# Patient Record
Sex: MALE | Race: WHITE | Employment: OTHER | URBAN - METROPOLITAN AREA
[De-identification: names, ages, dates, MRNs, and addresses within clinical notes are randomized per-mention and may not be internally consistent; named-entity substitution may affect disease eponyms.]

---

## 2017-01-18 ENCOUNTER — OFFICE VISIT (OUTPATIENT)
Dept: ORTHOPEDIC SURGERY | Age: 57
End: 2017-01-18

## 2017-01-18 VITALS
HEIGHT: 68 IN | WEIGHT: 199.08 LBS | BODY MASS INDEX: 30.17 KG/M2 | HEART RATE: 69 BPM | SYSTOLIC BLOOD PRESSURE: 106 MMHG | DIASTOLIC BLOOD PRESSURE: 67 MMHG

## 2017-01-18 DIAGNOSIS — M47.22 CERVICAL SPONDYLOSIS WITH RADICULOPATHY: Primary | ICD-10-CM

## 2017-01-18 PROCEDURE — 1036F TOBACCO NON-USER: CPT | Performed by: PHYSICAL MEDICINE & REHABILITATION

## 2017-01-18 PROCEDURE — 99213 OFFICE O/P EST LOW 20 MIN: CPT | Performed by: PHYSICAL MEDICINE & REHABILITATION

## 2017-01-18 PROCEDURE — 3017F COLORECTAL CA SCREEN DOC REV: CPT | Performed by: PHYSICAL MEDICINE & REHABILITATION

## 2017-01-18 PROCEDURE — G8427 DOCREV CUR MEDS BY ELIG CLIN: HCPCS | Performed by: PHYSICAL MEDICINE & REHABILITATION

## 2017-01-18 PROCEDURE — G8419 CALC BMI OUT NRM PARAM NOF/U: HCPCS | Performed by: PHYSICAL MEDICINE & REHABILITATION

## 2017-01-18 PROCEDURE — G8599 NO ASA/ANTIPLAT THER USE RNG: HCPCS | Performed by: PHYSICAL MEDICINE & REHABILITATION

## 2017-01-18 PROCEDURE — G8484 FLU IMMUNIZE NO ADMIN: HCPCS | Performed by: PHYSICAL MEDICINE & REHABILITATION

## 2017-01-26 ENCOUNTER — OFFICE VISIT (OUTPATIENT)
Dept: ORTHOPEDIC SURGERY | Age: 57
End: 2017-01-26

## 2017-01-26 VITALS
HEIGHT: 68 IN | DIASTOLIC BLOOD PRESSURE: 87 MMHG | BODY MASS INDEX: 30.17 KG/M2 | WEIGHT: 199.08 LBS | SYSTOLIC BLOOD PRESSURE: 134 MMHG | HEART RATE: 61 BPM

## 2017-01-26 DIAGNOSIS — M47.22 CERVICAL SPONDYLOSIS WITH RADICULOPATHY: Primary | ICD-10-CM

## 2017-01-26 PROCEDURE — 3017F COLORECTAL CA SCREEN DOC REV: CPT | Performed by: ORTHOPAEDIC SURGERY

## 2017-01-26 PROCEDURE — 1036F TOBACCO NON-USER: CPT | Performed by: ORTHOPAEDIC SURGERY

## 2017-01-26 PROCEDURE — G8599 NO ASA/ANTIPLAT THER USE RNG: HCPCS | Performed by: ORTHOPAEDIC SURGERY

## 2017-01-26 PROCEDURE — G8419 CALC BMI OUT NRM PARAM NOF/U: HCPCS | Performed by: ORTHOPAEDIC SURGERY

## 2017-01-26 PROCEDURE — G8427 DOCREV CUR MEDS BY ELIG CLIN: HCPCS | Performed by: ORTHOPAEDIC SURGERY

## 2017-01-26 PROCEDURE — 99213 OFFICE O/P EST LOW 20 MIN: CPT | Performed by: ORTHOPAEDIC SURGERY

## 2017-01-26 PROCEDURE — G8484 FLU IMMUNIZE NO ADMIN: HCPCS | Performed by: ORTHOPAEDIC SURGERY

## 2017-01-27 ENCOUNTER — TELEPHONE (OUTPATIENT)
Dept: CARDIOLOGY CLINIC | Age: 57
End: 2017-01-27

## 2017-01-30 ENCOUNTER — TELEPHONE (OUTPATIENT)
Dept: ORTHOPEDIC SURGERY | Age: 57
End: 2017-01-30

## 2017-01-30 ENCOUNTER — TELEPHONE (OUTPATIENT)
Dept: CARDIOLOGY CLINIC | Age: 57
End: 2017-01-30

## 2017-02-06 ENCOUNTER — HOSPITAL ENCOUNTER (OUTPATIENT)
Dept: OTHER | Age: 57
Discharge: HOME | End: 2017-02-06
Attending: FAMILY MEDICINE | Admitting: FAMILY MEDICINE

## 2017-02-06 DIAGNOSIS — M25.511 ARTHRALGIA OF RIGHT ACROMIOCLAVICULAR JOINT: ICD-10-CM

## 2017-02-09 ENCOUNTER — TELEPHONE (OUTPATIENT)
Dept: ORTHOPEDIC SURGERY | Age: 57
End: 2017-02-09

## 2017-02-22 ENCOUNTER — OFFICE VISIT (OUTPATIENT)
Dept: ORTHOPEDIC SURGERY | Age: 57
End: 2017-02-22

## 2017-02-22 VITALS
HEART RATE: 66 BPM | SYSTOLIC BLOOD PRESSURE: 115 MMHG | DIASTOLIC BLOOD PRESSURE: 76 MMHG | HEIGHT: 69 IN | BODY MASS INDEX: 29.49 KG/M2 | WEIGHT: 199.08 LBS

## 2017-02-22 DIAGNOSIS — Z98.1 S/P CERVICAL SPINAL FUSION: Primary | ICD-10-CM

## 2017-02-22 PROCEDURE — 72040 X-RAY EXAM NECK SPINE 2-3 VW: CPT | Performed by: ORTHOPAEDIC SURGERY

## 2017-02-22 PROCEDURE — 99024 POSTOP FOLLOW-UP VISIT: CPT | Performed by: ORTHOPAEDIC SURGERY

## 2017-02-22 RX ORDER — OXYCODONE HYDROCHLORIDE AND ACETAMINOPHEN 5; 325 MG/1; MG/1
1 TABLET ORAL EVERY 6 HOURS PRN
Qty: 50 TABLET | Refills: 0 | Status: SHIPPED | OUTPATIENT
Start: 2017-02-22 | End: 2017-04-05 | Stop reason: SDUPTHER

## 2017-03-31 ENCOUNTER — TELEPHONE (OUTPATIENT)
Dept: ORTHOPEDIC SURGERY | Age: 57
End: 2017-03-31

## 2017-04-05 ENCOUNTER — OFFICE VISIT (OUTPATIENT)
Dept: ORTHOPEDIC SURGERY | Age: 57
End: 2017-04-05

## 2017-04-05 VITALS — WEIGHT: 199 LBS | HEIGHT: 68 IN | HEART RATE: 71 BPM | BODY MASS INDEX: 30.16 KG/M2

## 2017-04-05 DIAGNOSIS — Z98.1 S/P CERVICAL SPINAL FUSION: Primary | ICD-10-CM

## 2017-04-05 DIAGNOSIS — M25.512 ACUTE PAIN OF LEFT SHOULDER: ICD-10-CM

## 2017-04-05 PROCEDURE — 99024 POSTOP FOLLOW-UP VISIT: CPT | Performed by: PHYSICIAN ASSISTANT

## 2017-04-05 PROCEDURE — 72040 X-RAY EXAM NECK SPINE 2-3 VW: CPT | Performed by: PHYSICIAN ASSISTANT

## 2017-04-05 PROCEDURE — 73030 X-RAY EXAM OF SHOULDER: CPT | Performed by: PHYSICIAN ASSISTANT

## 2017-04-05 RX ORDER — DEXAMETHASONE 6 MG/1
6 TABLET ORAL 2 TIMES DAILY
Qty: 14 TABLET | Refills: 0 | Status: SHIPPED | OUTPATIENT
Start: 2017-04-05 | End: 2017-04-12

## 2017-04-05 RX ORDER — OXYCODONE HYDROCHLORIDE AND ACETAMINOPHEN 5; 325 MG/1; MG/1
TABLET ORAL
Qty: 50 TABLET | Refills: 0 | Status: SHIPPED | OUTPATIENT
Start: 2017-04-05 | End: 2018-01-04

## 2017-05-16 ENCOUNTER — OFFICE VISIT (OUTPATIENT)
Dept: ORTHOPEDIC SURGERY | Age: 57
End: 2017-05-16

## 2017-05-16 VITALS
DIASTOLIC BLOOD PRESSURE: 68 MMHG | SYSTOLIC BLOOD PRESSURE: 98 MMHG | HEIGHT: 68 IN | HEART RATE: 75 BPM | WEIGHT: 199.08 LBS | BODY MASS INDEX: 30.17 KG/M2

## 2017-05-16 DIAGNOSIS — Z98.1 S/P CERVICAL SPINAL FUSION: Primary | ICD-10-CM

## 2017-05-16 PROCEDURE — 99024 POSTOP FOLLOW-UP VISIT: CPT | Performed by: ORTHOPAEDIC SURGERY

## 2017-05-16 PROCEDURE — 72040 X-RAY EXAM NECK SPINE 2-3 VW: CPT | Performed by: ORTHOPAEDIC SURGERY

## 2017-05-17 ENCOUNTER — OFFICE VISIT (OUTPATIENT)
Dept: CARDIOLOGY CLINIC | Age: 57
End: 2017-05-17

## 2017-05-17 VITALS
DIASTOLIC BLOOD PRESSURE: 72 MMHG | WEIGHT: 198.8 LBS | SYSTOLIC BLOOD PRESSURE: 108 MMHG | HEART RATE: 66 BPM | HEIGHT: 68 IN | BODY MASS INDEX: 30.13 KG/M2

## 2017-05-17 DIAGNOSIS — E78.2 MIXED HYPERLIPIDEMIA: ICD-10-CM

## 2017-05-17 DIAGNOSIS — I25.118 CORONARY ARTERY DISEASE OF NATIVE ARTERY OF NATIVE HEART WITH STABLE ANGINA PECTORIS (HCC): Primary | ICD-10-CM

## 2017-05-17 PROCEDURE — G8417 CALC BMI ABV UP PARAM F/U: HCPCS | Performed by: INTERNAL MEDICINE

## 2017-05-17 PROCEDURE — 1036F TOBACCO NON-USER: CPT | Performed by: INTERNAL MEDICINE

## 2017-05-17 PROCEDURE — 99214 OFFICE O/P EST MOD 30 MIN: CPT | Performed by: INTERNAL MEDICINE

## 2017-05-17 PROCEDURE — 3017F COLORECTAL CA SCREEN DOC REV: CPT | Performed by: INTERNAL MEDICINE

## 2017-05-17 PROCEDURE — G8599 NO ASA/ANTIPLAT THER USE RNG: HCPCS | Performed by: INTERNAL MEDICINE

## 2017-05-17 PROCEDURE — G8427 DOCREV CUR MEDS BY ELIG CLIN: HCPCS | Performed by: INTERNAL MEDICINE

## 2017-05-18 ENCOUNTER — HOSPITAL ENCOUNTER (OUTPATIENT)
Dept: OTHER | Age: 57
Discharge: HOME | End: 2017-05-18
Attending: INTERNAL MEDICINE | Admitting: INTERNAL MEDICINE

## 2017-05-18 LAB
ALBUMIN SERPL-MCNC: 4.1 G/DL (ref 3.4–5)
ALP BLD-CCNC: 108 U/L (ref 40–129)
ALT SERPL-CCNC: 21 U/L (ref 10–40)
AST SERPL-CCNC: 15 U/L (ref 15–37)
BILIRUB SERPL-MCNC: 0.4 MG/DL (ref 0–1)
BILIRUBIN DIRECT: <0.2 MG/DL (ref 0–0.3)
BILIRUBIN, INDIRECT: NORMAL MG/DL (ref 0–1)
CHOLESTEROL, FASTING: 200 MG/DL (ref 0–199)
HDLC SERPL-MCNC: 25 MG/DL (ref 40–60)
LDL CHOLESTEROL CALCULATED: 138 MG/DL
TOTAL PROTEIN: 6.7 G/DL (ref 6.4–8.2)
TRIGLYCERIDE, FASTING: 187 MG/DL (ref 0–150)
VLDLC SERPL CALC-MCNC: 37 MG/DL

## 2017-05-19 ENCOUNTER — TELEPHONE (OUTPATIENT)
Dept: CARDIOLOGY CLINIC | Age: 57
End: 2017-05-19

## 2017-05-19 RX ORDER — ATORVASTATIN CALCIUM 80 MG/1
80 TABLET, FILM COATED ORAL DAILY
Qty: 90 TABLET | Refills: 3 | Status: SHIPPED | OUTPATIENT
Start: 2017-05-19 | End: 2018-06-06 | Stop reason: SDUPTHER

## 2017-05-22 ENCOUNTER — OFFICE VISIT (OUTPATIENT)
Dept: ORTHOPEDIC SURGERY | Age: 57
End: 2017-05-22

## 2017-05-22 VITALS
HEART RATE: 66 BPM | WEIGHT: 198.85 LBS | SYSTOLIC BLOOD PRESSURE: 137 MMHG | BODY MASS INDEX: 30.14 KG/M2 | HEIGHT: 68 IN | DIASTOLIC BLOOD PRESSURE: 89 MMHG

## 2017-05-22 DIAGNOSIS — G89.29 CHRONIC LEFT SHOULDER PAIN: Primary | ICD-10-CM

## 2017-05-22 DIAGNOSIS — M25.512 CHRONIC LEFT SHOULDER PAIN: Primary | ICD-10-CM

## 2017-05-22 PROCEDURE — G8427 DOCREV CUR MEDS BY ELIG CLIN: HCPCS | Performed by: ORTHOPAEDIC SURGERY

## 2017-05-22 PROCEDURE — 99213 OFFICE O/P EST LOW 20 MIN: CPT | Performed by: ORTHOPAEDIC SURGERY

## 2017-05-22 PROCEDURE — 3017F COLORECTAL CA SCREEN DOC REV: CPT | Performed by: ORTHOPAEDIC SURGERY

## 2017-05-22 PROCEDURE — G8599 NO ASA/ANTIPLAT THER USE RNG: HCPCS | Performed by: ORTHOPAEDIC SURGERY

## 2017-05-22 PROCEDURE — G8417 CALC BMI ABV UP PARAM F/U: HCPCS | Performed by: ORTHOPAEDIC SURGERY

## 2017-05-22 PROCEDURE — 1036F TOBACCO NON-USER: CPT | Performed by: ORTHOPAEDIC SURGERY

## 2017-05-23 ENCOUNTER — HOSPITAL ENCOUNTER (OUTPATIENT)
Dept: MRI IMAGING | Age: 57
Discharge: HOME | End: 2017-05-23
Attending: ORTHOPAEDIC SURGERY | Admitting: ORTHOPAEDIC SURGERY

## 2017-05-23 DIAGNOSIS — M25.512 CHRONIC LEFT SHOULDER PAIN: ICD-10-CM

## 2017-05-23 DIAGNOSIS — G89.29 CHRONIC LEFT SHOULDER PAIN: ICD-10-CM

## 2017-05-23 DIAGNOSIS — M25.512 PAIN IN LEFT SHOULDER: ICD-10-CM

## 2017-05-25 ENCOUNTER — TELEPHONE (OUTPATIENT)
Dept: CARDIOLOGY CLINIC | Age: 57
End: 2017-05-25

## 2017-05-25 ENCOUNTER — TELEPHONE (OUTPATIENT)
Dept: ORTHOPEDIC SURGERY | Age: 57
End: 2017-05-25

## 2017-05-25 ENCOUNTER — OFFICE VISIT (OUTPATIENT)
Dept: ORTHOPEDIC SURGERY | Age: 57
End: 2017-05-25

## 2017-05-25 VITALS
WEIGHT: 198.85 LBS | HEIGHT: 68 IN | DIASTOLIC BLOOD PRESSURE: 53 MMHG | BODY MASS INDEX: 30.14 KG/M2 | HEART RATE: 67 BPM | SYSTOLIC BLOOD PRESSURE: 74 MMHG

## 2017-05-25 DIAGNOSIS — M75.122 COMPLETE TEAR OF LEFT ROTATOR CUFF: Primary | ICD-10-CM

## 2017-05-25 PROCEDURE — G8427 DOCREV CUR MEDS BY ELIG CLIN: HCPCS | Performed by: ORTHOPAEDIC SURGERY

## 2017-05-25 PROCEDURE — G8417 CALC BMI ABV UP PARAM F/U: HCPCS | Performed by: ORTHOPAEDIC SURGERY

## 2017-05-25 PROCEDURE — 99213 OFFICE O/P EST LOW 20 MIN: CPT | Performed by: ORTHOPAEDIC SURGERY

## 2017-05-25 PROCEDURE — 1036F TOBACCO NON-USER: CPT | Performed by: ORTHOPAEDIC SURGERY

## 2017-05-25 PROCEDURE — 3017F COLORECTAL CA SCREEN DOC REV: CPT | Performed by: ORTHOPAEDIC SURGERY

## 2017-05-25 PROCEDURE — G8599 NO ASA/ANTIPLAT THER USE RNG: HCPCS | Performed by: ORTHOPAEDIC SURGERY

## 2017-05-26 ASSESSMENT — ENCOUNTER SYMPTOMS: SHORTNESS OF BREATH: 1

## 2017-05-30 ENCOUNTER — HOSPITAL ENCOUNTER (OUTPATIENT)
Dept: SURGERY | Age: 57
Discharge: HOME | End: 2017-05-30
Attending: ORTHOPAEDIC SURGERY | Admitting: ORTHOPAEDIC SURGERY

## 2017-05-30 VITALS
BODY MASS INDEX: 30.01 KG/M2 | OXYGEN SATURATION: 97 % | TEMPERATURE: 96.8 F | HEIGHT: 68 IN | SYSTOLIC BLOOD PRESSURE: 119 MMHG | DIASTOLIC BLOOD PRESSURE: 80 MMHG | WEIGHT: 198 LBS | HEART RATE: 61 BPM | RESPIRATION RATE: 18 BRPM

## 2017-05-30 RX ORDER — HYDROMORPHONE HCL 110MG/55ML
0.5 PATIENT CONTROLLED ANALGESIA SYRINGE INTRAVENOUS EVERY 5 MIN PRN
Status: DISCONTINUED | OUTPATIENT
Start: 2017-05-30 | End: 2017-05-31 | Stop reason: HOSPADM

## 2017-05-30 RX ORDER — LIDOCAINE HYDROCHLORIDE 10 MG/ML
INJECTION, SOLUTION EPIDURAL; INFILTRATION; INTRACAUDAL; PERINEURAL
Status: COMPLETED
Start: 2017-05-30 | End: 2017-05-30

## 2017-05-30 RX ORDER — OXYCODONE HYDROCHLORIDE 5 MG/1
10 TABLET ORAL PRN
Status: ACTIVE | OUTPATIENT
Start: 2017-05-30 | End: 2017-05-30

## 2017-05-30 RX ORDER — OXYCODONE HYDROCHLORIDE 5 MG/1
5 TABLET ORAL PRN
Status: ACTIVE | OUTPATIENT
Start: 2017-05-30 | End: 2017-05-30

## 2017-05-30 RX ORDER — MEPERIDINE HYDROCHLORIDE 25 MG/ML
12.5 INJECTION INTRAMUSCULAR; INTRAVENOUS; SUBCUTANEOUS EVERY 5 MIN PRN
Status: DISCONTINUED | OUTPATIENT
Start: 2017-05-30 | End: 2017-05-31 | Stop reason: HOSPADM

## 2017-05-30 RX ORDER — PROMETHAZINE HYDROCHLORIDE 25 MG/ML
6.25 INJECTION, SOLUTION INTRAMUSCULAR; INTRAVENOUS
Status: ACTIVE | OUTPATIENT
Start: 2017-05-30 | End: 2017-05-30

## 2017-05-30 RX ORDER — SODIUM CHLORIDE, SODIUM LACTATE, POTASSIUM CHLORIDE, CALCIUM CHLORIDE 600; 310; 30; 20 MG/100ML; MG/100ML; MG/100ML; MG/100ML
INJECTION, SOLUTION INTRAVENOUS CONTINUOUS
Status: DISCONTINUED | OUTPATIENT
Start: 2017-05-30 | End: 2017-05-31 | Stop reason: HOSPADM

## 2017-05-30 RX ORDER — OXYCODONE AND ACETAMINOPHEN 7.5; 325 MG/1; MG/1
1 TABLET ORAL EVERY 6 HOURS PRN
Qty: 50 TABLET | Refills: 0 | Status: SHIPPED | OUTPATIENT
Start: 2017-05-30 | End: 2017-06-06

## 2017-05-30 RX ORDER — SODIUM CHLORIDE, SODIUM LACTATE, POTASSIUM CHLORIDE, CALCIUM CHLORIDE 600; 310; 30; 20 MG/100ML; MG/100ML; MG/100ML; MG/100ML
INJECTION, SOLUTION INTRAVENOUS
Status: COMPLETED
Start: 2017-05-30 | End: 2017-05-30

## 2017-05-30 RX ORDER — HYDRALAZINE HYDROCHLORIDE 20 MG/ML
5 INJECTION INTRAMUSCULAR; INTRAVENOUS EVERY 10 MIN PRN
Status: DISCONTINUED | OUTPATIENT
Start: 2017-05-30 | End: 2017-05-31 | Stop reason: HOSPADM

## 2017-05-30 RX ORDER — LIDOCAINE HYDROCHLORIDE 10 MG/ML
1 INJECTION, SOLUTION EPIDURAL; INFILTRATION; INTRACAUDAL; PERINEURAL
Status: COMPLETED | OUTPATIENT
Start: 2017-05-30 | End: 2017-05-30

## 2017-05-30 RX ORDER — ACETAMINOPHEN 10 MG/ML
INJECTION, SOLUTION INTRAVENOUS
Status: COMPLETED
Start: 2017-05-30 | End: 2017-05-30

## 2017-05-30 RX ORDER — MIDAZOLAM HYDROCHLORIDE 1 MG/ML
INJECTION INTRAMUSCULAR; INTRAVENOUS
Status: DISPENSED
Start: 2017-05-30 | End: 2017-05-30

## 2017-05-30 RX ORDER — FENTANYL CITRATE 50 UG/ML
INJECTION, SOLUTION INTRAMUSCULAR; INTRAVENOUS
Status: DISPENSED
Start: 2017-05-30 | End: 2017-05-30

## 2017-05-30 RX ORDER — DIPHENHYDRAMINE HYDROCHLORIDE 50 MG/ML
12.5 INJECTION INTRAMUSCULAR; INTRAVENOUS
Status: ACTIVE | OUTPATIENT
Start: 2017-05-30 | End: 2017-05-30

## 2017-05-30 RX ORDER — METOCLOPRAMIDE HYDROCHLORIDE 5 MG/ML
10 INJECTION INTRAMUSCULAR; INTRAVENOUS
Status: ACTIVE | OUTPATIENT
Start: 2017-05-30 | End: 2017-05-30

## 2017-05-30 RX ORDER — LABETALOL HYDROCHLORIDE 5 MG/ML
5 INJECTION, SOLUTION INTRAVENOUS EVERY 10 MIN PRN
Status: DISCONTINUED | OUTPATIENT
Start: 2017-05-30 | End: 2017-05-31 | Stop reason: HOSPADM

## 2017-05-30 RX ORDER — ACETAMINOPHEN 10 MG/ML
1000 INJECTION, SOLUTION INTRAVENOUS ONCE
Status: COMPLETED | OUTPATIENT
Start: 2017-05-30 | End: 2017-05-30

## 2017-05-30 RX ORDER — BUPIVACAINE HYDROCHLORIDE 5 MG/ML
INJECTION, SOLUTION EPIDURAL; INTRACAUDAL
Status: DISPENSED
Start: 2017-05-30 | End: 2017-05-30

## 2017-05-30 RX ORDER — HYDROMORPHONE HCL 110MG/55ML
0.25 PATIENT CONTROLLED ANALGESIA SYRINGE INTRAVENOUS EVERY 5 MIN PRN
Status: DISCONTINUED | OUTPATIENT
Start: 2017-05-30 | End: 2017-05-31 | Stop reason: HOSPADM

## 2017-05-30 RX ORDER — MORPHINE SULFATE 10 MG/ML
1 INJECTION, SOLUTION INTRAMUSCULAR; INTRAVENOUS EVERY 5 MIN PRN
Status: DISCONTINUED | OUTPATIENT
Start: 2017-05-30 | End: 2017-05-31 | Stop reason: HOSPADM

## 2017-05-30 RX ADMIN — ACETAMINOPHEN 1000 MG: 10 INJECTION, SOLUTION INTRAVENOUS at 07:21

## 2017-05-30 RX ADMIN — LIDOCAINE HYDROCHLORIDE 0.1 ML: 10 INJECTION, SOLUTION EPIDURAL; INFILTRATION; INTRACAUDAL; PERINEURAL at 07:19

## 2017-05-30 RX ADMIN — SODIUM CHLORIDE, SODIUM LACTATE, POTASSIUM CHLORIDE, CALCIUM CHLORIDE: 600; 310; 30; 20 INJECTION, SOLUTION INTRAVENOUS at 07:20

## 2017-05-30 ASSESSMENT — PAIN - FUNCTIONAL ASSESSMENT: PAIN_FUNCTIONAL_ASSESSMENT: 0-10

## 2017-05-30 ASSESSMENT — PAIN DESCRIPTION - PROGRESSION: CLINICAL_PROGRESSION: NOT CHANGED

## 2017-05-30 ASSESSMENT — ENCOUNTER SYMPTOMS: SHORTNESS OF BREATH: 1

## 2017-05-30 ASSESSMENT — PAIN DESCRIPTION - DESCRIPTORS: DESCRIPTORS: ACHING;BURNING

## 2017-06-07 ENCOUNTER — HOSPITAL ENCOUNTER (OUTPATIENT)
Dept: CT IMAGING | Age: 57
Discharge: HOME | End: 2017-06-07
Attending: NURSE PRACTITIONER | Admitting: NURSE PRACTITIONER

## 2017-06-07 DIAGNOSIS — Z85.72 PERSONAL HISTORY OF LYMPHOMA: ICD-10-CM

## 2017-06-07 DIAGNOSIS — Z85.72 HISTORY OF NON-HODGKIN'S LYMPHOMA: ICD-10-CM

## 2017-06-07 LAB
A/G RATIO: 1.4 (ref 1.1–2.2)
ALBUMIN SERPL-MCNC: 4 G/DL (ref 3.4–5)
ALP BLD-CCNC: 141 U/L (ref 40–129)
ALT SERPL-CCNC: 15 U/L (ref 10–40)
ANION GAP SERPL CALCULATED.3IONS-SCNC: 9 MMOL/L (ref 3–16)
AST SERPL-CCNC: 15 U/L (ref 15–37)
BASOPHILS ABSOLUTE: 0.1 K/UL (ref 0–0.2)
BASOPHILS RELATIVE PERCENT: 1.2 %
BILIRUB SERPL-MCNC: 0.5 MG/DL (ref 0–1)
BUN BLDV-MCNC: 7 MG/DL (ref 7–20)
CALCIUM SERPL-MCNC: 9.3 MG/DL (ref 8.3–10.6)
CHLORIDE BLD-SCNC: 99 MMOL/L (ref 99–110)
CO2: 29 MMOL/L (ref 21–32)
CREAT SERPL-MCNC: 0.8 MG/DL (ref 0.9–1.3)
EOSINOPHILS ABSOLUTE: 0.3 K/UL (ref 0–0.6)
EOSINOPHILS RELATIVE PERCENT: 4.1 %
GFR AFRICAN AMERICAN: >60
GFR NON-AFRICAN AMERICAN: >60
GLOBULIN: 2.8 G/DL
GLUCOSE BLD-MCNC: 112 MG/DL (ref 70–99)
HCT VFR BLD CALC: 39.3 % (ref 40.5–52.5)
HEMOGLOBIN: 12.8 G/DL (ref 13.5–17.5)
LYMPHOCYTES ABSOLUTE: 2.7 K/UL (ref 1–5.1)
LYMPHOCYTES RELATIVE PERCENT: 39.6 %
MCH RBC QN AUTO: 29.3 PG (ref 26–34)
MCHC RBC AUTO-ENTMCNC: 32.6 G/DL (ref 31–36)
MCV RBC AUTO: 89.9 FL (ref 80–100)
MONOCYTES ABSOLUTE: 0.5 K/UL (ref 0–1.3)
MONOCYTES RELATIVE PERCENT: 7.7 %
NEUTROPHILS ABSOLUTE: 3.2 K/UL (ref 1.7–7.7)
NEUTROPHILS RELATIVE PERCENT: 47.4 %
PDW BLD-RTO: 14 % (ref 12.4–15.4)
PLATELET # BLD: 247 K/UL (ref 135–450)
PMV BLD AUTO: 10 FL (ref 5–10.5)
POTASSIUM SERPL-SCNC: 3.7 MMOL/L (ref 3.5–5.1)
RBC # BLD: 4.37 M/UL (ref 4.2–5.9)
SODIUM BLD-SCNC: 137 MMOL/L (ref 136–145)
TOTAL PROTEIN: 6.8 G/DL (ref 6.4–8.2)
WBC # BLD: 6.8 K/UL (ref 4–11)

## 2017-06-08 ENCOUNTER — OFFICE VISIT (OUTPATIENT)
Dept: ORTHOPEDIC SURGERY | Age: 57
End: 2017-06-08

## 2017-06-08 VITALS
SYSTOLIC BLOOD PRESSURE: 125 MMHG | HEART RATE: 68 BPM | WEIGHT: 197.97 LBS | HEIGHT: 68 IN | BODY MASS INDEX: 30 KG/M2 | DIASTOLIC BLOOD PRESSURE: 74 MMHG

## 2017-06-08 DIAGNOSIS — M75.122 COMPLETE TEAR OF LEFT ROTATOR CUFF: Primary | ICD-10-CM

## 2017-06-08 PROCEDURE — 99024 POSTOP FOLLOW-UP VISIT: CPT | Performed by: ORTHOPAEDIC SURGERY

## 2017-06-08 RX ORDER — OXYCODONE HYDROCHLORIDE AND ACETAMINOPHEN 5; 325 MG/1; MG/1
TABLET ORAL
Qty: 50 TABLET | Refills: 0 | Status: CANCELLED | OUTPATIENT
Start: 2017-06-08

## 2017-07-20 ENCOUNTER — OFFICE VISIT (OUTPATIENT)
Dept: ORTHOPEDIC SURGERY | Age: 57
End: 2017-07-20

## 2017-07-20 VITALS — WEIGHT: 201.06 LBS | BODY MASS INDEX: 30.47 KG/M2 | HEIGHT: 68 IN

## 2017-07-20 DIAGNOSIS — M75.122 COMPLETE TEAR OF LEFT ROTATOR CUFF: Primary | ICD-10-CM

## 2017-07-20 PROCEDURE — 99024 POSTOP FOLLOW-UP VISIT: CPT | Performed by: ORTHOPAEDIC SURGERY

## 2017-08-29 ENCOUNTER — HOSPITAL ENCOUNTER (OUTPATIENT)
Dept: PULMONOLOGY | Age: 57
Discharge: HOME | End: 2017-08-29
Attending: INTERNAL MEDICINE | Admitting: INTERNAL MEDICINE

## 2017-08-29 VITALS — OXYGEN SATURATION: 96 %

## 2017-08-29 DIAGNOSIS — R06.00 DYSPNEA: ICD-10-CM

## 2017-08-29 RX ORDER — ALBUTEROL SULFATE 2.5 MG/3ML
2.5 SOLUTION RESPIRATORY (INHALATION) ONCE
Status: DISCONTINUED | OUTPATIENT
Start: 2017-08-29 | End: 2017-08-30 | Stop reason: HOSPADM

## 2017-09-07 ENCOUNTER — OFFICE VISIT (OUTPATIENT)
Dept: PULMONOLOGY | Age: 57
End: 2017-09-07

## 2017-09-07 VITALS
WEIGHT: 202.6 LBS | SYSTOLIC BLOOD PRESSURE: 126 MMHG | BODY MASS INDEX: 30.01 KG/M2 | OXYGEN SATURATION: 96 % | HEART RATE: 68 BPM | TEMPERATURE: 98.2 F | HEIGHT: 69 IN | RESPIRATION RATE: 18 BRPM | DIASTOLIC BLOOD PRESSURE: 82 MMHG

## 2017-09-07 DIAGNOSIS — R94.2 DIFFUSION CAPACITY OF LUNG (DL), DECREASED: ICD-10-CM

## 2017-09-07 DIAGNOSIS — R06.02 SHORTNESS OF BREATH: ICD-10-CM

## 2017-09-07 DIAGNOSIS — Z72.0 TOBACCO ABUSE: ICD-10-CM

## 2017-09-07 DIAGNOSIS — J43.9 PULMONARY EMPHYSEMA, UNSPECIFIED EMPHYSEMA TYPE (HCC): Primary | ICD-10-CM

## 2017-09-07 PROCEDURE — 3023F SPIROM DOC REV: CPT | Performed by: INTERNAL MEDICINE

## 2017-09-07 PROCEDURE — 99214 OFFICE O/P EST MOD 30 MIN: CPT | Performed by: INTERNAL MEDICINE

## 2017-09-07 PROCEDURE — 3017F COLORECTAL CA SCREEN DOC REV: CPT | Performed by: INTERNAL MEDICINE

## 2017-09-07 PROCEDURE — 1036F TOBACCO NON-USER: CPT | Performed by: INTERNAL MEDICINE

## 2017-09-07 PROCEDURE — G8926 SPIRO NO PERF OR DOC: HCPCS | Performed by: INTERNAL MEDICINE

## 2017-09-07 PROCEDURE — G8417 CALC BMI ABV UP PARAM F/U: HCPCS | Performed by: INTERNAL MEDICINE

## 2017-09-07 PROCEDURE — G8427 DOCREV CUR MEDS BY ELIG CLIN: HCPCS | Performed by: INTERNAL MEDICINE

## 2017-09-07 PROCEDURE — G8599 NO ASA/ANTIPLAT THER USE RNG: HCPCS | Performed by: INTERNAL MEDICINE

## 2017-09-07 RX ORDER — ALBUTEROL SULFATE 90 UG/1
2 AEROSOL, METERED RESPIRATORY (INHALATION) EVERY 6 HOURS PRN
Qty: 1 INHALER | Refills: 5 | Status: SHIPPED | OUTPATIENT
Start: 2017-09-07 | End: 2018-03-06 | Stop reason: CLARIF

## 2018-01-04 PROBLEM — R65.20 SEVERE SEPSIS (HCC): Status: ACTIVE | Noted: 2018-01-04

## 2018-01-04 PROBLEM — A41.9 SEVERE SEPSIS (HCC): Status: ACTIVE | Noted: 2018-01-04

## 2018-01-12 ENCOUNTER — TELEPHONE (OUTPATIENT)
Dept: PULMONOLOGY | Age: 58
End: 2018-01-12

## 2018-01-12 DIAGNOSIS — J18.9 COMMUNITY ACQUIRED PNEUMONIA, UNSPECIFIED LATERALITY: Primary | ICD-10-CM

## 2018-01-24 ENCOUNTER — OFFICE VISIT (OUTPATIENT)
Dept: SURGERY | Age: 58
End: 2018-01-24

## 2018-01-24 ENCOUNTER — HOSPITAL ENCOUNTER (OUTPATIENT)
Dept: OTHER | Age: 58
Discharge: HOME | End: 2018-01-24
Attending: INTERNAL MEDICINE | Admitting: INTERNAL MEDICINE

## 2018-01-24 VITALS
HEIGHT: 68 IN | BODY MASS INDEX: 29.86 KG/M2 | SYSTOLIC BLOOD PRESSURE: 136 MMHG | DIASTOLIC BLOOD PRESSURE: 82 MMHG | WEIGHT: 197 LBS

## 2018-01-24 DIAGNOSIS — D48.5 NEOPLASM OF UNCERTAIN BEHAVIOR OF SKIN: Primary | ICD-10-CM

## 2018-01-24 DIAGNOSIS — J18.9 COMMUNITY ACQUIRED PNEUMONIA, UNSPECIFIED LATERALITY: ICD-10-CM

## 2018-01-24 PROCEDURE — 1036F TOBACCO NON-USER: CPT | Performed by: SURGERY

## 2018-01-24 PROCEDURE — 1111F DSCHRG MED/CURRENT MED MERGE: CPT | Performed by: SURGERY

## 2018-01-24 PROCEDURE — G8427 DOCREV CUR MEDS BY ELIG CLIN: HCPCS | Performed by: SURGERY

## 2018-01-24 PROCEDURE — G8484 FLU IMMUNIZE NO ADMIN: HCPCS | Performed by: SURGERY

## 2018-01-24 PROCEDURE — G8417 CALC BMI ABV UP PARAM F/U: HCPCS | Performed by: SURGERY

## 2018-01-24 PROCEDURE — 3017F COLORECTAL CA SCREEN DOC REV: CPT | Performed by: SURGERY

## 2018-01-24 PROCEDURE — G8598 ASA/ANTIPLAT THER USED: HCPCS | Performed by: SURGERY

## 2018-01-24 PROCEDURE — 99201 PR OFFICE OUTPATIENT NEW 10 MINUTES: CPT | Performed by: SURGERY

## 2018-01-25 ENCOUNTER — OFFICE VISIT (OUTPATIENT)
Dept: PULMONOLOGY | Age: 58
End: 2018-01-25

## 2018-01-25 VITALS
BODY MASS INDEX: 29.4 KG/M2 | OXYGEN SATURATION: 97 % | RESPIRATION RATE: 16 BRPM | DIASTOLIC BLOOD PRESSURE: 72 MMHG | HEART RATE: 74 BPM | HEIGHT: 68 IN | TEMPERATURE: 97.9 F | SYSTOLIC BLOOD PRESSURE: 118 MMHG | WEIGHT: 194 LBS

## 2018-01-25 DIAGNOSIS — Z72.0 TOBACCO ABUSE: ICD-10-CM

## 2018-01-25 DIAGNOSIS — J18.9 COMMUNITY ACQUIRED PNEUMONIA, UNSPECIFIED LATERALITY: Primary | ICD-10-CM

## 2018-01-25 DIAGNOSIS — J43.9 PULMONARY EMPHYSEMA, UNSPECIFIED EMPHYSEMA TYPE (HCC): ICD-10-CM

## 2018-01-25 PROCEDURE — G8427 DOCREV CUR MEDS BY ELIG CLIN: HCPCS | Performed by: INTERNAL MEDICINE

## 2018-01-25 PROCEDURE — 99214 OFFICE O/P EST MOD 30 MIN: CPT | Performed by: INTERNAL MEDICINE

## 2018-01-25 PROCEDURE — 3023F SPIROM DOC REV: CPT | Performed by: INTERNAL MEDICINE

## 2018-01-25 PROCEDURE — G8484 FLU IMMUNIZE NO ADMIN: HCPCS | Performed by: INTERNAL MEDICINE

## 2018-01-25 PROCEDURE — G8598 ASA/ANTIPLAT THER USED: HCPCS | Performed by: INTERNAL MEDICINE

## 2018-01-25 PROCEDURE — G8417 CALC BMI ABV UP PARAM F/U: HCPCS | Performed by: INTERNAL MEDICINE

## 2018-01-25 PROCEDURE — 1036F TOBACCO NON-USER: CPT | Performed by: INTERNAL MEDICINE

## 2018-01-25 PROCEDURE — 3017F COLORECTAL CA SCREEN DOC REV: CPT | Performed by: INTERNAL MEDICINE

## 2018-01-25 PROCEDURE — G8926 SPIRO NO PERF OR DOC: HCPCS | Performed by: INTERNAL MEDICINE

## 2018-01-25 PROCEDURE — 1111F DSCHRG MED/CURRENT MED MERGE: CPT | Performed by: INTERNAL MEDICINE

## 2018-01-25 NOTE — PROGRESS NOTES
consistent with resolved multifocal pneumonia and   superimposed pulmonary edema.  No focus of acute airspace consolidation is   evident at this time. Assessment:       · Recent Severe pneumonia associated with emphysema exacerbation; was intubated; now close to abseline  · Dyspnea - no obstruction on PFT's; it is likely that deconditioning and Smoking-related lung disease and emphysema are contributing   · Pulmonary emphysema without obstruction   · Tobacco abuse- quitting now  · Mild reduction in DLCO- probably due to emphysema, no other ILD on chest CT, no history to suggest pulmonary vascular disease  · CAD s/p stent  · gastric MALT - in remission  · Preop pulm exam for cystoscopy      Plan:       · Quit during thisrecent  hospitalization  · Albuterol MDI prn   · spiriva daily -reminded to use daily  · Follow up in  3 months   Patient may proceed to the operating room for cystoscopy from pulmonary perspective without further testing. Because of underlying disease, the patient is at increased risk of jennifer-operative complications, including atelectasis, pneumonia and, when general anesthesia is required, failure to liberate from mechanical ventilation. However, this should not preclude the patient from having the recommended operation. It is my recommendation that the patient's inhaled bronchodilators be continued in the perioperative period (spiriva daily) and that short acting bronchodilators (albuterol neb) be used on a prn basis as well. All of this was discussed with the patient today in the office. No further pulmonary testing needed before procedure which can be scheduled at his convenience.

## 2018-01-31 ENCOUNTER — HOSPITAL ENCOUNTER (OUTPATIENT)
Dept: OTHER | Age: 58
Discharge: HOME | End: 2018-01-31
Attending: SURGERY | Admitting: SURGERY

## 2018-01-31 VITALS
TEMPERATURE: 97 F | SYSTOLIC BLOOD PRESSURE: 117 MMHG | RESPIRATION RATE: 14 BRPM | OXYGEN SATURATION: 97 % | DIASTOLIC BLOOD PRESSURE: 76 MMHG | HEART RATE: 65 BPM

## 2018-01-31 PROCEDURE — 11402 EXC TR-EXT B9+MARG 1.1-2 CM: CPT | Performed by: SURGERY

## 2018-01-31 PROCEDURE — 12032 INTMD RPR S/A/T/EXT 2.6-7.5: CPT | Performed by: SURGERY

## 2018-01-31 ASSESSMENT — PAIN DESCRIPTION - DESCRIPTORS: DESCRIPTORS: ACHING

## 2018-01-31 ASSESSMENT — PAIN - FUNCTIONAL ASSESSMENT: PAIN_FUNCTIONAL_ASSESSMENT: 0-10

## 2018-01-31 NOTE — PROGRESS NOTES
inhaler Inhale 2 puffs into the lungs every 6 hours as needed for Wheezing or Shortness of Breath 1 Inhaler 5    atorvastatin (LIPITOR) 80 MG tablet Take 1 tablet by mouth daily 90 tablet 3    omeprazole (PRILOSEC) 20 MG capsule Take 20 mg by mouth daily      nitroGLYCERIN (NITROSTAT) 0.4 MG SL tablet Place 1 tablet under the tongue every 5 minutes as needed for Chest pain 25 tablet 3    Sucralfate (CARAFATE PO) Take by mouth      FLUoxetine (PROZAC) 20 MG capsule Take 20 mg by mouth daily.  aspirin EC 81 MG EC tablet Take 1 tablet by mouth daily. 30 tablet 11    metoprolol (LOPRESSOR) 25 MG tablet Take 25 mg by mouth 2 times daily. Indications: take DOS      zolpidem (AMBIEN) 10 MG tablet Take 10 mg by mouth nightly as needed.  pregabalin (LYRICA) 50 MG capsule   Take 100 mg by mouth 3 times daily        No current facility-administered medications on file prior to visit. Allergies   Allergen Reactions    Bactrim [Sulfamethoxazole-Trimethoprim] Other (See Comments)     States makes yeast infection on his penis    Codeine Nausea Only       Social History     Social History    Marital status:      Spouse name: N/A    Number of children: N/A    Years of education: N/A     Occupational History    Not on file. Social History Main Topics    Smoking status: Former Smoker     Packs/day: 1.50     Years: 35.00     Types: Cigarettes     Quit date: 1/1/2016    Smokeless tobacco: Never Used    Alcohol use No    Drug use: Yes     Types: Marijuana    Sexual activity: Yes     Partners: Female     Other Topics Concern    Not on file     Social History Narrative    No narrative on file       Family History   Problem Relation Age of Onset    Heart Disease Father        ROS:  He reports no complaints related to the eyes, ears , nose throat or mouth. He denies weight loss. No chest pain. No SOB. No urinary complaints. No musculoskeletal complaints.   Skin complaints include lesion L

## 2018-02-07 ENCOUNTER — TELEPHONE (OUTPATIENT)
Dept: SURGERY | Age: 58
End: 2018-02-07

## 2018-02-07 NOTE — PRE-PROCEDURE INSTRUCTIONS

## 2018-02-09 ENCOUNTER — HOSPITAL ENCOUNTER (OUTPATIENT)
Dept: SURGERY | Age: 58
Discharge: HOME | End: 2018-02-09
Attending: UROLOGY | Admitting: UROLOGY

## 2018-02-09 VITALS
RESPIRATION RATE: 12 BRPM | BODY MASS INDEX: 29.4 KG/M2 | HEART RATE: 64 BPM | WEIGHT: 194 LBS | DIASTOLIC BLOOD PRESSURE: 77 MMHG | OXYGEN SATURATION: 97 % | SYSTOLIC BLOOD PRESSURE: 124 MMHG | TEMPERATURE: 97.7 F | HEIGHT: 68 IN

## 2018-02-09 DIAGNOSIS — R06.00 DYSPNEA: ICD-10-CM

## 2018-02-09 RX ORDER — HYDROMORPHONE HCL 110MG/55ML
0.5 PATIENT CONTROLLED ANALGESIA SYRINGE INTRAVENOUS EVERY 5 MIN PRN
Status: DISCONTINUED | OUTPATIENT
Start: 2018-02-09 | End: 2018-02-10 | Stop reason: HOSPADM

## 2018-02-09 RX ORDER — PROMETHAZINE HYDROCHLORIDE 25 MG/ML
6.25 INJECTION, SOLUTION INTRAMUSCULAR; INTRAVENOUS
Status: ACTIVE | OUTPATIENT
Start: 2018-02-09 | End: 2018-02-09

## 2018-02-09 RX ORDER — LABETALOL HYDROCHLORIDE 5 MG/ML
5 INJECTION, SOLUTION INTRAVENOUS EVERY 10 MIN PRN
Status: DISCONTINUED | OUTPATIENT
Start: 2018-02-09 | End: 2018-02-10 | Stop reason: HOSPADM

## 2018-02-09 RX ORDER — SODIUM CHLORIDE 0.9 % (FLUSH) 0.9 %
10 SYRINGE (ML) INJECTION PRN
Status: DISCONTINUED | OUTPATIENT
Start: 2018-02-09 | End: 2018-02-10 | Stop reason: HOSPADM

## 2018-02-09 RX ORDER — HYDROMORPHONE HCL 110MG/55ML
0.25 PATIENT CONTROLLED ANALGESIA SYRINGE INTRAVENOUS EVERY 5 MIN PRN
Status: DISCONTINUED | OUTPATIENT
Start: 2018-02-09 | End: 2018-02-10 | Stop reason: HOSPADM

## 2018-02-09 RX ORDER — OXYCODONE HYDROCHLORIDE 5 MG/1
5 TABLET ORAL PRN
Status: ACTIVE | OUTPATIENT
Start: 2018-02-09 | End: 2018-02-09

## 2018-02-09 RX ORDER — LIDOCAINE HYDROCHLORIDE 10 MG/ML
0.3 INJECTION, SOLUTION EPIDURAL; INFILTRATION; INTRACAUDAL; PERINEURAL
Status: COMPLETED | OUTPATIENT
Start: 2018-02-09 | End: 2018-02-09

## 2018-02-09 RX ORDER — SODIUM CHLORIDE, SODIUM LACTATE, POTASSIUM CHLORIDE, CALCIUM CHLORIDE 600; 310; 30; 20 MG/100ML; MG/100ML; MG/100ML; MG/100ML
INJECTION, SOLUTION INTRAVENOUS CONTINUOUS
Status: DISCONTINUED | OUTPATIENT
Start: 2018-02-09 | End: 2018-02-10 | Stop reason: HOSPADM

## 2018-02-09 RX ORDER — DIPHENHYDRAMINE HYDROCHLORIDE 50 MG/ML
12.5 INJECTION INTRAMUSCULAR; INTRAVENOUS
Status: ACTIVE | OUTPATIENT
Start: 2018-02-09 | End: 2018-02-09

## 2018-02-09 RX ORDER — OXYCODONE HYDROCHLORIDE 5 MG/1
10 TABLET ORAL PRN
Status: ACTIVE | OUTPATIENT
Start: 2018-02-09 | End: 2018-02-09

## 2018-02-09 RX ORDER — HYDRALAZINE HYDROCHLORIDE 20 MG/ML
5 INJECTION INTRAMUSCULAR; INTRAVENOUS EVERY 10 MIN PRN
Status: DISCONTINUED | OUTPATIENT
Start: 2018-02-09 | End: 2018-02-10 | Stop reason: HOSPADM

## 2018-02-09 RX ORDER — SODIUM CHLORIDE 0.9 % (FLUSH) 0.9 %
10 SYRINGE (ML) INJECTION EVERY 12 HOURS SCHEDULED
Status: DISCONTINUED | OUTPATIENT
Start: 2018-02-09 | End: 2018-02-10 | Stop reason: HOSPADM

## 2018-02-09 RX ORDER — METOCLOPRAMIDE HYDROCHLORIDE 5 MG/ML
10 INJECTION INTRAMUSCULAR; INTRAVENOUS
Status: ACTIVE | OUTPATIENT
Start: 2018-02-09 | End: 2018-02-09

## 2018-02-09 RX ORDER — MEPERIDINE HYDROCHLORIDE 25 MG/ML
12.5 INJECTION INTRAMUSCULAR; INTRAVENOUS; SUBCUTANEOUS EVERY 5 MIN PRN
Status: DISCONTINUED | OUTPATIENT
Start: 2018-02-09 | End: 2018-02-10 | Stop reason: HOSPADM

## 2018-02-09 RX ORDER — MORPHINE SULFATE 4 MG/ML
1 INJECTION, SOLUTION INTRAMUSCULAR; INTRAVENOUS EVERY 5 MIN PRN
Status: DISCONTINUED | OUTPATIENT
Start: 2018-02-09 | End: 2018-02-10 | Stop reason: HOSPADM

## 2018-02-09 RX ADMIN — SODIUM CHLORIDE, SODIUM LACTATE, POTASSIUM CHLORIDE, CALCIUM CHLORIDE: 600; 310; 30; 20 INJECTION, SOLUTION INTRAVENOUS at 12:19

## 2018-02-09 RX ADMIN — LIDOCAINE HYDROCHLORIDE 0.3 ML: 10 INJECTION, SOLUTION EPIDURAL; INFILTRATION; INTRACAUDAL; PERINEURAL at 12:20

## 2018-02-09 ASSESSMENT — PAIN - FUNCTIONAL ASSESSMENT: PAIN_FUNCTIONAL_ASSESSMENT: 0-10

## 2018-02-09 ASSESSMENT — ENCOUNTER SYMPTOMS: SHORTNESS OF BREATH: 1

## 2018-02-09 NOTE — BRIEF OP NOTE
Brief Postoperative Note    Nicholas Portillo  YOB: 1960  [id number]    Pre-operative Diagnosis: Gross hematuria. Post-operative Diagnosis: Same bladder stones, chronic prostatitis.     Procedure: Cysto, removal of bladder stones  Anesthesia: MAC    Surgeons/Assistants: JAMES Amaya    Estimated Blood Loss: 0    Complications: None    Specimens: Was Obtained: bladder stones    Findings: Same    Electronically signed by Wily Martino MD on 2/9/2018 at 1:57 PM

## 2018-02-09 NOTE — PROGRESS NOTES
NO CHANGE IN PHYSICAL ASSESSMENT; PT AND FAMILY VERBALIZE UNDERSTANDING OF INSTRUCTIONS; PT DISCHARGED VIA W/C BY NURSE WITH FAMILY IN STABLE CONDITION; NO C/O

## 2018-02-09 NOTE — ANESTHESIA PRE-OP
RESPIMAT) 2.5 MCG/ACT AERS inhaler 2 inhalations daily 1 Inhaler 5    atorvastatin (LIPITOR) 80 MG tablet Take 1 tablet by mouth daily 90 tablet 3    omeprazole (PRILOSEC) 20 MG capsule Take 20 mg by mouth daily      nitroGLYCERIN (NITROSTAT) 0.4 MG SL tablet Place 1 tablet under the tongue every 5 minutes as needed for Chest pain 25 tablet 3    Sucralfate (CARAFATE PO) Take by mouth      FLUoxetine (PROZAC) 20 MG capsule Take 20 mg by mouth daily.  aspirin EC 81 MG EC tablet Take 1 tablet by mouth daily. 30 tablet 11    metoprolol (LOPRESSOR) 25 MG tablet Take 25 mg by mouth 2 times daily. Indications: take DOS      zolpidem (AMBIEN) 10 MG tablet Take 10 mg by mouth nightly as needed.         pregabalin (LYRICA) 50 MG capsule   Take 100 mg by mouth 3 times daily       albuterol sulfate HFA (PROAIR HFA) 108 (90 Base) MCG/ACT inhaler Inhale 2 puffs into the lungs every 6 hours as needed for Wheezing or Shortness of Breath 1 Inhaler 5     Current Facility-Administered Medications   Medication Dose Route Frequency Provider Last Rate Last Dose    ceFAZolin (ANCEF) 2 g in sterile water 20 mL IV syringe  2 g Intravenous Once Robina Chamorro MD        lactated ringers infusion   Intravenous Continuous Ada Bardales MD        sodium chloride flush 0.9 % injection 10 mL  10 mL Intravenous 2 times per day Ada Bardales MD        sodium chloride flush 0.9 % injection 10 mL  10 mL Intravenous PRN Ada Bardales MD        lidocaine PF 1 % injection 0.3 mL  0.3 mL Intradermal Once PRN Ada Bardales MD        HYDROmorphone (DILAUDID) injection 0.25 mg  0.25 mg Intravenous Q5 Min PRN Anahy Campos MD        HYDROmorphone (DILAUDID) injection 0.5 mg  0.5 mg Intravenous Q5 Min JESSICAN Anahy Campos MD        morphine (PF) injection 1 mg  1 mg Intravenous Q5 Min PRN Anahy Campos MD        HYDROmorphone (DILAUDID) injection 0.5 mg  0.5 mg Intravenous Q5 Min PRN Anahy Campos MD        oxyCODONE (ROXICODONE) immediate release tablet 5 mg  5 mg Oral PRN Emili Mahmood MD        Or    oxyCODONE (ROXICODONE) immediate release tablet 10 mg  10 mg Oral PRN Emili Mahmood MD        diphenhydrAMINE (BENADRYL) injection 12.5 mg  12.5 mg Intravenous Once PRN Emili Mahmood MD        metoclopramide (REGLAN) injection 10 mg  10 mg Intravenous Once PRN Emili Mahmood MD        promethazine [facility]) injection 6.25 mg  6.25 mg Intravenous Once PRN Emili Mahmood MD        labetalol (NORMODYNE;TRANDATE) injection 5 mg  5 mg Intravenous Q10 Min PRN Emili Mahmood MD        hydrALAZINE (APRESOLINE) injection 5 mg  5 mg Intravenous Q10 Min PRN Emili Mahmood MD        meperidine (DEMEROL) injection 12.5 mg  12.5 mg Intravenous Q5 Min PRN Emili Mahmood MD           Allergies:     Allergies   Allergen Reactions    Bactrim [Sulfamethoxazole-Trimethoprim] Other (See Comments)     States makes yeast infection on his penis    Codeine Nausea Only       Problem List:    Patient Active Problem List   Diagnosis Code    Pulmonary nodules R91.8    Dyspnea R06.00    Tobacco abuse Z72.0    Abnormal stress test R94.39    Hyperlipidemia E78.5    Chest pain R07.9    Displacement of lumbar intervertebral disc without myelopathy M51.26    Degeneration of lumbar or lumbosacral intervertebral disc M51.37    CAD (coronary artery disease) I25.10    Peripheral edema R60.9    Lumbar stenosis M48.061    Lumbar facet arthropathy M12.88    MALT lymphoma ([facility]) C88.4    Impingement syndrome, shoulder M75.40    Complete tear of left rotator cuff M75.122    Severe sepsis ([facility]) A41.9, R65.20    Acute respiratory failure with hypoxia ([address] J96.01    Multifocal pneumonia J18.9    COPD exacerbation ([address] J44.1    Community acquired pneumonia J18.9    Pulmonary emphysema ([address] J43.9    Neoplasm of uncertain behavior of skin D48.5       Past Medical History:        Diagnosis Date    Arthritis 1/2011    SHOULDERS AND KNEES    CAD (coronary artery disease)     Chronic back pain     COPD (chronic obstructive pulmonary disease) (HCC)     GERD (gastroesophageal reflux disease)     ONCE WEEKLY TAKE TUMS    Hyperlipidemia     Hypertension     Kidney stone     Lumbar herniated disc     Chronic Pain    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     CERVICAL DISCECTOMY  02/08/2017    Anterior discectomy, anterior foraminotomy C5/C6 and C6-7    CYSTOSCOPY  6/5/15    DIAGNOSTIC CARDIAC CATH LAB PROCEDURE      ENDOSCOPY, COLON, DIAGNOSTIC      HERNIA REPAIR      KNEE ARTHROSCOPY      LITHOTRIPSY      LITHOTRIPSY Left 11/5/15    OTHER SURGICAL HISTORY  1/20/11    video arthroscopy of right shoulder with subcromial d ecompression and arthroscopic sarbjit    OTHER SURGICAL HISTORY  5/1/13    Excision Lesion Right Angle of Mouth    SHOULDER SURGERY      Left Shoulder X 3; right Shoulder X 5    SHOULDER SURGERY Left 05/30/2017    UPPER GASTROINTESTINAL ENDOSCOPY  07/26/2012       Social History:    Social History   Substance Use Topics    Smoking status: Former Smoker     Packs/day: 1.50     Years: 35.00     Types: Cigarettes     Quit date: 1/1/2016    Smokeless tobacco: Never Used    Alcohol use No                                Counseling given: Not Answered      Vital Signs (Current):   Vitals:    02/07/18 1429 02/09/18 1159   BP:  105/71   Pulse:  71   Resp:  16   SpO2:  97%   Weight: 194 lb (88 kg)    Height: 5' 8\" (1.727 m)                                               BP Readings from Last 3 Encounters:   02/09/18 105/71   01/31/18 117/76   01/25/18 118/72       NPO Status:                                                                                 BMI:   Wt Readings

## 2018-02-12 ENCOUNTER — HOSPITAL ENCOUNTER (OUTPATIENT)
Dept: OTHER | Age: 58
Discharge: HOME | End: 2018-02-12

## 2018-02-12 DIAGNOSIS — M54.6 PAIN IN THORACIC SPINE: ICD-10-CM

## 2018-03-06 ENCOUNTER — TELEPHONE (OUTPATIENT)
Dept: PULMONOLOGY | Age: 58
End: 2018-03-06

## 2018-03-06 DIAGNOSIS — J44.9 CHRONIC OBSTRUCTIVE PULMONARY DISEASE, UNSPECIFIED COPD TYPE (HCC): Primary | ICD-10-CM

## 2018-03-06 RX ORDER — ALBUTEROL SULFATE 90 UG/1
2 AEROSOL, METERED RESPIRATORY (INHALATION) EVERY 6 HOURS PRN
Qty: 1 INHALER | Refills: 5 | Status: SHIPPED | OUTPATIENT
Start: 2018-03-06 | End: 2018-11-09 | Stop reason: SDUPTHER

## 2018-05-17 ENCOUNTER — OFFICE VISIT (OUTPATIENT)
Dept: CARDIOLOGY CLINIC | Age: 58
End: 2018-05-17

## 2018-05-17 VITALS
HEIGHT: 69 IN | BODY MASS INDEX: 29.15 KG/M2 | OXYGEN SATURATION: 95 % | HEART RATE: 67 BPM | WEIGHT: 196.8 LBS | SYSTOLIC BLOOD PRESSURE: 112 MMHG | DIASTOLIC BLOOD PRESSURE: 68 MMHG

## 2018-05-17 DIAGNOSIS — I25.10 CORONARY ARTERY DISEASE INVOLVING NATIVE HEART WITHOUT ANGINA PECTORIS, UNSPECIFIED VESSEL OR LESION TYPE: Primary | ICD-10-CM

## 2018-05-17 DIAGNOSIS — R06.02 SHORTNESS OF BREATH: ICD-10-CM

## 2018-05-17 DIAGNOSIS — E78.2 MIXED HYPERLIPIDEMIA: ICD-10-CM

## 2018-05-17 DIAGNOSIS — I10 ESSENTIAL HYPERTENSION: ICD-10-CM

## 2018-05-17 PROCEDURE — 1036F TOBACCO NON-USER: CPT | Performed by: INTERNAL MEDICINE

## 2018-05-17 PROCEDURE — 99214 OFFICE O/P EST MOD 30 MIN: CPT | Performed by: INTERNAL MEDICINE

## 2018-05-17 PROCEDURE — 3017F COLORECTAL CA SCREEN DOC REV: CPT | Performed by: INTERNAL MEDICINE

## 2018-05-17 PROCEDURE — G8427 DOCREV CUR MEDS BY ELIG CLIN: HCPCS | Performed by: INTERNAL MEDICINE

## 2018-05-17 PROCEDURE — G8598 ASA/ANTIPLAT THER USED: HCPCS | Performed by: INTERNAL MEDICINE

## 2018-05-17 PROCEDURE — G8417 CALC BMI ABV UP PARAM F/U: HCPCS | Performed by: INTERNAL MEDICINE

## 2018-05-30 ENCOUNTER — TELEPHONE (OUTPATIENT)
Dept: PULMONOLOGY | Age: 58
End: 2018-05-30

## 2018-06-07 RX ORDER — ATORVASTATIN CALCIUM 80 MG/1
TABLET, FILM COATED ORAL
Qty: 90 TABLET | Refills: 3 | Status: SHIPPED | OUTPATIENT
Start: 2018-06-07 | End: 2019-04-05

## 2018-06-12 ENCOUNTER — HOSPITAL ENCOUNTER (OUTPATIENT)
Dept: CT IMAGING | Facility: MEDICAL CENTER | Age: 58
Discharge: HOME | End: 2018-06-12
Attending: NURSE PRACTITIONER | Admitting: NURSE PRACTITIONER

## 2018-06-12 ENCOUNTER — HOSPITAL ENCOUNTER (OUTPATIENT)
Dept: OTHER | Age: 58
Discharge: HOME | End: 2018-06-12
Attending: INTERNAL MEDICINE | Admitting: INTERNAL MEDICINE

## 2018-06-12 ENCOUNTER — HOSPITAL ENCOUNTER (OUTPATIENT)
Dept: OTHER | Age: 58
Discharge: HOME | End: 2018-06-12
Attending: FAMILY MEDICINE | Admitting: FAMILY MEDICINE

## 2018-06-12 DIAGNOSIS — Z85.79 HISTORY OF LYMPHOMA: ICD-10-CM

## 2018-06-12 DIAGNOSIS — Z85.72 HISTORY OF NON-HODGKIN'S LYMPHOMA: ICD-10-CM

## 2018-06-12 DIAGNOSIS — Z85.72 HX OF LYMPHOMA: ICD-10-CM

## 2018-06-13 LAB
CHOLESTEROL, FASTING: 101 MG/DL (ref 0–199)
HDLC SERPL-MCNC: 25 MG/DL (ref 40–60)
LDL CHOLESTEROL CALCULATED: 61 MG/DL
T4 FREE: 1.4 NG/DL (ref 0.9–1.8)
TRIGLYCERIDE, FASTING: 74 MG/DL (ref 0–150)
TSH SERPL DL<=0.05 MIU/L-ACNC: 6.43 UIU/ML (ref 0.27–4.2)
VLDLC SERPL CALC-MCNC: 15 MG/DL

## 2018-06-22 ENCOUNTER — TELEPHONE (OUTPATIENT)
Dept: CARDIOLOGY CLINIC | Age: 58
End: 2018-06-22

## 2018-06-26 ENCOUNTER — OFFICE VISIT (OUTPATIENT)
Dept: PULMONOLOGY | Age: 58
End: 2018-06-26

## 2018-06-26 VITALS
RESPIRATION RATE: 16 BRPM | HEIGHT: 69 IN | WEIGHT: 199 LBS | SYSTOLIC BLOOD PRESSURE: 125 MMHG | BODY MASS INDEX: 29.47 KG/M2 | HEART RATE: 72 BPM | OXYGEN SATURATION: 98 % | DIASTOLIC BLOOD PRESSURE: 74 MMHG

## 2018-06-26 DIAGNOSIS — J43.9 PULMONARY EMPHYSEMA, UNSPECIFIED EMPHYSEMA TYPE (HCC): ICD-10-CM

## 2018-06-26 DIAGNOSIS — R93.89 ABNORMAL CHEST CT: Primary | ICD-10-CM

## 2018-06-26 PROCEDURE — 3017F COLORECTAL CA SCREEN DOC REV: CPT | Performed by: INTERNAL MEDICINE

## 2018-06-26 PROCEDURE — G8428 CUR MEDS NOT DOCUMENT: HCPCS | Performed by: INTERNAL MEDICINE

## 2018-06-26 PROCEDURE — G8926 SPIRO NO PERF OR DOC: HCPCS | Performed by: INTERNAL MEDICINE

## 2018-06-26 PROCEDURE — 1036F TOBACCO NON-USER: CPT | Performed by: INTERNAL MEDICINE

## 2018-06-26 PROCEDURE — 3023F SPIROM DOC REV: CPT | Performed by: INTERNAL MEDICINE

## 2018-06-26 PROCEDURE — G8417 CALC BMI ABV UP PARAM F/U: HCPCS | Performed by: INTERNAL MEDICINE

## 2018-06-26 PROCEDURE — 99214 OFFICE O/P EST MOD 30 MIN: CPT | Performed by: INTERNAL MEDICINE

## 2018-06-26 PROCEDURE — G8598 ASA/ANTIPLAT THER USED: HCPCS | Performed by: INTERNAL MEDICINE

## 2018-06-26 RX ORDER — POTASSIUM CHLORIDE 750 MG/1
TABLET, FILM COATED, EXTENDED RELEASE ORAL
COMMUNITY
Start: 2018-06-16 | End: 2018-12-26

## 2018-06-26 RX ORDER — FUROSEMIDE 20 MG/1
TABLET ORAL
COMMUNITY
Start: 2018-05-17 | End: 2018-12-26

## 2018-06-26 RX ORDER — LEVOTHYROXINE SODIUM 0.07 MG/1
TABLET ORAL
COMMUNITY
Start: 2018-06-13

## 2018-06-26 RX ORDER — TIZANIDINE 4 MG/1
4 TABLET ORAL EVERY 8 HOURS PRN
COMMUNITY
Start: 2018-06-16

## 2018-06-26 RX ORDER — TAMSULOSIN HYDROCHLORIDE 0.4 MG/1
CAPSULE ORAL
COMMUNITY
Start: 2018-06-21

## 2018-10-02 ENCOUNTER — TELEPHONE (OUTPATIENT)
Dept: PULMONOLOGY | Age: 58
End: 2018-10-02

## 2018-11-05 ENCOUNTER — HOSPITAL ENCOUNTER (OUTPATIENT)
Dept: CT IMAGING | Age: 58
Discharge: HOME | End: 2018-11-05
Payer: MEDICARE

## 2018-11-05 PROCEDURE — 71250 CT THORAX DX C-: CPT

## 2018-11-09 ENCOUNTER — OFFICE VISIT (OUTPATIENT)
Dept: PULMONOLOGY | Age: 58
End: 2018-11-09
Payer: MEDICARE

## 2018-11-09 VITALS
RESPIRATION RATE: 16 BRPM | HEIGHT: 69 IN | SYSTOLIC BLOOD PRESSURE: 120 MMHG | DIASTOLIC BLOOD PRESSURE: 76 MMHG | WEIGHT: 197 LBS | BODY MASS INDEX: 29.18 KG/M2 | HEART RATE: 76 BPM | OXYGEN SATURATION: 98 %

## 2018-11-09 DIAGNOSIS — R93.89 ABNORMAL CHEST CT: ICD-10-CM

## 2018-11-09 DIAGNOSIS — R06.02 SHORTNESS OF BREATH: ICD-10-CM

## 2018-11-09 DIAGNOSIS — J43.9 PULMONARY EMPHYSEMA, UNSPECIFIED EMPHYSEMA TYPE (HCC): ICD-10-CM

## 2018-11-09 DIAGNOSIS — Z87.891 PERSONAL HISTORY OF TOBACCO USE: Primary | ICD-10-CM

## 2018-11-09 PROCEDURE — G8427 DOCREV CUR MEDS BY ELIG CLIN: HCPCS | Performed by: INTERNAL MEDICINE

## 2018-11-09 PROCEDURE — G8484 FLU IMMUNIZE NO ADMIN: HCPCS | Performed by: INTERNAL MEDICINE

## 2018-11-09 PROCEDURE — 3023F SPIROM DOC REV: CPT | Performed by: INTERNAL MEDICINE

## 2018-11-09 PROCEDURE — 99214 OFFICE O/P EST MOD 30 MIN: CPT | Performed by: INTERNAL MEDICINE

## 2018-11-09 PROCEDURE — 1036F TOBACCO NON-USER: CPT | Performed by: INTERNAL MEDICINE

## 2018-11-09 PROCEDURE — G0296 VISIT TO DETERM LDCT ELIG: HCPCS | Performed by: INTERNAL MEDICINE

## 2018-11-09 PROCEDURE — G8598 ASA/ANTIPLAT THER USED: HCPCS | Performed by: INTERNAL MEDICINE

## 2018-11-09 PROCEDURE — G8417 CALC BMI ABV UP PARAM F/U: HCPCS | Performed by: INTERNAL MEDICINE

## 2018-11-09 PROCEDURE — 3017F COLORECTAL CA SCREEN DOC REV: CPT | Performed by: INTERNAL MEDICINE

## 2018-11-09 PROCEDURE — G8926 SPIRO NO PERF OR DOC: HCPCS | Performed by: INTERNAL MEDICINE

## 2018-11-09 RX ORDER — ALBUTEROL SULFATE 90 UG/1
2 AEROSOL, METERED RESPIRATORY (INHALATION) EVERY 6 HOURS PRN
Qty: 1 INHALER | Refills: 5 | Status: ON HOLD | OUTPATIENT
Start: 2018-11-09 | End: 2020-10-01

## 2018-11-09 NOTE — PROGRESS NOTES
[facility]    Outpatient Follow Up Note    CC: SOB  Consulting provider: Racquel Moss MD    Interval History: 66 y.o. male He continues to smoke but has cut back to < 1 PPD. No exacerbations. Inhalers compliant and have helped    1/2018 he was admitted for severe CAP and exacerbation of his emphysema. He required intubation. He has daily passive smoke exposure from his sister who smokes heavily indoors. Initial HPI for Dr Cayla Valles 2014: \"in for evaluation of pulmonary nodules, emphysema. Had symptoms of pneumonia at the time of chest CT. Had dyspnea, chills/sweats, + cough, mainly dry. Completed a course of antibiotics with some improvement in symptoms. Started on prednisone last Thurs. For persistent cough, dyspnea. Currently on prednisone 30 mg. Having a productive cough since starting prednisone. Sputum is yellow in color, no hemoptysis. Dyspnea worse with exertion. More wheezing since getting the pneumonia, occasional chest tightness. Intermittently has an albuterol inhaler, currently without. Does not feel it helps.   + tobacco abuse- 2 ppd x 40 years. Quit in 2016. Has tried to stop smoking with wellbutrin- did not help. Retired- used to work in construction, building and tearing houses down.  Exposed to asbestos, dust, etc.   Current Medications:    Current Outpatient Prescriptions:     levothyroxine (SYNTHROID) 75 MCG tablet, , Disp: , Rfl:     furosemide (LASIX) 20 MG tablet, , Disp: , Rfl:     potassium chloride (KLOR-CON) 10 MEQ extended release tablet, , Disp: , Rfl:     mupirocin (BACTROBAN) 2 % ointment, , Disp: , Rfl:     tamsulosin (FLOMAX) 0.4 MG capsule, , Disp: , Rfl:     tiZANidine (ZANAFLEX) 4 MG tablet, , Disp: , Rfl:     tiotropium (SPIRIVA RESPIMAT) 2.5 MCG/ACT AERS inhaler, 2 inhalations daily, Disp: 1 Inhaler, Rfl: 5    atorvastatin (LIPITOR) 80 MG tablet, TAKE ONE TABLET BY MOUTH DAILY, Disp: 90 tablet, Rfl: 3    albuterol sulfate HFA (VENTOLIN HFA) 108 (90 Base) MCG/ACT inhaler, Inhale 2 puffs into the lungs every 6 hours as needed for Wheezing, Disp: 1 Inhaler, Rfl: 5    lactobacillus (CULTURELLE) capsule, Take 1 capsule by mouth daily, Disp: 10 capsule, Rfl: 0    omeprazole (PRILOSEC) 20 MG capsule, Take 20 mg by mouth daily, Disp: , Rfl:     nitroGLYCERIN (NITROSTAT) 0.4 MG SL tablet, Place 1 tablet under the tongue every 5 minutes as needed for Chest pain, Disp: 25 tablet, Rfl: 3    Sucralfate (CARAFATE PO), Take by mouth, Disp: , Rfl:     FLUoxetine (PROZAC) 20 MG capsule, Take 20 mg by mouth daily. , Disp: , Rfl:     aspirin EC 81 MG EC tablet, Take 1 tablet by mouth daily. , Disp: 30 tablet, Rfl: 11    metoprolol (LOPRESSOR) 25 MG tablet, Take 25 mg by mouth 2 times daily. Indications: take DOS, Disp: , Rfl:     zolpidem (AMBIEN) 10 MG tablet, Take 10 mg by mouth nightly as needed. , Disp: , Rfl:     pregabalin (LYRICA) 50 MG capsule,  Take 100 mg by mouth 3 times daily , Disp: , Rfl:     Objective:   PHYSICAL EXAM:    /76 (Site: Left Upper Arm, Position: Sitting, Cuff Size: Medium Adult)   Pulse 76   Resp 16   Ht 5' 8.5\" (1.74 m)   Wt 197 lb (89.4 kg)   SpO2 98%   BMI 29.52 kg/m²   Constitutional: In no acute distress. Appears stated age. Well developed and nourished  Eyes: PERRL. No sclera icterus. No conjunctival injection. ENT: Oropharynx clear. Neck: Trachea midline. No thyroid tenderness. Lymph: No cervical LAD. No supraclavicular LAD. Resp: No accessory muscle use. No crackles. No wheezes. No rhonchi. CV: Regular rate. Regular rhythm. No murmur or rub. No lower extremity edema. Skin: Warm and dry. No nodules on exposed extremities. No rash on exposed extremities. Musc: No clubbing. No cyanosis. No synovitis or joint deformity in digits. Psych: Oriented x 3. Mood and affect normal. Intact judgment and insight. LABS:  Reviewed any pertinent new labs that are available.     PFT         7/1/14

## 2018-12-25 ENCOUNTER — HOSPITAL ENCOUNTER (EMERGENCY)
Age: 58
Discharge: HOME | DRG: 194 | End: 2018-12-25
Attending: EMERGENCY MEDICINE
Payer: MEDICARE

## 2018-12-25 ENCOUNTER — APPOINTMENT (OUTPATIENT)
Dept: GENERAL RADIOLOGY | Age: 58
DRG: 194 | End: 2018-12-25
Payer: MEDICARE

## 2018-12-25 VITALS
TEMPERATURE: 97.8 F | OXYGEN SATURATION: 98 % | RESPIRATION RATE: 24 BRPM | SYSTOLIC BLOOD PRESSURE: 132 MMHG | BODY MASS INDEX: 29.18 KG/M2 | DIASTOLIC BLOOD PRESSURE: 70 MMHG | HEIGHT: 69 IN | HEART RATE: 76 BPM | WEIGHT: 197 LBS

## 2018-12-25 DIAGNOSIS — R05.9 COUGH: ICD-10-CM

## 2018-12-25 DIAGNOSIS — R06.02 SHORTNESS OF BREATH: ICD-10-CM

## 2018-12-25 DIAGNOSIS — J44.1 COPD EXACERBATION (HCC): Primary | ICD-10-CM

## 2018-12-25 LAB
A/G RATIO: 1.4 (ref 1.1–2.2)
ALBUMIN SERPL-MCNC: 3.9 G/DL (ref 3.4–5)
ALP BLD-CCNC: 100 U/L (ref 40–129)
ALT SERPL-CCNC: 15 U/L (ref 10–40)
ANION GAP SERPL CALCULATED.3IONS-SCNC: 13 MMOL/L (ref 3–16)
AST SERPL-CCNC: 13 U/L (ref 15–37)
BASOPHILS ABSOLUTE: 0.1 K/UL (ref 0–0.2)
BASOPHILS RELATIVE PERCENT: 0.8 %
BILIRUB SERPL-MCNC: 0.5 MG/DL (ref 0–1)
BUN BLDV-MCNC: 10 MG/DL (ref 7–20)
CALCIUM SERPL-MCNC: 9.5 MG/DL (ref 8.3–10.6)
CHLORIDE BLD-SCNC: 101 MMOL/L (ref 99–110)
CO2: 28 MMOL/L (ref 21–32)
CREAT SERPL-MCNC: 0.8 MG/DL (ref 0.9–1.3)
EKG ATRIAL RATE: 75 BPM
EKG DIAGNOSIS: NORMAL
EKG P AXIS: 74 DEGREES
EKG P-R INTERVAL: 156 MS
EKG Q-T INTERVAL: 378 MS
EKG QRS DURATION: 78 MS
EKG QTC CALCULATION (BAZETT): 422 MS
EKG R AXIS: 63 DEGREES
EKG T AXIS: 61 DEGREES
EKG VENTRICULAR RATE: 75 BPM
EOSINOPHILS ABSOLUTE: 0.1 K/UL (ref 0–0.6)
EOSINOPHILS RELATIVE PERCENT: 1 %
GFR AFRICAN AMERICAN: >60
GFR NON-AFRICAN AMERICAN: >60
GLOBULIN: 2.8 G/DL
GLUCOSE BLD-MCNC: 112 MG/DL (ref 70–99)
HCT VFR BLD CALC: 41 % (ref 40.5–52.5)
HEMOGLOBIN: 13.6 G/DL (ref 13.5–17.5)
INR BLD: 1.06 (ref 0.86–1.14)
LYMPHOCYTES ABSOLUTE: 3.1 K/UL (ref 1–5.1)
LYMPHOCYTES RELATIVE PERCENT: 31.7 %
MCH RBC QN AUTO: 29.7 PG (ref 26–34)
MCHC RBC AUTO-ENTMCNC: 33.1 G/DL (ref 31–36)
MCV RBC AUTO: 89.8 FL (ref 80–100)
MONOCYTES ABSOLUTE: 0.7 K/UL (ref 0–1.3)
MONOCYTES RELATIVE PERCENT: 6.6 %
NEUTROPHILS ABSOLUTE: 5.9 K/UL (ref 1.7–7.7)
NEUTROPHILS RELATIVE PERCENT: 59.9 %
PDW BLD-RTO: 14.2 % (ref 12.4–15.4)
PLATELET # BLD: 236 K/UL (ref 135–450)
PMV BLD AUTO: 8.4 FL (ref 5–10.5)
POTASSIUM SERPL-SCNC: 3.6 MMOL/L (ref 3.5–5.1)
PRO-BNP: 50 PG/ML (ref 0–124)
PROTHROMBIN TIME: 12.1 SEC (ref 9.8–13)
RBC # BLD: 4.57 M/UL (ref 4.2–5.9)
SODIUM BLD-SCNC: 142 MMOL/L (ref 136–145)
TOTAL PROTEIN: 6.7 G/DL (ref 6.4–8.2)
TROPONIN: <0.01 NG/ML
WBC # BLD: 9.9 K/UL (ref 4–11)

## 2018-12-25 PROCEDURE — 96365 THER/PROPH/DIAG IV INF INIT: CPT

## 2018-12-25 PROCEDURE — 99285 EMERGENCY DEPT VISIT HI MDM: CPT

## 2018-12-25 PROCEDURE — 6360000002 HC RX W HCPCS: Performed by: EMERGENCY MEDICINE

## 2018-12-25 PROCEDURE — 93010 ELECTROCARDIOGRAM REPORT: CPT | Performed by: INTERNAL MEDICINE

## 2018-12-25 PROCEDURE — 80053 COMPREHEN METABOLIC PANEL: CPT

## 2018-12-25 PROCEDURE — 87205 SMEAR GRAM STAIN: CPT

## 2018-12-25 PROCEDURE — 36415 COLL VENOUS BLD VENIPUNCTURE: CPT

## 2018-12-25 PROCEDURE — 93005 ELECTROCARDIOGRAM TRACING: CPT | Performed by: EMERGENCY MEDICINE

## 2018-12-25 PROCEDURE — 84484 ASSAY OF TROPONIN QUANT: CPT

## 2018-12-25 PROCEDURE — 85025 COMPLETE CBC W/AUTO DIFF WBC: CPT

## 2018-12-25 PROCEDURE — 6370000000 HC RX 637 (ALT 250 FOR IP): Performed by: EMERGENCY MEDICINE

## 2018-12-25 PROCEDURE — 87070 CULTURE OTHR SPECIMN AEROBIC: CPT

## 2018-12-25 PROCEDURE — 71046 X-RAY EXAM CHEST 2 VIEWS: CPT

## 2018-12-25 PROCEDURE — 83880 ASSAY OF NATRIURETIC PEPTIDE: CPT

## 2018-12-25 PROCEDURE — 2580000003 HC RX 258: Performed by: EMERGENCY MEDICINE

## 2018-12-25 PROCEDURE — 85610 PROTHROMBIN TIME: CPT

## 2018-12-25 RX ORDER — PREDNISONE 20 MG/1
60 TABLET ORAL ONCE
Status: COMPLETED | OUTPATIENT
Start: 2018-12-25 | End: 2018-12-25

## 2018-12-25 RX ORDER — BENZONATATE 200 MG/1
200 CAPSULE ORAL 3 TIMES DAILY PRN
Qty: 20 CAPSULE | Refills: 0 | Status: ON HOLD | OUTPATIENT
Start: 2018-12-25 | End: 2018-12-28

## 2018-12-25 RX ORDER — BENZONATATE 100 MG/1
100 CAPSULE ORAL 3 TIMES DAILY PRN
Status: DISCONTINUED | OUTPATIENT
Start: 2018-12-25 | End: 2018-12-25 | Stop reason: HOSPADM

## 2018-12-25 RX ORDER — IPRATROPIUM BROMIDE AND ALBUTEROL SULFATE 2.5; .5 MG/3ML; MG/3ML
1 SOLUTION RESPIRATORY (INHALATION) ONCE
Status: COMPLETED | OUTPATIENT
Start: 2018-12-25 | End: 2018-12-25

## 2018-12-25 RX ORDER — PREDNISONE 50 MG/1
50 TABLET ORAL DAILY
Qty: 5 TABLET | Refills: 0 | Status: ON HOLD | OUTPATIENT
Start: 2018-12-25 | End: 2018-12-28

## 2018-12-25 RX ORDER — GUAIFENESIN 600 MG/1
600 TABLET, EXTENDED RELEASE ORAL ONCE
Status: COMPLETED | OUTPATIENT
Start: 2018-12-25 | End: 2018-12-25

## 2018-12-25 RX ORDER — GUAIFENESIN 600 MG/1
600 TABLET, EXTENDED RELEASE ORAL 2 TIMES DAILY
Qty: 30 TABLET | Refills: 0 | Status: SHIPPED | OUTPATIENT
Start: 2018-12-25 | End: 2019-01-09

## 2018-12-25 RX ADMIN — IPRATROPIUM BROMIDE AND ALBUTEROL SULFATE 1 AMPULE: .5; 3 SOLUTION RESPIRATORY (INHALATION) at 10:32

## 2018-12-25 RX ADMIN — MAGNESIUM SULFATE 2 G: 500 INJECTION, SOLUTION INTRAMUSCULAR; INTRAVENOUS at 10:32

## 2018-12-25 RX ADMIN — PREDNISONE 60 MG: 20 TABLET ORAL at 10:32

## 2018-12-25 RX ADMIN — BENZONATATE 100 MG: 100 CAPSULE ORAL at 11:12

## 2018-12-25 RX ADMIN — GUAIFENESIN 600 MG: 600 TABLET, EXTENDED RELEASE ORAL at 11:12

## 2018-12-25 ASSESSMENT — PAIN DESCRIPTION - DESCRIPTORS: DESCRIPTORS: TIGHTNESS

## 2018-12-25 ASSESSMENT — PAIN DESCRIPTION - ONSET: ONSET: SUDDEN

## 2018-12-25 ASSESSMENT — PAIN DESCRIPTION - PROGRESSION: CLINICAL_PROGRESSION: NOT CHANGED

## 2018-12-25 ASSESSMENT — PAIN DESCRIPTION - PAIN TYPE: TYPE: ACUTE PAIN

## 2018-12-25 ASSESSMENT — PAIN DESCRIPTION - FREQUENCY: FREQUENCY: CONTINUOUS

## 2018-12-25 ASSESSMENT — PAIN DESCRIPTION - ORIENTATION: ORIENTATION: MID

## 2018-12-25 ASSESSMENT — PAIN SCALES - GENERAL: PAINLEVEL_OUTOF10: 8

## 2018-12-25 ASSESSMENT — PAIN DESCRIPTION - LOCATION: LOCATION: CHEST

## 2018-12-26 ENCOUNTER — APPOINTMENT (OUTPATIENT)
Dept: CT IMAGING | Age: 58
DRG: 194 | End: 2018-12-26
Payer: MEDICARE

## 2018-12-26 ENCOUNTER — APPOINTMENT (OUTPATIENT)
Dept: GENERAL RADIOLOGY | Age: 58
DRG: 194 | End: 2018-12-26
Payer: MEDICARE

## 2018-12-26 ENCOUNTER — HOSPITAL ENCOUNTER (INPATIENT)
Age: 58
LOS: 2 days | Discharge: HOME | DRG: 194 | End: 2018-12-28
Attending: EMERGENCY MEDICINE | Admitting: INTERNAL MEDICINE
Payer: MEDICARE

## 2018-12-26 DIAGNOSIS — R55 SYNCOPE AND COLLAPSE: Primary | ICD-10-CM

## 2018-12-26 DIAGNOSIS — S09.90XA CLOSED HEAD INJURY, INITIAL ENCOUNTER: ICD-10-CM

## 2018-12-26 LAB
ANION GAP SERPL CALCULATED.3IONS-SCNC: 13 MMOL/L (ref 3–16)
BASOPHILS ABSOLUTE: 0.2 K/UL (ref 0–0.2)
BASOPHILS RELATIVE PERCENT: 1.8 %
BILIRUBIN URINE: NEGATIVE
BLOOD, URINE: NEGATIVE
BUN BLDV-MCNC: 12 MG/DL (ref 7–20)
CALCIUM SERPL-MCNC: 8.9 MG/DL (ref 8.3–10.6)
CHLORIDE BLD-SCNC: 101 MMOL/L (ref 99–110)
CLARITY: CLEAR
CO2: 27 MMOL/L (ref 21–32)
COLOR: YELLOW
CREAT SERPL-MCNC: 0.9 MG/DL (ref 0.9–1.3)
EKG ATRIAL RATE: 73 BPM
EKG DIAGNOSIS: NORMAL
EKG P AXIS: 55 DEGREES
EKG P-R INTERVAL: 154 MS
EKG Q-T INTERVAL: 390 MS
EKG QRS DURATION: 78 MS
EKG QTC CALCULATION (BAZETT): 429 MS
EKG R AXIS: 32 DEGREES
EKG T AXIS: 33 DEGREES
EKG VENTRICULAR RATE: 73 BPM
EOSINOPHILS ABSOLUTE: 0.1 K/UL (ref 0–0.6)
EOSINOPHILS RELATIVE PERCENT: 0.7 %
GFR AFRICAN AMERICAN: >60
GFR NON-AFRICAN AMERICAN: >60
GLUCOSE BLD-MCNC: 119 MG/DL (ref 70–99)
GLUCOSE URINE: NEGATIVE MG/DL
HCT VFR BLD CALC: 39.2 % (ref 40.5–52.5)
HEMOGLOBIN: 13 G/DL (ref 13.5–17.5)
KETONES, URINE: NEGATIVE MG/DL
LEUKOCYTE ESTERASE, URINE: NEGATIVE
LYMPHOCYTES ABSOLUTE: 3 K/UL (ref 1–5.1)
LYMPHOCYTES RELATIVE PERCENT: 26.9 %
MCH RBC QN AUTO: 29.8 PG (ref 26–34)
MCHC RBC AUTO-ENTMCNC: 33.2 G/DL (ref 31–36)
MCV RBC AUTO: 89.6 FL (ref 80–100)
MICROSCOPIC EXAMINATION: NORMAL
MONOCYTES ABSOLUTE: 0.7 K/UL (ref 0–1.3)
MONOCYTES RELATIVE PERCENT: 6.4 %
NEUTROPHILS ABSOLUTE: 7.2 K/UL (ref 1.7–7.7)
NEUTROPHILS RELATIVE PERCENT: 64.2 %
NITRITE, URINE: NEGATIVE
PDW BLD-RTO: 14.6 % (ref 12.4–15.4)
PH UA: 7.5
PLATELET # BLD: 243 K/UL (ref 135–450)
PMV BLD AUTO: 8.7 FL (ref 5–10.5)
POTASSIUM SERPL-SCNC: 3.5 MMOL/L (ref 3.5–5.1)
PROTEIN UA: NEGATIVE MG/DL
RBC # BLD: 4.38 M/UL (ref 4.2–5.9)
SODIUM BLD-SCNC: 141 MMOL/L (ref 136–145)
SPECIFIC GRAVITY UA: <=1.005
TROPONIN: <0.01 NG/ML
URINE REFLEX TO CULTURE: NORMAL
URINE TYPE: NORMAL
UROBILINOGEN, URINE: 0.2 E.U./DL
WBC # BLD: 11.2 K/UL (ref 4–11)

## 2018-12-26 PROCEDURE — 6360000002 HC RX W HCPCS: Performed by: PHYSICIAN ASSISTANT

## 2018-12-26 PROCEDURE — 80048 BASIC METABOLIC PNL TOTAL CA: CPT

## 2018-12-26 PROCEDURE — 6370000000 HC RX 637 (ALT 250 FOR IP): Performed by: PHYSICIAN ASSISTANT

## 2018-12-26 PROCEDURE — 99222 1ST HOSP IP/OBS MODERATE 55: CPT | Performed by: PHYSICIAN ASSISTANT

## 2018-12-26 PROCEDURE — 70450 CT HEAD/BRAIN W/O DYE: CPT

## 2018-12-26 PROCEDURE — 72128 CT CHEST SPINE W/O DYE: CPT

## 2018-12-26 PROCEDURE — 71045 X-RAY EXAM CHEST 1 VIEW: CPT

## 2018-12-26 PROCEDURE — 85025 COMPLETE CBC W/AUTO DIFF WBC: CPT

## 2018-12-26 PROCEDURE — 99285 EMERGENCY DEPT VISIT HI MDM: CPT

## 2018-12-26 PROCEDURE — 94640 AIRWAY INHALATION TREATMENT: CPT

## 2018-12-26 PROCEDURE — 36415 COLL VENOUS BLD VENIPUNCTURE: CPT

## 2018-12-26 PROCEDURE — 2580000003 HC RX 258: Performed by: EMERGENCY MEDICINE

## 2018-12-26 PROCEDURE — 93010 ELECTROCARDIOGRAM REPORT: CPT | Performed by: INTERNAL MEDICINE

## 2018-12-26 PROCEDURE — 72125 CT NECK SPINE W/O DYE: CPT

## 2018-12-26 PROCEDURE — 94664 DEMO&/EVAL PT USE INHALER: CPT

## 2018-12-26 PROCEDURE — 84484 ASSAY OF TROPONIN QUANT: CPT

## 2018-12-26 PROCEDURE — 81003 URINALYSIS AUTO W/O SCOPE: CPT

## 2018-12-26 PROCEDURE — 6370000000 HC RX 637 (ALT 250 FOR IP): Performed by: EMERGENCY MEDICINE

## 2018-12-26 PROCEDURE — 94150 VITAL CAPACITY TEST: CPT

## 2018-12-26 PROCEDURE — 2060000000 HC ICU INTERMEDIATE R&B

## 2018-12-26 PROCEDURE — 2580000003 HC RX 258: Performed by: PHYSICIAN ASSISTANT

## 2018-12-26 PROCEDURE — 93005 ELECTROCARDIOGRAM TRACING: CPT | Performed by: EMERGENCY MEDICINE

## 2018-12-26 RX ORDER — PREGABALIN 100 MG/1
100 CAPSULE ORAL 3 TIMES DAILY
Status: DISCONTINUED | OUTPATIENT
Start: 2018-12-26 | End: 2018-12-28 | Stop reason: HOSPADM

## 2018-12-26 RX ORDER — PANTOPRAZOLE SODIUM 40 MG/1
40 TABLET, DELAYED RELEASE ORAL
Status: DISCONTINUED | OUTPATIENT
Start: 2018-12-27 | End: 2018-12-28 | Stop reason: HOSPADM

## 2018-12-26 RX ORDER — AZITHROMYCIN 250 MG/1
250 TABLET, FILM COATED ORAL DAILY
Status: DISCONTINUED | OUTPATIENT
Start: 2018-12-27 | End: 2018-12-26

## 2018-12-26 RX ORDER — 0.9 % SODIUM CHLORIDE 0.9 %
1000 INTRAVENOUS SOLUTION INTRAVENOUS ONCE
Status: COMPLETED | OUTPATIENT
Start: 2018-12-26 | End: 2018-12-26

## 2018-12-26 RX ORDER — SODIUM CHLORIDE 0.9 % (FLUSH) 0.9 %
10 SYRINGE (ML) INJECTION EVERY 12 HOURS SCHEDULED
Status: DISCONTINUED | OUTPATIENT
Start: 2018-12-26 | End: 2018-12-28 | Stop reason: HOSPADM

## 2018-12-26 RX ORDER — DOXYCYCLINE HYCLATE 100 MG
100 TABLET ORAL EVERY 12 HOURS SCHEDULED
Status: DISCONTINUED | OUTPATIENT
Start: 2018-12-27 | End: 2018-12-27

## 2018-12-26 RX ORDER — FLUOXETINE HYDROCHLORIDE 20 MG/1
20 CAPSULE ORAL DAILY
Status: DISCONTINUED | OUTPATIENT
Start: 2018-12-26 | End: 2018-12-28 | Stop reason: HOSPADM

## 2018-12-26 RX ORDER — LACTOBACILLUS RHAMNOSUS GG 10B CELL
1 CAPSULE ORAL DAILY
Status: DISCONTINUED | OUTPATIENT
Start: 2018-12-26 | End: 2018-12-28 | Stop reason: HOSPADM

## 2018-12-26 RX ORDER — ACETAMINOPHEN 325 MG/1
650 TABLET ORAL EVERY 4 HOURS PRN
Status: DISCONTINUED | OUTPATIENT
Start: 2018-12-26 | End: 2018-12-28 | Stop reason: HOSPADM

## 2018-12-26 RX ORDER — MAGNESIUM SULFATE 1 G/100ML
1 INJECTION INTRAVENOUS PRN
Status: DISCONTINUED | OUTPATIENT
Start: 2018-12-26 | End: 2018-12-28 | Stop reason: HOSPADM

## 2018-12-26 RX ORDER — METHYLPREDNISOLONE SODIUM SUCCINATE 40 MG/ML
40 INJECTION, POWDER, LYOPHILIZED, FOR SOLUTION INTRAMUSCULAR; INTRAVENOUS EVERY 12 HOURS
Status: DISCONTINUED | OUTPATIENT
Start: 2018-12-26 | End: 2018-12-28

## 2018-12-26 RX ORDER — OXYCODONE HYDROCHLORIDE 5 MG/1
5 TABLET ORAL ONCE
Status: COMPLETED | OUTPATIENT
Start: 2018-12-26 | End: 2018-12-26

## 2018-12-26 RX ORDER — ACETAMINOPHEN 500 MG
1000 TABLET ORAL ONCE
Status: COMPLETED | OUTPATIENT
Start: 2018-12-26 | End: 2018-12-26

## 2018-12-26 RX ORDER — POTASSIUM CHLORIDE 7.45 MG/ML
10 INJECTION INTRAVENOUS PRN
Status: DISCONTINUED | OUTPATIENT
Start: 2018-12-26 | End: 2018-12-28 | Stop reason: HOSPADM

## 2018-12-26 RX ORDER — ONDANSETRON 2 MG/ML
4 INJECTION INTRAMUSCULAR; INTRAVENOUS EVERY 6 HOURS PRN
Status: DISCONTINUED | OUTPATIENT
Start: 2018-12-26 | End: 2018-12-28 | Stop reason: HOSPADM

## 2018-12-26 RX ORDER — IPRATROPIUM BROMIDE AND ALBUTEROL SULFATE 2.5; .5 MG/3ML; MG/3ML
1 SOLUTION RESPIRATORY (INHALATION)
Status: DISCONTINUED | OUTPATIENT
Start: 2018-12-26 | End: 2018-12-27

## 2018-12-26 RX ORDER — POTASSIUM CHLORIDE 20 MEQ/1
40 TABLET, EXTENDED RELEASE ORAL PRN
Status: DISCONTINUED | OUTPATIENT
Start: 2018-12-26 | End: 2018-12-28 | Stop reason: HOSPADM

## 2018-12-26 RX ORDER — ASPIRIN 81 MG/1
81 TABLET ORAL DAILY
Status: DISCONTINUED | OUTPATIENT
Start: 2018-12-26 | End: 2018-12-28 | Stop reason: HOSPADM

## 2018-12-26 RX ORDER — ATORVASTATIN CALCIUM 80 MG/1
80 TABLET, FILM COATED ORAL DAILY
Status: DISCONTINUED | OUTPATIENT
Start: 2018-12-26 | End: 2018-12-28

## 2018-12-26 RX ORDER — AZITHROMYCIN 250 MG/1
500 TABLET, FILM COATED ORAL ONCE
Status: COMPLETED | OUTPATIENT
Start: 2018-12-26 | End: 2018-12-26

## 2018-12-26 RX ORDER — IPRATROPIUM BROMIDE AND ALBUTEROL SULFATE 2.5; .5 MG/3ML; MG/3ML
1 SOLUTION RESPIRATORY (INHALATION) EVERY 4 HOURS PRN
Status: DISCONTINUED | OUTPATIENT
Start: 2018-12-26 | End: 2018-12-26

## 2018-12-26 RX ORDER — BENZONATATE 100 MG/1
200 CAPSULE ORAL 3 TIMES DAILY PRN
Status: DISCONTINUED | OUTPATIENT
Start: 2018-12-26 | End: 2018-12-28 | Stop reason: HOSPADM

## 2018-12-26 RX ORDER — IPRATROPIUM BROMIDE AND ALBUTEROL SULFATE 2.5; .5 MG/3ML; MG/3ML
1 SOLUTION RESPIRATORY (INHALATION)
Status: DISCONTINUED | OUTPATIENT
Start: 2018-12-26 | End: 2018-12-26

## 2018-12-26 RX ORDER — TIZANIDINE 4 MG/1
4 TABLET ORAL EVERY 8 HOURS PRN
Status: DISCONTINUED | OUTPATIENT
Start: 2018-12-26 | End: 2018-12-28 | Stop reason: HOSPADM

## 2018-12-26 RX ORDER — SODIUM CHLORIDE 0.9 % (FLUSH) 0.9 %
10 SYRINGE (ML) INJECTION PRN
Status: DISCONTINUED | OUTPATIENT
Start: 2018-12-26 | End: 2018-12-28 | Stop reason: HOSPADM

## 2018-12-26 RX ORDER — GUAIFENESIN 600 MG/1
600 TABLET, EXTENDED RELEASE ORAL 2 TIMES DAILY
Status: DISCONTINUED | OUTPATIENT
Start: 2018-12-26 | End: 2018-12-28 | Stop reason: HOSPADM

## 2018-12-26 RX ORDER — KETOROLAC TROMETHAMINE 30 MG/ML
15 INJECTION, SOLUTION INTRAMUSCULAR; INTRAVENOUS EVERY 6 HOURS PRN
Status: DISCONTINUED | OUTPATIENT
Start: 2018-12-26 | End: 2018-12-28 | Stop reason: HOSPADM

## 2018-12-26 RX ORDER — FLUOXETINE HYDROCHLORIDE 20 MG/1
20 CAPSULE ORAL DAILY
Status: DISCONTINUED | OUTPATIENT
Start: 2018-12-26 | End: 2018-12-26

## 2018-12-26 RX ADMIN — AZITHROMYCIN 500 MG: 250 TABLET, FILM COATED ORAL at 13:07

## 2018-12-26 RX ADMIN — ENOXAPARIN SODIUM 40 MG: 40 INJECTION SUBCUTANEOUS at 13:07

## 2018-12-26 RX ADMIN — METHYLPREDNISOLONE SODIUM SUCCINATE 40 MG: 40 INJECTION, POWDER, FOR SOLUTION INTRAMUSCULAR; INTRAVENOUS at 16:26

## 2018-12-26 RX ADMIN — Medication 10 ML: at 21:09

## 2018-12-26 RX ADMIN — TIZANIDINE 4 MG: 4 TABLET ORAL at 18:25

## 2018-12-26 RX ADMIN — Medication 1 CAPSULE: at 13:08

## 2018-12-26 RX ADMIN — IPRATROPIUM BROMIDE AND ALBUTEROL SULFATE 1 AMPULE: .5; 3 SOLUTION RESPIRATORY (INHALATION) at 19:04

## 2018-12-26 RX ADMIN — GUAIFENESIN 600 MG: 600 TABLET, EXTENDED RELEASE ORAL at 21:09

## 2018-12-26 RX ADMIN — ACETAMINOPHEN 1000 MG: 500 TABLET ORAL at 09:21

## 2018-12-26 RX ADMIN — PREGABALIN 100 MG: 100 CAPSULE ORAL at 21:09

## 2018-12-26 RX ADMIN — GUAIFENESIN 600 MG: 600 TABLET, EXTENDED RELEASE ORAL at 13:08

## 2018-12-26 RX ADMIN — FLUOXETINE 20 MG: 20 CAPSULE ORAL at 21:09

## 2018-12-26 RX ADMIN — Medication 10 ML: at 16:27

## 2018-12-26 RX ADMIN — ATORVASTATIN CALCIUM 80 MG: 80 TABLET, FILM COATED ORAL at 21:09

## 2018-12-26 RX ADMIN — OXYCODONE HYDROCHLORIDE 5 MG: 5 TABLET ORAL at 16:26

## 2018-12-26 RX ADMIN — SODIUM CHLORIDE 1000 ML: 9 INJECTION, SOLUTION INTRAVENOUS at 09:21

## 2018-12-26 RX ADMIN — ACETAMINOPHEN 650 MG: 325 TABLET ORAL at 13:08

## 2018-12-26 RX ADMIN — METOPROLOL TARTRATE 25 MG: 25 TABLET ORAL at 21:09

## 2018-12-26 RX ADMIN — PREGABALIN 100 MG: 100 CAPSULE ORAL at 14:50

## 2018-12-26 RX ADMIN — BENZONATATE 200 MG: 100 CAPSULE ORAL at 13:08

## 2018-12-26 RX ADMIN — PREDNISONE 50 MG: 20 TABLET ORAL at 13:08

## 2018-12-26 ASSESSMENT — PAIN DESCRIPTION - PAIN TYPE
TYPE: ACUTE PAIN

## 2018-12-26 ASSESSMENT — PAIN SCALES - GENERAL
PAINLEVEL_OUTOF10: 6
PAINLEVEL_OUTOF10: 5
PAINLEVEL_OUTOF10: 9
PAINLEVEL_OUTOF10: 8
PAINLEVEL_OUTOF10: 9
PAINLEVEL_OUTOF10: 9
PAINLEVEL_OUTOF10: 10

## 2018-12-26 ASSESSMENT — PAIN DESCRIPTION - LOCATION
LOCATION: BACK;NECK
LOCATION: BACK;SHOULDER
LOCATION: BACK;NECK

## 2018-12-26 ASSESSMENT — PAIN DESCRIPTION - FREQUENCY
FREQUENCY: CONTINUOUS

## 2018-12-26 ASSESSMENT — PAIN DESCRIPTION - ORIENTATION
ORIENTATION: RIGHT;UPPER
ORIENTATION: RIGHT;UPPER
ORIENTATION: UPPER
ORIENTATION: UPPER

## 2018-12-26 ASSESSMENT — PAIN DESCRIPTION - PROGRESSION
CLINICAL_PROGRESSION: NOT CHANGED
CLINICAL_PROGRESSION: GRADUALLY WORSENING

## 2018-12-26 ASSESSMENT — PAIN DESCRIPTION - ONSET
ONSET: SUDDEN
ONSET: ON-GOING
ONSET: ON-GOING
ONSET: SUDDEN

## 2018-12-26 ASSESSMENT — PAIN DESCRIPTION - DIRECTION
RADIATING_TOWARDS: NON RADIATING
RADIATING_TOWARDS: NON RADIATING
RADIATING_TOWARDS: NONRADIATING

## 2018-12-26 ASSESSMENT — PAIN DESCRIPTION - DESCRIPTORS
DESCRIPTORS: SHARP;STABBING
DESCRIPTORS: SHARP;STABBING
DESCRIPTORS: ACHING;CONSTANT
DESCRIPTORS: ACHING;CONSTANT
DESCRIPTORS: SHARP

## 2018-12-27 LAB
ANION GAP SERPL CALCULATED.3IONS-SCNC: 12 MMOL/L (ref 3–16)
BASOPHILS ABSOLUTE: 0.2 K/UL (ref 0–0.2)
BASOPHILS RELATIVE PERCENT: 2.7 %
BUN BLDV-MCNC: 11 MG/DL (ref 7–20)
CALCIUM SERPL-MCNC: 9.1 MG/DL (ref 8.3–10.6)
CHLORIDE BLD-SCNC: 102 MMOL/L (ref 99–110)
CO2: 26 MMOL/L (ref 21–32)
CREAT SERPL-MCNC: 0.6 MG/DL (ref 0.9–1.3)
EOSINOPHILS ABSOLUTE: 0 K/UL (ref 0–0.6)
EOSINOPHILS RELATIVE PERCENT: 0.5 %
GFR AFRICAN AMERICAN: >60
GFR NON-AFRICAN AMERICAN: >60
GLUCOSE BLD-MCNC: 143 MG/DL (ref 70–99)
HCT VFR BLD CALC: 39 % (ref 40.5–52.5)
HEMOGLOBIN: 13.2 G/DL (ref 13.5–17.5)
LV EF: 58 %
LVEF MODALITY: NORMAL
LYMPHOCYTES ABSOLUTE: 0.7 K/UL (ref 1–5.1)
LYMPHOCYTES RELATIVE PERCENT: 10.3 %
MCH RBC QN AUTO: 30 PG (ref 26–34)
MCHC RBC AUTO-ENTMCNC: 33.8 G/DL (ref 31–36)
MCV RBC AUTO: 88.8 FL (ref 80–100)
MONOCYTES ABSOLUTE: 0.1 K/UL (ref 0–1.3)
MONOCYTES RELATIVE PERCENT: 2.3 %
NEUTROPHILS ABSOLUTE: 5.4 K/UL (ref 1.7–7.7)
NEUTROPHILS RELATIVE PERCENT: 84.2 %
PDW BLD-RTO: 14.8 % (ref 12.4–15.4)
PLATELET # BLD: 210 K/UL (ref 135–450)
PMV BLD AUTO: 9.1 FL (ref 5–10.5)
POTASSIUM REFLEX MAGNESIUM: 3.9 MMOL/L (ref 3.5–5.1)
RBC # BLD: 4.39 M/UL (ref 4.2–5.9)
SODIUM BLD-SCNC: 140 MMOL/L (ref 136–145)
WBC # BLD: 6.4 K/UL (ref 4–11)

## 2018-12-27 PROCEDURE — 94640 AIRWAY INHALATION TREATMENT: CPT

## 2018-12-27 PROCEDURE — 36415 COLL VENOUS BLD VENIPUNCTURE: CPT

## 2018-12-27 PROCEDURE — 94761 N-INVAS EAR/PLS OXIMETRY MLT: CPT

## 2018-12-27 PROCEDURE — 80048 BASIC METABOLIC PNL TOTAL CA: CPT

## 2018-12-27 PROCEDURE — 1200000000 HC SEMI PRIVATE

## 2018-12-27 PROCEDURE — 6370000000 HC RX 637 (ALT 250 FOR IP): Performed by: PHYSICIAN ASSISTANT

## 2018-12-27 PROCEDURE — 6370000000 HC RX 637 (ALT 250 FOR IP): Performed by: INTERNAL MEDICINE

## 2018-12-27 PROCEDURE — 6360000002 HC RX W HCPCS: Performed by: INTERNAL MEDICINE

## 2018-12-27 PROCEDURE — 2580000003 HC RX 258: Performed by: PHYSICIAN ASSISTANT

## 2018-12-27 PROCEDURE — 99222 1ST HOSP IP/OBS MODERATE 55: CPT | Performed by: INTERNAL MEDICINE

## 2018-12-27 PROCEDURE — 85025 COMPLETE CBC W/AUTO DIFF WBC: CPT

## 2018-12-27 PROCEDURE — 93880 EXTRACRANIAL BILAT STUDY: CPT

## 2018-12-27 PROCEDURE — 93306 TTE W/DOPPLER COMPLETE: CPT

## 2018-12-27 PROCEDURE — 99232 SBSQ HOSP IP/OBS MODERATE 35: CPT | Performed by: INTERNAL MEDICINE

## 2018-12-27 PROCEDURE — 6360000002 HC RX W HCPCS: Performed by: PHYSICIAN ASSISTANT

## 2018-12-27 PROCEDURE — 2580000003 HC RX 258: Performed by: INTERNAL MEDICINE

## 2018-12-27 RX ORDER — IPRATROPIUM BROMIDE AND ALBUTEROL SULFATE 2.5; .5 MG/3ML; MG/3ML
1 SOLUTION RESPIRATORY (INHALATION) EVERY 4 HOURS PRN
Status: DISCONTINUED | OUTPATIENT
Start: 2018-12-27 | End: 2018-12-28 | Stop reason: HOSPADM

## 2018-12-27 RX ORDER — IPRATROPIUM BROMIDE AND ALBUTEROL SULFATE 2.5; .5 MG/3ML; MG/3ML
1 SOLUTION RESPIRATORY (INHALATION) 2 TIMES DAILY
Status: DISCONTINUED | OUTPATIENT
Start: 2018-12-27 | End: 2018-12-28 | Stop reason: HOSPADM

## 2018-12-27 RX ORDER — AZITHROMYCIN 250 MG/1
250 TABLET, FILM COATED ORAL DAILY
Status: DISCONTINUED | OUTPATIENT
Start: 2018-12-27 | End: 2018-12-28 | Stop reason: HOSPADM

## 2018-12-27 RX ADMIN — IPRATROPIUM BROMIDE AND ALBUTEROL SULFATE 1 AMPULE: .5; 3 SOLUTION RESPIRATORY (INHALATION) at 06:55

## 2018-12-27 RX ADMIN — DOXYCYCLINE HYCLATE 100 MG: 100 TABLET, COATED ORAL at 08:19

## 2018-12-27 RX ADMIN — PREGABALIN 100 MG: 100 CAPSULE ORAL at 20:16

## 2018-12-27 RX ADMIN — KETOROLAC TROMETHAMINE 15 MG: 30 INJECTION, SOLUTION INTRAMUSCULAR at 17:22

## 2018-12-27 RX ADMIN — ENOXAPARIN SODIUM 40 MG: 40 INJECTION SUBCUTANEOUS at 08:18

## 2018-12-27 RX ADMIN — ASPIRIN 81 MG: 81 TABLET, COATED ORAL at 08:18

## 2018-12-27 RX ADMIN — KETOROLAC TROMETHAMINE 15 MG: 30 INJECTION, SOLUTION INTRAMUSCULAR at 08:20

## 2018-12-27 RX ADMIN — METOPROLOL TARTRATE 25 MG: 25 TABLET ORAL at 08:18

## 2018-12-27 RX ADMIN — PREGABALIN 100 MG: 100 CAPSULE ORAL at 08:19

## 2018-12-27 RX ADMIN — AZITHROMYCIN 250 MG: 250 TABLET, FILM COATED ORAL at 14:15

## 2018-12-27 RX ADMIN — METHYLPREDNISOLONE SODIUM SUCCINATE 40 MG: 40 INJECTION, POWDER, FOR SOLUTION INTRAMUSCULAR; INTRAVENOUS at 05:59

## 2018-12-27 RX ADMIN — GUAIFENESIN 600 MG: 600 TABLET, EXTENDED RELEASE ORAL at 20:16

## 2018-12-27 RX ADMIN — IPRATROPIUM BROMIDE AND ALBUTEROL SULFATE 1 AMPULE: .5; 3 SOLUTION RESPIRATORY (INHALATION) at 03:29

## 2018-12-27 RX ADMIN — TIZANIDINE 4 MG: 4 TABLET ORAL at 08:19

## 2018-12-27 RX ADMIN — FLUOXETINE 20 MG: 20 CAPSULE ORAL at 20:16

## 2018-12-27 RX ADMIN — PANTOPRAZOLE SODIUM 40 MG: 40 TABLET, DELAYED RELEASE ORAL at 05:59

## 2018-12-27 RX ADMIN — LEVOTHYROXINE SODIUM 75 MCG: 0.05 TABLET ORAL at 05:58

## 2018-12-27 RX ADMIN — METHYLPREDNISOLONE SODIUM SUCCINATE 40 MG: 40 INJECTION, POWDER, FOR SOLUTION INTRAMUSCULAR; INTRAVENOUS at 17:22

## 2018-12-27 RX ADMIN — Medication 10 ML: at 20:16

## 2018-12-27 RX ADMIN — METOPROLOL TARTRATE 25 MG: 25 TABLET ORAL at 20:16

## 2018-12-27 RX ADMIN — Medication 1 CAPSULE: at 08:18

## 2018-12-27 RX ADMIN — IPRATROPIUM BROMIDE AND ALBUTEROL SULFATE 1 AMPULE: .5; 3 SOLUTION RESPIRATORY (INHALATION) at 11:03

## 2018-12-27 RX ADMIN — CEFTRIAXONE SODIUM 1 G: 1 INJECTION, POWDER, FOR SOLUTION INTRAMUSCULAR; INTRAVENOUS at 13:38

## 2018-12-27 RX ADMIN — PREGABALIN 100 MG: 100 CAPSULE ORAL at 13:38

## 2018-12-27 RX ADMIN — IPRATROPIUM BROMIDE AND ALBUTEROL SULFATE 1 AMPULE: .5; 3 SOLUTION RESPIRATORY (INHALATION) at 20:35

## 2018-12-27 RX ADMIN — Medication 10 ML: at 17:23

## 2018-12-27 RX ADMIN — Medication 10 ML: at 08:20

## 2018-12-27 RX ADMIN — GUAIFENESIN 600 MG: 600 TABLET, EXTENDED RELEASE ORAL at 08:19

## 2018-12-27 ASSESSMENT — PAIN DESCRIPTION - ORIENTATION
ORIENTATION: INNER
ORIENTATION: INNER

## 2018-12-27 ASSESSMENT — PAIN SCALES - GENERAL
PAINLEVEL_OUTOF10: 6
PAINLEVEL_OUTOF10: 5

## 2018-12-27 ASSESSMENT — PAIN DESCRIPTION - PAIN TYPE
TYPE: ACUTE PAIN
TYPE: ACUTE PAIN

## 2018-12-27 ASSESSMENT — PAIN DESCRIPTION - LOCATION
LOCATION: HEAD
LOCATION: HEAD;NECK

## 2018-12-27 ASSESSMENT — PAIN DESCRIPTION - FREQUENCY
FREQUENCY: CONTINUOUS
FREQUENCY: CONTINUOUS

## 2018-12-27 ASSESSMENT — PAIN DESCRIPTION - ONSET
ONSET: ON-GOING
ONSET: ON-GOING

## 2018-12-27 ASSESSMENT — PAIN DESCRIPTION - DIRECTION
RADIATING_TOWARDS: NON RADIATING
RADIATING_TOWARDS: NON RADIATING

## 2018-12-27 ASSESSMENT — ACTIVITIES OF DAILY LIVING (ADL): EFFECT OF PAIN ON DAILY ACTIVITIES: BOTHERSOME

## 2018-12-27 ASSESSMENT — PAIN DESCRIPTION - PROGRESSION
CLINICAL_PROGRESSION: GRADUALLY WORSENING
CLINICAL_PROGRESSION: GRADUALLY WORSENING

## 2018-12-27 ASSESSMENT — PAIN DESCRIPTION - DESCRIPTORS
DESCRIPTORS: ACHING;CONSTANT
DESCRIPTORS: ACHING;CONSTANT

## 2018-12-28 ENCOUNTER — TELEPHONE (OUTPATIENT)
Dept: PULMONOLOGY | Age: 58
End: 2018-12-28

## 2018-12-28 ENCOUNTER — APPOINTMENT (OUTPATIENT)
Dept: ULTRASOUND IMAGING | Age: 58
DRG: 194 | End: 2018-12-28
Payer: MEDICARE

## 2018-12-28 VITALS
WEIGHT: 191.7 LBS | SYSTOLIC BLOOD PRESSURE: 149 MMHG | HEART RATE: 82 BPM | OXYGEN SATURATION: 98 % | BODY MASS INDEX: 29.05 KG/M2 | DIASTOLIC BLOOD PRESSURE: 85 MMHG | HEIGHT: 68 IN | RESPIRATION RATE: 16 BRPM | TEMPERATURE: 97 F

## 2018-12-28 LAB — TSH REFLEX: 1.03 UIU/ML (ref 0.27–4.2)

## 2018-12-28 PROCEDURE — 36415 COLL VENOUS BLD VENIPUNCTURE: CPT

## 2018-12-28 PROCEDURE — 84443 ASSAY THYROID STIM HORMONE: CPT

## 2018-12-28 PROCEDURE — 6370000000 HC RX 637 (ALT 250 FOR IP): Performed by: INTERNAL MEDICINE

## 2018-12-28 PROCEDURE — 6360000002 HC RX W HCPCS: Performed by: PHYSICIAN ASSISTANT

## 2018-12-28 PROCEDURE — 76536 US EXAM OF HEAD AND NECK: CPT

## 2018-12-28 PROCEDURE — 6370000000 HC RX 637 (ALT 250 FOR IP): Performed by: PHYSICIAN ASSISTANT

## 2018-12-28 PROCEDURE — 99239 HOSP IP/OBS DSCHRG MGMT >30: CPT | Performed by: INTERNAL MEDICINE

## 2018-12-28 PROCEDURE — 94640 AIRWAY INHALATION TREATMENT: CPT

## 2018-12-28 PROCEDURE — 2580000003 HC RX 258: Performed by: PHYSICIAN ASSISTANT

## 2018-12-28 RX ORDER — BENZONATATE 200 MG/1
200 CAPSULE ORAL 3 TIMES DAILY PRN
Qty: 30 CAPSULE | Refills: 0 | Status: SHIPPED | OUTPATIENT
Start: 2018-12-28 | End: 2018-12-31

## 2018-12-28 RX ORDER — PREDNISONE 20 MG/1
20 TABLET ORAL DAILY
Qty: 5 TABLET | Refills: 0 | Status: SHIPPED | OUTPATIENT
Start: 2018-12-28 | End: 2018-12-31

## 2018-12-28 RX ORDER — ATORVASTATIN CALCIUM 40 MG/1
80 TABLET, FILM COATED ORAL DAILY
Status: DISCONTINUED | OUTPATIENT
Start: 2018-12-28 | End: 2018-12-28 | Stop reason: HOSPADM

## 2018-12-28 RX ORDER — LEVOFLOXACIN 500 MG/1
500 TABLET, FILM COATED ORAL DAILY
Qty: 3 TABLET | Refills: 0 | Status: SHIPPED | OUTPATIENT
Start: 2018-12-28 | End: 2018-12-31

## 2018-12-28 RX ADMIN — METOPROLOL TARTRATE 25 MG: 25 TABLET ORAL at 08:57

## 2018-12-28 RX ADMIN — Medication 1 CAPSULE: at 08:57

## 2018-12-28 RX ADMIN — PANTOPRAZOLE SODIUM 40 MG: 40 TABLET, DELAYED RELEASE ORAL at 05:15

## 2018-12-28 RX ADMIN — ATORVASTATIN CALCIUM 80 MG: 40 TABLET, FILM COATED ORAL at 08:57

## 2018-12-28 RX ADMIN — Medication 10 ML: at 05:15

## 2018-12-28 RX ADMIN — ASPIRIN 81 MG: 81 TABLET, COATED ORAL at 08:57

## 2018-12-28 RX ADMIN — IPRATROPIUM BROMIDE AND ALBUTEROL SULFATE 1 AMPULE: .5; 3 SOLUTION RESPIRATORY (INHALATION) at 07:59

## 2018-12-28 RX ADMIN — GUAIFENESIN 600 MG: 600 TABLET, EXTENDED RELEASE ORAL at 08:56

## 2018-12-28 RX ADMIN — PREGABALIN 100 MG: 100 CAPSULE ORAL at 08:57

## 2018-12-28 RX ADMIN — LEVOTHYROXINE SODIUM 75 MCG: 0.05 TABLET ORAL at 05:15

## 2018-12-28 RX ADMIN — AZITHROMYCIN 250 MG: 250 TABLET, FILM COATED ORAL at 08:57

## 2018-12-28 RX ADMIN — METHYLPREDNISOLONE SODIUM SUCCINATE 40 MG: 40 INJECTION, POWDER, FOR SOLUTION INTRAMUSCULAR; INTRAVENOUS at 05:15

## 2018-12-29 ENCOUNTER — CARE COORDINATION (OUTPATIENT)
Dept: CASE MANAGEMENT | Age: 58
End: 2018-12-29

## 2018-12-29 LAB
CULTURE, RESPIRATORY: NORMAL
GRAM STAIN RESULT: NORMAL

## 2018-12-31 ENCOUNTER — HOSPITAL ENCOUNTER (EMERGENCY)
Age: 58
Discharge: HOME | End: 2018-12-31
Attending: EMERGENCY MEDICINE
Payer: MEDICARE

## 2018-12-31 ENCOUNTER — TELEPHONE (OUTPATIENT)
Dept: OTHER | Facility: CLINIC | Age: 58
End: 2018-12-31

## 2018-12-31 ENCOUNTER — APPOINTMENT (OUTPATIENT)
Dept: GENERAL RADIOLOGY | Age: 58
End: 2018-12-31
Payer: MEDICARE

## 2018-12-31 VITALS
BODY MASS INDEX: 29.4 KG/M2 | DIASTOLIC BLOOD PRESSURE: 75 MMHG | HEART RATE: 87 BPM | RESPIRATION RATE: 18 BRPM | SYSTOLIC BLOOD PRESSURE: 117 MMHG | OXYGEN SATURATION: 94 % | WEIGHT: 194 LBS | TEMPERATURE: 97.5 F | HEIGHT: 68 IN

## 2018-12-31 DIAGNOSIS — J40 BRONCHITIS: Primary | ICD-10-CM

## 2018-12-31 LAB
ANION GAP SERPL CALCULATED.3IONS-SCNC: 9 MMOL/L (ref 3–16)
BASOPHILS ABSOLUTE: 0.1 K/UL (ref 0–0.2)
BASOPHILS RELATIVE PERCENT: 0.4 %
BUN BLDV-MCNC: 10 MG/DL (ref 7–20)
CALCIUM SERPL-MCNC: 9.4 MG/DL (ref 8.3–10.6)
CHLORIDE BLD-SCNC: 96 MMOL/L (ref 99–110)
CO2: 30 MMOL/L (ref 21–32)
CREAT SERPL-MCNC: 0.8 MG/DL (ref 0.9–1.3)
EKG ATRIAL RATE: 84 BPM
EKG DIAGNOSIS: NORMAL
EKG P AXIS: 62 DEGREES
EKG P-R INTERVAL: 140 MS
EKG Q-T INTERVAL: 366 MS
EKG QRS DURATION: 76 MS
EKG QTC CALCULATION (BAZETT): 432 MS
EKG R AXIS: 44 DEGREES
EKG T AXIS: 58 DEGREES
EKG VENTRICULAR RATE: 84 BPM
EOSINOPHILS ABSOLUTE: 0.1 K/UL (ref 0–0.6)
EOSINOPHILS RELATIVE PERCENT: 0.6 %
GFR AFRICAN AMERICAN: >60
GFR NON-AFRICAN AMERICAN: >60
GLUCOSE BLD-MCNC: 94 MG/DL (ref 70–99)
HCT VFR BLD CALC: 43.5 % (ref 40.5–52.5)
HEMOGLOBIN: 14.3 G/DL (ref 13.5–17.5)
LYMPHOCYTES ABSOLUTE: 1.1 K/UL (ref 1–5.1)
LYMPHOCYTES RELATIVE PERCENT: 9.1 %
MCH RBC QN AUTO: 29.8 PG (ref 26–34)
MCHC RBC AUTO-ENTMCNC: 32.9 G/DL (ref 31–36)
MCV RBC AUTO: 90.5 FL (ref 80–100)
MONOCYTES ABSOLUTE: 0.6 K/UL (ref 0–1.3)
MONOCYTES RELATIVE PERCENT: 4.4 %
NEUTROPHILS ABSOLUTE: 10.6 K/UL (ref 1.7–7.7)
NEUTROPHILS RELATIVE PERCENT: 85.5 %
PDW BLD-RTO: 15 % (ref 12.4–15.4)
PLATELET # BLD: 210 K/UL (ref 135–450)
PMV BLD AUTO: 8.5 FL (ref 5–10.5)
POTASSIUM SERPL-SCNC: 3.9 MMOL/L (ref 3.5–5.1)
RBC # BLD: 4.81 M/UL (ref 4.2–5.9)
SODIUM BLD-SCNC: 135 MMOL/L (ref 136–145)
TROPONIN: <0.01 NG/ML
WBC # BLD: 12.4 K/UL (ref 4–11)

## 2018-12-31 PROCEDURE — 93005 ELECTROCARDIOGRAM TRACING: CPT | Performed by: EMERGENCY MEDICINE

## 2018-12-31 PROCEDURE — 85025 COMPLETE CBC W/AUTO DIFF WBC: CPT

## 2018-12-31 PROCEDURE — 93010 ELECTROCARDIOGRAM REPORT: CPT | Performed by: INTERNAL MEDICINE

## 2018-12-31 PROCEDURE — 99285 EMERGENCY DEPT VISIT HI MDM: CPT

## 2018-12-31 PROCEDURE — 84484 ASSAY OF TROPONIN QUANT: CPT

## 2018-12-31 PROCEDURE — 80048 BASIC METABOLIC PNL TOTAL CA: CPT

## 2018-12-31 PROCEDURE — 71046 X-RAY EXAM CHEST 2 VIEWS: CPT

## 2018-12-31 PROCEDURE — 6360000002 HC RX W HCPCS: Performed by: EMERGENCY MEDICINE

## 2018-12-31 PROCEDURE — 6370000000 HC RX 637 (ALT 250 FOR IP): Performed by: EMERGENCY MEDICINE

## 2018-12-31 RX ORDER — LEVOFLOXACIN 500 MG/1
500 TABLET, FILM COATED ORAL DAILY
Qty: 3 TABLET | Refills: 0 | Status: SHIPPED | OUTPATIENT
Start: 2018-12-31 | End: 2019-01-03

## 2018-12-31 RX ORDER — BENZONATATE 100 MG/1
100 CAPSULE ORAL ONCE
Status: COMPLETED | OUTPATIENT
Start: 2018-12-31 | End: 2018-12-31

## 2018-12-31 RX ORDER — LEVOFLOXACIN 500 MG/1
500 TABLET, FILM COATED ORAL DAILY
Qty: 7 TABLET | Refills: 0 | Status: SHIPPED | OUTPATIENT
Start: 2018-12-31 | End: 2019-01-07

## 2018-12-31 RX ORDER — LEVOFLOXACIN 500 MG/1
500 TABLET, FILM COATED ORAL ONCE
Status: COMPLETED | OUTPATIENT
Start: 2018-12-31 | End: 2018-12-31

## 2018-12-31 RX ORDER — PREDNISONE 10 MG/1
20 TABLET ORAL DAILY
Qty: 10 TABLET | Refills: 0 | Status: SHIPPED | OUTPATIENT
Start: 2018-12-31 | End: 2019-01-05

## 2018-12-31 RX ORDER — BENZONATATE 100 MG/1
100 CAPSULE ORAL 3 TIMES DAILY PRN
Qty: 10 CAPSULE | Refills: 0 | Status: SHIPPED | OUTPATIENT
Start: 2018-12-31 | End: 2019-01-07

## 2018-12-31 RX ORDER — ALBUTEROL SULFATE 2.5 MG/3ML
2.5 SOLUTION RESPIRATORY (INHALATION) ONCE
Status: COMPLETED | OUTPATIENT
Start: 2018-12-31 | End: 2018-12-31

## 2018-12-31 RX ORDER — PREDNISONE 10 MG/1
20 TABLET ORAL DAILY
Qty: 10 TABLET | Refills: 0 | Status: SHIPPED | OUTPATIENT
Start: 2018-12-31 | End: 2018-12-31

## 2018-12-31 RX ORDER — LEVOFLOXACIN 500 MG/1
500 TABLET, FILM COATED ORAL DAILY
Qty: 7 TABLET | Refills: 0 | Status: SHIPPED | OUTPATIENT
Start: 2018-12-31 | End: 2018-12-31

## 2018-12-31 RX ORDER — PREDNISONE 20 MG/1
40 TABLET ORAL ONCE
Status: COMPLETED | OUTPATIENT
Start: 2018-12-31 | End: 2018-12-31

## 2018-12-31 RX ORDER — BENZONATATE 100 MG/1
100 CAPSULE ORAL 3 TIMES DAILY PRN
Qty: 10 CAPSULE | Refills: 0 | Status: SHIPPED | OUTPATIENT
Start: 2018-12-31 | End: 2018-12-31

## 2018-12-31 RX ADMIN — PREDNISONE 40 MG: 20 TABLET ORAL at 09:48

## 2018-12-31 RX ADMIN — LEVOFLOXACIN 500 MG: 500 TABLET, FILM COATED ORAL at 10:31

## 2018-12-31 RX ADMIN — BENZONATATE 100 MG: 100 CAPSULE ORAL at 09:48

## 2018-12-31 RX ADMIN — ALBUTEROL SULFATE 2.5 MG: 2.5 SOLUTION RESPIRATORY (INHALATION) at 09:48

## 2018-12-31 ASSESSMENT — PAIN DESCRIPTION - PAIN TYPE: TYPE: ACUTE PAIN

## 2018-12-31 ASSESSMENT — PAIN DESCRIPTION - LOCATION: LOCATION: CHEST;HEAD

## 2018-12-31 ASSESSMENT — PAIN SCALES - GENERAL: PAINLEVEL_OUTOF10: 8

## 2019-01-16 ENCOUNTER — CARE COORDINATION (OUTPATIENT)
Dept: CASE MANAGEMENT | Age: 59
End: 2019-01-16

## 2019-01-17 ENCOUNTER — CARE COORDINATION (OUTPATIENT)
Dept: CASE MANAGEMENT | Age: 59
End: 2019-01-17

## 2019-01-17 ENCOUNTER — TELEPHONE (OUTPATIENT)
Dept: PULMONOLOGY | Age: 59
End: 2019-01-17

## 2019-01-21 ENCOUNTER — OFFICE VISIT (OUTPATIENT)
Dept: PULMONOLOGY | Age: 59
End: 2019-01-21
Payer: MEDICARE

## 2019-01-21 VITALS
SYSTOLIC BLOOD PRESSURE: 128 MMHG | HEART RATE: 78 BPM | RESPIRATION RATE: 20 BRPM | DIASTOLIC BLOOD PRESSURE: 64 MMHG | TEMPERATURE: 97.7 F | HEIGHT: 68 IN | OXYGEN SATURATION: 98 % | WEIGHT: 198 LBS | BODY MASS INDEX: 30.01 KG/M2

## 2019-01-21 DIAGNOSIS — Z72.0 TOBACCO ABUSE: ICD-10-CM

## 2019-01-21 DIAGNOSIS — J43.9 PULMONARY EMPHYSEMA, UNSPECIFIED EMPHYSEMA TYPE (HCC): ICD-10-CM

## 2019-01-21 DIAGNOSIS — J18.9 COMMUNITY ACQUIRED PNEUMONIA, UNSPECIFIED LATERALITY: Primary | ICD-10-CM

## 2019-01-21 PROCEDURE — G8417 CALC BMI ABV UP PARAM F/U: HCPCS | Performed by: INTERNAL MEDICINE

## 2019-01-21 PROCEDURE — G8599 NO ASA/ANTIPLAT THER USE RNG: HCPCS | Performed by: INTERNAL MEDICINE

## 2019-01-21 PROCEDURE — G8484 FLU IMMUNIZE NO ADMIN: HCPCS | Performed by: INTERNAL MEDICINE

## 2019-01-21 PROCEDURE — 4004F PT TOBACCO SCREEN RCVD TLK: CPT | Performed by: INTERNAL MEDICINE

## 2019-01-21 PROCEDURE — G8926 SPIRO NO PERF OR DOC: HCPCS | Performed by: INTERNAL MEDICINE

## 2019-01-21 PROCEDURE — 1111F DSCHRG MED/CURRENT MED MERGE: CPT | Performed by: INTERNAL MEDICINE

## 2019-01-21 PROCEDURE — 3023F SPIROM DOC REV: CPT | Performed by: INTERNAL MEDICINE

## 2019-01-21 PROCEDURE — 3017F COLORECTAL CA SCREEN DOC REV: CPT | Performed by: INTERNAL MEDICINE

## 2019-01-21 PROCEDURE — 99214 OFFICE O/P EST MOD 30 MIN: CPT | Performed by: INTERNAL MEDICINE

## 2019-01-21 PROCEDURE — G8427 DOCREV CUR MEDS BY ELIG CLIN: HCPCS | Performed by: INTERNAL MEDICINE

## 2019-01-21 RX ORDER — BENZONATATE 200 MG/1
200 CAPSULE ORAL 3 TIMES DAILY PRN
Qty: 90 CAPSULE | Refills: 1 | Status: SHIPPED | OUTPATIENT
Start: 2019-01-21 | End: 2019-01-28

## 2019-01-21 RX ORDER — BUDESONIDE AND FORMOTEROL FUMARATE DIHYDRATE 160; 4.5 UG/1; UG/1
AEROSOL RESPIRATORY (INHALATION)
COMMUNITY
Start: 2019-01-07 | End: 2019-04-05

## 2019-02-05 ENCOUNTER — CARE COORDINATION (OUTPATIENT)
Dept: CASE MANAGEMENT | Age: 59
End: 2019-02-05

## 2019-02-17 ENCOUNTER — CARE COORDINATION (OUTPATIENT)
Dept: CASE MANAGEMENT | Age: 59
End: 2019-02-17

## 2019-03-06 ENCOUNTER — CARE COORDINATION (OUTPATIENT)
Dept: CASE MANAGEMENT | Age: 59
End: 2019-03-06

## 2019-03-25 ENCOUNTER — CARE COORDINATION (OUTPATIENT)
Dept: CASE MANAGEMENT | Age: 59
End: 2019-03-25

## 2019-04-05 ENCOUNTER — HOSPITAL ENCOUNTER (EMERGENCY)
Age: 59
Discharge: HOME | End: 2019-04-06
Attending: EMERGENCY MEDICINE
Payer: MEDICARE

## 2019-04-05 ENCOUNTER — APPOINTMENT (OUTPATIENT)
Dept: GENERAL RADIOLOGY | Age: 59
End: 2019-04-05
Payer: MEDICARE

## 2019-04-05 DIAGNOSIS — C88.4 MALT LYMPHOMA (HCC): ICD-10-CM

## 2019-04-05 DIAGNOSIS — E86.0 MILD DEHYDRATION: ICD-10-CM

## 2019-04-05 DIAGNOSIS — Z86.59 MENTAL STATUS CHANGE RESOLVED: Primary | ICD-10-CM

## 2019-04-05 DIAGNOSIS — J44.9 CHRONIC OBSTRUCTIVE PULMONARY DISEASE, UNSPECIFIED COPD TYPE (HCC): ICD-10-CM

## 2019-04-05 DIAGNOSIS — R55 SYNCOPE AND COLLAPSE: ICD-10-CM

## 2019-04-05 DIAGNOSIS — F12.90 MARIJUANA USE: ICD-10-CM

## 2019-04-05 DIAGNOSIS — Z72.0 TOBACCO ABUSE: ICD-10-CM

## 2019-04-05 LAB
BASOPHILS ABSOLUTE: 0 K/UL (ref 0–0.2)
BASOPHILS RELATIVE PERCENT: 0.8 %
EOSINOPHILS ABSOLUTE: 0.2 K/UL (ref 0–0.6)
EOSINOPHILS RELATIVE PERCENT: 2.8 %
HCT VFR BLD CALC: 37.8 % (ref 40.5–52.5)
HEMOGLOBIN: 12.6 G/DL (ref 13.5–17.5)
LYMPHOCYTES ABSOLUTE: 2.2 K/UL (ref 1–5.1)
LYMPHOCYTES RELATIVE PERCENT: 37.2 %
MCH RBC QN AUTO: 29.6 PG (ref 26–34)
MCHC RBC AUTO-ENTMCNC: 33.4 G/DL (ref 31–36)
MCV RBC AUTO: 88.4 FL (ref 80–100)
MONOCYTES ABSOLUTE: 0.4 K/UL (ref 0–1.3)
MONOCYTES RELATIVE PERCENT: 7.5 %
NEUTROPHILS ABSOLUTE: 3.1 K/UL (ref 1.7–7.7)
NEUTROPHILS RELATIVE PERCENT: 51.7 %
PDW BLD-RTO: 15 % (ref 12.4–15.4)
PLATELET # BLD: 196 K/UL (ref 135–450)
PMV BLD AUTO: 8.6 FL (ref 5–10.5)
RBC # BLD: 4.28 M/UL (ref 4.2–5.9)
WBC # BLD: 6 K/UL (ref 4–11)

## 2019-04-05 PROCEDURE — 84484 ASSAY OF TROPONIN QUANT: CPT

## 2019-04-05 PROCEDURE — 36415 COLL VENOUS BLD VENIPUNCTURE: CPT

## 2019-04-05 PROCEDURE — 84439 ASSAY OF FREE THYROXINE: CPT

## 2019-04-05 PROCEDURE — 99285 EMERGENCY DEPT VISIT HI MDM: CPT

## 2019-04-05 PROCEDURE — 71045 X-RAY EXAM CHEST 1 VIEW: CPT

## 2019-04-05 PROCEDURE — 80053 COMPREHEN METABOLIC PANEL: CPT

## 2019-04-05 PROCEDURE — 84443 ASSAY THYROID STIM HORMONE: CPT

## 2019-04-05 PROCEDURE — 85025 COMPLETE CBC W/AUTO DIFF WBC: CPT

## 2019-04-05 PROCEDURE — 82550 ASSAY OF CK (CPK): CPT

## 2019-04-05 PROCEDURE — 83735 ASSAY OF MAGNESIUM: CPT

## 2019-04-05 PROCEDURE — G0480 DRUG TEST DEF 1-7 CLASSES: HCPCS

## 2019-04-06 ENCOUNTER — APPOINTMENT (OUTPATIENT)
Dept: CT IMAGING | Age: 59
End: 2019-04-06
Payer: MEDICARE

## 2019-04-06 VITALS
OXYGEN SATURATION: 96 % | RESPIRATION RATE: 16 BRPM | TEMPERATURE: 98.3 F | SYSTOLIC BLOOD PRESSURE: 126 MMHG | BODY MASS INDEX: 29.1 KG/M2 | HEART RATE: 64 BPM | DIASTOLIC BLOOD PRESSURE: 63 MMHG | HEIGHT: 68 IN | WEIGHT: 192 LBS

## 2019-04-06 LAB
A/G RATIO: 1.3 (ref 1.1–2.2)
ALBUMIN SERPL-MCNC: 3.9 G/DL (ref 3.4–5)
ALP BLD-CCNC: 112 U/L (ref 40–129)
ALT SERPL-CCNC: 13 U/L (ref 10–40)
AMMONIA: 50 UMOL/L (ref 16–60)
AMPHETAMINE SCREEN, URINE: ABNORMAL
ANION GAP SERPL CALCULATED.3IONS-SCNC: 11 MMOL/L (ref 3–16)
AST SERPL-CCNC: 17 U/L (ref 15–37)
BARBITURATE SCREEN URINE: ABNORMAL
BASE EXCESS VENOUS: -0.7 MMOL/L (ref -3–3)
BENZODIAZEPINE SCREEN, URINE: ABNORMAL
BILIRUB SERPL-MCNC: 0.4 MG/DL (ref 0–1)
BILIRUBIN URINE: NEGATIVE
BLOOD, URINE: NEGATIVE
BUN BLDV-MCNC: 7 MG/DL (ref 7–20)
CALCIUM SERPL-MCNC: 9.4 MG/DL (ref 8.3–10.6)
CANNABINOID SCREEN URINE: POSITIVE
CARBOXYHEMOGLOBIN: 8.7 % (ref 0–1.5)
CHLORIDE BLD-SCNC: 100 MMOL/L (ref 99–110)
CLARITY: CLEAR
CO2: 26 MMOL/L (ref 21–32)
COCAINE METABOLITE SCREEN URINE: ABNORMAL
COLOR: YELLOW
CREAT SERPL-MCNC: 0.8 MG/DL (ref 0.9–1.3)
EKG ATRIAL RATE: 64 BPM
EKG DIAGNOSIS: NORMAL
EKG P AXIS: 53 DEGREES
EKG P-R INTERVAL: 162 MS
EKG Q-T INTERVAL: 418 MS
EKG QRS DURATION: 76 MS
EKG QTC CALCULATION (BAZETT): 431 MS
EKG R AXIS: 39 DEGREES
EKG T AXIS: 51 DEGREES
EKG VENTRICULAR RATE: 64 BPM
EPITHELIAL CELLS, UA: NORMAL /HPF
ETHANOL: NORMAL MG/DL (ref 0–0.08)
GFR AFRICAN AMERICAN: >60
GFR NON-AFRICAN AMERICAN: >60
GLOBULIN: 2.9 G/DL
GLUCOSE BLD-MCNC: 109 MG/DL (ref 70–99)
GLUCOSE URINE: NEGATIVE MG/DL
HCO3 VENOUS: 21.3 MMOL/L (ref 23–29)
KETONES, URINE: NEGATIVE MG/DL
LEUKOCYTE ESTERASE, URINE: ABNORMAL
Lab: ABNORMAL
MAGNESIUM: 1.8 MG/DL (ref 1.8–2.4)
METHADONE SCREEN, URINE: ABNORMAL
METHEMOGLOBIN VENOUS: 0.3 %
MICROSCOPIC EXAMINATION: YES
NITRITE, URINE: NEGATIVE
O2 CONTENT, VEN: 14 VOL %
O2 SAT, VEN: 81 %
O2 THERAPY: ABNORMAL
OPIATE SCREEN URINE: ABNORMAL
OXYCODONE URINE: ABNORMAL
PCO2, VEN: 28.1 MMHG (ref 40–50)
PH UA: 6
PH UA: 6 (ref 5–8)
PH VENOUS: 7.5 (ref 7.35–7.45)
PHENCYCLIDINE SCREEN URINE: ABNORMAL
PO2, VEN: 40.6 MMHG (ref 25–40)
POTASSIUM SERPL-SCNC: 3.6 MMOL/L (ref 3.5–5.1)
PROPOXYPHENE SCREEN: ABNORMAL
PROTEIN UA: NEGATIVE MG/DL
RAPID INFLUENZA  B AGN: NEGATIVE
RAPID INFLUENZA A AGN: NEGATIVE
RBC UA: NORMAL /HPF (ref 0–2)
SODIUM BLD-SCNC: 137 MMOL/L (ref 136–145)
SPECIFIC GRAVITY UA: <=1.005 (ref 1–1.03)
T4 FREE: 1.4 NG/DL (ref 0.9–1.8)
TCO2 CALC VENOUS: 22 MMOL/L
TOTAL CK: 315 U/L (ref 39–308)
TOTAL PROTEIN: 6.8 G/DL (ref 6.4–8.2)
TROPONIN: <0.01 NG/ML
TSH REFLEX: 4.4 UIU/ML (ref 0.27–4.2)
URINE TYPE: ABNORMAL
UROBILINOGEN, URINE: 0.2 E.U./DL
WBC UA: NORMAL /HPF (ref 0–5)

## 2019-04-06 PROCEDURE — 2580000003 HC RX 258: Performed by: EMERGENCY MEDICINE

## 2019-04-06 PROCEDURE — 87804 INFLUENZA ASSAY W/OPTIC: CPT

## 2019-04-06 PROCEDURE — 96360 HYDRATION IV INFUSION INIT: CPT

## 2019-04-06 PROCEDURE — 81001 URINALYSIS AUTO W/SCOPE: CPT

## 2019-04-06 PROCEDURE — 82803 BLOOD GASES ANY COMBINATION: CPT

## 2019-04-06 PROCEDURE — 93010 ELECTROCARDIOGRAM REPORT: CPT | Performed by: INTERNAL MEDICINE

## 2019-04-06 PROCEDURE — 80307 DRUG TEST PRSMV CHEM ANLYZR: CPT

## 2019-04-06 PROCEDURE — 93005 ELECTROCARDIOGRAM TRACING: CPT | Performed by: EMERGENCY MEDICINE

## 2019-04-06 PROCEDURE — 36415 COLL VENOUS BLD VENIPUNCTURE: CPT

## 2019-04-06 PROCEDURE — 82140 ASSAY OF AMMONIA: CPT

## 2019-04-06 PROCEDURE — 70450 CT HEAD/BRAIN W/O DYE: CPT

## 2019-04-06 RX ORDER — NALOXONE HYDROCHLORIDE 0.4 MG/ML
0.4 INJECTION, SOLUTION INTRAMUSCULAR; INTRAVENOUS; SUBCUTANEOUS ONCE
Status: DISCONTINUED | OUTPATIENT
Start: 2019-04-06 | End: 2019-04-06

## 2019-04-06 RX ORDER — 0.9 % SODIUM CHLORIDE 0.9 %
1000 INTRAVENOUS SOLUTION INTRAVENOUS ONCE
Status: COMPLETED | OUTPATIENT
Start: 2019-04-06 | End: 2019-04-06

## 2019-04-06 RX ORDER — ALBUTEROL SULFATE 2.5 MG/3ML
2.5 SOLUTION RESPIRATORY (INHALATION) EVERY 4 HOURS PRN
Qty: 100 EACH | Refills: 0 | Status: ON HOLD | OUTPATIENT
Start: 2019-04-06 | End: 2019-06-21 | Stop reason: ALTCHOICE

## 2019-04-06 RX ADMIN — SODIUM CHLORIDE 1000 ML: 9 INJECTION, SOLUTION INTRAVENOUS at 01:05

## 2019-04-06 NOTE — ED NOTES
MD made aware that Mag results will take about an hour to result due to QC being completed on machinery.       Vijaya Barnes RN  04/06/19 [id number]

## 2019-04-06 NOTE — ED NOTES
Flu swab obtained. Other labs obtained. Patient voided per urinal. Patient insisted on standing up at bedside. Patient needed SBA. Patient to CT prior to writer being able to medicate patient. Patient more awake after flu swab and urine obtained.       Celina Disla RN  04/06/19 [id number]

## 2019-04-06 NOTE — ED NOTES
Patient is ready to go home. Discharge instructions reviewed states understanding. Denies questions. Instructed to f/u states understanding.       Renan Bass RN  04/06/19 [id number]

## 2019-04-06 NOTE — ED NOTES
Discussed giving Narcan with MD. MD gave OK to hold Narcan until results of UDS resulted     Asuncion Harrison RN  04/06/19 [id number]

## 2019-04-06 NOTE — ED NOTES
Per patient family member patient was complaining of leg pain and then sat down in the chair and slumped forward. SO states that patient has been drowsy for about an hour. SO states patient took normal night time medications and medications normally do not effect patient that way. Patient is drowsy but responds to voice.       Kulwant Luo RN  04/05/19 [id number]

## 2019-04-06 NOTE — ED PROVIDER NOTES
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  CERVICAL DISCECTOMY  02/08/2017    Anterior discectomy, anterior foraminotomy C5/C6 and C6-7    CYSTOSCOPY  6/5/15    CYSTOSCOPY N/A 02/09/2018    DIAGNOSTIC CARDIAC CATH LAB PROCEDURE      ENDOSCOPY, COLON, DIAGNOSTIC      HERNIA REPAIR      KNEE ARTHROSCOPY      LITHOTRIPSY      LITHOTRIPSY Left 11/5/15    OTHER SURGICAL HISTORY  1/20/11    video arthroscopy of right shoulder with subcromial d ecompression and arthroscopic sarbjit    OTHER SURGICAL HISTORY  5/1/13    Excision Lesion Right Angle of Mouth    SHOULDER SURGERY      Left Shoulder X 3; right Shoulder X 5    SHOULDER SURGERY Left 05/30/2017    UPPER GASTROINTESTINAL ENDOSCOPY  07/26/2012     Family History   Problem Relation Age of Onset    Heart Disease Father      Social History     Socioeconomic History    Marital status:       Spouse name: Not on file    Number of children: Not on file    Years of education: Not on file    Highest education level: Not on file   Occupational History    Not on file   Social Needs    Financial resource strain: Not on file    Food insecurity:     Worry: Not on file     Inability: Not on file    Transportation needs:     Medical: Not on file     Non-medical: Not on file   Tobacco Use    Smoking status: Current Every Day Smoker     Packs/day: 0.50     Years: 35.00     Pack years: 17.50     Types: Cigarettes    Smokeless tobacco: Never Used   Substance and Sexual Activity    Alcohol use: No    Drug use: Yes     Types: Marijuana    Sexual activity: Yes     Partners: Female   Lifestyle    Physical activity:     Days per week: Not on file     Minutes per session: Not on file    Stress: Not on file   Relationships    Social connections:     Talks on phone: Not on file     Gets together: Not on file     Attends Restoration service: Not on file     Active member of club or organization: Not on file     Attends meetings of clubs or organizations: Not on file     Relationship status: Not on file    Intimate partner violence:     Fear of current or ex partner: Not on file     Emotionally abused: Not on file     Physically abused: Not on file     Forced sexual activity: Not on file   Other Topics Concern    Not on file   Social History Narrative    Not on file     No current facility-administered medications for this encounter. Current Outpatient Medications   Medication Sig Dispense Refill    albuterol (PROVENTIL) (2.5 MG/3ML) 0.083% nebulizer solution Take 3 mLs by nebulization every 4 hours as needed for Wheezing or Shortness of Breath 100 each 0    albuterol sulfate HFA (VENTOLIN HFA) 108 (90 Base) MCG/ACT inhaler Inhale 2 puffs into the lungs every 6 hours as needed for Wheezing 1 Inhaler 5    levothyroxine (SYNTHROID) 75 MCG tablet       tamsulosin (FLOMAX) 0.4 MG capsule       tiZANidine (ZANAFLEX) 4 MG tablet       omeprazole (PRILOSEC) 20 MG capsule Take 20 mg by mouth daily      nitroGLYCERIN (NITROSTAT) 0.4 MG SL tablet Place 1 tablet under the tongue every 5 minutes as needed for Chest pain 25 tablet 3    Sucralfate (CARAFATE PO) Take by mouth      FLUoxetine (PROZAC) 20 MG capsule Take 20 mg by mouth daily.  aspirin EC 81 MG EC tablet Take 1 tablet by mouth daily. 30 tablet 11    metoprolol (LOPRESSOR) 25 MG tablet Take 25 mg by mouth 2 times daily.  Indications: take DOS      pregabalin (LYRICA) 50 MG capsule   Take 100 mg by mouth 3 times daily       tiotropium (SPIRIVA RESPIMAT) 2.5 MCG/ACT AERS inhaler 2 inhalations daily 1 Inhaler 5     Allergies   Allergen Reactions  Bactrim [Sulfamethoxazole-Trimethoprim] Other (See Comments)     States makes yeast infection on his penis    Codeine Nausea Only       REVIEW OF SYSTEMS  10 systems reviewed, pertinent positives per HPI otherwise noted to be negative. PHYSICAL EXAM  /76   Pulse 67   Temp 98.3 °F (36.8 °C)   Resp 16   Ht 5' 8\" (1.727 m)   Wt 192 lb (87.1 kg)   SpO2 94%   BMI 29.19 kg/m²   GENERAL APPEARANCE: Awake and alert. Cooperative. No acute distress, sleeping when I entered the room, appears older than stated age  HEAD: Normocephalic. Atraumatic. EYES: PERRL. EOM's grossly intact. Normal/dull conjunctiva  ENT: Mucous membranes are moist.  Airway patent, no tonsillar/pharyngeal erythema/edema, no hemotympanum. NECK: Supple. Normal range of motion, no rigidity, no JVD  HEART: RRR. No murmurs  LUNGS: Respirations nonlabored. Lungs are with coarse breath sounds, few scattered wheezes, no crackles or rhonchi. ABDOMEN: Soft. Non-distended. Non-tender. No guarding or rebound. Normal bowel sounds. Rotund  EXTREMITIES: No peripheral edema. No calf tenderness Moves all extremities equally. All extremities neurovascularly intact. SKIN: Warm and dry. No acute rashes. NEUROLOGICAL: He is somnolent, but awakens easily to voice and answers questions appropriately, oriented ×4, falls asleep easily. No gross facial drooping. Strength 5/5, sensation intact. No truncal ataxia. Finger to nose testing normal bilaterally, but slowed on cranial nerves II through XII grossly intact, no visual field deficits  PSYCHIATRIC: Normal mood and affect. LABS  I have reviewed all labs for this visit.    Results for orders placed or performed during the hospital encounter of 04/05/19   Rapid influenza A/B antigens   Result Value Ref Range    Rapid Influenza A Ag Negative Negative    Rapid Influenza B Ag Negative Negative   CBC auto differential   Result Value Ref Range    WBC 6.0 4.0 - 11.0 K/uL    RBC 4.28 4.20 - 5.90 M/uL Yellow Straw/Yellow    Clarity, UA Clear Clear    Glucose, Ur Negative Negative mg/dL    Bilirubin Urine Negative Negative    Ketones, Urine Negative Negative mg/dL    Specific Gravity, UA <=1.005 1.005 - 1.030    Blood, Urine Negative Negative    pH, UA 6.0 5.0 - 8.0    Protein, UA Negative Negative mg/dL    Urobilinogen, Urine 0.2 <2.0 E.U./dL    Nitrite, Urine Negative Negative    Leukocyte Esterase, Urine SMALL (A) Negative    Microscopic Examination YES     Urine Type Not Specified    Urine Drug Screen   Result Value Ref Range    Amphetamine Screen, Urine Neg Negative <1000ng/mL    Barbiturate Screen, Ur Neg Negative <200 ng/mL    Benzodiazepine Screen, Urine Neg Negative <200 ng/mL    Cannabinoid Scrn, Ur POSITIVE (A) Negative <50 ng/mL    Cocaine Metabolite Screen, Urine Neg Negative <300 ng/mL    Opiate Scrn, Ur Neg Negative <300 ng/mL    PCP Screen, Urine Neg Negative <25 ng/mL    Methadone Screen, Urine Neg Negative <300 ng/mL    Propoxyphene Scrn, Ur Neg Negative <300 ng/mL    pH, UA 6.0     Drug Screen Comment: see below     Oxycodone Urine Neg Negative <100 ng/ml   Microscopic Urinalysis   Result Value Ref Range    WBC, UA 3-5 0 - 5 /HPF    RBC, UA 0-2 0 - 2 /HPF    Epi Cells 0-2 /HPF     RADIOLOGY  Ct Head Wo Contrast    Result Date: 4/6/2019  EXAMINATION: CT OF THE HEAD WITHOUT CONTRAST  4/6/2019 12:27 am TECHNIQUE: CT of the head was performed without the administration of intravenous contrast. Dose modulation, iterative reconstruction, and/or weight based adjustment of the mA/kV was utilized to reduce the radiation dose to as low as reasonably achievable. COMPARISON: None. HISTORY: ORDERING SYSTEM PROVIDED HISTORY: AMS, hx stomach CA TECHNOLOGIST PROVIDED HISTORY: Has a \"code stroke\" or \"stroke alert\" been called? ->No Ordering Physician Provided Reason for Exam: loc Acuity: Acute Type of Exam: Initial FINDINGS: BRAIN/VENTRICLES: There is no acute intracranial hemorrhage, mass effect or midline shift.  No abnormal extra-axial fluid collection. The gray-white differentiation is maintained without evidence of an acute infarct. There is no evidence of hydrocephalus. ORBITS: The visualized portion of the orbits demonstrate no acute abnormality. SINUSES: Moderate ethmoid sinus mucosal thickening. Small mucous retention cysts identified involving both maxillary sinuses. Mild mucosal thickening maxillary sinuses. Mastoids are grossly clear with minimal fluid right mastoid. SOFT TISSUES/SKULL:  No acute abnormality of the visualized skull or soft tissues. Small right forehead lipoma versus dermoid. No acute intracranial abnormality. Mild-to-moderate sinus disease. Xr Chest Portable    Result Date: 4/6/2019  EXAMINATION: SINGLE XRAY VIEW OF THE CHEST 4/5/2019 11:51 pm COMPARISON: Chest radiographs 12/31/2018 HISTORY: ORDERING SYSTEM PROVIDED HISTORY: loc TECHNOLOGIST PROVIDED HISTORY: Reason for exam:->loc Ordering Physician Provided Reason for Exam: loc Acuity: Acute Type of Exam: Initial FINDINGS: No pneumothorax, pleural effusion or airspace consolidation. Normal heart size and mediastinal contours. No acute osseous abnormality. Cervical fusion hardware noted. No acute findings. ED COURSE/MDM  Patient seen and evaluated. Old records reviewed. Labs and imaging reviewed and results discussed with patient. Plan of care discussed with patient and family. Patient and family in agreement with plan.    61-year-old male with mental status change, he did improve here after fluids, is more awake and answering questions more promptly, labs and imaging unremarkable, abdominal exam benign and no new abdominal symptoms, he did request a nebulizer machine prescription which was provided on paper, and given prescription for albuterol solution for home use, discussed that he should follow up with primary care and Dr. Genoveva Price, he may need home oxygen as his O2 sats would drop into the low 90s, sometimes Breath       CLINICAL IMPRESSION  1. Mental status change resolved    2. Syncope and collapse    3. Chronic obstructive pulmonary disease, unspecified COPD type ([facility] [address]    4. Mild dehydration    5. 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 Tobacco abuse        Blood pressure 134/76, pulse 67, temperature 98.3 °F (36.8 °C), resp. rate 16, height 5' 8\" (1.727 m), weight 192 lb (87.1 kg), SpO2 94 %. Marcus Bradley was discharged to home in stable condition.                    May Serrato DO  04/06/19 0215

## 2019-04-25 ENCOUNTER — HOSPITAL ENCOUNTER (OUTPATIENT)
Dept: GENERAL RADIOLOGY | Age: 59
Discharge: HOME | End: 2019-04-25
Payer: MEDICARE

## 2019-04-25 ENCOUNTER — HOSPITAL ENCOUNTER (OUTPATIENT)
Age: 59
Discharge: HOME | End: 2019-04-25
Payer: MEDICARE

## 2019-04-25 DIAGNOSIS — J44.1 COPD EXACERBATION (HCC): ICD-10-CM

## 2019-04-25 PROCEDURE — 71046 X-RAY EXAM CHEST 2 VIEWS: CPT

## 2019-05-15 NOTE — PROGRESS NOTES
Sucralfate (CARAFATE PO) Take by mouth    Historical Provider, MD   FLUoxetine (PROZAC) 20 MG capsule Take 20 mg by mouth daily. Historical Provider, MD   aspirin EC 81 MG EC tablet Take 1 tablet by mouth daily. 2/16/15   Evy Lr MD   metoprolol (LOPRESSOR) 25 MG tablet Take 25 mg by mouth 2 times daily. Indications: take DOS    Historical Provider, MD   pregabalin (LYRICA) 50 MG capsule   Take 100 mg by mouth 3 times daily     Historical Provider, MD          Allergies:  Bactrim [sulfamethoxazole-trimethoprim] and Codeine     Review of Systems:   A 14 point review of symptoms completed. Pertinent positives identified in the HPI, all other review of symptoms negative as below. Objective   PHYSICAL EXAM:    There were no vitals filed for this visit.            General Appearance:  Alert, cooperative, no distress, appears stated age   Head:  Normocephalic, without obvious abnormality, atraumatic   Eyes:  PERRL, conjunctiva/corneas clear   Nose: Nares normal, no drainage or sinus tenderness   Throat: Lips, mucosa, and tongue normal   Neck: Supple, symmetrical, trachea midline, no adenopathy, thyroid: not enlarged, symmetric, no tenderness/mass/nodules, no carotid bruit or JVD   Lungs:   Clear to auscultation bilaterally, respirations unlabored   Chest Wall:  No deformity or tenderness   Heart:  Regular rate and rhythm, S1, S2 normal, 2/6 sm   Abdomen:   Soft, non-tender, bowel sounds active all four quadrants,  no masses, no organomegaly   Extremities: Extremities normal, atraumatic, no cyanosis or edema   Pulses: 2+ and symmetric   Skin: Skin color, texture, turgor normal, no rashes or lesions   Pysch: Normal mood and affect   Neurologic: Normal gross motor and sensory exam.         Labs   CBC:   Lab Results   Component Value Date    WBC 6.0 04/05/2019    RBC 4.28 04/05/2019    RBC 3.92 06/09/2017    HGB 12.6 04/05/2019    HCT 37.8 04/05/2019    MCV 88.4 04/05/2019    RDW 15.0 04/05/2019    PLT

## 2019-05-17 ENCOUNTER — OFFICE VISIT (OUTPATIENT)
Dept: CARDIOLOGY CLINIC | Age: 59
End: 2019-05-17
Payer: MEDICARE

## 2019-05-17 VITALS
OXYGEN SATURATION: 98 % | HEIGHT: 68 IN | SYSTOLIC BLOOD PRESSURE: 90 MMHG | BODY MASS INDEX: 28.49 KG/M2 | WEIGHT: 188 LBS | HEART RATE: 68 BPM | DIASTOLIC BLOOD PRESSURE: 62 MMHG

## 2019-05-17 DIAGNOSIS — E78.2 MIXED HYPERLIPIDEMIA: ICD-10-CM

## 2019-05-17 DIAGNOSIS — R07.2 PRECORDIAL PAIN: ICD-10-CM

## 2019-05-17 DIAGNOSIS — R55 SYNCOPE, UNSPECIFIED SYNCOPE TYPE: ICD-10-CM

## 2019-05-17 DIAGNOSIS — I25.10 CORONARY ARTERY DISEASE INVOLVING NATIVE HEART WITHOUT ANGINA PECTORIS, UNSPECIFIED VESSEL OR LESION TYPE: Primary | ICD-10-CM

## 2019-05-17 DIAGNOSIS — I10 ESSENTIAL HYPERTENSION: ICD-10-CM

## 2019-05-17 PROCEDURE — 99214 OFFICE O/P EST MOD 30 MIN: CPT | Performed by: INTERNAL MEDICINE

## 2019-05-17 PROCEDURE — 3017F COLORECTAL CA SCREEN DOC REV: CPT | Performed by: INTERNAL MEDICINE

## 2019-05-17 PROCEDURE — G8427 DOCREV CUR MEDS BY ELIG CLIN: HCPCS | Performed by: INTERNAL MEDICINE

## 2019-05-17 PROCEDURE — G8599 NO ASA/ANTIPLAT THER USE RNG: HCPCS | Performed by: INTERNAL MEDICINE

## 2019-05-17 PROCEDURE — 4004F PT TOBACCO SCREEN RCVD TLK: CPT | Performed by: INTERNAL MEDICINE

## 2019-05-17 PROCEDURE — G8417 CALC BMI ABV UP PARAM F/U: HCPCS | Performed by: INTERNAL MEDICINE

## 2019-05-17 RX ORDER — ZOLPIDEM TARTRATE 10 MG/1
TABLET ORAL NIGHTLY PRN
COMMUNITY

## 2019-05-17 RX ORDER — ONDANSETRON 4 MG/1
4 TABLET, FILM COATED ORAL EVERY 8 HOURS PRN
Status: ON HOLD | COMMUNITY
End: 2020-10-03 | Stop reason: HOSPADM

## 2019-05-17 RX ORDER — POTASSIUM CHLORIDE 750 MG/1
20 TABLET, FILM COATED, EXTENDED RELEASE ORAL DAILY
COMMUNITY
End: 2020-02-18 | Stop reason: SDUPTHER

## 2019-05-17 NOTE — PATIENT INSTRUCTIONS
Plan   1. Liver and lipids  2. Lexiscan myoview to evaluate chest pain and shortness of breath  3. Echo to evaluate chest pain and shortness of breath  4. 2 week event monitor to evaluate syncope  5.  Follow up with NP in 1-2 months

## 2019-05-17 NOTE — LETTER
[address] [place] [address] Cardiology - [address] [place] [address] [place] Ch[place]vez. [address]  Phone: [phone]  Fax: [phone]    Hillary Guerra MD        May 22, 2019     Cory Meza MD  [address] [place] [address] Dr. Philippe Portillo [id number]    Patient: Fletcher Tee  MR Number:   YOB: 1960  Date of Visit: 5/17/2019    Dear Dr. Cory Meza:    Today I saw our mutual patient named above. Below are the relevant portions of my assessment and plan of care. If you have questions, please do not hesitate to call me. I look forward to following Sullivan Justin along with you. [facility]   CARDIAC EVALUATION NOTE  [phone]      PCP:  Cory Meza MD    Reason for Consultation/Chief Complaint: ER follow up    Subjective   History of Present Illness:  Fletcher Tee is a 64 y.o. patient with a history of CAD, HTN, HLD, COPD, and thyroid disease who presents for follow up. She initially saw Dr. Sonali Belle on 02/16/15 for an abnormal stress test. Stress test from 02/05/15 showed small sized reversible perfusion defect in the lateral wall of the left ventricle which likely represents a focus of stress induced myocardial ischemia. He reported his main complaint was SOB. Reports to having active chest tightness across his chest. Chest pain ongoing for 2 months. States this chest pain has not been worsening. Had been on antihypertensives and have not helped to improve his chest pain. States he had active pain that radiates into his L shoulder, however this is not new for him. His cardiac cath on 02/23/15 showed 40% mid LAD. He underwent ACDF C5/C6 and C6-7 with allograft and plate and screws on 5/0/91. He had a syncopal episode after coughing and was admitted 12/26-12/28/18. He presented ED in April 2018 due to a syncopal episode. He was treated for dehydration. Today he reports he has not had a syncopal episode since 04/2019.  He 2 puffs into the lungs every 6 hours as needed for Wheezing 11/9/18   Yolanda Vergara MD   levothyroxine (SYNTHROID) 75 MCG tablet  6/13/18   Historical Provider, MD   tamsulosin (FLOMAX) 0.4 MG capsule  6/21/18   Historical Provider, MD   tiZANidine (ZANAFLEX) 4 MG tablet  6/16/18   Historical Provider, MD   tiotropium (SPIRIVA RESPIMAT) 2.5 MCG/ACT AERS inhaler 2 inhalations daily 6/26/18   Yolanda Vergara MD   omeprazole (PRILOSEC) 20 MG capsule Take 20 mg by mouth daily    Historical Provider, MD   nitroGLYCERIN (NITROSTAT) 0.4 MG SL tablet Place 1 tablet under the tongue every 5 minutes as needed for Chest pain 5/17/16   Mikey Cardoza MD   Sucralfate (CARAFATE PO) Take by mouth    Historical Provider, MD   FLUoxetine (PROZAC) 20 MG capsule Take 20 mg by mouth daily. Historical Provider, MD   aspirin EC 81 MG EC tablet Take 1 tablet by mouth daily. 2/16/15   Mikey Cardoza MD   metoprolol (LOPRESSOR) 25 MG tablet Take 25 mg by mouth 2 times daily. Indications: take DOS    Historical Provider, MD   pregabalin (LYRICA) 50 MG capsule   Take 100 mg by mouth 3 times daily     Historical Provider, MD          Allergies:  Bactrim [sulfamethoxazole-trimethoprim] and Codeine     Review of Systems:   A 14 point review of symptoms completed. Pertinent positives identified in the HPI, all other review of symptoms negative as below. Objective   PHYSICAL EXAM:    There were no vitals filed for this visit.            General Appearance:  Alert, cooperative, no distress, appears stated age   Head:  Normocephalic, without obvious abnormality, atraumatic   Eyes:  PERRL, conjunctiva/corneas clear   Nose: Nares normal, no drainage or sinus tenderness   Throat: Lips, mucosa, and tongue normal   Neck: Supple, symmetrical, trachea midline, no adenopathy, thyroid: not enlarged, symmetric, no tenderness/mass/nodules, no carotid bruit or JVD   Lungs:   Clear to auscultation bilaterally, respirations unlabored myocardial ischemia. Please correlate with ECG findings. 2. LVEF 65%       Cath: 02/20/15  CORONARY ANGIOGRAPHY:  1. Left main was a large-caliber vessel that bifurcated. It was normal.     2.  Left circumflex was a large-caliber vessel. It was dominant. It  continued in the posterior AV groove as a medium-caliber vessel and then in  the posterior intraventricular groove as a medium-caliber left posterior  descending artery. It gave off a larger 1st obtuse marginal branch that had  luminal irregularities. In the posterior AV groove it gave off 2 small left  posterolateral branches and a small- to medium-caliber left posterior  descending artery. 3.  The LAD was a medium-caliber vessel that reached the apex and wrapped  around it. Proximal to mid LAD had diffuse disease of 40%. It gave off 2  diagonal branches, first was small and second was small to medium and had  luminal irregularities. 4.  Right coronary artery was small and nondominant and normal.        IMPRESSION:    1. A 40% proximal to mid left anterior descending stenosis. 2.  Moderately elevated left ventricular end-diastolic pressure, 20 mmHg. 3.  Hyperdynamic left ventricular systolic function with ejection fraction of  65%. Studies:       I have reviewed labs and imaging/xray/diagnostic testing in this note.     Assessment        Patient Active Problem List   Diagnosis    Pulmonary nodules    Dyspnea    Tobacco abuse    Abnormal stress test    Hyperlipidemia    Chest pain    Displacement of lumbar intervertebral disc without myelopathy    Degeneration of lumbar or lumbosacral intervertebral disc    CAD (coronary artery disease)    Peripheral edema    Lumbar stenosis    Lumbar facet arthropathy    MALT lymphoma ([facility] [address]    Impingement syndrome, shoulder    Complete tear of left rotator cuff    Severe sepsis (HCC)    Acute respiratory failure with hypoxia (HCC)    Multifocal pneumonia  Chronic obstructive pulmonary disease with acute exacerbation ([facility] [address]    Community acquired pneumonia    Pulmonary emphysema ([facility] [address]    Neoplasm of uncertain behavior of skin    Essential hypertension    Syncope    Syncope and collapse    Closed head injury    Acute bronchitis    Pneumonia due to infectious organism                Plan   1. Liver and lipids  2. Lexiscan myoview to evaluate chest pain and shortness of breath  3. Echo to evaluate chest pain and shortness of breath  4. 2 week event monitor to evaluate syncope  5. Follow up with NP in 1-2 months      Scribe's attestation: This note was scribed in the presence of Dr. Kristi Arzola by Antoinette Fugn RN      Thank you for allowing us to participate in the care of Swetha Gillis. Please call me with any questions [phone]. Kristi Arzola MD, [facility] [place]   Interventional Cardiologist  [place] [address]  [phone] [facility]  [phone] [address] [place] [address]  5/15/2019 3:45 PM    I will address the patient's cardiac risk factors and adjusted pharmacologic treatment as needed. In addition, I have reinforced the need for patient directed risk factor modification. Tobacco use was discussed with the patient and educated on the negative effects and was asked not to use. All questions and concerns were addressed to the patient/family. Alternatives to my treatment were discussed. I, Dr Kristi Arzola, personally performed the services described in this documentation, as scribed by the above signed scribe in my presence. It is both accurate and complete to my knowledge. I agree with the details independently gathered by the clinical support staff and the scribed note accurately describes my personal service to the patient.                   Sincerely,        Rosa Amaya MD

## 2019-05-21 ENCOUNTER — HOSPITAL ENCOUNTER (OUTPATIENT)
Age: 59
Discharge: HOME | End: 2019-05-21
Payer: MEDICARE

## 2019-05-21 ENCOUNTER — TELEPHONE (OUTPATIENT)
Dept: PULMONOLOGY | Age: 59
End: 2019-05-21

## 2019-05-21 DIAGNOSIS — E78.2 MIXED HYPERLIPIDEMIA: ICD-10-CM

## 2019-05-21 LAB
ALBUMIN SERPL-MCNC: 4.1 G/DL (ref 3.4–5)
ALP BLD-CCNC: 130 U/L (ref 40–129)
ALT SERPL-CCNC: 13 U/L (ref 10–40)
AST SERPL-CCNC: 12 U/L (ref 15–37)
BILIRUB SERPL-MCNC: 0.3 MG/DL (ref 0–1)
BILIRUBIN DIRECT: <0.2 MG/DL (ref 0–0.3)
BILIRUBIN, INDIRECT: ABNORMAL MG/DL (ref 0–1)
CHOLESTEROL, TOTAL: 119 MG/DL (ref 0–199)
HDLC SERPL-MCNC: 29 MG/DL (ref 40–60)
LDL CHOLESTEROL CALCULATED: 76 MG/DL
T4 FREE: 1.3 NG/DL (ref 0.9–1.8)
TOTAL PROTEIN: 7.2 G/DL (ref 6.4–8.2)
TRIGL SERPL-MCNC: 68 MG/DL (ref 0–150)
TSH SERPL DL<=0.05 MIU/L-ACNC: 4.41 UIU/ML (ref 0.27–4.2)
VLDLC SERPL CALC-MCNC: 14 MG/DL

## 2019-05-21 PROCEDURE — 80076 HEPATIC FUNCTION PANEL: CPT

## 2019-05-21 PROCEDURE — 84443 ASSAY THYROID STIM HORMONE: CPT

## 2019-05-21 PROCEDURE — 36415 COLL VENOUS BLD VENIPUNCTURE: CPT

## 2019-05-21 PROCEDURE — 84439 ASSAY OF FREE THYROXINE: CPT

## 2019-05-21 PROCEDURE — 80061 LIPID PANEL: CPT

## 2019-05-21 NOTE — TELEPHONE ENCOUNTER
Do you have the following symptoms? Shortness of Breath  yes  Wheezing  yes  Cough  yes                  Cough Characteristics:                           Productive    yes                           Sputum Color    Goes from clear to green                           Hemoptysis   no                           Consistency of sputum   thick   Fever:    no  Temp:n/a  Chills/Sweats:  no  What other symptoms are you having?:  no    How long have you had these symptoms? About a month in a half     Pharmacy: [facility] [place]          Review medications and allergies: Allergies? Allergies   Allergen Reactions    Bactrim [Sulfamethoxazole-Trimethoprim] Other (See Comments)     States makes yeast infection on his penis    Codeine Nausea Only                      Currently on Antibiotics? No                       Systemic Steroids? Prednisone  (Drug/Dose/Frequency and how long on?) 10mg taper        Pull in last office note. Assessment: 1/21/19      · Abnormal Chest CT with GGO -probably post-infectious - these have resolved  · Dyspnea -  it is likely that deconditioning and Smoking-related lung disease and emphysema are contributing   · Pulmonary emphysema without obstruction   · Prior tobacco abuse  · CAD s/p stent  · gastric MALT - in remission     Plan:     He seems to have a respiratory illness in the winter that lingers but then does well the rest of the year. Would stop Symbicort once these symptoms return to baseline. · Completing a prednisone taper  · Albuterol MDI prn   · Complete tobacco cessation  · spiriva daily   · Tessalon perals PRN  · Call if not continuing to improve  · lung cancer screening  · Screening CT scan was considered in a lung cancer screening counseling and shared decision making visit today that included the following elements:   · Eligibility: Age: 66. There are no signs or symptoms of lung cancer.   Tobacco History 80 pack-years, quit 2 years ago  · Verbal counseling has been performed by me to include benefits and harms of screening, follow-up diagnostic testing, over-diagnosis, false positive rate, and total radiation exposure;   · I have counseled on the importance of adherence to annual lung cancer LDCT screening, the impact of comorbidities and patient is willing to undergo diagnosis and treatment;   · I have provided counseling on the importance of maintaining cigarette smoking abstinence if former smoker; or the importance of smoking cessation if current smoker and, if appropriate, furnishing of information about tobacco cessation interventions; and   · I have furnished a written order for lung cancer screening with LDCT.    · Order for Screening chest CT scan should be placed with documentation as below:  · Beneficiary date of birth;   · Actual pack - year smoking history (number) from above;   · Current smoking status, and for former smokers, the number of years since quitting smoking from above  · Beneficiary is asymptomatic   · National Provider Identifier (NPI) for Dr. Antonio Gallegos

## 2019-05-22 ENCOUNTER — OFFICE VISIT (OUTPATIENT)
Dept: PULMONOLOGY | Age: 59
End: 2019-05-22
Payer: MEDICARE

## 2019-05-22 VITALS
HEIGHT: 68 IN | RESPIRATION RATE: 16 BRPM | WEIGHT: 186 LBS | DIASTOLIC BLOOD PRESSURE: 76 MMHG | HEART RATE: 66 BPM | BODY MASS INDEX: 28.19 KG/M2 | TEMPERATURE: 98.1 F | OXYGEN SATURATION: 98 % | SYSTOLIC BLOOD PRESSURE: 110 MMHG

## 2019-05-22 DIAGNOSIS — Z72.0 TOBACCO ABUSE: ICD-10-CM

## 2019-05-22 DIAGNOSIS — R05.9 COUGH: Primary | ICD-10-CM

## 2019-05-22 DIAGNOSIS — J43.9 PULMONARY EMPHYSEMA, UNSPECIFIED EMPHYSEMA TYPE (HCC): ICD-10-CM

## 2019-05-22 PROCEDURE — 3023F SPIROM DOC REV: CPT | Performed by: INTERNAL MEDICINE

## 2019-05-22 PROCEDURE — G8427 DOCREV CUR MEDS BY ELIG CLIN: HCPCS | Performed by: INTERNAL MEDICINE

## 2019-05-22 PROCEDURE — 4004F PT TOBACCO SCREEN RCVD TLK: CPT | Performed by: INTERNAL MEDICINE

## 2019-05-22 PROCEDURE — 99214 OFFICE O/P EST MOD 30 MIN: CPT | Performed by: INTERNAL MEDICINE

## 2019-05-22 PROCEDURE — G8926 SPIRO NO PERF OR DOC: HCPCS | Performed by: INTERNAL MEDICINE

## 2019-05-22 PROCEDURE — G8417 CALC BMI ABV UP PARAM F/U: HCPCS | Performed by: INTERNAL MEDICINE

## 2019-05-22 PROCEDURE — G8599 NO ASA/ANTIPLAT THER USE RNG: HCPCS | Performed by: INTERNAL MEDICINE

## 2019-05-22 PROCEDURE — 3017F COLORECTAL CA SCREEN DOC REV: CPT | Performed by: INTERNAL MEDICINE

## 2019-05-22 RX ORDER — FLUTICASONE FUROATE AND VILANTEROL 100; 25 UG/1; UG/1
1 POWDER RESPIRATORY (INHALATION) DAILY
Qty: 1 EACH | Refills: 5 | Status: ON HOLD | OUTPATIENT
Start: 2019-05-22 | End: 2020-10-01

## 2019-05-22 RX ORDER — FLUTICASONE FUROATE AND VILANTEROL 100; 25 UG/1; UG/1
1 POWDER RESPIRATORY (INHALATION) DAILY
Qty: 1 EACH | Refills: 0 | Status: ON HOLD | COMMUNITY
Start: 2019-05-22 | End: 2020-10-01

## 2019-05-22 RX ORDER — GUAIFENESIN/DEXTROMETHORPHAN 100-10MG/5
5 SYRUP ORAL 3 TIMES DAILY PRN
Qty: 120 ML | Refills: 0 | Status: SHIPPED | OUTPATIENT
Start: 2019-05-22 | End: 2019-06-01

## 2019-05-22 RX ORDER — BENZONATATE 200 MG/1
200 CAPSULE ORAL 3 TIMES DAILY PRN
Qty: 30 CAPSULE | Refills: 0 | Status: SHIPPED | OUTPATIENT
Start: 2019-05-22 | End: 2019-05-29

## 2019-05-22 NOTE — PROGRESS NOTES
[facility]    Outpatient Follow Up Note    CC: SOB, hospital follow up  Consulting provider: Estefanía Kelley MD    Interval History: 64 y.o. male  he developed a URI about 6 or 8 weeks ago. He has had increased cough and wheezing and postnasal drainage since that time. Coughing up yellow sputum. No fever. He's been on 3 rounds of antibiotics and is starting steroids again today. He feels like none of this has made any significant impact on his symptoms. He continues to smoke. He had a clear x-ray twice. 1/18 he was intubated for Pneumonia briefly. Initial HPI for Dr Vaibhav Villanueva 2014:  + tobacco abuse- 2 ppd x 40 years. Quit in 2016. Has tried to stop smoking with wellbutrin- did not help. Retired- used to work in construction, building and tearing houses down. Exposed to asbestos, dust, etc.   Current Medications:    Current Outpatient Medications:     zolpidem (AMBIEN) 10 MG tablet, Take by mouth nightly as needed for Sleep., Disp: , Rfl:     ondansetron (ZOFRAN) 4 MG tablet, Take 4 mg by mouth every 8 hours as needed for Nausea or Vomiting, Disp: , Rfl:     potassium chloride (K-TAB) 10 MEQ extended release tablet, Take 10 mEq by mouth daily, Disp: , Rfl:     albuterol sulfate HFA (VENTOLIN HFA) 108 (90 Base) MCG/ACT inhaler, Inhale 2 puffs into the lungs every 6 hours as needed for Wheezing, Disp: 1 Inhaler, Rfl: 5    levothyroxine (SYNTHROID) 75 MCG tablet, , Disp: , Rfl:     tamsulosin (FLOMAX) 0.4 MG capsule, , Disp: , Rfl:     tiZANidine (ZANAFLEX) 4 MG tablet, , Disp: , Rfl:     omeprazole (PRILOSEC) 20 MG capsule, Take 20 mg by mouth daily, Disp: , Rfl:     nitroGLYCERIN (NITROSTAT) 0.4 MG SL tablet, Place 1 tablet under the tongue every 5 minutes as needed for Chest pain, Disp: 25 tablet, Rfl: 3    Sucralfate (CARAFATE PO), Take by mouth, Disp: , Rfl:     FLUoxetine (PROZAC) 20 MG capsule, Take 20 mg by mouth daily. , Disp: , Rfl:     aspirin EC 81 MG EC stent  · gastric MALT - in remission    Plan:      · Do not think further biopsy be beneficial this point. He is on steroid taper ready. · Add tessalon pearls and robitussin DM  · Albuterol MDI prn   · Complete tobacco cessation emphasized  · Change to Breo  · F/u in 2 mo  lung cancer screening due in Nov  Screening CT scan was considered in a lung cancer screening counseling and shared decision making visit today that included the following elements:   Eligibility: Age: 66. There are no signs or symptoms of lung cancer. Tobacco History 80 pack-years, quit 2 years ago  Verbal counseling has been performed by me to include benefits and harms of screening, follow-up diagnostic testing, over-diagnosis, false positive rate, and total radiation exposure;   I have counseled on the importance of adherence to annual lung cancer LDCT screening, the impact of comorbidities and patient is willing to undergo diagnosis and treatment;   I have provided counseling on the importance of maintaining cigarette smoking abstinence if former smoker; or the importance of smoking cessation if current smoker and, if appropriate, furnishing of information about tobacco cessation interventions; and   I have furnished a written order for lung cancer screening with LDCT. Order for Screening chest CT scan should be placed with documentation as below:  Beneficiary date of birth;    Actual pack - year smoking history (number) from above;   Current smoking status, and for former smokers, the number of years since quitting smoking from above  Beneficiary is asymptomatic   National Provider Identifier (NPI) for Dr. Sung Anis

## 2019-05-24 ENCOUNTER — TELEPHONE (OUTPATIENT)
Dept: CARDIOLOGY CLINIC | Age: 59
End: 2019-05-24

## 2019-05-24 NOTE — TELEPHONE ENCOUNTER
Conner Lorenzo from [facility] called to let us know that pt had an urgent alert on monitor. Pt is in SR but passed out. Conner Lorenzo said she called pt and pt did not want any medical attention and said he was okay. Katina from [facility] sending over monitor report now.

## 2019-05-29 DIAGNOSIS — R55 SYNCOPE, UNSPECIFIED SYNCOPE TYPE: ICD-10-CM

## 2019-05-29 PROCEDURE — 93268 ECG RECORD/REVIEW: CPT | Performed by: INTERNAL MEDICINE

## 2019-05-31 ENCOUNTER — HOSPITAL ENCOUNTER (OUTPATIENT)
Dept: NON INVASIVE DIAGNOSTICS | Age: 59
Discharge: HOME | End: 2019-05-31
Payer: MEDICARE

## 2019-05-31 ENCOUNTER — HOSPITAL ENCOUNTER (OUTPATIENT)
Dept: NUCLEAR MEDICINE | Age: 59
Discharge: HOME | End: 2019-05-31
Payer: MEDICARE

## 2019-05-31 DIAGNOSIS — R55 SYNCOPE, UNSPECIFIED SYNCOPE TYPE: ICD-10-CM

## 2019-05-31 DIAGNOSIS — R07.2 PRECORDIAL PAIN: ICD-10-CM

## 2019-05-31 LAB
LV EF: 58 %
LV EF: 60 %
LVEF MODALITY: NORMAL
LVEF MODALITY: NORMAL

## 2019-05-31 PROCEDURE — 93306 TTE W/DOPPLER COMPLETE: CPT

## 2019-05-31 PROCEDURE — 3430000000 HC RX DIAGNOSTIC RADIOPHARMACEUTICAL: Performed by: INTERNAL MEDICINE

## 2019-05-31 PROCEDURE — A9502 TC99M TETROFOSMIN: HCPCS | Performed by: INTERNAL MEDICINE

## 2019-05-31 PROCEDURE — 78452 HT MUSCLE IMAGE SPECT MULT: CPT

## 2019-05-31 PROCEDURE — 93017 CV STRESS TEST TRACING ONLY: CPT

## 2019-05-31 PROCEDURE — 6360000002 HC RX W HCPCS: Performed by: INTERNAL MEDICINE

## 2019-05-31 RX ADMIN — REGADENOSON 0.4 MG: 0.08 INJECTION, SOLUTION INTRAVENOUS at 08:30

## 2019-05-31 RX ADMIN — TETROFOSMIN 34 MILLICURIE: 1.38 INJECTION, POWDER, LYOPHILIZED, FOR SOLUTION INTRAVENOUS at 08:42

## 2019-05-31 RX ADMIN — TETROFOSMIN 11.3 MILLICURIE: 1.38 INJECTION, POWDER, LYOPHILIZED, FOR SOLUTION INTRAVENOUS at 07:28

## 2019-05-31 ASSESSMENT — PAIN - FUNCTIONAL ASSESSMENT: PAIN_FUNCTIONAL_ASSESSMENT: 0-10

## 2019-06-03 ENCOUNTER — HOSPITAL ENCOUNTER (OUTPATIENT)
Age: 59
Discharge: HOME | End: 2019-06-03
Payer: MEDICARE

## 2019-06-03 ENCOUNTER — HOSPITAL ENCOUNTER (OUTPATIENT)
Dept: CT IMAGING | Age: 59
Discharge: HOME | End: 2019-06-03
Payer: MEDICARE

## 2019-06-03 DIAGNOSIS — Z85.72 PERSONAL HISTORY OF MALIGNANT LYMPHOMA: ICD-10-CM

## 2019-06-03 LAB
BUN BLDV-MCNC: 7 MG/DL (ref 7–20)
CREAT SERPL-MCNC: 0.9 MG/DL (ref 0.9–1.3)
GFR AFRICAN AMERICAN: >60
GFR NON-AFRICAN AMERICAN: >60

## 2019-06-03 PROCEDURE — 82565 ASSAY OF CREATININE: CPT

## 2019-06-03 PROCEDURE — 6360000004 HC RX CONTRAST MEDICATION: Performed by: NURSE PRACTITIONER

## 2019-06-03 PROCEDURE — 74177 CT ABD & PELVIS W/CONTRAST: CPT

## 2019-06-03 PROCEDURE — 36415 COLL VENOUS BLD VENIPUNCTURE: CPT

## 2019-06-03 PROCEDURE — 84520 ASSAY OF UREA NITROGEN: CPT

## 2019-06-03 RX ADMIN — IOHEXOL 50 ML: 240 INJECTION, SOLUTION INTRATHECAL; INTRAVASCULAR; INTRAVENOUS; ORAL at 08:09

## 2019-06-03 RX ADMIN — IOPAMIDOL 75 ML: 755 INJECTION, SOLUTION INTRAVENOUS at 08:09

## 2019-06-06 ENCOUNTER — TELEPHONE (OUTPATIENT)
Dept: CARDIOLOGY CLINIC | Age: 59
End: 2019-06-06

## 2019-06-06 PROCEDURE — 93228 REMOTE 30 DAY ECG REV/REPORT: CPT | Performed by: INTERNAL MEDICINE

## 2019-06-06 NOTE — TELEPHONE ENCOUNTER
Spoke with Celso Dempsey with [employer] does not require a PA. Reference number [id number]. [employer] notified.

## 2019-06-07 DIAGNOSIS — R55 SYNCOPE, UNSPECIFIED SYNCOPE TYPE: ICD-10-CM

## 2019-06-10 ENCOUNTER — HOSPITAL ENCOUNTER (EMERGENCY)
Age: 59
Discharge: HOME | End: 2019-06-10
Attending: EMERGENCY MEDICINE
Payer: MEDICARE

## 2019-06-10 VITALS
HEART RATE: 77 BPM | BODY MASS INDEX: 28.19 KG/M2 | RESPIRATION RATE: 18 BRPM | SYSTOLIC BLOOD PRESSURE: 129 MMHG | WEIGHT: 186 LBS | TEMPERATURE: 98 F | OXYGEN SATURATION: 100 % | HEIGHT: 68 IN | DIASTOLIC BLOOD PRESSURE: 85 MMHG

## 2019-06-10 DIAGNOSIS — M54.31 SCIATICA OF RIGHT SIDE: Primary | ICD-10-CM

## 2019-06-10 PROCEDURE — 6360000002 HC RX W HCPCS: Performed by: EMERGENCY MEDICINE

## 2019-06-10 PROCEDURE — 96372 THER/PROPH/DIAG INJ SC/IM: CPT

## 2019-06-10 PROCEDURE — 99283 EMERGENCY DEPT VISIT LOW MDM: CPT

## 2019-06-10 RX ORDER — PROMETHAZINE HYDROCHLORIDE 25 MG/ML
25 INJECTION, SOLUTION INTRAMUSCULAR; INTRAVENOUS ONCE
Status: COMPLETED | OUTPATIENT
Start: 2019-06-10 | End: 2019-06-10

## 2019-06-10 RX ORDER — OXYCODONE HYDROCHLORIDE AND ACETAMINOPHEN 5; 325 MG/1; MG/1
1 TABLET ORAL EVERY 6 HOURS PRN
Qty: 12 TABLET | Refills: 0 | Status: SHIPPED | OUTPATIENT
Start: 2019-06-10 | End: 2019-06-13

## 2019-06-10 RX ADMIN — PROMETHAZINE HYDROCHLORIDE 25 MG: 25 INJECTION INTRAMUSCULAR; INTRAVENOUS at 02:37

## 2019-06-10 RX ADMIN — HYDROMORPHONE HYDROCHLORIDE 1 MG: 1 INJECTION, SOLUTION INTRAMUSCULAR; INTRAVENOUS; SUBCUTANEOUS at 02:37

## 2019-06-10 ASSESSMENT — PAIN SCALES - GENERAL: PAINLEVEL_OUTOF10: 10

## 2019-06-10 NOTE — ED NOTES
No sign of reaction at injection sites. States that pain is getting some better.      Evelynn Gosselin, RN  06/10/19 7287

## 2019-06-10 NOTE — ED PROVIDER NOTES
Triage Chief Complaint:   Back Pain (low back pain with radiation to right leg)      [place]:  Samuel Morris is a 64 y.o. male that presents with lumbar back pain particularly on the right side that radiates down his leg on the right. Patient's had long-standing back problems throughout the years and has been in pain management in the past but is no longer being followed. He has had steroid injections that seemed to have helped. He has had no change in bowel or bladder habit and no focal weakness. He does describe sciatica. He did not have a headache chest pain shortness of breath abdominal pain or fever. He is not known to be immunocompromised. 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     CERVICAL DISCECTOMY  02/08/2017    Anterior discectomy, anterior foraminotomy C5/C6 and C6-7    CYSTOSCOPY  6/5/15    CYSTOSCOPY N/A 02/09/2018    DIAGNOSTIC CARDIAC CATH LAB PROCEDURE      ENDOSCOPY, COLON, DIAGNOSTIC      HERNIA REPAIR      KNEE ARTHROSCOPY      LITHOTRIPSY      LITHOTRIPSY Left 11/5/15    OTHER SURGICAL HISTORY  1/20/11    video arthroscopy of right shoulder with subcromial d ecompression and arthroscopic sarbjit    OTHER SURGICAL HISTORY  5/1/13    Excision Lesion Right Angle of Mouth    SHOULDER SURGERY      Left Shoulder X 3; right Shoulder X 5    SHOULDER SURGERY Left 05/30/2017    UPPER GASTROINTESTINAL ENDOSCOPY  07/26/2012     Family History   Problem Relation Age of Onset    Heart Disease Father      Social History     Socioeconomic History    Marital status:       Spouse name: Not on file    Number of children: Not on file    Years of education: Not on file    Highest education level: Not on file   Occupational History    Not on file   Social Needs    Financial resource strain: Not on file    Food insecurity:     Worry: Not on file     Inability: Not on file    Transportation needs:     Medical: Not on file     Non-medical: Not on file   Tobacco Use    Smoking status: Current Every Day Smoker     Packs/day: 0.50     Years: 45.00     Pack years: 22.50     Types: Cigarettes    Smokeless tobacco: Never Used   Substance and Sexual Activity    Alcohol use: No    Drug use: Yes     Types: Marijuana    Sexual activity: Yes     Partners: Female   Lifestyle    Physical activity:     Days per week: Not on file     Minutes per session: Not on file    Stress: Not on file   Relationships    Social connections:     Talks on phone: Not on file     Gets together: Not on file     Attends Advent service: Not on file     Active member of club or organization: Not on file     Attends meetings of clubs or organizations: Not on file     Relationship status: Not on file    Intimate partner violence:     Fear of current or ex partner: Not on file     Emotionally abused: Not on file     Physically abused: Not on file     Forced sexual activity: Not on file   Other Topics Concern    Not on file   Social History Narrative    Not on file     Current Facility-Administered Medications   Medication Dose Route Frequency Provider Last Rate Last Dose    HYDROmorphone (DILAUDID) injection 1 mg  1 mg Intramuscular Once Agatha Dempsey MD        [facility] injection 25 mg  25 mg Intramuscular Once Kiana Jade MD         Current Outpatient Medications   Medication Sig Dispense Refill    oxyCODONE-acetaminophen (PERCOCET) 5-325 MG per tablet Take 1 tablet by mouth every 6 hours as needed for Pain for up to 3 days. Intended supply: 3 days. Take lowest dose possible to manage pain 12 tablet 0    fluticasone-vilanterol (BREO ELLIPTA) 100-25 MCG/INH AEPB inhaler Inhale 1 puff into the lungs daily 1 each 5    fluticasone-vilanterol (BREO ELLIPTA) 100-25 MCG/INH AEPB inhaler Inhale 1 puff into the lungs daily 1 each 0    zolpidem (AMBIEN) 10 MG tablet Take by mouth nightly as needed for Sleep.  ondansetron (ZOFRAN) 4 MG tablet Take 4 mg by mouth every 8 hours as needed for Nausea or Vomiting      potassium chloride (K-TAB) 10 MEQ extended release tablet Take 10 mEq by mouth daily      albuterol (PROVENTIL) (2.5 MG/3ML) 0.083% nebulizer solution Take 3 mLs by nebulization every 4 hours as needed for Wheezing or Shortness of Breath 100 each 0    albuterol sulfate HFA (VENTOLIN HFA) 108 (90 Base) MCG/ACT inhaler Inhale 2 puffs into the lungs every 6 hours as needed for Wheezing 1 Inhaler 5    levothyroxine (SYNTHROID) 75 MCG tablet       tamsulosin (FLOMAX) 0.4 MG capsule       tiZANidine (ZANAFLEX) 4 MG tablet       omeprazole (PRILOSEC) 20 MG capsule Take 20 mg by mouth daily      nitroGLYCERIN (NITROSTAT) 0.4 MG SL tablet Place 1 tablet under the tongue every 5 minutes as needed for Chest pain 25 tablet 3    Sucralfate (CARAFATE PO) Take by mouth      FLUoxetine (PROZAC) 20 MG capsule Take 20 mg by mouth daily.  aspirin EC 81 MG EC tablet Take 1 tablet by mouth daily. 30 tablet 11    metoprolol (LOPRESSOR) 25 MG tablet Take 25 mg by mouth 2 times daily.  Indications: take DOS      pregabalin (LYRICA) 50 MG capsule   Take 100 mg by mouth 3 times daily        Allergies   Allergen Reactions    Bactrim [Sulfamethoxazole-Trimethoprim] Other (See Comments) States makes yeast infection on his penis    Codeine Nausea Only     Nursing Notes Reviewed    Physical Exam:  ED Triage Vitals [06/10/19 0223]   Enc Vitals Group      BP       Pulse       Resp       Temp       Temp Source Oral      SpO2       Weight 186 lb (84.4 kg)      Height 5' 8\" (1.727 m)      Head Circumference       Peak Flow       Pain Score       Pain Loc       Pain Edu? Excl. in [address]? GENERAL APPEARANCE: A well-developed well-nourished pleasant uncomfortable 43-year-old male in moderate distress  HEAD: Normocephalic, atraumatic  EYES: Sclera anicteric.no conjunctival injection,   HEART: RRR without rubs murmurs or gallops  LUNGS:  Clear good air movement no wheezing no retraction or accessory muscle use,  BACK: No pain to palpation of the midline,  pain is predominantly the right lumbosacral lateral musculature with no objective radiculopathy, 5 over 5 motor strength normal sensation 2+ knee and ankle jerk reflexes, he can dorsiflex and plantar flex his feet flex and extend his knees and did not have saddle anesthesia. Straight leg raising reproduces pain on the right side but not radicular symptoms. EXTREMITIES: No acute deformities, no peripheral edema  SKIN: Warm and dry. Normal color, no rash,  capillary refill less than 2 seconds  MENTAL STATUS: Alert, oriented, interactive,     Nursing note and vital signs reviewed     I have reviewed and interpreted all of the currently available lab results from this visit (if applicable):  No results found for this visit on 06/10/19. Radiographs (if obtained):  ? Radiologist's Report Reviewed:  No orders to display       ? Discussed with Radiologist:     ? The following radiograph was interpreted by myself in the absence of a radiologist:     EKG (if obtained): (All EKG's are interpreted by myself in the absence of a cardiologist)      MDM:   Patient with lumbar pain presents to the ER for evaluation.   No evidence of an acute disc cauda equina syndrome or epidural disease. He'll be treated as an outpatient I'll give him Percocet for pain along with ibuprofen and is to follow-up with his family physician but I have encouraged him to return to the emergency department if any interim if problems develop. Patient is wife are reliable and understand these instructions    Final Impression:  1. Sciatica of right side        Critical Care:       Disposition referral (if applicable):  No follow-up provider specified. Disposition medications (if applicable):  New Prescriptions    OXYCODONE-ACETAMINOPHEN (PERCOCET) 5-325 MG PER TABLET    Take 1 tablet by mouth every 6 hours as needed for Pain for up to 3 days. Intended supply: 3 days. Take lowest dose possible to manage pain       Comment: Please note this report has been produced using speech recognition software and may contain errors related to that system including errors in grammar, punctuation, and spelling, as well as words and phrases that may be inappropriate. If there are any questions or concerns please feel free to contact the dictating provider for clarification.       (Please note that portions of this note may have been completed with a voice recognition program. Efforts were made to edit the dictations but occasionally words are mis-transcribed.)    MD Akin Cedillo MD  06/10/19 6362

## 2019-06-21 ENCOUNTER — HOSPITAL ENCOUNTER (OUTPATIENT)
Age: 59
Setting detail: OUTPATIENT SURGERY
Discharge: HOME | End: 2019-06-21
Attending: INTERNAL MEDICINE | Admitting: INTERNAL MEDICINE
Payer: MEDICARE

## 2019-06-21 VITALS
SYSTOLIC BLOOD PRESSURE: 112 MMHG | DIASTOLIC BLOOD PRESSURE: 73 MMHG | RESPIRATION RATE: 16 BRPM | OXYGEN SATURATION: 96 % | WEIGHT: 182 LBS | TEMPERATURE: 97.4 F | HEIGHT: 68 IN | HEART RATE: 73 BPM | BODY MASS INDEX: 27.58 KG/M2

## 2019-06-21 PROCEDURE — 88305 TISSUE EXAM BY PATHOLOGIST: CPT

## 2019-06-21 PROCEDURE — 2709999900 HC NON-CHARGEABLE SUPPLY: Performed by: INTERNAL MEDICINE

## 2019-06-21 PROCEDURE — 6360000002 HC RX W HCPCS: Performed by: INTERNAL MEDICINE

## 2019-06-21 PROCEDURE — 7100000010 HC PHASE II RECOVERY - FIRST 15 MIN: Performed by: INTERNAL MEDICINE

## 2019-06-21 PROCEDURE — 3609012400 HC EGD TRANSORAL BIOPSY SINGLE/MULTIPLE: Performed by: INTERNAL MEDICINE

## 2019-06-21 PROCEDURE — 7100000011 HC PHASE II RECOVERY - ADDTL 15 MIN: Performed by: INTERNAL MEDICINE

## 2019-06-21 PROCEDURE — 2580000003 HC RX 258: Performed by: INTERNAL MEDICINE

## 2019-06-21 PROCEDURE — 99152 MOD SED SAME PHYS/QHP 5/>YRS: CPT | Performed by: INTERNAL MEDICINE

## 2019-06-21 RX ORDER — SODIUM CHLORIDE, SODIUM LACTATE, POTASSIUM CHLORIDE, CALCIUM CHLORIDE 600; 310; 30; 20 MG/100ML; MG/100ML; MG/100ML; MG/100ML
INJECTION, SOLUTION INTRAVENOUS CONTINUOUS
Status: DISCONTINUED | OUTPATIENT
Start: 2019-06-21 | End: 2019-06-21 | Stop reason: HOSPADM

## 2019-06-21 RX ORDER — SODIUM CHLORIDE, SODIUM LACTATE, POTASSIUM CHLORIDE, CALCIUM CHLORIDE 600; 310; 30; 20 MG/100ML; MG/100ML; MG/100ML; MG/100ML
INJECTION, SOLUTION INTRAVENOUS CONTINUOUS PRN
Status: COMPLETED | OUTPATIENT
Start: 2019-06-21 | End: 2019-06-21

## 2019-06-21 RX ORDER — FENTANYL CITRATE 50 UG/ML
INJECTION, SOLUTION INTRAMUSCULAR; INTRAVENOUS PRN
Status: DISCONTINUED | OUTPATIENT
Start: 2019-06-21 | End: 2019-06-21 | Stop reason: ALTCHOICE

## 2019-06-21 RX ORDER — MIDAZOLAM HYDROCHLORIDE 5 MG/ML
INJECTION INTRAMUSCULAR; INTRAVENOUS PRN
Status: DISCONTINUED | OUTPATIENT
Start: 2019-06-21 | End: 2019-06-21 | Stop reason: ALTCHOICE

## 2019-06-21 NOTE — H&P
Gastroenterology Preop Assessment    Patient:   Zulema Blocker   :    1960   Facility:   [facility]  Referring/PCP: Gem Jenkins MD  Date:     2019    Subjective:   Procedure: egd    HPI/Reason for procedure:  History of MALT lymphoma and abdominal pain    Past Medical History:   Diagnosis Date    Arthritis 2011    SHOULDERS AND KNEES    CAD (coronary artery disease)     Chronic back pain     COPD (chronic obstructive pulmonary disease) ([address]     GERD (gastroesophageal reflux disease)     ONCE WEEKLY TAKE TUMS    Hyperlipidemia     Hypertension     Kidney stone     Lumbar herniated disc     Chronic Pain    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     CERVICAL DISCECTOMY  2017    Anterior discectomy, anterior foraminotomy C5/C6 and C6-7    CYSTOSCOPY  6/5/15    CYSTOSCOPY N/A 2018    DIAGNOSTIC CARDIAC CATH LAB PROCEDURE      ENDOSCOPY, COLON, DIAGNOSTIC      HERNIA REPAIR      KNEE ARTHROSCOPY      LITHOTRIPSY      LITHOTRIPSY Left 11/5/15    OTHER SURGICAL HISTORY  11    video arthroscopy of right shoulder with subcromial d ecompression and arthroscopic sarbjit    OTHER SURGICAL HISTORY  13    Excision Lesion Right Angle of Mouth    SHOULDER SURGERY      Left Shoulder X 3; right Shoulder X 5    SHOULDER SURGERY Left 2017    UPPER GASTROINTESTINAL ENDOSCOPY  2012       Social:   Social History     Tobacco Use    Smoking status: Current Every Day Smoker     Packs/day: 0.50     Years: 45.00     Pack years: 22.50     Types: Cigarettes    Smokeless tobacco: Never Used   Substance Use Topics    Alcohol use: No     Family:   Family History   Problem Relation Age of Onset    Heart Disease Father Diaz, DO      Infusions:    lactated ringers       PRN Medications: Allergies: Allergies   Allergen Reactions    Bactrim [Sulfamethoxazole-Trimethoprim] Other (See Comments)     States makes yeast infection on his penis    Codeine Nausea Only         Objective:     Physical Exam:   /87   Pulse 71   Temp 96.9 °F (36.1 °C) (Temporal)   Resp 18   Ht 5' 8\" (1.727 m)   Wt 182 lb (82.6 kg)   SpO2 100%   BMI 27.67 kg/m²     HEENT: NCAT  Lungs: CTAB  CV: RRR  Abd: soft, ntd  Ext: dpi    Lab and Imaging Review   Labs:  CBC: No results for input(s): WBC, HGB, HCT, MCV, PLT in the last 72 hours. BMP: No results for input(s): NA, K, CL, CO2, PHOS, BUN, CREATININE in the last 72 hours. Invalid input(s): CA  LIVER PROFILE: No results for input(s): AST, ALT, LIPASE, PROT, BILIDIR, BILITOT, ALKPHOS in the last 72 hours. Invalid input(s): AMYLASE,  ALB  PT/INR: No results for input(s): INR in the last 72 hours. Invalid input(s): PT    Pre-Procedure Assessment / Plan:  ASA: Class 2 - A normal healthy patient with mild systemic disease  Airway: Mallampati: II (soft palate, uvula, fauces visible)  Level of Sedation Plan: Moderate sedation  Post Procedure plan: Return to same level of care      Plan:   1. egd    I assessed the patient and find that the patient is in satisfactory condition to proceed with the planned procedure and sedation plan. I have explained the risk, benefits, and alternatives to the procedure; the patient understands and agrees to proceed.        Ari Turk  6/21/2019

## 2019-06-21 NOTE — PROGRESS NOTES
Recovery completed,discharge instructions given to pt and friend,verbalize understanding.   Pt discharged home stable condition

## 2019-06-21 NOTE — OP NOTE
Esophagogastroduodenoscopy Note    Patient:   Edd Cantu    YOB: 1960    Facility:   [facility] [Outpatient]   Referring/PCP: Diya Monet MD    Procedure:   Esophagogastroduodenoscopy with biopsy  Date:     6/21/2019   Endoscopist:  Parviz Langford     Preoperative Diagnosis:   History of MALT lymphoma, abdominal pain    Postoperative Diagnosis:  Erosive gastritis    Anesthesia:  Versed 9 mg IV, fentanyl 100 mcg IV; Procedural Sedation Time: 10 minutes. Estimated blood loss: None    Complications: None    Description of Procedure:  Informed consent was obtained from the patient after explanation of the procedure including indications, description of the procedure,  benefits and possible risks and complications of the procedure, and alternatives. Questions were answered. The patient's history was reviewed and a directed physical examination was performed prior to the procedure. Patient was monitored throughout the procedure with pulse oximetry and periodic assessment of vital signs. Patient was sedated as noted above. The Nursing staff and I performed a time out. With the patient in the left lateral decubitus position, the Olympus videoendoscope was placed in the patient's mouth and under direct visualization passed into the esophagus. The scope was ultimately passed to the second portion of the duodenum. Visualization was performed during both introduction and withdrawal of the endoscope and retroflexed view of the proximal stomach was obtained. Findings[de-identified]   Esophagus: normal. The findings do support a diagnosis of [place]'s Esophagus. Stomach: Small erosions were seen in the gastric antrum. Biopsies were obtained with cold biopsy forceps.   Duodenum: normal.  Biopsies were obtained    Recommendations:   - Await pathology results    Parviz Langford DO

## 2019-07-05 ENCOUNTER — TELEPHONE (OUTPATIENT)
Dept: CARDIOLOGY CLINIC | Age: 59
End: 2019-07-05

## 2019-07-05 NOTE — TELEPHONE ENCOUNTER
Pt called is requesting a cardiac risk assessment for a procedure he is having on 7-10-19. Pt is having a biopsy of the prostate, bladder and kidney. Dr. Lisa Aleman is doing the procedure. Pt last ov was 5-17-19 w/ [facility]. Pt did not have a fax # just the office # [phone].  Please advise, thank you

## 2019-11-06 ENCOUNTER — TELEPHONE (OUTPATIENT)
Dept: PULMONOLOGY | Age: 59
End: 2019-11-06

## 2019-11-27 ENCOUNTER — TELEPHONE (OUTPATIENT)
Dept: PULMONOLOGY | Age: 59
End: 2019-11-27

## 2020-02-18 RX ORDER — POTASSIUM CHLORIDE 750 MG/1
20 TABLET, FILM COATED, EXTENDED RELEASE ORAL DAILY
Qty: 90 TABLET | Refills: 3 | Status: ON HOLD | OUTPATIENT
Start: 2020-02-18 | End: 2020-10-01

## 2020-02-18 RX ORDER — ATORVASTATIN CALCIUM 80 MG/1
TABLET, FILM COATED ORAL
Qty: 90 TABLET | Refills: 3 | Status: SHIPPED | OUTPATIENT
Start: 2020-02-18 | End: 2021-02-19 | Stop reason: SDUPTHER

## 2020-02-18 NOTE — TELEPHONE ENCOUNTER
Pt called and I spoke to Tereza Kathleen and she was able to help me relay the message to the pt and he verbalized understanding on getting his lab work done and also sts that he has a GI doctor as well.  Thank you

## 2020-07-02 ENCOUNTER — HOSPITAL ENCOUNTER (OUTPATIENT)
Dept: CT IMAGING | Age: 60
Discharge: HOME | End: 2020-07-02
Payer: MEDICARE

## 2020-07-02 PROCEDURE — 6360000004 HC RX CONTRAST MEDICATION: Performed by: FAMILY MEDICINE

## 2020-07-02 PROCEDURE — 74177 CT ABD & PELVIS W/CONTRAST: CPT

## 2020-07-02 RX ADMIN — IOPAMIDOL 75 ML: 755 INJECTION, SOLUTION INTRAVENOUS at 13:45

## 2020-07-02 RX ADMIN — IOHEXOL 50 ML: 240 INJECTION, SOLUTION INTRATHECAL; INTRAVASCULAR; INTRAVENOUS; ORAL at 13:45

## 2020-10-01 ENCOUNTER — HOSPITAL ENCOUNTER (INPATIENT)
Age: 60
LOS: 2 days | Discharge: HOME | DRG: 603 | End: 2020-10-03
Attending: EMERGENCY MEDICINE | Admitting: INTERNAL MEDICINE
Payer: MEDICARE

## 2020-10-01 ENCOUNTER — APPOINTMENT (OUTPATIENT)
Dept: GENERAL RADIOLOGY | Age: 60
DRG: 603 | End: 2020-10-01
Payer: MEDICARE

## 2020-10-01 PROBLEM — L03.90 CELLULITIS: Status: ACTIVE | Noted: 2020-10-01

## 2020-10-01 LAB
A/G RATIO: 1.6 (ref 1.1–2.2)
ALBUMIN SERPL-MCNC: 4.1 G/DL (ref 3.4–5)
ALP BLD-CCNC: 121 U/L (ref 40–129)
ALT SERPL-CCNC: 9 U/L (ref 10–40)
ANION GAP SERPL CALCULATED.3IONS-SCNC: 11 MMOL/L (ref 3–16)
AST SERPL-CCNC: 10 U/L (ref 15–37)
BASOPHILS ABSOLUTE: 0.1 K/UL (ref 0–0.2)
BASOPHILS RELATIVE PERCENT: 0.7 %
BILIRUB SERPL-MCNC: 1 MG/DL (ref 0–1)
BUN BLDV-MCNC: 8 MG/DL (ref 7–20)
CALCIUM SERPL-MCNC: 9.4 MG/DL (ref 8.3–10.6)
CHLORIDE BLD-SCNC: 101 MMOL/L (ref 99–110)
CO2: 23 MMOL/L (ref 21–32)
CREAT SERPL-MCNC: 0.7 MG/DL (ref 0.9–1.3)
EOSINOPHILS ABSOLUTE: 0 K/UL (ref 0–0.6)
EOSINOPHILS RELATIVE PERCENT: 0 %
GFR AFRICAN AMERICAN: >60
GFR NON-AFRICAN AMERICAN: >60
GLOBULIN: 2.5 G/DL
GLUCOSE BLD-MCNC: 103 MG/DL (ref 70–99)
HCT VFR BLD CALC: 35.5 % (ref 40.5–52.5)
HEMOGLOBIN: 11.8 G/DL (ref 13.5–17.5)
LACTIC ACID, SEPSIS: 0.8 MMOL/L (ref 0.4–1.9)
LYMPHOCYTES ABSOLUTE: 1.1 K/UL (ref 1–5.1)
LYMPHOCYTES RELATIVE PERCENT: 9 %
MCH RBC QN AUTO: 29.9 PG (ref 26–34)
MCHC RBC AUTO-ENTMCNC: 33.3 G/DL (ref 31–36)
MCV RBC AUTO: 89.7 FL (ref 80–100)
MONOCYTES ABSOLUTE: 0.5 K/UL (ref 0–1.3)
MONOCYTES RELATIVE PERCENT: 4.1 %
NEUTROPHILS ABSOLUTE: 10.6 K/UL (ref 1.7–7.7)
NEUTROPHILS RELATIVE PERCENT: 86.2 %
PDW BLD-RTO: 15.2 % (ref 12.4–15.4)
PLATELET # BLD: 194 K/UL (ref 135–450)
PMV BLD AUTO: 8.4 FL (ref 5–10.5)
POTASSIUM REFLEX MAGNESIUM: 3.7 MMOL/L (ref 3.5–5.1)
PROCALCITONIN: 0.05 NG/ML (ref 0–0.15)
RBC # BLD: 3.95 M/UL (ref 4.2–5.9)
SODIUM BLD-SCNC: 135 MMOL/L (ref 136–145)
TOTAL PROTEIN: 6.6 G/DL (ref 6.4–8.2)
WBC # BLD: 12.3 K/UL (ref 4–11)

## 2020-10-01 PROCEDURE — 84145 PROCALCITONIN (PCT): CPT

## 2020-10-01 PROCEDURE — 73130 X-RAY EXAM OF HAND: CPT

## 2020-10-01 PROCEDURE — 73090 X-RAY EXAM OF FOREARM: CPT

## 2020-10-01 PROCEDURE — 6360000002 HC RX W HCPCS: Performed by: INTERNAL MEDICINE

## 2020-10-01 PROCEDURE — 99285 EMERGENCY DEPT VISIT HI MDM: CPT

## 2020-10-01 PROCEDURE — 6370000000 HC RX 637 (ALT 250 FOR IP): Performed by: EMERGENCY MEDICINE

## 2020-10-01 PROCEDURE — 85025 COMPLETE CBC W/AUTO DIFF WBC: CPT

## 2020-10-01 PROCEDURE — 96365 THER/PROPH/DIAG IV INF INIT: CPT

## 2020-10-01 PROCEDURE — 96367 TX/PROPH/DG ADDL SEQ IV INF: CPT

## 2020-10-01 PROCEDURE — 2580000003 HC RX 258: Performed by: INTERNAL MEDICINE

## 2020-10-01 PROCEDURE — 6370000000 HC RX 637 (ALT 250 FOR IP): Performed by: INTERNAL MEDICINE

## 2020-10-01 PROCEDURE — 2580000003 HC RX 258: Performed by: PHYSICIAN ASSISTANT

## 2020-10-01 PROCEDURE — 1200000000 HC SEMI PRIVATE

## 2020-10-01 PROCEDURE — 87150 DNA/RNA AMPLIFIED PROBE: CPT

## 2020-10-01 PROCEDURE — 2580000003 HC RX 258

## 2020-10-01 PROCEDURE — 87040 BLOOD CULTURE FOR BACTERIA: CPT

## 2020-10-01 PROCEDURE — 87077 CULTURE AEROBIC IDENTIFY: CPT

## 2020-10-01 PROCEDURE — 80053 COMPREHEN METABOLIC PANEL: CPT

## 2020-10-01 PROCEDURE — 6360000002 HC RX W HCPCS: Performed by: PHYSICIAN ASSISTANT

## 2020-10-01 PROCEDURE — 96366 THER/PROPH/DIAG IV INF ADDON: CPT

## 2020-10-01 PROCEDURE — 83605 ASSAY OF LACTIC ACID: CPT

## 2020-10-01 RX ORDER — TAMSULOSIN HYDROCHLORIDE 0.4 MG/1
0.4 CAPSULE ORAL DAILY
Status: DISCONTINUED | OUTPATIENT
Start: 2020-10-01 | End: 2020-10-03 | Stop reason: HOSPADM

## 2020-10-01 RX ORDER — BUDESONIDE AND FORMOTEROL FUMARATE DIHYDRATE 160; 4.5 UG/1; UG/1
2 AEROSOL RESPIRATORY (INHALATION) 2 TIMES DAILY
Status: DISCONTINUED | OUTPATIENT
Start: 2020-10-01 | End: 2020-10-02

## 2020-10-01 RX ORDER — 0.9 % SODIUM CHLORIDE 0.9 %
1000 INTRAVENOUS SOLUTION INTRAVENOUS ONCE
Status: COMPLETED | OUTPATIENT
Start: 2020-10-01 | End: 2020-10-01

## 2020-10-01 RX ORDER — SODIUM CHLORIDE 0.9 % (FLUSH) 0.9 %
10 SYRINGE (ML) INJECTION PRN
Status: DISCONTINUED | OUTPATIENT
Start: 2020-10-01 | End: 2020-10-03 | Stop reason: HOSPADM

## 2020-10-01 RX ORDER — ASPIRIN 81 MG/1
81 TABLET ORAL DAILY
Status: DISCONTINUED | OUTPATIENT
Start: 2020-10-01 | End: 2020-10-03 | Stop reason: HOSPADM

## 2020-10-01 RX ORDER — ATORVASTATIN CALCIUM 40 MG/1
80 TABLET, FILM COATED ORAL DAILY
Status: DISCONTINUED | OUTPATIENT
Start: 2020-10-01 | End: 2020-10-03 | Stop reason: HOSPADM

## 2020-10-01 RX ORDER — ACETAMINOPHEN 500 MG
1000 TABLET ORAL ONCE
Status: COMPLETED | OUTPATIENT
Start: 2020-10-01 | End: 2020-10-01

## 2020-10-01 RX ORDER — POLYETHYLENE GLYCOL 3350 17 G/17G
17 POWDER, FOR SOLUTION ORAL DAILY PRN
Status: DISCONTINUED | OUTPATIENT
Start: 2020-10-01 | End: 2020-10-03 | Stop reason: HOSPADM

## 2020-10-01 RX ORDER — ONDANSETRON 2 MG/ML
4 INJECTION INTRAMUSCULAR; INTRAVENOUS EVERY 6 HOURS PRN
Status: DISCONTINUED | OUTPATIENT
Start: 2020-10-01 | End: 2020-10-03 | Stop reason: HOSPADM

## 2020-10-01 RX ORDER — PROMETHAZINE HYDROCHLORIDE 25 MG/1
12.5 TABLET ORAL EVERY 6 HOURS PRN
Status: DISCONTINUED | OUTPATIENT
Start: 2020-10-01 | End: 2020-10-03 | Stop reason: HOSPADM

## 2020-10-01 RX ORDER — HYDROCODONE BITARTRATE AND ACETAMINOPHEN 5; 325 MG/1; MG/1
1 TABLET ORAL EVERY 6 HOURS PRN
Status: DISCONTINUED | OUTPATIENT
Start: 2020-10-01 | End: 2020-10-02

## 2020-10-01 RX ORDER — PREGABALIN 100 MG/1
100 CAPSULE ORAL 3 TIMES DAILY
Status: DISCONTINUED | OUTPATIENT
Start: 2020-10-01 | End: 2020-10-03 | Stop reason: HOSPADM

## 2020-10-01 RX ORDER — ALBUTEROL SULFATE 2.5 MG/3ML
2.5 SOLUTION RESPIRATORY (INHALATION) EVERY 6 HOURS PRN
Status: DISCONTINUED | OUTPATIENT
Start: 2020-10-01 | End: 2020-10-02

## 2020-10-01 RX ORDER — ACETAMINOPHEN 325 MG/1
650 TABLET ORAL EVERY 6 HOURS PRN
Status: DISCONTINUED | OUTPATIENT
Start: 2020-10-01 | End: 2020-10-03 | Stop reason: HOSPADM

## 2020-10-01 RX ORDER — ACETAMINOPHEN 650 MG/1
650 SUPPOSITORY RECTAL EVERY 6 HOURS PRN
Status: DISCONTINUED | OUTPATIENT
Start: 2020-10-01 | End: 2020-10-03 | Stop reason: HOSPADM

## 2020-10-01 RX ORDER — SODIUM CHLORIDE 0.9 % (FLUSH) 0.9 %
10 SYRINGE (ML) INJECTION EVERY 12 HOURS SCHEDULED
Status: DISCONTINUED | OUTPATIENT
Start: 2020-10-01 | End: 2020-10-03 | Stop reason: HOSPADM

## 2020-10-01 RX ORDER — PANTOPRAZOLE SODIUM 40 MG/1
40 TABLET, DELAYED RELEASE ORAL
Status: DISCONTINUED | OUTPATIENT
Start: 2020-10-02 | End: 2020-10-03 | Stop reason: HOSPADM

## 2020-10-01 RX ORDER — RISPERIDONE 0.5 MG/1
0.5 TABLET, FILM COATED ORAL NIGHTLY
COMMUNITY

## 2020-10-01 RX ORDER — ZOLPIDEM TARTRATE 5 MG/1
5 TABLET ORAL NIGHTLY PRN
Status: DISCONTINUED | OUTPATIENT
Start: 2020-10-01 | End: 2020-10-03 | Stop reason: HOSPADM

## 2020-10-01 RX ORDER — LEVOTHYROXINE SODIUM 0.03 MG/1
75 TABLET ORAL DAILY
Status: DISCONTINUED | OUTPATIENT
Start: 2020-10-02 | End: 2020-10-03 | Stop reason: HOSPADM

## 2020-10-01 RX ORDER — SODIUM CHLORIDE 9 MG/ML
INJECTION, SOLUTION INTRAVENOUS
Status: COMPLETED
Start: 2020-10-01 | End: 2020-10-01

## 2020-10-01 RX ORDER — ALBUTEROL SULFATE 90 UG/1
2 AEROSOL, METERED RESPIRATORY (INHALATION) EVERY 6 HOURS PRN
COMMUNITY

## 2020-10-01 RX ORDER — FLUOXETINE HYDROCHLORIDE 20 MG/1
20 CAPSULE ORAL DAILY
Status: DISCONTINUED | OUTPATIENT
Start: 2020-10-01 | End: 2020-10-03 | Stop reason: HOSPADM

## 2020-10-01 RX ORDER — TIZANIDINE 4 MG/1
2 TABLET ORAL EVERY 8 HOURS PRN
Status: DISCONTINUED | OUTPATIENT
Start: 2020-10-01 | End: 2020-10-03 | Stop reason: HOSPADM

## 2020-10-01 RX ADMIN — CEFEPIME 2 G: 2 INJECTION, POWDER, FOR SOLUTION INTRAVENOUS at 14:58

## 2020-10-01 RX ADMIN — CEFTRIAXONE SODIUM 1 G: 1 INJECTION, POWDER, FOR SOLUTION INTRAMUSCULAR; INTRAVENOUS at 22:26

## 2020-10-01 RX ADMIN — SODIUM CHLORIDE 250 ML: 9 INJECTION, SOLUTION INTRAVENOUS at 22:27

## 2020-10-01 RX ADMIN — ACETAMINOPHEN 1000 MG: 500 TABLET ORAL at 17:34

## 2020-10-01 RX ADMIN — VANCOMYCIN HYDROCHLORIDE 1750 MG: 10 INJECTION, POWDER, LYOPHILIZED, FOR SOLUTION INTRAVENOUS at 15:34

## 2020-10-01 RX ADMIN — PREGABALIN 100 MG: 100 CAPSULE ORAL at 22:30

## 2020-10-01 RX ADMIN — PROMETHAZINE HYDROCHLORIDE 12.5 MG: 25 TABLET ORAL at 22:30

## 2020-10-01 RX ADMIN — ATORVASTATIN CALCIUM 80 MG: 40 TABLET, FILM COATED ORAL at 22:30

## 2020-10-01 RX ADMIN — ENOXAPARIN SODIUM 40 MG: 40 INJECTION SUBCUTANEOUS at 22:38

## 2020-10-01 RX ADMIN — Medication 10 ML: at 22:31

## 2020-10-01 RX ADMIN — TAMSULOSIN HYDROCHLORIDE 0.4 MG: 0.4 CAPSULE ORAL at 22:30

## 2020-10-01 RX ADMIN — FLUOXETINE 20 MG: 20 CAPSULE ORAL at 22:30

## 2020-10-01 RX ADMIN — ZOLPIDEM TARTRATE 5 MG: 5 TABLET ORAL at 22:30

## 2020-10-01 RX ADMIN — TIZANIDINE 2 MG: 4 TABLET ORAL at 22:30

## 2020-10-01 RX ADMIN — SODIUM CHLORIDE 1000 ML: 9 INJECTION, SOLUTION INTRAVENOUS at 14:58

## 2020-10-01 ASSESSMENT — PAIN DESCRIPTION - ONSET: ONSET: GRADUAL

## 2020-10-01 ASSESSMENT — PAIN DESCRIPTION - PROGRESSION: CLINICAL_PROGRESSION: GRADUALLY WORSENING

## 2020-10-01 ASSESSMENT — PAIN DESCRIPTION - ORIENTATION: ORIENTATION: RIGHT

## 2020-10-01 ASSESSMENT — PAIN SCALES - GENERAL
PAINLEVEL_OUTOF10: 10
PAINLEVEL_OUTOF10: 0

## 2020-10-01 ASSESSMENT — PAIN DESCRIPTION - FREQUENCY: FREQUENCY: CONTINUOUS

## 2020-10-01 ASSESSMENT — PAIN DESCRIPTION - DESCRIPTORS: DESCRIPTORS: BURNING;ACHING;TIGHTNESS

## 2020-10-01 ASSESSMENT — PAIN DESCRIPTION - LOCATION: LOCATION: HAND

## 2020-10-01 ASSESSMENT — PAIN DESCRIPTION - PAIN TYPE: TYPE: ACUTE PAIN

## 2020-10-01 NOTE — ED PROVIDER NOTES
ED Attending Note    Rosalba Alonso was initially seen and evaluated by the advanced practice provider. Briefly, this is a 64 y.o. male who presented with with pain, swelling, and redness of the right hand for 2 days. Associated with chills. Struck his hand into a sharp object several days prior. Focused exam:   General: appears stated age, nontoxic, NAD  MSK: There is mild edema of the dorsum of the right hand. Small scab on the dorsum of the hand. Overlying erythema with lymphangitic streaking. 2+ radial pulse. Sensation intact R/M/U. Wiggles fingers. Right cardinal hand functions are intact. Given complaint of chills and given lymphangitic streaking will start on antibiotics and admit to hospital medicine. For all further details of the patient's emergency department visit, please see the advanced practice provider's documentation. Sarah Winters MD     This report has been produced using speech recognition software and may contain errors related to that system including errors in grammar, punctuation, and spelling, as well as words and phrases that may be inappropriate. If there are any questions or concerns please feel free to contact the dictating provider for clarification.        Sarah Winters MD  10/01/20 2650

## 2020-10-01 NOTE — PROGRESS NOTES
Admitting Physician:  Vancomycin administered by ED provider. Please consult Pharmacy if you wish to continue vancomycin after admission. Thanks.   Pharmacy

## 2020-10-01 NOTE — ED NOTES
1718 - perfect serve sent to [address] [place] [address]  10/01/20 1720    1925 - Dr. Davide Rosado called back.       Rima Daiz  10/01/20 0227

## 2020-10-01 NOTE — ED PROVIDER NOTES
[address] ED  EMERGENCY DEPARTMENT ENCOUNTER        Pt Name: Jemal Blevins  MRN: [id number]  [place] 1960  Date of evaluation: 10/1/2020  Provider: JAISON Laird  PCP: Claudine Justice MD    This patient was seen and evaluated by the attending physician Julianna Lund       Chief Complaint   Patient presents with    Abscess     RIGHT HAND       HISTORY OF PRESENT ILLNESS   (Location/Symptom, Timing/Onset, Context/Setting, Quality, Duration, Modifying Factors, Severity)  Note limiting factors. Jemal Blevins is a 64 y.o. male with significant past medical history of primary stomach cancer not currently in treatment, coronary artery disease, tobacco abuse who presents via private vehicle from his home with his daughter for evaluation of swelling to the right hand. Patient notes about 5 days ago he had a scratch to the right hand. Over the past 2 days the hand is gotten more red more swollen more tender, it hurts when his sleeve touches his wrist.  He also endorses feeling ill, body aches chills rigors, nausea since last night. No documented fevers at home. He has not been taking any medicine for it. Nothing makes it better, moving it makes it worse. He endorses pain with moving the wrist and fingers. He denies any chest pain or shortness of breath. He has been working outside in his pond that is in his backyard. He denies history of diabetes or other forms of immunocompromise, he denies history of IV drug abuse, he is not currently on any chemo or radiation for his stomach cancer, he is followed by Dr. Vonda Cedeno for this, he has never received chemoradiation for this. Nursing Notes were all reviewed and agreed with or any disagreements were addressed  in the HPI. REVIEW OF SYSTEMS    (2-9 systems for level 4, 10 or more for level 5)     Review of Systems    Positives and Pertinent negatives as per HPI.   Except as noted abovein the ROS, all other systems were reviewed and negative. 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  CERVICAL DISCECTOMY  02/08/2017    Anterior discectomy, anterior foraminotomy C5/C6 and C6-7    CYSTOSCOPY  6/5/15    CYSTOSCOPY N/A 02/09/2018    DIAGNOSTIC CARDIAC CATH LAB PROCEDURE      ENDOSCOPY, COLON, DIAGNOSTIC      HERNIA REPAIR      KNEE ARTHROSCOPY      LITHOTRIPSY      LITHOTRIPSY Left 11/5/15    OTHER SURGICAL HISTORY  1/20/11    video arthroscopy of right shoulder with subcromial d ecompression and arthroscopic sarbjit    OTHER SURGICAL HISTORY  5/1/13    Excision Lesion Right Angle of Mouth    SHOULDER SURGERY      Left Shoulder X 3; right Shoulder X 5    SHOULDER SURGERY Left 05/30/2017    UPPER GASTROINTESTINAL ENDOSCOPY  07/26/2012    UPPER GASTROINTESTINAL ENDOSCOPY N/A 6/21/2019    EGD BIOPSY performed by Angie Torres DO at [address]       Previous Medications    ALBUTEROL SULFATE HFA (VENTOLIN HFA) 108 (90 BASE) MCG/ACT INHALER    Inhale 2 puffs into the lungs every 6 hours as needed for Wheezing    ASPIRIN EC 81 MG EC TABLET    Take 1 tablet by mouth daily. ATORVASTATIN (LIPITOR) 80 MG TABLET    TAKE ONE TABLET BY MOUTH DAILY    FLUOXETINE (PROZAC) 20 MG CAPSULE    Take 20 mg by mouth daily. FLUTICASONE-VILANTEROL (BREO ELLIPTA) 100-25 MCG/INH AEPB INHALER    Inhale 1 puff into the lungs daily    FLUTICASONE-VILANTEROL (BREO ELLIPTA) 100-25 MCG/INH AEPB INHALER    Inhale 1 puff into the lungs daily    LEVOTHYROXINE (SYNTHROID) 75 MCG TABLET        NITROGLYCERIN (NITROSTAT) 0.4 MG SL TABLET    Place 1 tablet under the tongue every 5 minutes as needed for Chest pain    OMEPRAZOLE (PRILOSEC) 20 MG CAPSULE    Take 20 mg by mouth daily    ONDANSETRON (ZOFRAN) 4 MG TABLET    Take 4 mg by mouth every 8 hours as needed for Nausea or Vomiting    POTASSIUM CHLORIDE (K-TAB) 10 MEQ EXTENDED RELEASE TABLET    Take 2 tablets by mouth daily    PREGABALIN (LYRICA) 50 MG CAPSULE      Take 100 mg by mouth 3 times daily     SUCRALFATE (CARAFATE PO)    Take by mouth    TAMSULOSIN (FLOMAX) 0.4 MG CAPSULE        TIZANIDINE (ZANAFLEX) 4 MG TABLET        ZOLPIDEM (AMBIEN) 10 MG TABLET    Take by mouth nightly as needed for Sleep. ALLERGIES     Bactrim [sulfamethoxazole-trimethoprim] and Codeine    FAMILYHISTORY       Family History   Problem Relation Age of Onset    Heart Disease Father     Cancer Mother     Cancer Sister           SOCIAL HISTORY       Social History     Socioeconomic History    Marital status:       Spouse name: None    Number of children: None    Years of education: None    Highest education level: None   Occupational History    None   Social Needs    Financial resource strain: None    Food insecurity     Worry: None     Inability: None    Transportation needs     Medical: None     Non-medical: None   Tobacco Use    Smoking status: Current Every Day Smoker     Packs/day: 0.50     Years: 45.00     Pack years: 22.50     Types: Cigarettes    Smokeless tobacco: Never Used   Substance and Sexual Activity    Alcohol use: No    Drug use: Yes     Types: Marijuana    Sexual activity: Yes     Partners: Female   Lifestyle    Physical activity     Days per week: None     Minutes per session: None    Stress: None   Relationships    Social connections     Talks on phone: None     Gets together: None     Attends Yazidism service: None     Active member of club or organization: None     Attends meetings of clubs or organizations: None     Relationship status: None    Intimate partner violence     Fear of current or ex partner: None     Emotionally abused: None     Physically abused: None     Forced sexual activity: None   Other Topics Concern    None   Social History Narrative    None       SCREENINGS             PHYSICAL EXAM    (up to 7 for level 4, 8 or more for level 5)     ED Triage Vitals [10/01/20 1221]   BP Temp Temp Source Pulse Resp SpO2 Height Weight   135/71 98.5 °F (36.9 °C) Oral 89 16 98 % 5' 8.5\" (1.74 m) 155 lb (70.3 kg)       Physical Exam  Vitals signs and nursing note reviewed. Constitutional:       General: He is awake. He is in acute distress. Appearance: Normal appearance. He is well-developed. He is ill-appearing. He is not toxic-appearing or diaphoretic. HENT:      Head: Normocephalic and atraumatic. Right Ear: External ear normal.      Left Ear: External ear normal.      Nose: Nose normal.      Mouth/Throat:      Mouth: Mucous membranes are moist.      Pharynx: Oropharynx is clear. Eyes:      General:         Right eye: No discharge. Left eye: No discharge. Extraocular Movements: Extraocular movements intact. Conjunctiva/sclera: Conjunctivae normal.      Pupils: Pupils are equal, round, and reactive to light. Neck:      Musculoskeletal: Normal range of motion and neck supple. No neck rigidity. Cardiovascular:      Rate and Rhythm: Normal rate and regular rhythm. Pulses:           Radial pulses are 2+ on the right side and 2+ on the left side. Posterior tibial pulses are 2+ on the right side and 2+ on the left side. Heart sounds: Normal heart sounds. No murmur. No friction rub. No gallop.     Pulmonary:      Effort: Pulmonary effort is normal. No respiratory distress. Breath sounds: Normal breath sounds. No wheezing or rales. Abdominal:      Palpations: Abdomen is soft. Tenderness: There is abdominal tenderness (generalized). There is no guarding or rebound. Musculoskeletal:      Right elbow: Normal.     Right wrist: He exhibits decreased range of motion, tenderness and swelling. He exhibits no bony tenderness, no effusion, no crepitus, no deformity and no laceration. Right hand: He exhibits laceration and swelling. He exhibits normal range of motion, no tenderness, no bony tenderness, normal capillary refill and no deformity. Normal sensation noted. Decreased strength noted. He exhibits wrist extension trouble. He exhibits no finger abduction and no thumb/finger opposition. Hands:       Right lower leg: No edema. Left lower leg: No edema. Comments: Able to fully extend all fingers and wrist but with pain. No flexor deformity. Skin:     General: Skin is warm and dry. Capillary Refill: Capillary refill takes less than 2 seconds. Findings: Erythema, signs of injury and lesion present. Neurological:      General: No focal deficit present. Mental Status: He is alert, oriented to person, place, and time and easily aroused. Sensory: No sensory deficit. Motor: No weakness. Psychiatric:         Mood and Affect: Mood normal.         Behavior: Behavior normal. Behavior is cooperative. DIAGNOSTIC RESULTS   LABS:    Labs Reviewed - No data to display    All other labs were within normal range or not returned as of this dictation. EKG: All EKG's are interpreted by the Emergency Department Physician who either signs orCo-signs this chart in the absence of a cardiologist.  Please see their note for interpretation of EKG.       RADIOLOGY:   Non-plain film images such as CT, Ultrasound and MRI are read by the radiologist. Plain radiographic images are visualized andpreliminarily interpreted by the  ED Provider with the below findings:        Interpretation perthe Radiologist below, if available at the time of this note:    No orders to display     No results found. PROCEDURES   Unless otherwise noted below, none     Procedures    CRITICAL CARE TIME   N/A    CONSULTS:  None      EMERGENCY DEPARTMENT COURSE and DIFFERENTIALDIAGNOSIS/MDM:   Vitals:    Vitals:    10/01/20 1221   BP: 135/71   Pulse: 89   Resp: 16   Temp: 98.5 °F (36.9 °C)   TempSrc: Oral   SpO2: 98%   Weight: 155 lb (70.3 kg)   Height: 5' 8.5\" (1.74 m)       Patient was given thefollowing medications:  Medications - No data to display    PDMP Monitoring:    Last PDMP [employer] as Reviewed [facility]):  Review User Review Instant Review Result            Urine Drug Screenings (1 yr)     Urine Drug Screen  Collected: 4/6/2019 12:40 AM (Final result)    Narrative:  Performed at:  [facility]. [facility]  [address] [place] [address]. [facility], [address] [place] [address]   Phone [phone]    Complete Results              Medication Contract and Consent for Opioid Use Documents Filed      No documents found                MDM:     SIRS CRITERIA:    Temp >38 C (100.4 F) or <36 C (96.8 F)   NO. Heart Rate >90:   NO.     Resp. Rate >20 or PaCO2 < 32 mmHg:   NO.     WBC <4K or >12K  or >10% Bands:   YES  +1. Total:   1     ** Two or more above criteria met? No**     Patient seen and evaluated. Old records reviewed. Diagnostic testing reviewed and results discussed. Pt is a 65 yo male who presents for infection to the right hand. On exam he is alert, oriented, afebrile and well perfused. He is not meeting SIRS sepsis criteria however he was promptly started on IV abx as he has infetion over the extensor surface of the hand with lymphangitic streaking, concerning for more extensive infection.  At this time I have overall low concern for extensor tendon compartment infection as he has full active rom of joints in the area of infection at this time however I advocate for continued observation and prompt ortho consult should his clinical picture change. All information including ED workup, results, treatment, diagnosis has been reviewed and discussed with ED attending physician and directly discussed with Hospitalist who is the admitting physician. Pt will be admitted in stable condition. Pt advised of admission and is in full agreement. FINAL IMPRESSION      1. Cellulitis of right hand          DISPOSITION/PLAN   DISPOSITION        PATIENT REFERREDTO:  No follow-up provider specified.     DISCHARGE MEDICATIONS:  New Prescriptions    No medications on file       DISCONTINUED MEDICATIONS:  Discontinued Medications    No medications on file              (Please note that portions ofthis note were completed with a voice recognition program.  Efforts were made to edit the dictations but occasionally words are mis-transcribed.)    Sunil Lama (electronically signed)       Sunil Lama  10/02/20 1114

## 2020-10-02 ENCOUNTER — APPOINTMENT (OUTPATIENT)
Dept: MRI IMAGING | Age: 60
DRG: 603 | End: 2020-10-02
Payer: MEDICARE

## 2020-10-02 PROCEDURE — 1200000000 HC SEMI PRIVATE

## 2020-10-02 PROCEDURE — 99222 1ST HOSP IP/OBS MODERATE 55: CPT | Performed by: INTERNAL MEDICINE

## 2020-10-02 PROCEDURE — 2580000003 HC RX 258: Performed by: INTERNAL MEDICINE

## 2020-10-02 PROCEDURE — 94761 N-INVAS EAR/PLS OXIMETRY MLT: CPT

## 2020-10-02 PROCEDURE — 94640 AIRWAY INHALATION TREATMENT: CPT

## 2020-10-02 PROCEDURE — A9579 GAD-BASE MR CONTRAST NOS,1ML: HCPCS | Performed by: PHYSICIAN ASSISTANT

## 2020-10-02 PROCEDURE — 6360000004 HC RX CONTRAST MEDICATION: Performed by: PHYSICIAN ASSISTANT

## 2020-10-02 PROCEDURE — 73220 MRI UPPR EXTREMITY W/O&W/DYE: CPT

## 2020-10-02 PROCEDURE — 6370000000 HC RX 637 (ALT 250 FOR IP): Performed by: NURSE PRACTITIONER

## 2020-10-02 PROCEDURE — 6370000000 HC RX 637 (ALT 250 FOR IP): Performed by: INTERNAL MEDICINE

## 2020-10-02 PROCEDURE — 6360000002 HC RX W HCPCS: Performed by: INTERNAL MEDICINE

## 2020-10-02 RX ORDER — ALBUTEROL SULFATE 2.5 MG/3ML
2.5 SOLUTION RESPIRATORY (INHALATION) EVERY 6 HOURS PRN
Status: DISCONTINUED | OUTPATIENT
Start: 2020-10-02 | End: 2020-10-03 | Stop reason: HOSPADM

## 2020-10-02 RX ORDER — BUDESONIDE AND FORMOTEROL FUMARATE DIHYDRATE 160; 4.5 UG/1; UG/1
2 AEROSOL RESPIRATORY (INHALATION) 2 TIMES DAILY
Status: DISCONTINUED | OUTPATIENT
Start: 2020-10-02 | End: 2020-10-03 | Stop reason: HOSPADM

## 2020-10-02 RX ORDER — NICOTINE 21 MG/24HR
1 PATCH, TRANSDERMAL 24 HOURS TRANSDERMAL DAILY
Status: DISCONTINUED | OUTPATIENT
Start: 2020-10-02 | End: 2020-10-03 | Stop reason: HOSPADM

## 2020-10-02 RX ORDER — OXYCODONE HYDROCHLORIDE 5 MG/1
5 TABLET ORAL EVERY 4 HOURS PRN
Status: DISCONTINUED | OUTPATIENT
Start: 2020-10-02 | End: 2020-10-03 | Stop reason: HOSPADM

## 2020-10-02 RX ORDER — OXYCODONE HYDROCHLORIDE 5 MG/1
10 TABLET ORAL EVERY 4 HOURS PRN
Status: DISCONTINUED | OUTPATIENT
Start: 2020-10-02 | End: 2020-10-03 | Stop reason: HOSPADM

## 2020-10-02 RX ADMIN — ATORVASTATIN CALCIUM 80 MG: 40 TABLET, FILM COATED ORAL at 08:22

## 2020-10-02 RX ADMIN — TIZANIDINE 2 MG: 4 TABLET ORAL at 21:06

## 2020-10-02 RX ADMIN — PREGABALIN 100 MG: 100 CAPSULE ORAL at 14:53

## 2020-10-02 RX ADMIN — ZOLPIDEM TARTRATE 5 MG: 5 TABLET ORAL at 21:06

## 2020-10-02 RX ADMIN — GADOTERIDOL 15 ML: 279.3 INJECTION, SOLUTION INTRAVENOUS at 12:29

## 2020-10-02 RX ADMIN — Medication 10 ML: at 08:23

## 2020-10-02 RX ADMIN — VANCOMYCIN HYDROCHLORIDE 1250 MG: 10 INJECTION, POWDER, LYOPHILIZED, FOR SOLUTION INTRAVENOUS at 19:09

## 2020-10-02 RX ADMIN — PANTOPRAZOLE SODIUM 40 MG: 40 TABLET, DELAYED RELEASE ORAL at 06:01

## 2020-10-02 RX ADMIN — LEVOTHYROXINE SODIUM 75 MCG: 25 TABLET ORAL at 06:01

## 2020-10-02 RX ADMIN — Medication 2 PUFF: at 07:01

## 2020-10-02 RX ADMIN — OXYCODONE 10 MG: 5 TABLET ORAL at 11:03

## 2020-10-02 RX ADMIN — Medication 10 ML: at 21:06

## 2020-10-02 RX ADMIN — ENOXAPARIN SODIUM 40 MG: 40 INJECTION SUBCUTANEOUS at 08:21

## 2020-10-02 RX ADMIN — PROMETHAZINE HYDROCHLORIDE 12.5 MG: 25 TABLET ORAL at 21:06

## 2020-10-02 RX ADMIN — Medication 2 PUFF: at 19:48

## 2020-10-02 RX ADMIN — CEFTRIAXONE SODIUM 1 G: 1 INJECTION, POWDER, FOR SOLUTION INTRAMUSCULAR; INTRAVENOUS at 21:03

## 2020-10-02 RX ADMIN — PREGABALIN 100 MG: 100 CAPSULE ORAL at 21:06

## 2020-10-02 RX ADMIN — PREGABALIN 100 MG: 100 CAPSULE ORAL at 08:23

## 2020-10-02 RX ADMIN — TAMSULOSIN HYDROCHLORIDE 0.4 MG: 0.4 CAPSULE ORAL at 08:22

## 2020-10-02 RX ADMIN — FLUOXETINE 20 MG: 20 CAPSULE ORAL at 08:22

## 2020-10-02 RX ADMIN — VANCOMYCIN HYDROCHLORIDE 1250 MG: 10 INJECTION, POWDER, LYOPHILIZED, FOR SOLUTION INTRAVENOUS at 05:59

## 2020-10-02 ASSESSMENT — PAIN SCALES - GENERAL
PAINLEVEL_OUTOF10: 10
PAINLEVEL_OUTOF10: 7

## 2020-10-02 ASSESSMENT — PAIN DESCRIPTION - PAIN TYPE: TYPE: ACUTE PAIN

## 2020-10-02 ASSESSMENT — PAIN DESCRIPTION - FREQUENCY: FREQUENCY: CONTINUOUS

## 2020-10-02 ASSESSMENT — PAIN DESCRIPTION - DESCRIPTORS: DESCRIPTORS: ACHING;BURNING

## 2020-10-02 ASSESSMENT — PAIN DESCRIPTION - LOCATION: LOCATION: HAND

## 2020-10-02 ASSESSMENT — PAIN DESCRIPTION - ORIENTATION: ORIENTATION: RIGHT

## 2020-10-02 NOTE — CARE COORDINATION
Case Management Assessment  Initial Evaluation      Patient Name: Mica Dumont  YOB: 1960  Diagnosis: Cellulitis [L03.90]  Date / Time: 10/1/2020 12:17 PM    Admission status/Date:  10/02/2020  Chart Reviewed: Yes      Patient Interviewed: Yes   Family Interviewed:  No      Hospitalization in the last 30 days:  No      Health Care Decision Maker:    Met with: patient  Ramses Whitmore conducted  (bedside/phone):phone  Current PCP: APRIL [facility]ajas Medicare  Precert required for SNF : YES        3 night stay required - NA    ADLS  Support Systems/Care Needs: Family Members, Spouse/Significant Other  Transportation: self    Meal Preparation: self    Housing  Living Arrangements: lives w/SO and 2 grandchildren  Steps:  Intent for return to present living arrangements: Yes  Identified Issues: NA    Home Care Information  Active with [address] : No Agency:  Type of Home Care Services: None  Passport/Waiver : No  :                      Phone Number:    Passport/Waiver Services: NA          Durable Medical Equiptment   DME Provider:   Equipment:   Walker___Cane___RTS___ BSC___Shower Chair___Hospital Bed___W/C____Other________  02 at ____Liter(s)---wears(frequency)_______ HHN ___ CPAP___ BiPap___   N/A____      Home O2 Use :  No   97% RA      Community Service Affiliation  Dialysis:  No    · Agency:  · Location:  · Dialysis Schedule:  · Phone:   · Fax: Other Community Services:   DISCHARGE PLAN: Explained Case Management role/services. Chart reviewed. Met with pt at bedside and explained the role of the CM. Plans to return home. IPTA. CM following for possible home IVABTX.

## 2020-10-02 NOTE — ED NOTES
Patient transported to floor with nurse, via wheelchair. Patient has his belongings with him, including, clothing, shoes, and cell phone and . Report given to [facility], WINNIE.      Mario Mix RN  10/01/20 2036

## 2020-10-02 NOTE — PROGRESS NOTES
Vancomycin Day: 2    Patient's labs, cultures, vitals, and vancomycin regimen reviewed. No changes today.   Trough due on 10/3 at [address] [place] [address] D 10/2/63421:54 PM  .

## 2020-10-02 NOTE — PROGRESS NOTES
AM assessment completed, see flow sheet. Pt is alert and oriented. Vital signs are WNL. Respirations are even & easy on RA. No complaints voiced of pain at this time. Right hand red swollen up passed wrist. Pt refused pillows to elevate or ice pack. Pt denies needs at this time. SR up x 2, and bed in low position. Call light is within reach.

## 2020-10-02 NOTE — PROGRESS NOTES
Report given @ bedside to [facility], for transferral of care. Pt resting in bed. Call light in reach.

## 2020-10-02 NOTE — PLAN OF CARE
Problem: Falls - Risk of:  Goal: Will remain free from falls  Description: Will remain free from falls  10/2/2020 1054 by Jessica Higuera RN  Outcome: Ongoing  10/2/2020 0126 by Ellin Galeazzi, RN  Outcome: Ongoing  Goal: Absence of physical injury  Description: Absence of physical injury  10/2/2020 1054 by Jessica Higuera RN  Outcome: Ongoing  10/2/2020 0126 by Ellin Galeazzi, RN  Outcome: Ongoing     Problem: SAFETY  Goal: Free from accidental physical injury  10/2/2020 1054 by Jessica Higuera RN  Outcome: Ongoing  10/2/2020 0126 by Ellin Galeazzi, RN  Outcome: Ongoing     Problem: DAILY CARE  Goal: Daily care needs are met  10/2/2020 1054 by Jessica Higuera RN  Outcome: Ongoing  10/2/2020 0126 by Ellin Galeazzi, RN  Outcome: Ongoing     Problem: PAIN  Goal: Patient's pain/discomfort is manageable  10/2/2020 1054 by Jessica Higuera RN  Outcome: Ongoing  10/2/2020 0126 by Ellin Galeazzi, RN  Outcome: Ongoing     Problem: SKIN INTEGRITY  Goal: Skin integrity is maintained or improved  10/2/2020 1054 by Jessica Higuera RN  Outcome: Ongoing  10/2/2020 0126 by Ellin Galeazzi, RN  Outcome: Ongoing     Problem: KNOWLEDGE DEFICIT  Goal: Patient/S.O. demonstrates understanding of disease process, treatment plan, medications, and discharge instructions.   10/2/2020 1054 by Jessica Higuera RN  Outcome: Ongoing  10/2/2020 0126 by Ellin Galeazzi, RN  Outcome: Ongoing     Problem: DISCHARGE BARRIERS  Goal: Patient's continuum of care needs are met  10/2/2020 1054 by Jessica Higuera RN  Outcome: Ongoing  10/2/2020 0126 by Ellin Galeazzi, RN  Outcome: Ongoing

## 2020-10-02 NOTE — PROGRESS NOTES
RESPIRATORY THERAPY ASSESSMENT    Name:  Veda Cr Record Number:  [id number]  Age: 64 y.o. Gender: male  : 1960  Today's Date:  10/2/2020  Room:  0203/0203-01    Assessment     Is the patient being admitted for a COPD or Asthma exacerbation? No   (If yes the patient will be seen every 4 hours for the first 24 hours and then reassessed)    Patient Admission Diagnosis      Allergies  Allergies   Allergen Reactions    Bactrim [Sulfamethoxazole-Trimethoprim] Other (See Comments)     States makes yeast infection on his penis    Codeine Nausea Only       Minimum Predicted Vital Capacity:               Actual Vital Capacity:                    Pulmonary History:COPD  Home Oxygen Therapy:  room air  Home Respiratory Therapy:Albuterol, Breo  Current Respiratory Therapy:  Symbicort 160 2 puff BID, Albuterol Q6 PRN          Respiratory Severity Index(RSI)   Patients with orders for inhalation medications, oxygen, or any therapeutic treatment modality will be placed on Respiratory Protocol. They will be assessed with the first treatment and at least every 72 hours thereafter. The following severity scale will be used to determine frequency of treatment intervention. Smoking History: Pulmonary Disease or Smoking History, Greater than 15 pack year = 2    Social History  Social History     Tobacco Use    Smoking status: Current Every Day Smoker     Packs/day: 0.50     Years: 45.00     Pack years: 22.50     Types: Cigarettes    Smokeless tobacco: Never Used   Substance Use Topics    Alcohol use: No    Drug use: Yes     Types: Marijuana       Recent Surgical History: None = 0  Past Surgical History  Past Surgical History:   Procedure Laterality Date    CARDIAC CATHETERIZATION  2015    stent placed    CARPAL TUNNEL RELEASE      chioma.     CERVICAL DISCECTOMY  2017    Anterior discectomy, anterior foraminotomy C5/C6 and C6-7    CYSTOSCOPY  6/5/15    CYSTOSCOPY N/A 2018    DIAGNOSTIC CARDIAC CATH LAB PROCEDURE      ENDOSCOPY, COLON, DIAGNOSTIC      HERNIA REPAIR      KNEE ARTHROSCOPY      LITHOTRIPSY      LITHOTRIPSY Left 11/5/15    OTHER SURGICAL HISTORY  1/20/11    video arthroscopy of right shoulder with subcromial d ecompression and arthroscopic sarbjit    OTHER SURGICAL HISTORY  5/1/13    Excision Lesion Right Angle of Mouth    SHOULDER SURGERY      Left Shoulder X 3; right Shoulder X 5    SHOULDER SURGERY Left 05/30/2017    UPPER GASTROINTESTINAL ENDOSCOPY  07/26/2012    UPPER GASTROINTESTINAL ENDOSCOPY N/A 6/21/2019    EGD BIOPSY performed by Anabell Diane DO at [facility]       Level of Consciousness: Alert, Oriented, and Cooperative = 0    Level of Activity: Walking unassisted = 0    Respiratory Pattern: Regular Pattern; RR 8-20 = 0    Breath Sounds: Clear = 0    Sputum   ,  ,    Cough: Strong, spontaneous, non-productive = 0    Vital Signs   /64   Pulse 79   Temp 99.8 °F (37.7 °C) (Oral)   Resp 16   Ht 5' 8.5\" (1.74 m)   Wt 154 lb 6.4 oz (70 kg)   SpO2 97%   BMI 23.13 kg/m²   SPO2 (COPD values may differ): Greater than or equal to 92% on room air = 0    Peak Flow (asthma only): not applicable = 0    RSI: 0-4 = See once and convert to home regimen or discontinue        Plan       Goals: medication delivery, mobilize retained secretions, volume expansion and improve oxygenation    Patient/caregiver was educated on the proper method of use for Respiratory Care Devices:  Yes      Level of patient/caregiver understanding able to:   ? Verbalize understanding   ? Demonstrate understanding       ? Teach back        ? Needs reinforcement       ? No available caregiver               ? Other:     Response to education:  Excellent     Is patient being placed on Home Treatment Regimen? Yes     Does the patient have everything they need prior to discharge?   NA     Comments: pt assessed & chart reviewed    Plan of Care: maintain home regimen    Electronically signed by Alba Childs RCP on 10/2/2020 at 12:38 AM    Respiratory Protocol Guidelines     1. Assessment and treatment by Respiratory Therapy will be initiated for medication and therapeutic interventions upon initiation of aerosolized medication. 2. Physician will be contacted for respiratory rate (RR) greater than 35 breaths per minute. Therapy will be held for heart rate (HR) greater than 140 beats per minute, pending direction from physician. 3. Bronchodilators will be administered via Metered Dose Inhaler (MDI) with spacer when the following criteria are met:  a. Alert and cooperative     b. HR < 140 bpm  c. RR < 30 bpm                d. Can demonstrate a 2-3 second inspiratory hold  4. Bronchodilators will be administered via Hand Held Nebulizer [facility]) to patients when ANY of the following criteria are met  a. Incognizant or uncooperative          b. Patients treated with HHN at Home        c. Unable to demonstrate proper use of MDI with spacer     d. RR > 30 bpm   5. Bronchodilators will be delivered via Metered Dose Inhaler (MDI), HHN, Aerogen to intubated patients on mechanical ventilation. 6. Inhalation medication orders will be delivered and/or substituted as outlined below. Aerosolized Medications Ordering and Administration Guidelines:    1. All Medications will be ordered by a physician, and their frequency and/or modality will be adjusted as defined by the patients Respiratory Severity Index (RSI) score. 2. If the patient does not have documented COPD, consider discontinuing anticholinergics when RSI is less than 9.  3. If the bronchospasm worsens (increased RSI), then the bronchodilator frequency can be increased to a maximum of every 4 hours. If greater than every 4 hours is required, the physician will be contacted.   4. If the bronchospasm improves, the frequency of the bronchodilator can be decreased, based on the patient's RSI, but not less than home treatment regimen frequency. 5. Bronchodilator(s) will be discontinued if patient has a RSI less than 9 and has received no scheduled or as needed treatment for 72  Hrs. Patients Ordered on a Mucolytic Agent:    1. Must always be administered with a bronchodilator. 2. Discontinue if patient experiences worsened bronchospasm, or secretions have lessened to the point that the patient is able to clear them with a cough. Anti-inflammatory and Combination Medications:    1. If the patient lacks prior history of lung disease, is not using inhaled anti-inflammatory medication at home, and lacks wheezing by examination or by history for at least 24 hours, contact physician for possible discontinuation.

## 2020-10-02 NOTE — H&P
Hospital Medicine History & Physical      PCP: Ruma Oconnor MD    Date of Admission: 10/1/2020    Date of Service: Pt seen/examined on 10/2/2020     Chief Complaint:    Chief Complaint   Patient presents with    Abscess     RIGHT HAND       History Of Present Illness: The patient is a 64 y.o. male with hypertension, hyperlipidemia, GERD, COPD, CAD, chronic back pain, lymphoma, and arthritis who presents to [facility] with c/o swelling, redness, and pain to right hand. He cut his hand about 5 days ago. Then 2 days ago, his right hand started swelling and developing erythema.  symptoms continued to worsen. He reports having fever and chills yesterday. He has nausea and vomiting occasionally due to lymphoma. He is not undergoing treatment for this. He uses Marijuana for chronic pain. Labs with leukocytosis. X-ray of right hand, right radius and ulna negative. Admitted to med-surg. 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     CERVICAL DISCECTOMY  02/08/2017    Anterior discectomy, anterior foraminotomy C5/C6 and C6-7    CYSTOSCOPY  6/5/15    CYSTOSCOPY N/A 02/09/2018    DIAGNOSTIC CARDIAC CATH LAB PROCEDURE      ENDOSCOPY, COLON, DIAGNOSTIC      HERNIA REPAIR      KNEE ARTHROSCOPY      LITHOTRIPSY      LITHOTRIPSY Left 11/5/15    OTHER SURGICAL HISTORY  1/20/11    video Allergies:  Bactrim [sulfamethoxazole-trimethoprim] and Codeine    Social History:    TOBACCO:   reports that he has been smoking cigarettes. He has a 22.50 pack-year smoking history. He has never used smokeless tobacco.  ETOH:   reports no history of alcohol use. Family History:   Positive as follows:        Problem Relation Age of Onset    Heart Disease Father     Cancer Mother     Cancer Sister        REVIEW OF SYSTEMS:       Constitutional: + fever, chills  Respiratory: Negative  for dyspnea, cough   Cardiovascular: Negative for chest pain   Gastrointestinal: Negative for vomiting, diarrhea   Genitourinary: Negative for hematuria   Musculoskeletal: + arthralgias + right hand swelling, pain  Skin: + right hand erythema  Neurological: Negative for syncope   Psychiatric/Behavorial: Negative for anxiety    PHYSICAL EXAM:    /77   Pulse 76   Temp 96.7 °F (35.9 °C) (Oral)   Resp 16   Ht 5' 8.5\" (1.74 m)   Wt 154 lb 6.4 oz (70 kg)   SpO2 99%   BMI 23.13 kg/m²     Gen: No distress. Alert. Eyes: PERRL. No sclera icterus. No conjunctival injection. ENT: No discharge. Pharynx clear. Neck:  Trachea midline. Resp: No accessory muscle use. No crackles. No wheezes. No rhonchi. CV: Regular rate. Regular rhythm. No murmur. No rub. GI: Non-tender. Non-distended. Normal bowel sounds. Skin: Warm and dry. + right hand erythema, swelling, TTP   M/S: No cyanosis. No joint deformity. No clubbing. Neuro: Awake. Grossly nonfocal    Psych: Oriented x 3. No anxiety or agitation. I Tod Sep have reviewed the chart on [facility] and personally interviewed and examined patient, reviewed the data (labs and imaging) and after discussion with my PA formulated the plan. Agree with note with the following edits.     HPI: 65-year male history of hypertension, hyperlipidemia, coronary disease, COPD presents the emergency room with swelling redness and pain to the dorsum of the right hand. He cut his hand a few days ago. And 2 days ago he started noticing swelling and redness. Admits to low-grade fever and chills. Denies IV drug use. I reviewed the patient's Past Medical History, Past Surgical History, Medications, and Allergies. Physical exam:    /77   Pulse 76   Temp 96.7 °F (35.9 °C) (Oral)   Resp 16   Ht 5' 8.5\" (1.74 m)   Wt 154 lb 6.4 oz (70 kg)   SpO2 99%   BMI 23.13 kg/m²     Gen: No distress. Alert. Eyes: PERRL. No sclera icterus. No conjunctival injection. ENT: No discharge. Pharynx clear. Neck: Trachea midline. Normal thyroid. Resp: No accessory muscle use. No crackles. No wheezes. No rhonchi. No dullness on percussion. CV: Regular rate. Regular rhythm. No murmur or rub. No edema. GI: Non-tender. Non-distended. No masses. No organomegaly. Normal bowel sounds. No hernia. Skin: erythema,swelling dorsum of right hand  Able to move all fingers   movement of wrist painful  Lymph: No cervical LAD. No supraclavicular LAD. M/S: No cyanosis. No joint deformity. No clubbing. Neuro: Awake. Moves all 4 extremities, non focal  Psych: Oriented x 3. No anxiety or agitation. RAMIN Peralta      CBC:   Recent Labs     10/01/20  1445   WBC 12.3*   HGB 11.8*   HCT 35.5*   MCV 89.7        BMP:   Recent Labs     10/01/20  1445   *   K 3.7      CO2 23   BUN 8   CREATININE 0.7*     LIVER PROFILE:   Recent Labs     10/01/20  1445   AST 10*   ALT 9*   BILITOT 1.0   ALKPHOS 121         CULTURES  Blood: pending    EKG:  I have reviewed the EKG with the following interpretation:   N/A    RADIOLOGY  XR RADIUS ULNA RIGHT (2 VIEWS)   Final Result   Negative         XR HAND RIGHT (MIN 3 VIEWS)   Final Result   No acute bony abnormality               Active Problems:    Cellulitis  Resolved Problems:    * No resolved hospital problems.  *        ASSESSMENT/PLAN:  Cellulitis right hand  - admitted to med-surg.   - Treat with Rocephin,

## 2020-10-02 NOTE — PROGRESS NOTES
Patient admitted to room 203 from ER. Patient oriented to room, call light, bed rails, phone, lights and bathroom. Patient instructed about the schedule of the day including: vital sign frequency, lab draws, possible tests, frequency of MD and staff rounds, and prescribed diet. Bed alarm deferred patient low fall risk and refuses alarm. Bed locked, in lowest position, side rails up 2/4, call light within reach. Recliner Assessment:     Patient is able to demonstrated the ability to move from a reclining position to an upright position within the recliner. 4 Eyes Skin Assessment:     The patient is being assess for   Admission    I agree that 2 RN's have performed a thorough Head to Toe Skin Assessment on the patient. ALL assessment sites listed below have been assessed. Areas assessed by both nurses:   [x]   Head, Face, and Ears   [x]   Shoulders, Back, and Chest, Abdomen  [x]   Arms, Elbows, and Hands   [x]   Coccyx, Sacrum, and Ischium  [x]   Legs, Feet, and Heels        Redness and swelling to right hand. Scattered scabs and bruising. **SHARE this note so that the co-signing nurse is able to place an eSignature**     Co-signer eSignature: Electronically signed by Max Munguia RN on 10/2/20 at 6:19 AM EDT    Does the Patient have Skin Breakdown?   No          Sukumar Prevention initiated:  NA   Wound Care Orders initiated:  No      [facility] nurse consulted for Pressure Injury (Stage 3,4, Unstageable, DTI, NWPT, Complex wounds)and New or Established Ostomies:  NA      Primary Nurse eSignature: Electronically signed by Fredis Harrington RN on 10/2/20 at 1:15 AM EDT

## 2020-10-02 NOTE — CONSULTS
Department of Orthopedic Surgery  Physician Assistant   Consult Note        Reason for Consult:  Right hand pain  Requesting Physician: Trevin Dimas*  Date of Service: 10/2/2020 12:07 PM    CHIEF COMPLAINT:  As Above    History Obtained From:  patient    HISTORY OF PRESENT ILLNESS:                The patient is a 64 y.o. male who presents with above chief complaint. Pt states for the past few days he has noticed increased redness and pain in the right hand. Had a recent scrape to the back of his hand so feels that was the original source. Last night noted streaking up his arm and came to the ER for further eval.  No palm pain. No drainage. No fevers. No forearm pain. 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     CERVICAL DISCECTOMY  02/08/2017    Anterior discectomy, anterior foraminotomy C5/C6 and C6-7    CYSTOSCOPY  6/5/15    CYSTOSCOPY N/A 02/09/2018    DIAGNOSTIC CARDIAC CATH LAB PROCEDURE      ENDOSCOPY, COLON, DIAGNOSTIC      HERNIA REPAIR      KNEE ARTHROSCOPY      LITHOTRIPSY      LITHOTRIPSY Left 11/5/15    OTHER SURGICAL HISTORY  1/20/11    video arthroscopy of right shoulder with subcromial d ecompression and arthroscopic sarbjit    OTHER SURGICAL HISTORY  5/1/13    Excision Lesion Right Angle of Mouth    SHOULDER SURGERY      Left Shoulder X 3; right Shoulder X 5    SHOULDER SURGERY Left 05/30/2017    UPPER GASTROINTESTINAL ENDOSCOPY  07/26/2012    UPPER GASTROINTESTINAL ENDOSCOPY N/A 6/21/2019    [facility]         Medications Prior to Admission:   Prior to Admission medications    Medication Sig Start Date End Date Taking? Authorizing Provider   albuterol sulfate HFA (VENTOLIN HFA) 108 (90 Base) MCG/ACT inhaler Inhale 2 puffs into the lungs every 6 hours as needed for Wheezing   Yes Historical Provider, MD   risperiDONE (RISPERDAL) 0.5 MG tablet Take 0.5 mg by mouth nightly   Yes Historical Provider, MD   atorvastatin (LIPITOR) 80 MG tablet TAKE ONE TABLET BY MOUTH DAILY 2/18/20   Pérez Overall, MD   zolpidem (AMBIEN) 10 MG tablet Take by mouth nightly as needed for Sleep. Historical Provider, MD   ondansetron (ZOFRAN) 4 MG tablet Take 4 mg by mouth every 8 hours as needed for Nausea or Vomiting    Historical Provider, MD   levothyroxine (SYNTHROID) 75 MCG tablet  6/13/18   Historical Provider, MD   tamsulosin (FLOMAX) 0.4 MG capsule  6/21/18   Historical Provider, MD   tiZANidine (ZANAFLEX) 4 MG tablet 4 mg every 8 hours as needed  6/16/18   Historical Provider, MD   omeprazole (PRILOSEC) 20 MG capsule Take 20 mg by mouth daily    Historical Provider, MD   nitroGLYCERIN (NITROSTAT) 0.4 MG SL tablet Place 1 tablet under the tongue every 5 minutes as needed for Chest pain 5/17/16   Caspar Shadow, MD   Sucralfate (CARAFATE PO) Take 1 g by mouth 4 times daily     Historical Provider, MD   FLUoxetine (PROZAC) 20 MG capsule Take 20 mg by mouth daily. Historical Provider, MD   pregabalin (LYRICA) 50 MG capsule   Take 100 mg by mouth 3 times daily     Historical Provider, MD       Allergies:  Bactrim [sulfamethoxazole-trimethoprim] and Codeine    Social History:    Tobacco:  reports that he has been smoking cigarettes. He has a 22.50 pack-year smoking history.  He has never used smokeless tobacco.   Alcohol:

## 2020-10-03 VITALS
SYSTOLIC BLOOD PRESSURE: 131 MMHG | OXYGEN SATURATION: 98 % | HEART RATE: 65 BPM | HEIGHT: 69 IN | BODY MASS INDEX: 22.87 KG/M2 | WEIGHT: 154.4 LBS | TEMPERATURE: 97.9 F | DIASTOLIC BLOOD PRESSURE: 72 MMHG | RESPIRATION RATE: 16 BRPM

## 2020-10-03 LAB
A/G RATIO: 1.4 (ref 1.1–2.2)
ALBUMIN SERPL-MCNC: 3.4 G/DL (ref 3.4–5)
ALP BLD-CCNC: 116 U/L (ref 40–129)
ALT SERPL-CCNC: 8 U/L (ref 10–40)
ANION GAP SERPL CALCULATED.3IONS-SCNC: 10 MMOL/L (ref 3–16)
AST SERPL-CCNC: 9 U/L (ref 15–37)
BILIRUB SERPL-MCNC: <0.2 MG/DL (ref 0–1)
BUN BLDV-MCNC: 8 MG/DL (ref 7–20)
CALCIUM SERPL-MCNC: 8.9 MG/DL (ref 8.3–10.6)
CHLORIDE BLD-SCNC: 105 MMOL/L (ref 99–110)
CO2: 25 MMOL/L (ref 21–32)
CREAT SERPL-MCNC: 0.7 MG/DL (ref 0.9–1.3)
GFR AFRICAN AMERICAN: >60
GFR NON-AFRICAN AMERICAN: >60
GLOBULIN: 2.5 G/DL
GLUCOSE BLD-MCNC: 106 MG/DL (ref 70–99)
MAGNESIUM: 1.8 MG/DL (ref 1.8–2.4)
POTASSIUM REFLEX MAGNESIUM: 3.5 MMOL/L (ref 3.5–5.1)
REPORT: NORMAL
SODIUM BLD-SCNC: 140 MMOL/L (ref 136–145)
TOTAL PROTEIN: 5.9 G/DL (ref 6.4–8.2)

## 2020-10-03 PROCEDURE — 36415 COLL VENOUS BLD VENIPUNCTURE: CPT

## 2020-10-03 PROCEDURE — 94640 AIRWAY INHALATION TREATMENT: CPT

## 2020-10-03 PROCEDURE — 99238 HOSP IP/OBS DSCHRG MGMT 30/<: CPT | Performed by: INTERNAL MEDICINE

## 2020-10-03 PROCEDURE — 80053 COMPREHEN METABOLIC PANEL: CPT

## 2020-10-03 PROCEDURE — 83735 ASSAY OF MAGNESIUM: CPT

## 2020-10-03 PROCEDURE — 2580000003 HC RX 258: Performed by: INTERNAL MEDICINE

## 2020-10-03 PROCEDURE — 6370000000 HC RX 637 (ALT 250 FOR IP): Performed by: INTERNAL MEDICINE

## 2020-10-03 PROCEDURE — 6360000002 HC RX W HCPCS: Performed by: INTERNAL MEDICINE

## 2020-10-03 RX ORDER — DOXYCYCLINE HYCLATE 100 MG
100 TABLET ORAL 2 TIMES DAILY
Qty: 12 TABLET | Refills: 0 | Status: SHIPPED | OUTPATIENT
Start: 2020-10-03 | End: 2020-10-09

## 2020-10-03 RX ORDER — CEPHALEXIN 500 MG/1
500 CAPSULE ORAL 3 TIMES DAILY
Qty: 18 CAPSULE | Refills: 0 | Status: SHIPPED | OUTPATIENT
Start: 2020-10-03 | End: 2020-10-09

## 2020-10-03 RX ADMIN — TAMSULOSIN HYDROCHLORIDE 0.4 MG: 0.4 CAPSULE ORAL at 09:25

## 2020-10-03 RX ADMIN — Medication 2 PUFF: at 06:56

## 2020-10-03 RX ADMIN — ATORVASTATIN CALCIUM 80 MG: 40 TABLET, FILM COATED ORAL at 09:25

## 2020-10-03 RX ADMIN — PREGABALIN 100 MG: 100 CAPSULE ORAL at 09:25

## 2020-10-03 RX ADMIN — VANCOMYCIN HYDROCHLORIDE 1250 MG: 10 INJECTION, POWDER, LYOPHILIZED, FOR SOLUTION INTRAVENOUS at 06:24

## 2020-10-03 RX ADMIN — FLUOXETINE 20 MG: 20 CAPSULE ORAL at 09:25

## 2020-10-03 NOTE — CONSULTS
SHOULDER SURGERY Left 05/30/2017    UPPER GASTROINTESTINAL ENDOSCOPY  07/26/2012    UPPER GASTROINTESTINAL ENDOSCOPY N/A 6/21/2019    EGD BIOPSY performed by Anayeli Dietz DO at [facility]         Medications Prior to Admission:   Prior to Admission medications    Medication Sig Start Date End Date Taking? Authorizing Provider   cephALEXin (KEFLEX) 500 MG capsule Take 1 capsule by mouth 3 times daily for 6 days 10/3/20 10/9/20 Yes Severiano Kolb MD   doxycycline hyclate (VIBRA-TABS) 100 MG tablet Take 1 tablet by mouth 2 times daily for 6 days 10/3/20 10/9/20 Yes Severiano Kolb MD   albuterol sulfate HFA (VENTOLIN HFA) 108 (90 Base) MCG/ACT inhaler Inhale 2 puffs into the lungs every 6 hours as needed for Wheezing   Yes Historical Provider, MD   risperiDONE (RISPERDAL) 0.5 MG tablet Take 0.5 mg by mouth nightly   Yes Historical Provider, MD   atorvastatin (LIPITOR) 80 MG tablet TAKE ONE TABLET BY MOUTH DAILY 2/18/20   Funmi Borja MD   zolpidem (AMBIEN) 10 MG tablet Take by mouth nightly as needed for Sleep. Historical Provider, MD   levothyroxine (SYNTHROID) 75 MCG tablet  6/13/18   Historical Provider, MD   tamsulosin (FLOMAX) 0.4 MG capsule  6/21/18   Historical Provider, MD   tiZANidine (ZANAFLEX) 4 MG tablet 4 mg every 8 hours as needed  6/16/18   Historical Provider, MD   omeprazole (PRILOSEC) 20 MG capsule Take 20 mg by mouth daily    Historical Provider, MD   nitroGLYCERIN (NITROSTAT) 0.4 MG SL tablet Place 1 tablet under the tongue every 5 minutes as needed for Chest pain 5/17/16   Becki Carlos MD   Sucralfate (CARAFATE PO) Take 1 g by mouth 4 times daily     Historical Provider, MD   FLUoxetine (PROZAC) 20 MG capsule Take 20 mg by mouth daily.     Historical Provider, MD   pregabalin (LYRICA) 50 MG capsule   Take 100 mg by mouth 3 times daily     Historical Provider, MD       Allergies:  Bactrim [sulfamethoxazole-trimethoprim] and Codeine    Social in nature. XR RADIUS ULNA RIGHT (2 VIEWS)   Final Result   Negative         XR HAND RIGHT (MIN 3 VIEWS)   Final Result   No acute bony abnormality                IMPRESSION/RECOMMENDATIONS:    Assessment: Right hand/rist cellulitis    Plan:  1) I believe the patient is responding well to IV abx and indeed may d/c to home on oral abx. We discussed concerning signs that would prompt return to the ED. May follow up prn. 2) All questions answered, patient and spouse exhibit good understanding    Thank you for the opportunity to consult on this patient.     Hodan Greer 134

## 2020-10-03 NOTE — PROGRESS NOTES
AM assessment complete. Gave am meds due see mar. Refused asa, lovenox, nicotine patch. A/O x 4. Denies any needs/pain. D/C in place. SO at bedsideCall light within reach.

## 2020-10-03 NOTE — PROGRESS NOTES
Prescriptions: cephalexin, doxycycline and discharge instructions given. Pt verbalized understanding denies any questions/ needs at this time.  Transport called to transport pt to vehicle for discharge home

## 2020-10-03 NOTE — PROGRESS NOTES
Admit: 10/1/2020    Name:  Malina Oneil  Room:  0203/0203-01  MRN:    [id number]    Critical Care Daily Progress Note for 10/3/2020     Interval History:     Scheduled Meds:   budesonide-formoterol  2 puff Inhalation BID    nicotine  1 patch Transdermal Daily    aspirin EC  81 mg Oral Daily    atorvastatin  80 mg Oral Daily    FLUoxetine  20 mg Oral Daily    levothyroxine  75 mcg Oral Daily    pantoprazole  40 mg Oral QAM AC    pregabalin  100 mg Oral TID    tamsulosin  0.4 mg Oral Daily    sodium chloride flush  10 mL Intravenous 2 times per day    enoxaparin  40 mg Subcutaneous Daily    cefTRIAXone (ROCEPHIN) IV  1 g Intravenous Q24H    vancomycin  1,250 mg Intravenous Q12H       Continuous Infusions:      PRN Meds:  albuterol, oxyCODONE **OR** oxyCODONE, zolpidem, tiZANidine, sodium chloride flush, acetaminophen **OR** acetaminophen, polyethylene glycol, promethazine **OR** ondansetron                  Objective:     Temp  Av.6 °F (36.4 °C)  Min: 96.7 °F (35.9 °C)  Max: 98.1 °F (36.7 °C)  Pulse  Av.6  Min: 62  Max: 76  BP  Min: 100/66  Max: 131/72  SpO2  Av.7 %  Min: 97 %  Max: 99 %  Patient Vitals for the past 4 hrs:   BP Temp Temp src Pulse Resp SpO2   10/03/20 0719 131/72 97.9 °F (36.6 °C) Oral 65 16 98 %   10/03/20 0659 -- -- -- -- 18 97 %   10/03/20 0440 131/84 97.8 °F (36.6 °C) Oral 62 16 98 %         Intake/Output Summary (Last 24 hours) at 10/3/2020 0741  Last data filed at 10/2/2020 1814  Gross per 24 hour   Intake 462 ml   Output --   Net 462 ml       Physical Exam:  General:  Awake, alert and oriented. Appears to be not in any distress  Mucous Membranes:  Pink , anicteric  Neck: No JVD, no carotid bruit, no thyromegaly  Chest:  Clear to auscultation bilaterally, no added sounds  Cardiovascular:  RRR S1S2 heard, no murmurs or gallops  Abdomen:  Soft, undistended, non tender, no organomegaly, BS present  Extremities: No edema or cyanosis.  Distal pulses well felt  Neurological : no focal deficits    Lab Data:  CBC:   Recent Labs     10/01/20  1445   WBC 12.3*   RBC 3.95*   HGB 11.8*   HCT 35.5*   MCV 89.7   RDW 15.2        BMP:   Recent Labs     10/01/20  1445 10/03/20  0453   * 140   K 3.7 3.5    105   CO2 23 25   BUN 8 8   CREATININE 0.7* 0.7*     BNP: No results for input(s): BNP in the last 72 hours. PT/INR: No results for input(s): PROTIME, INR in the last 72 hours. APTT:No results for input(s): APTT in the last 72 hours. CARDIAC ENZYMES: No results for input(s): CKMB, CKMBINDEX, TROPONINI in the last 72 hours. Invalid input(s): CKTOTAL;3  FASTING LIPID PANEL:  Lab Results   Component Value Date    CHOL 119 05/21/2019    HDL 29 05/21/2019    HDL 32 04/13/2011    TRIG 68 05/21/2019     LIVER PROFILE:   Recent Labs     10/01/20  1445 10/03/20  0453   AST 10* 9*   ALT 9* 8*   BILITOT 1.0 <0.2   ALKPHOS 121 116         No evidence of a drainable abscess.  No evidence of acute osteomyelitis. Mild subcutaneous edema at the dorsum of the hand which could represent   cellulitis in the appropriate clinical setting. Lobular cystic structure at the radial aspect of the wrist without adjacent   inflammatory changes most likely represents a ganglion cyst.     Degenerative changes throughout the hand, most notably at the 2nd and 3rd MCP   joints.  Mild bone marrow edema about the 2nd-5th MCP joints is likely   degenerative in nature.      Assessment & Plan:     Patient Active Problem List    Diagnosis Date Noted    Cellulitis of right hand     Cellulitis 10/01/2020    Pneumonia due to infectious organism     Syncope 12/26/2018    Syncope and collapse 12/26/2018    Closed head injury     Acute bronchitis     Essential hypertension 05/17/2018    Neoplasm of uncertain behavior of skin     Pulmonary emphysema ([facility] [address] 01/25/2018    Community acquired pneumonia     Acute respiratory failure with hypoxia ([facility] [address]     Multifocal pneumonia     Chronic obstructive pulmonary disease ([facility] [address]     Severe sepsis ([facility] [address] 01/04/2018    Complete tear of left rotator cuff 05/25/2017    Impingement syndrome, shoulder 09/01/2016    MALT lymphoma ([facility] [address] 12/08/2015    Lumbar stenosis 05/27/2015    Lumbar facet arthropathy 05/27/2015    CAD (coronary artery disease) 03/04/2015    Peripheral edema 03/04/2015    Displacement of lumbar intervertebral disc without myelopathy 03/03/2015    Degeneration of lumbar or lumbosacral intervertebral disc 03/03/2015    Abnormal stress test 02/16/2015    Hyperlipidemia 02/16/2015    Chest pain 02/16/2015    Pulmonary nodules 05/13/2014    Dyspnea 05/13/2014    Tobacco abuse 05/13/2014     Cellulitis right hand  - admitted to med-surg.   - Treat with Rocephin, Vanc, D#2  - pain control: Oxycodone prn  Ortho consult   MRI- no abscess   H/o MRSA skin infection in the past  D/c on keflex and doxy     Leukocytosis   - related to above. Antibiotics as above, monitor for improvement.      COPD  - stable. No AE. Continue Symbicort. - albuterol prn.      CAD  - on aspirin, Atorvastatin.      Hypertension  - BP stable.  Not on home medications anymore for BP.      Hyperlipidemia  - on Atorvastatin.      Hypothyroidism  - home dose of synthroid 75 mcg      GERD  - on PPI     Chronic pain  - uses Marijuana at home  - continue Lyrica, Zanaflex     BPH  - on Flomax     Gastric MALT Lymphoma  - F/w Dr. Danny Wolfe  - not currently undergoing treatment for this.      Tobacco Dependence  - Recommended cessation  - nicotine patch ordered      DVT Prophylaxis: Lovenox  Diet: DIET GENERAL;  Code Status: Full Code      Nell Xavier

## 2020-10-05 LAB
BLOOD CULTURE, ROUTINE: ABNORMAL
BLOOD CULTURE, ROUTINE: ABNORMAL
CULTURE, BLOOD 2: NORMAL
ORGANISM: ABNORMAL
ORGANISM: ABNORMAL

## 2020-10-05 NOTE — DISCHARGE SUMMARY
Name:  Prashant Carrero  Room:  0203/0203-01  MRN:    [id number]    Discharge Summary      This discharge summary is in conjunction with a complete physical exam done on the day of discharge. Discharging Physician: Dr. Romero Link: 10/1/2020  Discharge:  10/3/2020    HPI taken from admission H&P:    The patient is a 64 y.o. male with hypertension, hyperlipidemia, GERD, COPD, CAD, chronic back pain, lymphoma, and arthritis who presents to [facility] with c/o swelling, redness, and pain to right hand. He cut his hand about 5 days ago. Then 2 days ago, his right hand started swelling and developing erythema.  symptoms continued to worsen. He reports having fever and chills yesterday. He has nausea and vomiting occasionally due to lymphoma. He is not undergoing treatment for this. He uses Marijuana for chronic pain. Labs with leukocytosis. X-ray of right hand, right radius and ulna negative. Admitted to med-surg. Diagnoses this Admission and Hospital Course     Cellulitis right hand  - Treated with Rocephin, Vanc. Seen by ortho- MRI did not show an abscess. He improved. Dc on keflex and doxy to complete course      Leukocytosis   - related to above. Antibiotics as above      COPD  - stable. No AE. Continue Symbicort. - albuterol prn.      CAD  - on aspirin, Atorvastatin.      Hypertension  - BP stable.  Not on home medications anymore for BP.      Hyperlipidemia  - on Atorvastatin.      Hypothyroidism  - home dose of synthroid 75 mcg      GERD  - on PPI     Chronic pain  - uses Marijuana at home  - continue Lyrica, Zanaflex     BPH  - on Flomax     Gastric MALT Lymphoma  - F/w Dr. Inez Rolle  - not currently undergoing treatment for this.      Tobacco Dependence  - Recommended cessation  - nicotine patch ordered        Procedures (Please Review Full Report for Details)  None     Consults    Orthopedic surgery       Physical Exam at Discharge:    /72   Pulse 65   Temp 97.9 °F (36.6 °C) (Oral)   Resp 16   Ht 5' 8.5\" (1.74 m)   Wt 154 lb 6.4 oz (70 kg)   SpO2 98%   BMI 23.13 kg/m²   General:  Awake, alert and oriented. Appears to be not in any distress  Mucous Membranes:  Pink , anicteric  Neck: No JVD, no carotid bruit, no thyromegaly  Chest:  Clear to auscultation bilaterally, no added sounds  Cardiovascular:  RRR S1S2 heard, no murmurs or gallops  Abdomen:  Soft, undistended, non tender, no organomegaly, BS present  Extremities: No edema or cyanosis. Distal pulses well felt  Neurological : no focal deficits    BMP:   Recent Labs     10/03/20  0453      K 3.5      CO2 25   BUN 8   CREATININE 0.7*     LIVER PROFILE:   Recent Labs     10/03/20  0453   AST 9*   ALT 8*   BILITOT <0.2   ALKPHOS 116         CULTURES  Blood: Coag neg staph in 1 out of 2 bottles - suspect contaminant     RADIOLOGY  MRI HAND RIGHT W WO CONTRAST   Final Result   No evidence of a drainable abscess. No evidence of acute osteomyelitis. Mild subcutaneous edema at the dorsum of the hand which could represent   cellulitis in the appropriate clinical setting. Lobular cystic structure at the radial aspect of the wrist without adjacent   inflammatory changes most likely represents a ganglion cyst.      Degenerative changes throughout the hand, most notably at the 2nd and 3rd MCP   joints. Mild bone marrow edema about the 2nd-5th MCP joints is likely   degenerative in nature. XR RADIUS ULNA RIGHT (2 VIEWS)   Final Result   Negative         XR HAND RIGHT (MIN 3 VIEWS)   Final Result   No acute bony abnormality               Discharge Medications     Medication List      START taking these medications    cephALEXin 500 MG capsule  Commonly known as:  KEFLEX  Take 1 capsule by mouth 3 times daily for 6 days  Notes to patient:  . Cephalexin (Keflex®)  Use: treat infections. Side effects: loose stools, upset stomach or vaginal yeast infections in females.      doxycycline hyclate 100 MG tablet  Commonly known as:  VIBRA-TABS  Take 1 tablet by mouth 2 times daily for 6 days  Notes to patient:  . Doxycycline (Vibramycin, Doxy, Vibra Tabs)  Use: Treat infection  Side effects: nausea, vomiting, diarrhea & rash        CONTINUE taking these medications    atorvastatin 80 MG tablet  Commonly known as:  LIPITOR  TAKE ONE TABLET BY MOUTH DAILY     CARAFATE PO     FLUoxetine 20 MG capsule  Commonly known as:  PROZAC     levothyroxine 75 MCG tablet  Commonly known as:  SYNTHROID     nitroGLYCERIN 0.4 MG SL tablet  Commonly known as:  Nitrostat  Place 1 tablet under the tongue every 5 minutes as needed for Chest pain     omeprazole 20 MG delayed release capsule  Commonly known as:  PRILOSEC     pregabalin 50 MG capsule  Commonly known as:  LYRICA     risperiDONE 0.5 MG tablet  Commonly known as:  RISPERDAL     tamsulosin 0.4 MG capsule  Commonly known as:  FLOMAX     tiZANidine 4 MG tablet  Commonly known as:  ZANAFLEX     Ventolin  (90 Base) MCG/ACT inhaler  Generic drug:  albuterol sulfate HFA     zolpidem 10 MG tablet  Commonly known as:  AMBIEN        STOP taking these medications    ondansetron 4 MG tablet  Commonly known as:  ZOFRAN           Where to Get Your Medications      These medications were sent to [facility] [address] [facility] [address] [facility] - P [phone] - F [phone]  [address] [facility] [address]    Phone:  [phone]   · cephALEXin 500 MG capsule  · doxycycline hyclate 100 MG tablet           Discharged in stable condition to home     Follow Up: Follow up with PCP in 1 week     JENNIFER Peralta.

## 2020-11-03 PROBLEM — R55 SYNCOPE: Status: RESOLVED | Noted: 2018-12-26 | Resolved: 2020-11-03

## 2020-11-03 PROBLEM — J18.9 PNEUMONIA DUE TO INFECTIOUS ORGANISM: Status: RESOLVED | Noted: 2020-11-03 | Resolved: 2020-11-03

## 2021-02-19 ENCOUNTER — TELEPHONE (OUTPATIENT)
Dept: CARDIOLOGY CLINIC | Age: 61
End: 2021-02-19

## 2021-02-19 RX ORDER — ATORVASTATIN CALCIUM 80 MG/1
TABLET, FILM COATED ORAL
Qty: 90 TABLET | Refills: 3 | Status: SHIPPED | OUTPATIENT
Start: 2021-02-19 | End: 2022-05-27

## 2021-02-19 NOTE — TELEPHONE ENCOUNTER
Refill request for atorvastatin (LIPITOR) 80 MG tablet   [facility] - [place], [place] - [address] [facility] [address] - P [phone] - F [phone]

## 2021-04-01 DIAGNOSIS — Z79.899 MEDICATION MANAGEMENT: Primary | ICD-10-CM

## 2021-04-01 RX ORDER — ATORVASTATIN CALCIUM 80 MG/1
TABLET, FILM COATED ORAL
Qty: 90 TABLET | Refills: 2 | Status: SHIPPED | OUTPATIENT
Start: 2021-04-01 | End: 2022-03-04 | Stop reason: SDUPTHER

## 2021-04-01 RX ORDER — POTASSIUM CHLORIDE 750 MG/1
TABLET, FILM COATED, EXTENDED RELEASE ORAL
Qty: 90 TABLET | Refills: 2 | Status: SHIPPED | OUTPATIENT
Start: 2021-04-01 | End: 2021-08-26

## 2021-04-01 NOTE — TELEPHONE ENCOUNTER
Is this pt okay to get a refill on potassium chloride 10 MEQ 2 tabs po daily. Pt's last OV 5/17/2019. SRJ please advise.       TY

## 2021-04-01 NOTE — TELEPHONE ENCOUNTER
Let patient know medication was refilled, would encourage him to go ahead and get the labs as per previous plans. Thank you. SRJ pt would like to get a refill on atorvastatin 8/0  mg daily. However, pt did not get his  lipids/ lft's as asked 2/19/20. Pt's last OV 5/17/19 SRJ. SRj would you like pt to schedule an OV? SRj please advise. Pt can be reached at [phone].     TY

## 2021-04-01 NOTE — TELEPHONE ENCOUNTER
Spoke to pt. Let him know that his atorvastatin has been refilled per SRJ. Pt said he will get is lipids and lft's drawn this Monday when he out at [facility] for some other testing.  Pt V/U.

## 2021-04-05 ENCOUNTER — HOSPITAL ENCOUNTER (OUTPATIENT)
Dept: NEUROLOGY | Age: 61
Discharge: HOME | End: 2021-04-05
Payer: MEDICARE

## 2021-04-05 ENCOUNTER — HOSPITAL ENCOUNTER (OUTPATIENT)
Age: 61
Discharge: HOME | End: 2021-04-05
Payer: MEDICARE

## 2021-04-05 DIAGNOSIS — Z79.899 MEDICATION MANAGEMENT: ICD-10-CM

## 2021-04-05 LAB
ALBUMIN SERPL-MCNC: 4.1 G/DL (ref 3.4–5)
ALP BLD-CCNC: 111 U/L (ref 40–129)
ALT SERPL-CCNC: 12 U/L (ref 10–40)
AST SERPL-CCNC: 14 U/L (ref 15–37)
BILIRUB SERPL-MCNC: 0.4 MG/DL (ref 0–1)
BILIRUBIN DIRECT: <0.2 MG/DL (ref 0–0.3)
BILIRUBIN, INDIRECT: ABNORMAL MG/DL (ref 0–1)
CHOLESTEROL, FASTING: 107 MG/DL (ref 0–199)
HDLC SERPL-MCNC: 34 MG/DL (ref 40–60)
LDL CHOLESTEROL CALCULATED: 54 MG/DL
TOTAL PROTEIN: 6.8 G/DL (ref 6.4–8.2)
TRIGLYCERIDE, FASTING: 96 MG/DL (ref 0–150)
VLDLC SERPL CALC-MCNC: 19 MG/DL

## 2021-04-05 PROCEDURE — 36415 COLL VENOUS BLD VENIPUNCTURE: CPT

## 2021-04-05 PROCEDURE — 95910 NRV CNDJ TEST 7-8 STUDIES: CPT

## 2021-04-05 PROCEDURE — 95886 MUSC TEST DONE W/N TEST COMP: CPT

## 2021-04-05 PROCEDURE — 80061 LIPID PANEL: CPT

## 2021-04-05 PROCEDURE — 80076 HEPATIC FUNCTION PANEL: CPT

## 2021-04-05 NOTE — PROCEDURES
Electrodiagnostic Report  [address] [place] [address]  Physical Medicine & Rehabilitation    04/05/21    Malachi Deal  63 y.o.  1960  [id number]  Referring Provider: Paddy Nava MD    Clinical Problem:  63 y.o   With history of neck pain, patient with migraine headaches and numbness in arms. PMH: + thyroid disease, + stomach cancer + bilateral shoulder surgery. Cervical surgery many years ago. PE: reflexes trace, + thumb opposition weakness    EMG SUMMARY TABLE RIGHT/LEFT UPPER     Spontaneous     MUAP   Recruitment    Insertional Activity Fibrillation Potential Positive Sharp Waves   Fasiculation High Frequency Potentials   Amp Duration PPP Pattern   Deltoid C5.6 Ax normal none none none none normal normal normal normal   Biceps C5,6 musc cut normal none none none none normal normal normal normal   Triceps C6,7,8 Radial normal none none none none normal normal normal normal   Pronator Teres C6,7 Median normal none none none none normal normal normal normal   Brachio Rad C5,6 Radial normal none none none none normal normal normal normal   Ext Ind Prop C7,8 Radial normal none none none none normal normal normal normal   Oppones Poll C8-T1 Median normal none none none none normal normal normal normal   1st Dorsal Int C8-T1 Ulnar normal none none none none normal normal normal normal   Cerv Paraspinals C2-T1 PPR       normal none none none none normal normal normal normal     Nerve Conduction Studies     Nerve Sensory Distal Latency (msec) Sensory Distal Latency (msec) Amp uv Amp uv Motor Distal Latency (msec) Motor Distal Latency (msec) Amp kv Amp kv Motor NCV (m/sec) Motor NCV (m/sec)    Right Left Right Left Right Left Right Left Right Left   Median 7.5 (n<3.6) 6.6 (n<3.6) 10  12  7.2 (n<4.3) 7.5 (n<4.3) 1.2  3.3  61 (n>50) 60 (n>50)   Ulnar 3.7 (n<3.7) 3.7 (n<3.7) 26  30  4.3 (n<4.2) 4.3 (n<4.2) 10.8  9.8 13.3  11.3 64 b.e(n>50)   60 a. e(>45) 61 b.e(n>50)  60  a. e(>45)   Radial (n<3.5) (n<3.5)             Summary of EMG and Nerve Conduction Findings: The above EMG needle exam was within normal limits. Nerve conduction studies demonstrate prolongation of both median sensory and motor distal latencies. Ulnar studies demonstrate borderline prolongation of motor and sensory distal latencies. Conduction velocities were normal .    Overall Impression: Residual slowing of both median sensory and motor distal latencies. Post carpal tunnel release. No evidence of an acute radiculopathy or other entrapment neuropathy. Thank you. Electronically signed by:  Carin Stratton DO,4/5/2021,9:03 AM

## 2021-04-06 ENCOUNTER — TELEPHONE (OUTPATIENT)
Dept: CARDIOLOGY CLINIC | Age: 61
End: 2021-04-06

## 2021-04-06 NOTE — TELEPHONE ENCOUNTER
----- Message from Sheeba Oropeza MD sent at 4/5/2021  4:06 PM EDT -----  Let patient know their lipid test is stable, continue current meds, no new orders or changes at this time. Thanks.

## 2021-04-06 NOTE — TELEPHONE ENCOUNTER
Created telephone encounter. Spoke with Tk Sterling relayed message per [facility] regarding labs. Pt verbalized understanding.

## 2021-04-15 ENCOUNTER — OFFICE VISIT (OUTPATIENT)
Dept: CARDIOLOGY CLINIC | Age: 61
End: 2021-04-15
Payer: MEDICARE

## 2021-04-15 VITALS
SYSTOLIC BLOOD PRESSURE: 112 MMHG | HEART RATE: 72 BPM | WEIGHT: 177 LBS | BODY MASS INDEX: 26.22 KG/M2 | HEIGHT: 69 IN | DIASTOLIC BLOOD PRESSURE: 78 MMHG | OXYGEN SATURATION: 97 %

## 2021-04-15 DIAGNOSIS — I25.10 CORONARY ARTERY DISEASE INVOLVING NATIVE HEART WITHOUT ANGINA PECTORIS, UNSPECIFIED VESSEL OR LESION TYPE: ICD-10-CM

## 2021-04-15 DIAGNOSIS — R94.39 ABNORMAL STRESS TEST: ICD-10-CM

## 2021-04-15 DIAGNOSIS — R06.02 SOB (SHORTNESS OF BREATH): ICD-10-CM

## 2021-04-15 DIAGNOSIS — R53.83 OTHER FATIGUE: ICD-10-CM

## 2021-04-15 DIAGNOSIS — I20.0 UNSTABLE ANGINA PECTORIS (HCC): ICD-10-CM

## 2021-04-15 DIAGNOSIS — E78.2 MIXED HYPERLIPIDEMIA: Primary | ICD-10-CM

## 2021-04-15 PROCEDURE — 3017F COLORECTAL CA SCREEN DOC REV: CPT | Performed by: INTERNAL MEDICINE

## 2021-04-15 PROCEDURE — G8419 CALC BMI OUT NRM PARAM NOF/U: HCPCS | Performed by: INTERNAL MEDICINE

## 2021-04-15 PROCEDURE — G8427 DOCREV CUR MEDS BY ELIG CLIN: HCPCS | Performed by: INTERNAL MEDICINE

## 2021-04-15 PROCEDURE — 93000 ELECTROCARDIOGRAM COMPLETE: CPT | Performed by: INTERNAL MEDICINE

## 2021-04-15 PROCEDURE — 99213 OFFICE O/P EST LOW 20 MIN: CPT | Performed by: INTERNAL MEDICINE

## 2021-04-15 PROCEDURE — 4004F PT TOBACCO SCREEN RCVD TLK: CPT | Performed by: INTERNAL MEDICINE

## 2021-04-15 NOTE — PROGRESS NOTES
[facility]   CARDIAC EVALUATION NOTE  [phone]      PCP:  Archie Samuel MD    Reason for Consultation/Chief Complaint: follow up for CAD and SOB     Subjective   History of Present Illness:  Selina De Leon is a 63 y.o. patient with a history of CAD, HTN, HLD, COPD, and thyroid disease who presents for follow up. She initially saw Dr. Jhoan Gary on 02/16/15 for an abnormal stress test. Stress test from 02/05/15 showed small sized reversible perfusion defect in the lateral wall of the left ventricle which likely represents a focus of stress induced myocardial ischemia. He reported his main complaint was SOB. Reports to having active chest tightness across his chest. Chest pain ongoing for 2 months. States this chest pain has not been worsening. Had been on antihypertensives and have not helped to improve his chest pain. States he had active pain that radiates into his L shoulder, however this is not new for him. His cardiac cath on 02/23/15 showed 40% mid LAD. He underwent ACDF C5/C6 and C6-7 with allograft and plate and screws on 0/1/07. He had a syncopal episode after coughing and was admitted 12/26-12/28/18. He presented ED in April 2018 due to a syncopal episode. He was treated for dehydration. At his last visit he reported a syncopal episode in 2018 which started after coughing and then again in 04/2019. His carotid doppler from 12/27/18 showed <50% bilaterally. At his last visit he reported having chest pain and SOB. He underwent a normal stress test on 5/31/19. An echocardiogram from 5/31/19 showed an EF 55-60%. He wore a 2 week REY from 5/17-5/30/19 which was normal. He states he has been feeling well overall. He states he has some fatigue and SOB that is unchanged. He states his swelling has been stable. Patient currently denies any weight gain, edema, palpitations, chest pain,  dizziness, and syncope.     Past Medical History:   has a past medical history of Arthritis, CAD (coronary artery disease), Chronic back pain, COPD (chronic obstructive pulmonary disease) ([address] GERD (gastroesophageal reflux disease), Hyperlipidemia, Hypertension, Kidney stone, Lumbar herniated disc, Lymphoma of gastrointestinal tract ([address] Pneumonia, Thyroid disease, and Vitamin B deficiency. Surgical History:   has a past surgical history that includes shoulder surgery; Knee arthroscopy; hernia repair; other surgical history (1/20/11); Carpal tunnel release; Upper gastrointestinal endoscopy (07/26/2012); Endoscopy, colon, diagnostic; other surgical history (5/1/13); Cystoscopy (6/5/15); Lithotripsy; Lithotripsy (Left, 11/5/15); Diagnostic Cardiac Cath Lab Procedure; Cardiac catheterization (2/20/2015); Cervical discectomy (02/08/2017); shoulder surgery (Left, 05/30/2017); Cystoscopy (N/A, 02/09/2018); and Upper gastrointestinal endoscopy (N/A, 6/21/2019). Social History:   reports that he has been smoking cigarettes. He has a 22.50 pack-year smoking history. He has never used smokeless tobacco. He reports current drug use. Drug: Marijuana. He reports that he does not drink alcohol. Family History:  family history includes Cancer in his mother and sister; Heart Disease in his father. Home Medications:  Were reviewed and are listed in nursing record and/or below  Prior to Admission medications    Medication Sig Start Date End Date Taking?  Authorizing Provider   potassium chloride (KLOR-CON) 10 MEQ extended release tablet TAKE TWO TABLETS BY MOUTH DAILY 4/1/21  Yes Alba Vance MD   atorvastatin (LIPITOR) 80 MG tablet TAKE ONE TABLET BY MOUTH DAILY 4/1/21  Yes Alfonso Ndiaye MD   atorvastatin (LIPITOR) 80 MG tablet TAKE ONE TABLET BY MOUTH DAILY 2/19/21  Yes Alba Vance MD   albuterol sulfate HFA (VENTOLIN HFA) 108 (90 Base) MCG/ACT inhaler Inhale 2 puffs into the lungs every 6 hours as needed for Wheezing   Yes Historical Provider, MD   risperiDONE (RISPERDAL) 0.5 MG tablet Take 0.5 mg by mouth nightly   Yes Historical Provider, MD   zolpidem (AMBIEN) 10 MG tablet Take by mouth nightly as needed for Sleep. Yes Historical Provider, MD   levothyroxine (SYNTHROID) 75 MCG tablet  6/13/18  Yes Historical Provider, MD   tamsulosin (FLOMAX) 0.4 MG capsule  6/21/18  Yes Historical Provider, MD   tiZANidine (ZANAFLEX) 4 MG tablet 4 mg every 8 hours as needed  6/16/18  Yes Historical Provider, MD   omeprazole (PRILOSEC) 20 MG capsule Take 20 mg by mouth daily   Yes Historical Provider, MD   nitroGLYCERIN (NITROSTAT) 0.4 MG SL tablet Place 1 tablet under the tongue every 5 minutes as needed for Chest pain 5/17/16  Yes Kaila Johnson MD   Sucralfate (CARAFATE PO) Take 1 g by mouth 4 times daily    Yes Historical Provider, MD   FLUoxetine (PROZAC) 20 MG capsule Take 40 mg by mouth daily    Yes Historical Provider, MD   pregabalin (LYRICA) 50 MG capsule Take 150 mg by mouth 3 times daily. Yes Historical Provider, MD          Allergies:  Bactrim [sulfamethoxazole-trimethoprim] and Codeine     Review of Systems:   A 14 point review of symptoms completed. Pertinent positives identified in the HPI, all other review of symptoms negative as below.       Objective   PHYSICAL EXAM:    Vitals:    04/15/21 1304   BP: 112/78   Pulse: 72   SpO2: 97%    Weight: 177 lb (80.3 kg)         General Appearance:  Alert, cooperative, no distress, appears stated age   Head:  Normocephalic, without obvious abnormality, atraumatic   Eyes:  PERRL, conjunctiva/corneas clear   Nose: Nares normal, no drainage or sinus tenderness   Throat: Lips, mucosa, and tongue normal   Neck: Supple, symmetrical, trachea midline, no adenopathy, thyroid: not enlarged, symmetric, no tenderness/mass/nodules, no carotid bruit or JVD   Lungs:   Clear to auscultation bilaterally, respirations unlabored   Chest Wall:  No deformity or tenderness   Heart:  Regular rate and rhythm, S1, S2 normal, 2/6 sm   Abdomen:   Soft, non-tender, bowel sounds active all four quadrants,  no masses, no organomegaly   Extremities: Extremities normal, atraumatic, no cyanosis or edema   Pulses: 2+ and symmetric   Skin: Skin color, texture, turgor normal, no rashes or lesions   Pysch: Normal mood and affect   Neurologic: Normal gross motor and sensory exam.         Labs   CBC:   Lab Results   Component Value Date    WBC 12.3 10/01/2020    RBC 3.95 10/01/2020    RBC 3.92 2017    HGB 11.8 10/01/2020    HCT 35.5 10/01/2020    MCV 89.7 10/01/2020    RDW 15.2 10/01/2020     10/01/2020     CMP:  Lab Results   Component Value Date     10/03/2020    K 3.5 10/03/2020     10/03/2020    CO2 25 10/03/2020    BUN 8 10/03/2020    CREATININE 0.7 10/03/2020    GFRAA >60 10/03/2020    GFRAA >60 2012    AGRATIO 1.4 10/03/2020    LABGLOM >60 10/03/2020    GLUCOSE 106 10/03/2020    GLUCOSE 119 2017    PROT 6.8 2021    PROT 6.2 2017    CALCIUM 8.9 10/03/2020    BILITOT 0.4 2021    ALKPHOS 111 2021    AST 14 2021    ALT 12 2021     PT/INR:  No results found for: PTINR  HgBA1c:No results found for: LABA1C  Lab Results   Component Value Date    CKTOTAL 315 (H) 2019    TROPONINI <0.01 2019         Cardiac Data     Last EK19  SR HR 64      Echocardiogram 2019  Conclusions   Summary   Normal left ventricular systolic function with an estimated ejection   fraction of 55-60%. No regional wall motion abnormalities are seen. There is mild concentric left ventricular hypertrophy. Normal left ventricular diastolic filling pressure. Trivial to mild tricuspid regurgitation. Normal systolic pulmonary artery pressure (SPAP) estimated at 38 mmHg (RA   pressure 3 mmHg). Echo: 18   Summary   Normal left ventricular systolic function with an estimated ejection   fraction of 55-60%. No regional wall motion abnormalities are seen. Normal left ventricular diastolic filling pressure.    Normal systolic pulmonary artery pressure (SPAP) estimated at 35 mmHg (RA   pressure 8 mmHg). Stress Test: 02/05/15  Impression: 1. Small sized reversible perfusion defect in the lateral wall of the left ventricle which likely represents a focus of stress induced myocardial ischemia. Please correlate with ECG findings. 2. LVEF 65%     Stress test 5/31/2019  Conclusions    Summary  Normal LV function. There is normal isotope uptake at stress and rest. There is no evidence of  myocardial ischemia or scar. Cath: 02/20/15  CORONARY ANGIOGRAPHY:  1. Left main was a large-caliber vessel that bifurcated. It was normal.     2.  Left circumflex was a large-caliber vessel. It was dominant. It  continued in the posterior AV groove as a medium-caliber vessel and then in  the posterior intraventricular groove as a medium-caliber left posterior  descending artery. It gave off a larger 1st obtuse marginal branch that had  luminal irregularities. In the posterior AV groove it gave off 2 small left  posterolateral branches and a small- to medium-caliber left posterior  descending artery. 3.  The LAD was a medium-caliber vessel that reached the apex and wrapped  around it. Proximal to mid LAD had diffuse disease of 40%. It gave off 2  diagonal branches, first was small and second was small to medium and had  luminal irregularities. 4.  Right coronary artery was small and nondominant and normal.        IMPRESSION:    1. A 40% proximal to mid left anterior descending stenosis. 2.  Moderately elevated left ventricular end-diastolic pressure, 20 mmHg. 3.  Hyperdynamic left ventricular systolic function with ejection fraction of  65%. Studies:   REY 5/17/-5/30/2019  Normal         I have reviewed labs and imaging/xray/diagnostic testing in this note. Assessment        1. Mixed hyperlipidemia    2. Unstable angina pectoris ([address]    3.  Coronary artery disease involving native heart without angina pectoris,

## 2021-08-26 RX ORDER — POTASSIUM CHLORIDE 750 MG/1
TABLET, FILM COATED, EXTENDED RELEASE ORAL
Qty: 180 TABLET | Refills: 3 | Status: SHIPPED | OUTPATIENT
Start: 2021-08-26 | End: 2022-09-06 | Stop reason: SDUPTHER

## 2021-10-02 ENCOUNTER — APPOINTMENT (OUTPATIENT)
Dept: CT IMAGING | Age: 61
End: 2021-10-02
Payer: MEDICARE

## 2021-10-02 ENCOUNTER — HOSPITAL ENCOUNTER (EMERGENCY)
Age: 61
Discharge: HOME | End: 2021-10-03
Attending: EMERGENCY MEDICINE
Payer: MEDICARE

## 2021-10-02 DIAGNOSIS — R31.9 HEMATURIA, UNSPECIFIED TYPE: ICD-10-CM

## 2021-10-02 DIAGNOSIS — R30.0 DIFFICULT OR PAINFUL URINATION: Primary | ICD-10-CM

## 2021-10-02 DIAGNOSIS — E83.42 HYPOMAGNESEMIA: ICD-10-CM

## 2021-10-02 DIAGNOSIS — N40.0 ENLARGED PROSTATE: ICD-10-CM

## 2021-10-02 LAB
A/G RATIO: 1.4 (ref 1.1–2.2)
ALBUMIN SERPL-MCNC: 4 G/DL (ref 3.4–5)
ALP BLD-CCNC: 123 U/L (ref 40–129)
ALT SERPL-CCNC: 15 U/L (ref 10–40)
AMORPHOUS: ABNORMAL /HPF
ANION GAP SERPL CALCULATED.3IONS-SCNC: 12 MMOL/L (ref 3–16)
AST SERPL-CCNC: 18 U/L (ref 15–37)
BASOPHILS ABSOLUTE: 0 K/UL (ref 0–0.2)
BASOPHILS RELATIVE PERCENT: 0.6 %
BILIRUB SERPL-MCNC: 0.7 MG/DL (ref 0–1)
BILIRUBIN URINE: NEGATIVE
BLOOD, URINE: ABNORMAL
BUN BLDV-MCNC: 13 MG/DL (ref 7–20)
CALCIUM SERPL-MCNC: 9.4 MG/DL (ref 8.3–10.6)
CHLORIDE BLD-SCNC: 101 MMOL/L (ref 99–110)
CLARITY: ABNORMAL
CO2: 24 MMOL/L (ref 21–32)
COLOR: ABNORMAL
CREAT SERPL-MCNC: 0.9 MG/DL (ref 0.9–1.3)
EOSINOPHILS ABSOLUTE: 0.1 K/UL (ref 0–0.6)
EOSINOPHILS RELATIVE PERCENT: 0.7 %
EPITHELIAL CELLS, UA: ABNORMAL /HPF (ref 0–5)
GFR AFRICAN AMERICAN: >60
GFR NON-AFRICAN AMERICAN: >60
GLOBULIN: 2.8 G/DL
GLUCOSE BLD-MCNC: 133 MG/DL (ref 70–99)
GLUCOSE URINE: NEGATIVE MG/DL
HCT VFR BLD CALC: 37.3 % (ref 40.5–52.5)
HEMOGLOBIN: 12.5 G/DL (ref 13.5–17.5)
KETONES, URINE: ABNORMAL MG/DL
LEUKOCYTE ESTERASE, URINE: NEGATIVE
LYMPHOCYTES ABSOLUTE: 2 K/UL (ref 1–5.1)
LYMPHOCYTES RELATIVE PERCENT: 26.7 %
MCH RBC QN AUTO: 29.1 PG (ref 26–34)
MCHC RBC AUTO-ENTMCNC: 33.5 G/DL (ref 31–36)
MCV RBC AUTO: 86.8 FL (ref 80–100)
MICROSCOPIC EXAMINATION: YES
MONOCYTES ABSOLUTE: 0.6 K/UL (ref 0–1.3)
MONOCYTES RELATIVE PERCENT: 8.3 %
MUCUS: ABNORMAL /LPF
NEUTROPHILS ABSOLUTE: 4.7 K/UL (ref 1.7–7.7)
NEUTROPHILS RELATIVE PERCENT: 63.7 %
NITRITE, URINE: NEGATIVE
PDW BLD-RTO: 15.6 % (ref 12.4–15.4)
PH UA: 5 (ref 5–8)
PLATELET # BLD: 255 K/UL (ref 135–450)
PMV BLD AUTO: 7.2 FL (ref 5–10.5)
POTASSIUM REFLEX MAGNESIUM: 3.5 MMOL/L (ref 3.5–5.1)
PROTEIN UA: 30 MG/DL
RBC # BLD: 4.3 M/UL (ref 4.2–5.9)
RBC UA: ABNORMAL /HPF (ref 0–4)
SODIUM BLD-SCNC: 137 MMOL/L (ref 136–145)
SPECIFIC GRAVITY UA: >=1.03 (ref 1–1.03)
TOTAL PROTEIN: 6.8 G/DL (ref 6.4–8.2)
URINE REFLEX TO CULTURE: ABNORMAL
URINE TYPE: ABNORMAL
UROBILINOGEN, URINE: 1 E.U./DL
WBC # BLD: 7.3 K/UL (ref 4–11)
WBC UA: ABNORMAL /HPF (ref 0–5)

## 2021-10-02 PROCEDURE — 83735 ASSAY OF MAGNESIUM: CPT

## 2021-10-02 PROCEDURE — 2580000003 HC RX 258: Performed by: EMERGENCY MEDICINE

## 2021-10-02 PROCEDURE — 81001 URINALYSIS AUTO W/SCOPE: CPT

## 2021-10-02 PROCEDURE — 96360 HYDRATION IV INFUSION INIT: CPT

## 2021-10-02 PROCEDURE — 74176 CT ABD & PELVIS W/O CONTRAST: CPT

## 2021-10-02 PROCEDURE — 36415 COLL VENOUS BLD VENIPUNCTURE: CPT

## 2021-10-02 PROCEDURE — 99283 EMERGENCY DEPT VISIT LOW MDM: CPT

## 2021-10-02 PROCEDURE — 80053 COMPREHEN METABOLIC PANEL: CPT

## 2021-10-02 PROCEDURE — 85025 COMPLETE CBC W/AUTO DIFF WBC: CPT

## 2021-10-02 RX ORDER — 0.9 % SODIUM CHLORIDE 0.9 %
1000 INTRAVENOUS SOLUTION INTRAVENOUS ONCE
Status: COMPLETED | OUTPATIENT
Start: 2021-10-02 | End: 2021-10-03

## 2021-10-02 RX ADMIN — SODIUM CHLORIDE 1000 ML: 9 INJECTION, SOLUTION INTRAVENOUS at 23:28

## 2021-10-02 ASSESSMENT — PAIN DESCRIPTION - LOCATION: LOCATION: BACK

## 2021-10-02 ASSESSMENT — PAIN DESCRIPTION - ORIENTATION: ORIENTATION: RIGHT;LEFT;LOWER

## 2021-10-02 ASSESSMENT — PAIN DESCRIPTION - PAIN TYPE: TYPE: ACUTE PAIN;CHRONIC PAIN

## 2021-10-02 ASSESSMENT — PAIN DESCRIPTION - DESCRIPTORS: DESCRIPTORS: CONSTANT

## 2021-10-02 ASSESSMENT — PAIN SCALES - GENERAL: PAINLEVEL_OUTOF10: 8

## 2021-10-03 VITALS
TEMPERATURE: 98 F | HEART RATE: 88 BPM | HEIGHT: 68 IN | DIASTOLIC BLOOD PRESSURE: 80 MMHG | RESPIRATION RATE: 14 BRPM | SYSTOLIC BLOOD PRESSURE: 135 MMHG | OXYGEN SATURATION: 99 % | BODY MASS INDEX: 23.95 KG/M2 | WEIGHT: 158 LBS

## 2021-10-03 LAB — MAGNESIUM: 1.6 MG/DL (ref 1.8–2.4)

## 2021-10-03 RX ORDER — MAGNESIUM CHLORIDE 64 MG
2 TABLET, DELAYED RELEASE (ENTERIC COATED) ORAL ONCE
Status: DISCONTINUED | OUTPATIENT
Start: 2021-10-03 | End: 2021-10-03 | Stop reason: HOSPADM

## 2021-10-03 NOTE — ED TRIAGE NOTES
Pt c/o low urine output throughout the days that he has had for the last 4-5 days. H/o kidney stones, pt has 16 oz water bottle half full of dark colored fluid and states that's all the output he has had today. Pt was able to give urine for sample.

## 2021-10-03 NOTE — ED PROVIDER NOTES
CHIEF COMPLAINT  Dysuria      HISTORY OF PRESENT ILLNESS  Brittany Burdick is a 63 y.o. male presents to the ED with dysuria, difficulty with urination, hesitancy, but not really burning sensation, has had issues with urination in the past but worsened today, he is aware of a chronic blockage in his kidney, stones, blood in urine noted, some pelvic pressure, but no significant abdominal pain, no flank/back with exception of his normal chronic back pain, no nausea/vomiting/bowel changes no chest pain or shortness of breath, no respiratory symptoms, no known fevers, no Covid exposure that he is aware of. Denies any penile discharge, no concern for STDs, no scrotal/tenderness or swelling , he does take Flomax, no other complaints, modifying factors or associated symptoms. I have reviewed the following from the nursing documentation. 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     CERVICAL DISCECTOMY  02/08/2017    Anterior discectomy, anterior foraminotomy C5/C6 and C6-7    CYSTOSCOPY  6/5/15    CYSTOSCOPY N/A 02/09/2018    DIAGNOSTIC CARDIAC CATH LAB PROCEDURE      ENDOSCOPY, COLON, DIAGNOSTIC      HERNIA REPAIR      KNEE ARTHROSCOPY      LITHOTRIPSY      LITHOTRIPSY Left 11/5/15    OTHER SURGICAL HISTORY  1/20/11    video arthroscopy of right shoulder with subcromial d ecompression and arthroscopic sarbjit  OTHER SURGICAL HISTORY  5/1/13    Excision Lesion Right Angle of Mouth    SHOULDER SURGERY      Left Shoulder X 3; right Shoulder X 5    SHOULDER SURGERY Left 05/30/2017    UPPER GASTROINTESTINAL ENDOSCOPY  07/26/2012    UPPER GASTROINTESTINAL ENDOSCOPY N/A 6/21/2019    EGD BIOPSY performed by Amy Spears DO at [address] [facility]     Family History   Problem Relation Age of Onset    Heart Disease Father     Cancer Mother     Cancer Sister      Social History     Socioeconomic History    Marital status:      Spouse name: Not on file    Number of children: Not on file    Years of education: Not on file    Highest education level: Not on file   Occupational History    Not on file   Tobacco Use    Smoking status: Current Every Day Smoker     Packs/day: 0.50     Years: 45.00     Pack years: 22.50     Types: Cigarettes    Smokeless tobacco: Never Used   Vaping Use    Vaping Use: Former    Substances: Never   Substance and Sexual Activity    Alcohol use: No    Drug use: Yes     Types: Marijuana    Sexual activity: Yes     Partners: Female   Other Topics Concern    Not on file   Social History Narrative    Not on file     Social Determinants of Health     Financial Resource Strain:     Difficulty of Paying Living Expenses:    Food Insecurity:     Worried About Running Out of Food in the Last Year:     [address] in the Last Year:    Transportation Needs:     Lack of Transportation (Medical):      Lack of Transportation (Non-Medical):    Physical Activity:     Days of Exercise per Week:     Minutes of Exercise per Session:    Stress:     Feeling of Stress :    Social Connections:     Frequency of Communication with Friends and Family:     Frequency of Social Gatherings with Friends and Family:     Attends [employer] Services:     Active Member of Clubs or Organizations:     Attends Club or Organization Meetings:     Marital Status:    Intimate Partner Violence:     Fear of Current or Ex-Partner:     Emotionally Abused:     Physically Abused:     Sexually Abused:      No current facility-administered medications for this encounter. Current Outpatient Medications   Medication Sig Dispense Refill    potassium chloride (KLOR-CON) 10 MEQ extended release tablet TAKE TWO TABLETS BY MOUTH DAILY 180 tablet 3    atorvastatin (LIPITOR) 80 MG tablet TAKE ONE TABLET BY MOUTH DAILY 90 tablet 2    atorvastatin (LIPITOR) 80 MG tablet TAKE ONE TABLET BY MOUTH DAILY 90 tablet 3    albuterol sulfate HFA (VENTOLIN HFA) 108 (90 Base) MCG/ACT inhaler Inhale 2 puffs into the lungs every 6 hours as needed for Wheezing      risperiDONE (RISPERDAL) 0.5 MG tablet Take 0.5 mg by mouth nightly      zolpidem (AMBIEN) 10 MG tablet Take by mouth nightly as needed for Sleep.  levothyroxine (SYNTHROID) 75 MCG tablet       tamsulosin (FLOMAX) 0.4 MG capsule       tiZANidine (ZANAFLEX) 4 MG tablet 4 mg every 8 hours as needed       omeprazole (PRILOSEC) 20 MG capsule Take 20 mg by mouth daily      nitroGLYCERIN (NITROSTAT) 0.4 MG SL tablet Place 1 tablet under the tongue every 5 minutes as needed for Chest pain 25 tablet 3    Sucralfate (CARAFATE PO) Take 1 g by mouth 4 times daily       FLUoxetine (PROZAC) 20 MG capsule Take 40 mg by mouth daily       pregabalin (LYRICA) 50 MG capsule Take 150 mg by mouth 3 times daily. Allergies   Allergen Reactions    Bactrim [Sulfamethoxazole-Trimethoprim] Other (See Comments)     States makes yeast infection on his penis    Codeine Nausea Only     Pt tolerates Oxycodone       REVIEW OF SYSTEMS  10 systems reviewed, pertinent positives per HPI otherwise noted to be negative. PHYSICAL EXAM  /80   Pulse 88   Temp 98 °F (36.7 °C) (Oral)   Resp 14   Ht 5' 8\" (1.727 m)   Wt 158 lb (71.7 kg)   SpO2 99%   BMI 24.02 kg/m²   GENERAL APPEARANCE: Awake and alert. Cooperative. No acute distress  HEAD: Normocephalic. Atraumatic.   EYES: PERRL. EOM's grossly intact. ENT: Mucous membranes are moist.  Airway patent, no stridor  NECK: Supple. No rigidity  HEART: RRR. No murmurs  LUNGS: Respirations nonlabored. Lungs are clear to auscultation bilaterally. ABDOMEN: Soft. Non-distended. Little pelvic discomfort with palpation, no CVA tenderness. No guarding or rebound. Normal bowel sounds. Genital exam with no sores/lesions, mild tenderness throughout scrotum/penis/testicles/cords, thus difficult to localize, but no palpable masses, no erythema/edema, no discharge, no abscesses or cellulitis  EXTREMITIES: No peripheral edema. Moves all extremities equally. All extremities neurovascularly intact. SKIN: Warm and dry. No acute rashes. NEUROLOGICAL: Alert and oriented. No gross facial drooping. No truncal ataxia. Normal speech, steady gait  PSYCHIATRIC: Normal mood and affect. LABS  I have reviewed all labs for this visit.    Results for orders placed or performed during the hospital encounter of 10/02/21   Urinalysis Reflex to Culture    Specimen: Urine, clean catch   Result Value Ref Range    Color, UA DARK YELLOW (A) Straw/Yellow    Clarity, UA CLOUDY (A) Clear    Glucose, Ur Negative Negative mg/dL    Bilirubin Urine Negative Negative    Ketones, Urine TRACE (A) Negative mg/dL    Specific Gravity, UA >=1.030 1.005 - 1.030    Blood, Urine LARGE (A) Negative    pH, UA 5.0 5.0 - 8.0    Protein, UA 30 (A) Negative mg/dL    Urobilinogen, Urine 1.0 <2.0 E.U./dL    Nitrite, Urine Negative Negative    Leukocyte Esterase, Urine Negative Negative    Microscopic Examination YES     Urine Type NotGiven     Urine Reflex to Culture Not Indicated    Microscopic Urinalysis   Result Value Ref Range    Mucus, UA 1+ (A) None Seen /LPF    WBC, UA 0-2 0 - 5 /HPF    RBC, UA  (A) 0 - 4 /HPF    Epithelial Cells, UA 0-1 0 - 5 /HPF    Amorphous, UA 2+ /HPF   CBC Auto Differential   Result Value Ref Range    WBC 7.3 4.0 - 11.0 K/uL    RBC 4.30 4.20 - 5.90 M/uL Hemoglobin 12.5 (L) 13.5 - 17.5 g/dL    Hematocrit 37.3 (L) 40.5 - 52.5 %    MCV 86.8 80.0 - 100.0 fL    MCH 29.1 26.0 - 34.0 pg    MCHC 33.5 31.0 - 36.0 g/dL    RDW 15.6 (H) 12.4 - 15.4 %    Platelets 296 677 - 197 K/uL    MPV 7.2 5.0 - 10.5 fL    Neutrophils % 63.7 %    Lymphocytes % 26.7 %    Monocytes % 8.3 %    Eosinophils % 0.7 %    Basophils % 0.6 %    Neutrophils Absolute 4.7 1.7 - 7.7 K/uL    Lymphocytes Absolute 2.0 1.0 - 5.1 K/uL    Monocytes Absolute 0.6 0.0 - 1.3 K/uL    Eosinophils Absolute 0.1 0.0 - 0.6 K/uL    Basophils Absolute 0.0 0.0 - 0.2 K/uL   Comprehensive Metabolic Panel w/ Reflex to MG   Result Value Ref Range    Sodium 137 136 - 145 mmol/L    Potassium reflex Magnesium 3.5 3.5 - 5.1 mmol/L    Chloride 101 99 - 110 mmol/L    CO2 24 21 - 32 mmol/L    Anion Gap 12 3 - 16    Glucose 133 (H) 70 - 99 mg/dL    BUN 13 7 - 20 mg/dL    CREATININE 0.9 0.9 - 1.3 mg/dL    GFR Non-African American >60 >60    GFR African American >60 >60    Calcium 9.4 8.3 - 10.6 mg/dL    Total Protein 6.8 6.4 - 8.2 g/dL    Albumin 4.0 3.4 - 5.0 g/dL    Albumin/Globulin Ratio 1.4 1.1 - 2.2    Total Bilirubin 0.7 0.0 - 1.0 mg/dL    Alkaline Phosphatase 123 40 - 129 U/L    ALT 15 10 - 40 U/L    AST 18 15 - 37 U/L    Globulin 2.8 g/dL   Magnesium   Result Value Ref Range    Magnesium 1.60 (L) 1.80 - 2.40 mg/dL       RADIOLOGY  CT ABDOMEN PELVIS WO CONTRAST Additional Contrast? None    Result Date: 10/3/2021  EXAMINATION: CT OF THE ABDOMEN AND PELVIS WITHOUT CONTRAST 10/2/2021 11:40 pm TECHNIQUE: CT of the abdomen and pelvis was performed without the administration of intravenous contrast. Multiplanar reformatted images are provided for review. Dose modulation, iterative reconstruction, and/or weight based adjustment of the mA/kV was utilized to reduce the radiation dose to as low as reasonably achievable. COMPARISON: CT chest abdomen and pelvis with contrast July 2, 2020.  HISTORY: ORDERING SYSTEM PROVIDED HISTORY: hx L UPJ stone, L flank pain TECHNOLOGIST PROVIDED HISTORY: Reason for exam:->hx L UPJ stone, L flank pain Additional Contrast?->None Decision Support Exception - unselect if not a suspected or confirmed emergency medical condition->Emergency Medical Condition (MA) Reason for Exam: blood in urin   hx of stones Acuity: Acute Type of Exam: Initial FINDINGS: Abdomen/Pelvis: Lower chest: The lung bases are well aerated. Small scattered pneumatoceles are seen involving the right lower lung lobe and posterior left lower lung lobe. Pleural surfaces are unremarkable and no evidence of pleural effusion is identified. Organs: Decreased chronic mild left renal pelviectasis is present. Left renal collecting system multifocal calculi are seen with large is seen within the inferior left renal pelvis which measures up to 8 mm in length without associated urinary obstruction identified. The liver, gallbladder, spleen, pancreas, adrenal glands, kidneys, are otherwise unremarkable in appearance. GI/Bowel: The stomach is unremarkable without wall thickening or distention. Scattered diverticula are seen within the colon without evidence of adjacent inflammatory change within the mesenteric fat identified. Bowel loops are otherwise unremarkable in appearance without evidence of obstruction, distension or mucosal thickening. The appendix is normal. Pelvis: The urinary bladder is mildly distended and grossly unremarkable in appearance. No evidence of pelvic free fluid is seen. Prostate gland is enlarged measuring up to 5.9 cm in transverse dimension. Peritoneum/Retroperitoneum: No evidence of retroperitoneal or intraperitoneal lymphadenopathy is identified. No evidence of intraperitoneal free fluid is seen. Bones/Soft Tissues: The bones, skeletal muscle bundles, fascial planes and subcutaneous soft tissues are unremarkable in appearance.      1. Left renal collecting system multifocal calculi, as discussed above, without associated urinary obstruction identified. 2. Decreased left renal chronic pelviectasis suggesting long-standing acquired versus congenital mild UPJ obstruction. 3. Mild colonic diverticulosis without evidence of diverticulitis. 4. Enlarged prostate gland. Recommend clinical correlation. ED COURSE/MDM  Patient seen and evaluated. Old records reviewed. Labs and imaging reviewed and results discussed with patient. 61-year-old male with dysuria/hematuria, discussed the CT findings, chronic appearing UPJ obstruction, but he is able to urinate/no urinary retention, he was aware of the diverticulosis, no diverticulitis, and enlarged prostate, has seen a urologist before, other than hematuria, labs unremarkable, normal renal function, normal H&H, he felt a little better after he was given fluids, he declined need for pain meds here, we did not have the mag I ordered in the ED but patient stated he could f/u w/ PCP about low mag, did not want a script for supplement, I have low suspicion for infectious process at this time,, low suspicion for torsion, will follow up with urology as soon as possible, strict return precautions given, all questions answered, will return if any worsening symptoms or new concerns, see AVS for further discharge information, patient verbalized understanding of plan, felt comfortable going home. Orders Placed This Encounter   Procedures    CT ABDOMEN PELVIS WO CONTRAST Additional Contrast? None    Urinalysis Reflex to Culture    Microscopic Urinalysis    CBC Auto Differential    Comprehensive Metabolic Panel w/ Reflex to MG    Magnesium     Orders Placed This Encounter   Medications    0.9 % sodium chloride bolus    DISCONTD: magnesium chloride (MAG DELAY) extended release tablet 128 mg          CLINICAL IMPRESSION  1. Difficult or painful urination    2. Hematuria, unspecified type    3. Hypomagnesemia    4.  Enlarged prostate        Blood pressure 135/80, pulse 88, temperature 98 °F (36.7 °C), temperature source Oral, resp. rate 14, height 5' 8\" (1.727 m), weight 158 lb (71.7 kg), SpO2 99 %. [facility] was discharged to home in stable condition.                    Adrienne Grider DO  10/06/21 1420

## 2022-03-04 RX ORDER — ATORVASTATIN CALCIUM 80 MG/1
TABLET, FILM COATED ORAL
Qty: 90 TABLET | Refills: 0 | Status: SHIPPED | OUTPATIENT
Start: 2022-03-04 | End: 2022-04-19

## 2022-03-04 NOTE — TELEPHONE ENCOUNTER
[id number] requesting a refill on pt's Atorvastatin 80mg for a 90 day supply.     *annual followup scheduled for 4/19/22 with mark

## 2022-04-15 RX ORDER — FLUTICASONE FUROATE AND VILANTEROL TRIFENATATE 100; 25 UG/1; UG/1
1 POWDER RESPIRATORY (INHALATION) DAILY
COMMUNITY
End: 2022-04-19

## 2022-04-15 NOTE — PROGRESS NOTES
[facility]   CARDIAC EVALUATION NOTE  [phone]      PCP:  Angely Wheeler MD    Reason for Consultation/Chief Complaint: 1 year follow up for CAD and SOB     Subjective   History of Present Illness:  Josie Carty is a 63 y.o. patient with a history of CAD, HTN, HLD, COPD, and thyroid disease who presents for follow up. She initially saw Dr. Deng Azul on 02/16/15 for an abnormal stress test. Stress test from 02/05/15 showed small sized reversible perfusion defect in the lateral wall of the left ventricle which likely represents a focus of stress induced myocardial ischemia. He reported his main complaint was SOB. Reports to having active chest tightness across his chest. Chest pain ongoing for 2 months. States this chest pain has not been worsening. Had been on antihypertensives and have not helped to improve his chest pain. States he had active pain that radiates into his L shoulder, however this is not new for him. His cardiac cath on 02/23/15 showed 40% mid LAD. He underwent ACDF C5/C6 and C6-7 with allograft and plate and screws on 4/5/76. He had a syncopal episode after coughing and was admitted 12/26-12/28/18. He presented ED in April 2018 due to a syncopal episode. He was treated for dehydration. At his last visit he reported a syncopal episode in 2018 which started after coughing and then again in 04/2019. His carotid doppler from 12/27/18 showed <50% bilaterally. At his last visit he reported having chest pain and SOB. He underwent a normal stress test on 5/31/19. An echocardiogram from 5/31/19 showed an EF 55-60%. He wore a 2 week REY from 5/17-5/30/19 which was normal. OV 4/15/21 stated he had been feeling well overall. He stated he had some fatigue and SOB that is unchanged. He stated his swelling had been stable. Patient currently denied any weight gain, edema, palpitations, chest pain,  dizziness, and syncope. 10/2/21 he was evaluated at [address] [place] [address].  [facility] ED for dysuria, difficult with urination and hesitancy. Today he reports that he has been doing okay. He reports that he is going to have knee surgery on his right knee on Thursday. Denies chest pain, sob, dizziness, syncope and edema. He can walk 1-2 blocks w/o difficulty other than knee pain. Past Medical History:   has a past medical history of Arthritis, CAD (coronary artery disease), Chronic back pain, COPD (chronic obstructive pulmonary disease) ([facility] [address] GERD (gastroesophageal reflux disease), Hyperlipidemia, Hypertension, Kidney stone, Lumbar herniated disc, Lymphoma of gastrointestinal tract ([facility] [address] Pneumonia, Thyroid disease, and Vitamin B deficiency. Surgical History:   has a past surgical history that includes shoulder surgery; Knee arthroscopy; hernia repair; other surgical history (1/20/11); Carpal tunnel release; Upper gastrointestinal endoscopy (07/26/2012); Endoscopy, colon, diagnostic; other surgical history (5/1/13); Cystoscopy (6/5/15); Lithotripsy; Lithotripsy (Left, 11/5/15); Diagnostic Cardiac Cath Lab Procedure; Cardiac catheterization (2/20/2015); Cervical discectomy (02/08/2017); shoulder surgery (Left, 05/30/2017); Cystoscopy (N/A, 02/09/2018); and Upper gastrointestinal endoscopy (N/A, 6/21/2019). Social History:   reports that he has been smoking cigarettes. He has a 22.50 pack-year smoking history. He has never used smokeless tobacco. He reports current drug use. Drug: Marijuana April Dinning). He reports that he does not drink alcohol. Family History:  family history includes Cancer in his mother and sister; Heart Disease in his father. Home Medications:  Were reviewed and are listed in nursing record and/or below  Prior to Admission medications    Medication Sig Start Date End Date Taking? Authorizing Provider   diazePAM (VALIUM) 5 MG tablet Take 5 mg by mouth every 6 hours as needed.  3/11/22  Yes Historical Provider, MD   ondansetron (ZOFRAN) 8 MG tablet  4/11/22  Yes Historical Provider, MD medical marijuana Inhale 1 Units into the lungs as needed. Yes Historical Provider, MD   OMEPRAZOLE PO Take 40 mg by mouth daily 1 capsule by mouth daily 7/27/21  Yes Historical Provider, MD   potassium chloride (KLOR-CON) 10 MEQ extended release tablet TAKE TWO TABLETS BY MOUTH DAILY 8/26/21  Yes Yvette Hinson MD   atorvastatin (LIPITOR) 80 MG tablet TAKE ONE TABLET BY MOUTH DAILY 2/19/21  Yes Maverick Blair MD   albuterol sulfate HFA (VENTOLIN HFA) 108 (90 Base) MCG/ACT inhaler Inhale 2 puffs into the lungs every 6 hours as needed for Wheezing   Yes Historical Provider, MD   risperiDONE (RISPERDAL) 0.5 MG tablet Take 0.5 mg by mouth nightly   Yes Historical Provider, MD   zolpidem (AMBIEN) 10 MG tablet Take by mouth nightly as needed for Sleep. Yes Historical Provider, MD   levothyroxine (SYNTHROID) 75 MCG tablet  6/13/18  Yes Historical Provider, MD   tamsulosin (FLOMAX) 0.4 MG capsule  6/21/18  Yes Historical Provider, MD   tiZANidine (ZANAFLEX) 4 MG tablet 4 mg every 8 hours as needed  6/16/18  Yes Historical Provider, MD   nitroGLYCERIN (NITROSTAT) 0.4 MG SL tablet Place 1 tablet under the tongue every 5 minutes as needed for Chest pain 5/17/16  Yes Amalia Gallo MD   Sucralfate (CARAFATE PO) Take 1 g by mouth 4 times daily    Yes Historical Provider, MD   FLUoxetine (PROZAC) 20 MG capsule Take 40 mg by mouth daily    Yes Historical Provider, MD   pregabalin (LYRICA) 50 MG capsule Take 150 mg by mouth 3 times daily. Yes Historical Provider, MD          Allergies:  Bactrim [sulfamethoxazole-trimethoprim] and Codeine     Review of Systems:   A 14 point review of symptoms completed. Pertinent positives identified in the HPI, all other review of symptoms negative as below.       Objective   PHYSICAL EXAM:    Vitals:    04/19/22 0939   BP: 116/78   Pulse: 76   SpO2: 97%    Weight: 182 lb (82.6 kg)         General Appearance:  Alert, cooperative, no distress, appears stated age   Head: Normocephalic, without obvious abnormality, atraumatic   Eyes:  PERRL, conjunctiva/corneas clear   Nose: Nares normal, no drainage or sinus tenderness   Throat: Lips, mucosa, and tongue normal   Neck: Supple, symmetrical, trachea midline, no adenopathy, thyroid: not enlarged, symmetric, no tenderness/mass/nodules, no carotid bruit or JVD   Lungs:   Clear to auscultation bilaterally, respirations unlabored   Chest Wall:  No deformity or tenderness   Heart:  Regular rate and rhythm, S1, S2 normal, 2/6 sm   Abdomen:   Soft, non-tender, bowel sounds active all four quadrants,  no masses, no organomegaly   Extremities: Extremities normal, atraumatic, no cyanosis or edema   Pulses: 2+ and symmetric   Skin: Skin color, texture, turgor normal, no rashes or lesions   Pysch: Normal mood and affect   Neurologic: Normal gross motor and sensory exam.         Labs   CBC:   Lab Results   Component Value Date    WBC 7.3 10/02/2021    RBC 4.30 10/02/2021    RBC 3.92 2017    HGB 12.5 10/02/2021    HCT 37.3 10/02/2021    MCV 86.8 10/02/2021    RDW 15.6 10/02/2021     10/02/2021     CMP:  Lab Results   Component Value Date     10/02/2021    K 3.5 10/02/2021     10/02/2021    CO2 24 10/02/2021    BUN 13 10/02/2021    CREATININE 0.9 10/02/2021    GFRAA >60 10/02/2021    GFRAA >60 2012    AGRATIO 1.4 10/02/2021    LABGLOM >60 10/02/2021    GLUCOSE 133 10/02/2021    GLUCOSE 119 2017    PROT 6.8 10/02/2021    PROT 6.2 2017    CALCIUM 9.4 10/02/2021    BILITOT 0.7 10/02/2021    ALKPHOS 123 10/02/2021    AST 18 10/02/2021    ALT 15 10/02/2021     PT/INR:  No results found for: PTINR  HgBA1c:No results found for: LABA1C  Lab Results   Component Value Date    CKTOTAL 315 (H) 2019    TROPONINI <0.01 2019         Cardiac Data     Last EK/15/21  Sinus  Rhythm   WITHIN NORMAL LIMITS, HR 75     19  SR HR 64      Today nsr nml ekg     Echocardiogram 2019  Conclusions   Summary and had  luminal irregularities. 4.  Right coronary artery was small and nondominant and normal.        IMPRESSION:    1. A 40% proximal to mid left anterior descending stenosis. 2.  Moderately elevated left ventricular end-diastolic pressure, 20 mmHg. 3.  Hyperdynamic left ventricular systolic function with ejection fraction of  65%. Studies:   REY 5/17/-5/30/2019  Normal         I have reviewed labs and imaging/xray/diagnostic testing in this note. Assessment        1. Mixed hyperlipidemia    2. Coronary artery disease with unstable angina pectoris, unspecified vessel or lesion type, unspecified whether native or transplanted heart ([address]    3. Essential hypertension    4. Chest pain, unspecified type    5. Pre-op evaluation         Plan   1. Intermediate cardiac risk. May proceed with right knee surgery. Continue meds for chronic condition above. 2. Lipid lab work-fasting  3. 1 year follow up or sooner              Thank you for allowing us to participate in the care of Josie Carty. Please call me with any questions [phone]. Scribe's attestation: This note was scribed in the presence of Dr. Johnny Bojorquez MD  by FRANCISCA Lopez MD, [facility]   Interventional Cardiologist  [place] [address]  [phone] [facility]  [phone] [address] [place] [address]  4/19/2022 10:05 AM    I will address the patient's cardiac risk factors and adjusted pharmacologic treatment as needed. In addition, I have reinforced the need for patient directed risk factor modification. Tobacco use was discussed with the patient and educated on the negative effects and was asked not to use. All questions and concerns were addressed to the patient/family. Alternatives to my treatment were discussed. I, Dr Johnny Bojorquez, personally performed the services described in this documentation, as scribed by the above signed scribe in my presence.   It is both accurate and complete to my knowledge. I agree with the details independently gathered by the clinical support staff and the scribed note accurately describes my personal service to the patient.

## 2022-04-19 ENCOUNTER — OFFICE VISIT (OUTPATIENT)
Dept: CARDIOLOGY CLINIC | Age: 62
End: 2022-04-19
Payer: MEDICARE

## 2022-04-19 VITALS
DIASTOLIC BLOOD PRESSURE: 78 MMHG | WEIGHT: 182 LBS | HEART RATE: 76 BPM | SYSTOLIC BLOOD PRESSURE: 116 MMHG | BODY MASS INDEX: 26.96 KG/M2 | OXYGEN SATURATION: 97 % | HEIGHT: 69 IN

## 2022-04-19 DIAGNOSIS — E78.2 MIXED HYPERLIPIDEMIA: Primary | ICD-10-CM

## 2022-04-19 DIAGNOSIS — I10 ESSENTIAL HYPERTENSION: ICD-10-CM

## 2022-04-19 DIAGNOSIS — R07.9 CHEST PAIN, UNSPECIFIED TYPE: ICD-10-CM

## 2022-04-19 DIAGNOSIS — Z01.818 PRE-OP EVALUATION: ICD-10-CM

## 2022-04-19 DIAGNOSIS — I25.110 CORONARY ARTERY DISEASE WITH UNSTABLE ANGINA PECTORIS, UNSPECIFIED VESSEL OR LESION TYPE, UNSPECIFIED WHETHER NATIVE OR TRANSPLANTED HEART (HCC): ICD-10-CM

## 2022-04-19 PROCEDURE — 93000 ELECTROCARDIOGRAM COMPLETE: CPT | Performed by: INTERNAL MEDICINE

## 2022-04-19 PROCEDURE — 3017F COLORECTAL CA SCREEN DOC REV: CPT | Performed by: INTERNAL MEDICINE

## 2022-04-19 PROCEDURE — 99214 OFFICE O/P EST MOD 30 MIN: CPT | Performed by: INTERNAL MEDICINE

## 2022-04-19 PROCEDURE — G8419 CALC BMI OUT NRM PARAM NOF/U: HCPCS | Performed by: INTERNAL MEDICINE

## 2022-04-19 PROCEDURE — G8427 DOCREV CUR MEDS BY ELIG CLIN: HCPCS | Performed by: INTERNAL MEDICINE

## 2022-04-19 PROCEDURE — 4004F PT TOBACCO SCREEN RCVD TLK: CPT | Performed by: INTERNAL MEDICINE

## 2022-04-19 RX ORDER — PREDNISONE 10 MG/1
TABLET ORAL
COMMUNITY
Start: 2021-12-13 | End: 2022-04-19

## 2022-04-19 RX ORDER — ONDANSETRON HYDROCHLORIDE 8 MG/1
TABLET, FILM COATED ORAL
COMMUNITY
Start: 2022-04-11

## 2022-04-19 RX ORDER — DIAZEPAM 5 MG/1
5 TABLET ORAL EVERY 6 HOURS PRN
COMMUNITY
Start: 2022-03-11

## 2022-04-19 NOTE — LETTER
[address] Cardiology - [address] [place] [address] [facility]  Phone: [phone]  Fax: [phone]    Umesh Mayorga MD    April 19, 2022     Milan Wyatt MD  [address] [place] [address] Dr. Philippe Portillo [id number]    Patient: Faustina Abdul   MR Number: [id number]   YOB: 1960   Date of Visit: 4/19/2022       Dear Milan Wyatt:    Thank you for referring Nubia Ascencio to me for evaluation/treatment. Below are the relevant portions of my assessment and plan of care. If you have questions, please do not hesitate to call me. I look forward to following Celeste Saul along with you.     Sincerely,      Umesh Mayorga MD

## 2022-04-19 NOTE — PATIENT INSTRUCTIONS
1. Intermediate cardiac risk. May proceed with right knee surgery.   2. Lipid lab work-fasting  3. 1 year follow up or sooner

## 2022-04-19 NOTE — LETTER
[facility], [address] [facility], [address] [place] [address]  L:[phone]  F: [phone]      April 19, 2022      Carlo Essex, 1960 is at intermediate cardiovascular risk. He is not taking anticoagulants and may proceed with right knee surgery. If you need anything else or have questions, please do not hesitate to contact our office.        Thank you,  Dr. Francois MD

## 2022-05-27 RX ORDER — ATORVASTATIN CALCIUM 80 MG/1
TABLET, FILM COATED ORAL
Qty: 90 TABLET | Refills: 3 | Status: SHIPPED | OUTPATIENT
Start: 2022-05-27

## 2022-06-13 ENCOUNTER — HOSPITAL ENCOUNTER (OUTPATIENT)
Dept: GENERAL RADIOLOGY | Age: 62
Discharge: HOME | End: 2022-06-13
Payer: MEDICARE

## 2022-06-13 ENCOUNTER — HOSPITAL ENCOUNTER (OUTPATIENT)
Age: 62
Discharge: HOME | End: 2022-06-13
Payer: MEDICARE

## 2022-06-13 DIAGNOSIS — R11.2 NAUSEA AND VOMITING, INTRACTABILITY OF VOMITING NOT SPECIFIED, UNSPECIFIED VOMITING TYPE: ICD-10-CM

## 2022-06-13 PROCEDURE — 74018 RADEX ABDOMEN 1 VIEW: CPT

## 2022-06-15 ENCOUNTER — CLINICAL DOCUMENTATION (OUTPATIENT)
Dept: OTHER | Age: 62
End: 2022-06-15

## 2022-09-06 RX ORDER — POTASSIUM CHLORIDE 750 MG/1
TABLET, FILM COATED, EXTENDED RELEASE ORAL
Qty: 180 TABLET | Refills: 3 | Status: SHIPPED | OUTPATIENT
Start: 2022-09-06

## 2022-09-07 ENCOUNTER — HOSPITAL ENCOUNTER (OUTPATIENT)
Dept: CT IMAGING | Age: 62
Discharge: HOME | End: 2022-09-07
Payer: MEDICARE

## 2022-09-07 DIAGNOSIS — Z85.72 PERSONAL HISTORY OF LYMPHOMA: ICD-10-CM

## 2022-09-07 LAB
GFR AFRICAN AMERICAN: >60
GFR NON-AFRICAN AMERICAN: >60
PERFORMED ON: NORMAL
POC CREATININE: 0.8 MG/DL (ref 0.8–1.3)
POC SAMPLE TYPE: NORMAL

## 2022-09-07 PROCEDURE — 74177 CT ABD & PELVIS W/CONTRAST: CPT

## 2022-09-07 PROCEDURE — 6360000004 HC RX CONTRAST MEDICATION: Performed by: INTERNAL MEDICINE

## 2022-09-07 PROCEDURE — 82565 ASSAY OF CREATININE: CPT

## 2022-09-07 RX ADMIN — IOPAMIDOL 75 ML: 755 INJECTION, SOLUTION INTRAVENOUS at 14:36

## 2022-09-07 RX ADMIN — IOHEXOL 50 ML: 240 INJECTION, SOLUTION INTRATHECAL; INTRAVASCULAR; INTRAVENOUS; ORAL at 14:47

## 2022-09-14 ENCOUNTER — TELEPHONE (OUTPATIENT)
Dept: PULMONOLOGY | Age: 62
End: 2022-09-14

## 2022-09-14 NOTE — TELEPHONE ENCOUNTER
Npt ref by Álvaro, history of Lymphoma, multiple nodules of lung former Dr. Harini Fernandes pt. Please advise on scheduling.

## 2022-09-16 ENCOUNTER — TELEPHONE (OUTPATIENT)
Dept: PULMONOLOGY | Age: 62
End: 2022-09-16

## 2022-09-16 ENCOUNTER — OFFICE VISIT (OUTPATIENT)
Dept: PULMONOLOGY | Age: 62
End: 2022-09-16
Payer: MEDICARE

## 2022-09-16 VITALS
HEIGHT: 69 IN | RESPIRATION RATE: 18 BRPM | DIASTOLIC BLOOD PRESSURE: 70 MMHG | WEIGHT: 179.6 LBS | SYSTOLIC BLOOD PRESSURE: 125 MMHG | OXYGEN SATURATION: 96 % | BODY MASS INDEX: 26.6 KG/M2 | HEART RATE: 75 BPM

## 2022-09-16 DIAGNOSIS — R91.1 LUNG NODULE: Primary | ICD-10-CM

## 2022-09-16 PROCEDURE — G8427 DOCREV CUR MEDS BY ELIG CLIN: HCPCS | Performed by: INTERNAL MEDICINE

## 2022-09-16 PROCEDURE — 99406 BEHAV CHNG SMOKING 3-10 MIN: CPT | Performed by: INTERNAL MEDICINE

## 2022-09-16 PROCEDURE — G8419 CALC BMI OUT NRM PARAM NOF/U: HCPCS | Performed by: INTERNAL MEDICINE

## 2022-09-16 PROCEDURE — 4004F PT TOBACCO SCREEN RCVD TLK: CPT | Performed by: INTERNAL MEDICINE

## 2022-09-16 PROCEDURE — 99204 OFFICE O/P NEW MOD 45 MIN: CPT | Performed by: INTERNAL MEDICINE

## 2022-09-16 PROCEDURE — 3017F COLORECTAL CA SCREEN DOC REV: CPT | Performed by: INTERNAL MEDICINE

## 2022-09-16 RX ORDER — ROFLUMILAST 500 UG/1
500 TABLET ORAL
COMMUNITY
Start: 2022-08-30

## 2022-09-16 RX ORDER — FLUTICASONE FUROATE, UMECLIDINIUM BROMIDE AND VILANTEROL TRIFENATATE 100; 62.5; 25 UG/1; UG/1; UG/1
1 POWDER RESPIRATORY (INHALATION) DAILY
Qty: 1 EACH | Refills: 3 | Status: SHIPPED | OUTPATIENT
Start: 2022-09-16

## 2022-09-16 NOTE — PATIENT INSTRUCTIONS
Remember to bring all pulmonary medications to your next appointment with the office. Please keep all of your future appointments scheduled by [facility] [address] [facility] office. Out of respect for other patients and providers, you may be asked to reschedule your appointment if you arrive later than your scheduled appointment time. Appointments cancelled less than 24hrs in advance will be considered a no show. Patients with three missed appointments within 1 year or four missed appointments within 2 years can be dismissed from the practice. Bronchoscopy has been set up for 9/20/22 @ 11AM  arrival time  at [facility]. Please enter at [facility]. Nothing to eat or drink after midnight prior to the procedure. Must have a  to bring you to and from the procedure. Hold blood thinners as instructed prior to the procedure.

## 2022-09-16 NOTE — PROGRESS NOTES
P  [facility]                                               Pulmonary Clinic Consult     I had the pleasure of seeing  Sly Zaman     Chief Complaint   Patient presents with    New Patient     Hx of lymphoma, pulmonary nodules, ref by Dr Fritz Garcia (seen Dr Claude Gory in 2019)        HISTORY OF PRESENT ILLNESS:    Sly Zaman is a 63y.o. year old  Who start smoking at age  17  And increase gradually   1.5 pack daily ,he still smoke    He was diagnosed 10 year,he received pills as he states ,he was done with therapy long time and was told he is in remission     The Patient comes in with SOB that has been going on  for the last few years Associated with .cough and he usually has clear sputum    He states that it get worse with exercise or walking long distance and he can walk less 300 feet- 1/2 And go 1-2 flight of stairs before get short winded        Sleep history :  Snoring yes   Feel tired during the day yes   Wake up fresh no   excessive daytime sleepiness yes             ALLERGIES:    Allergies   Allergen Reactions    Bactrim [Sulfamethoxazole-Trimethoprim] Other (See Comments)     States makes yeast infection on his penis    Codeine Nausea Only     Pt tolerates Oxycodone       PAST MEDICAL HISTORY:       Diagnosis Date    Arthritis 1/2011    SHOULDERS AND KNEES    CAD (coronary artery disease)     Chronic back pain     COPD (chronic obstructive pulmonary disease) ([facility])     GERD (gastroesophageal reflux disease)     ONCE WEEKLY TAKE TUMS    Hyperlipidemia     Hypertension     Kidney stone     Lumbar herniated disc     Chronic Pain    Lymphoma of gastrointestinal tract [facility]) May 2013    Pneumonia     Thyroid disease     overactive thyroid    Vitamin B deficiency        MEDICATIONS:   Current Outpatient Medications   Medication Sig Dispense Refill    potassium chloride (KLOR-CON) 10 MEQ extended release tablet TAKE TWO TABLETS BY MOUTH DAILY 180 tablet 3 atorvastatin (LIPITOR) 80 MG tablet TAKE ONE TABLET BY MOUTH DAILY 90 tablet 3    diazePAM (VALIUM) 5 MG tablet Take 5 mg by mouth every 6 hours as needed. ondansetron (ZOFRAN) 8 MG tablet       medical marijuana Inhale 1 Units into the lungs as needed. OMEPRAZOLE PO Take 40 mg by mouth daily 1 capsule by mouth daily      albuterol sulfate HFA (PROVENTIL;VENTOLIN;PROAIR) 108 (90 Base) MCG/ACT inhaler Inhale 2 puffs into the lungs every 6 hours as needed for Wheezing      zolpidem (AMBIEN) 10 MG tablet Take by mouth nightly as needed for Sleep.      levothyroxine (SYNTHROID) 75 MCG tablet       tamsulosin (FLOMAX) 0.4 MG capsule       tiZANidine (ZANAFLEX) 4 MG tablet 4 mg every 8 hours as needed       nitroGLYCERIN (NITROSTAT) 0.4 MG SL tablet Place 1 tablet under the tongue every 5 minutes as needed for Chest pain 25 tablet 3    Sucralfate (CARAFATE PO) Take 1 g by mouth 4 times daily       FLUoxetine (PROZAC) 20 MG capsule Take 40 mg by mouth daily       pregabalin (LYRICA) 50 MG capsule Take 150 mg by mouth 3 times daily. DALIRESP 500 MCG tablet Take 500 mcg by mouth      risperiDONE (RISPERDAL) 0.5 MG tablet Take 0.5 mg by mouth nightly (Patient not taking: Reported on 9/16/2022)       No current facility-administered medications for this visit. SOCIAL AND OCCUPATIONAL HEALTH:  Social History     Tobacco Use   Smoking Status Every Day    Packs/day: 0.50    Years: 45.00    Pack years: 22.50    Types: Cigarettes   Smokeless Tobacco Never     TB :  Pets   Industrial exposure:  Birds :    SURGICAL HISTORY:   Past Surgical History:   Procedure Laterality Date    CARDIAC CATHETERIZATION  2/20/2015    stent placed    CARPAL TUNNEL RELEASE      chioma.     CERVICAL DISCECTOMY  02/08/2017    Anterior discectomy, anterior foraminotomy C5/C6 and C6-7    CYSTOSCOPY  6/5/15    CYSTOSCOPY N/A 02/09/2018    DIAGNOSTIC CARDIAC CATH LAB PROCEDURE      ENDOSCOPY, COLON, DIAGNOSTIC      HERNIA REPAIR      KNEE ARTHROSCOPY      LITHOTRIPSY      LITHOTRIPSY Left 11/5/15    OTHER SURGICAL HISTORY  1/20/11    video arthroscopy of right shoulder with subcromial d ecompression and arthroscopic sarbjit    OTHER SURGICAL HISTORY  5/1/13    Excision Lesion Right Angle of Mouth    SHOULDER SURGERY      Left Shoulder X 3; right Shoulder X 5    SHOULDER SURGERY Left 05/30/2017    UPPER GASTROINTESTINAL ENDOSCOPY  07/26/2012    UPPER GASTROINTESTINAL ENDOSCOPY N/A 6/21/2019    EGD BIOPSY performed by Argenis Brown DO at [address]:   Lung cancer:wife has Lung cancer   DVT or PE no     REVIEW OF SYSTEMS:  Constitutional: General health is good . There has been no weight changes. No fevers, fatigue or weakness. Head: Patient denies any history of trauma, convulsive disorder or syncope. Skin:  Patient denies history of changes in pigmentation, eruptions or pruritus. No easy bruising or bleeding. EENT: no nasal congestion   Cardiovascular ,No chest pain ,No edema ,  Respiration:SOB:  ++,ROCHE :++  Gastrointestinal:No GI bleed ,no melena  ,no hematemesis    Musculoskeletal: no joint pain ,no swelling  Neurological:no , syncope. Denies twitching, convulsions, loss of consciousness or memory. Endocrine:  . No history of goiter, exophthalmos or dryness of skin. The patient has no history of diabetes. Hematopoietic:  No history of bleeding disorders or easy bruising. Rheumatic:  No connective tissue disease history or polyarthritis/inflammatory joint disease. PHYSICAL EXAMINATION:  Vitals:    09/16/22 0932   BP: 125/70   Pulse: 75   Resp: 18   SpO2: 96%     Constitutional: This is a well developed, well nourished 63y.o. year old male who is alert, oriented, cooperative and in no apparent distress. Head was normocephalic and atraumatic. EENT: Mallampati class :  Extraocular muscles intact. External canals are patent and no discharge was appreciated.   Septum was midline,   mucosa was inadvertent computerized transcription errors may be present.

## 2022-09-19 ENCOUNTER — ANESTHESIA EVENT (OUTPATIENT)
Dept: ENDOSCOPY | Age: 62
End: 2022-09-19
Payer: MEDICARE

## 2022-09-19 ASSESSMENT — ENCOUNTER SYMPTOMS: SHORTNESS OF BREATH: 1

## 2022-09-19 NOTE — PROGRESS NOTES
now and the time of your surgery. To provide excellent care, visitors will be limited to two in a room at any given time. Please no children under the age of 15 in the surgical department.

## 2022-09-19 NOTE — ANESTHESIA PRE PROCEDURE
Department of Anesthesiology  Preprocedure Note       Name:  Courtney Dunn   Age:  63 y.o.  :  1960                                          MRN:  [id number]         Date:  2022      Surgeon: Benito Silver):  Francisco Swift MD    Procedure: Procedure(s):  ROBOT/EBUS WF W/ANES. (12:00) NO LABS    Medications prior to admission:   Prior to Admission medications    Medication Sig Start Date End Date Taking? Authorizing Provider   DALIRESP 500 MCG tablet Take 500 mcg by mouth 22   Historical Provider, MD   fluticasone-umeclidin-vilant (TRELEGY ELLIPTA) 100-62.5-25 MCG/INH AEPB Inhale 1 puff into the lungs daily 22   Francisco Swift MD   potassium chloride (KLOR-CON) 10 MEQ extended release tablet TAKE TWO TABLETS BY MOUTH DAILY 22   Ivanna Damico MD   atorvastatin (LIPITOR) 80 MG tablet TAKE ONE TABLET BY MOUTH DAILY 22   Geetha Fernandes MD   diazePAM (VALIUM) 5 MG tablet Take 5 mg by mouth every 6 hours as needed. 3/11/22   Historical Provider, MD   ondansetron (ZOFRAN) 8 MG tablet  22   Historical Provider, MD   medical marijuana Inhale 1 Units into the lungs as needed. Historical Provider, MD   OMEPRAZOLE PO Take 40 mg by mouth daily 1 capsule by mouth daily 21   Historical Provider, MD   albuterol sulfate HFA (PROVENTIL;VENTOLIN;PROAIR) 108 (90 Base) MCG/ACT inhaler Inhale 2 puffs into the lungs every 6 hours as needed for Wheezing    Historical Provider, MD   risperiDONE (RISPERDAL) 0.5 MG tablet Take 0.5 mg by mouth nightly  Patient not taking: Reported on 2022    Historical Provider, MD   zolpidem (AMBIEN) 10 MG tablet Take by mouth nightly as needed for Sleep.     Historical Provider, MD   levothyroxine (SYNTHROID) 75 MCG tablet  18   Historical Provider, MD   tamsulosin (FLOMAX) 0.4 MG capsule  18   Historical Provider, MD   tiZANidine (ZANAFLEX) 4 MG tablet 4 mg every 8 hours as needed  18   Historical Provider, MD   nitroGLYCERIN (NITROSTAT) 0.4 MG SL tablet Place 1 tablet under the tongue every 5 minutes as needed for Chest pain 5/17/16   Asher Brady MD   Sucralfate (CARAFATE PO) Take 1 g by mouth 4 times daily     Historical Provider, MD   FLUoxetine (PROZAC) 20 MG capsule Take 40 mg by mouth daily     Historical Provider, MD   pregabalin (LYRICA) 50 MG capsule Take 150 mg by mouth 3 times daily. Historical Provider, MD       Current medications:    No current facility-administered medications for this encounter. Current Outpatient Medications   Medication Sig Dispense Refill    DALIRESP 500 MCG tablet Take 500 mcg by mouth      fluticasone-umeclidin-vilant (TRELEGY ELLIPTA) 100-62.5-25 MCG/INH AEPB Inhale 1 puff into the lungs daily 1 each 3    potassium chloride (KLOR-CON) 10 MEQ extended release tablet TAKE TWO TABLETS BY MOUTH DAILY 180 tablet 3    atorvastatin (LIPITOR) 80 MG tablet TAKE ONE TABLET BY MOUTH DAILY 90 tablet 3    diazePAM (VALIUM) 5 MG tablet Take 5 mg by mouth every 6 hours as needed.  ondansetron (ZOFRAN) 8 MG tablet       medical marijuana Inhale 1 Units into the lungs as needed.  OMEPRAZOLE PO Take 40 mg by mouth daily 1 capsule by mouth daily      albuterol sulfate HFA (PROVENTIL;VENTOLIN;PROAIR) 108 (90 Base) MCG/ACT inhaler Inhale 2 puffs into the lungs every 6 hours as needed for Wheezing      risperiDONE (RISPERDAL) 0.5 MG tablet Take 0.5 mg by mouth nightly (Patient not taking: Reported on 9/16/2022)      zolpidem (AMBIEN) 10 MG tablet Take by mouth nightly as needed for Sleep.       levothyroxine (SYNTHROID) 75 MCG tablet       tamsulosin (FLOMAX) 0.4 MG capsule       tiZANidine (ZANAFLEX) 4 MG tablet 4 mg every 8 hours as needed       nitroGLYCERIN (NITROSTAT) 0.4 MG SL tablet Place 1 tablet under the tongue every 5 minutes as needed for Chest pain 25 tablet 3    Sucralfate (CARAFATE PO) Take 1 g by mouth 4 times daily       FLUoxetine (PROZAC) 20 MG capsule Take 40 mg by mouth daily       pregabalin (LYRICA) 50 MG capsule Take 150 mg by mouth 3 times daily. Allergies: Allergies   Allergen Reactions    Bactrim [Sulfamethoxazole-Trimethoprim] Other (See Comments)     States makes yeast infection on his penis    Codeine Nausea Only     Pt tolerates Oxycodone       Problem List:    Patient Active Problem List   Diagnosis Code    Pulmonary nodules R91.8    SOB (shortness of breath) R06.02    Tobacco abuse Z72.0    Abnormal stress test R94.39    Hyperlipidemia E78.5    Chest pain R07.9    Displacement of lumbar intervertebral disc without myelopathy M51.26    Degeneration of lumbar or lumbosacral intervertebral disc M51.37    CAD (coronary artery disease) I25.10    Peripheral edema R60.9    Lumbar stenosis M48.061    Lumbar facet arthropathy M47.816    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    Impingement syndrome, shoulder M75.40    Complete tear of left rotator cuff M75.122    Severe sepsis ([facility]) A41.9, R65.20    Acute respiratory failure with hypoxia ([address] J96.01    Multifocal pneumonia J18.9    Chronic obstructive pulmonary disease ([address] J44.9    Community acquired pneumonia J18.9    Pulmonary emphysema ([address] J43.9    Neoplasm of uncertain behavior of skin D48.5    Essential hypertension I10    Syncope and collapse R55    Closed head injury S09.90XA    Acute bronchitis J20.9    Cellulitis L03.90    Cellulitis of right hand L03. 80    Other fatigue R53.83       Past Medical History:        Diagnosis Date    Arthritis 1/2011    SHOULDERS AND KNEES    CAD (coronary artery disease)     Chronic back pain     COPD (chronic obstructive pulmonary disease) ([facility])     GERD (gastroesophageal reflux disease)     ONCE WEEKLY TAKE TUMS    Hyperlipidemia     Hypertension     Kidney stone     Lumbar herniated disc     Chronic Pain    Lymphoma of gastrointestinal tract [facility]) May 2013    Pneumonia     Thyroid disease     overactive thyroid    Vitamin B deficiency 10/02/2021 11:25 PM    HCT 37.3 10/02/2021 11:25 PM    MCV 86.8 10/02/2021 11:25 PM    RDW 15.6 10/02/2021 11:25 PM     10/02/2021 11:25 PM       CMP:   Lab Results   Component Value Date/Time     10/02/2021 11:25 PM    K 3.5 10/02/2021 11:25 PM     10/02/2021 11:25 PM    CO2 24 10/02/2021 11:25 PM    BUN 13 10/02/2021 11:25 PM    CREATININE 0.8 09/07/2022 02:37 PM    CREATININE 0.9 10/02/2021 11:25 PM    GFRAA >60 09/07/2022 02:37 PM    GFRAA >60 08/28/2012 06:00 AM    AGRATIO 1.4 10/02/2021 11:25 PM    LABGLOM >60 09/07/2022 02:37 PM    GLUCOSE 133 10/02/2021 11:25 PM    GLUCOSE 119 06/09/2017 01:10 PM    PROT 6.8 10/02/2021 11:25 PM    PROT 6.2 06/09/2017 01:10 PM    CALCIUM 9.4 10/02/2021 11:25 PM    BILITOT 0.7 10/02/2021 11:25 PM    ALKPHOS 123 10/02/2021 11:25 PM    AST 18 10/02/2021 11:25 PM    ALT 15 10/02/2021 11:25 PM       POC Tests: No results for input(s): POCGLU, POCNA, POCK, POCCL, POCBUN, POCHEMO, POCHCT in the last 72 hours.     Coags:   Lab Results   Component Value Date/Time    PROTIME 12.1 12/25/2018 10:20 AM    INR 1.06 12/25/2018 10:20 AM    APTT 29.8 05/11/2016 12:45 PM       HCG (If Applicable): No results found for: PREGTESTUR, PREGSERUM, HCG, HCGQUANT     ABGs:   Lab Results   Component Value Date/Time    PHART 7.413 01/09/2018 05:54 AM    PO2ART 67.1 01/09/2018 05:54 AM    BPU7VWB 54.4 01/09/2018 05:54 AM    POC7EDG 33.9 01/09/2018 05:54 AM    BEART 7.9 01/09/2018 05:54 AM    Q3ZTQWVM 93.2 01/09/2018 05:54 AM        Type & Screen (If Applicable):  No results found for: LABABO, LABRH    Drug/Infectious Status (If Applicable):  No results found for: HIV, HEPCAB    COVID-19 Screening (If Applicable): No results found for: COVID19        Anesthesia Evaluation  Patient summary reviewed and Nursing notes reviewed no history of anesthetic complications:   Airway: Mallampati: II  TM distance: >3 FB   Neck ROM: full  Mouth opening: > = 3 FB   Dental:          Pulmonary:   (+) pneumonia:  COPD:  shortness of breath:                             Cardiovascular:    (+) hypertension:, CAD:,                   Neuro/Psych:   Negative Neuro/Psych ROS              GI/Hepatic/Renal: Neg GI/Hepatic/Renal ROS  (+) GERD:,      (-) liver disease and no renal disease       Endo/Other:    (+) hypothyroidism::., malignancy/cancer (lymphoma). (-) diabetes mellitus               Abdominal:             Vascular: negative vascular ROS. Other Findings:           Anesthesia Plan      general     ASA 3     (I discussed with the patient the risks and benefits of PIV, general anesthesia, IV Narcotics, PACU. All questions were answered the patient agrees with the plan)  Induction: intravenous. MIPS: Prophylactic antiemetics administered. Anesthetic plan and risks discussed with patient. Plan discussed with CRNA.                     Jade Sotomayor MD   9/19/2022

## 2022-09-20 ENCOUNTER — ANESTHESIA (OUTPATIENT)
Dept: ENDOSCOPY | Age: 62
End: 2022-09-20
Payer: MEDICARE

## 2022-09-20 ENCOUNTER — HOSPITAL ENCOUNTER (OUTPATIENT)
Age: 62
Setting detail: OUTPATIENT SURGERY
Discharge: HOME | End: 2022-09-20
Attending: INTERNAL MEDICINE | Admitting: INTERNAL MEDICINE
Payer: MEDICARE

## 2022-09-20 ENCOUNTER — APPOINTMENT (OUTPATIENT)
Dept: GENERAL RADIOLOGY | Age: 62
End: 2022-09-20
Attending: INTERNAL MEDICINE
Payer: MEDICARE

## 2022-09-20 VITALS
SYSTOLIC BLOOD PRESSURE: 104 MMHG | TEMPERATURE: 97.7 F | HEART RATE: 84 BPM | RESPIRATION RATE: 24 BRPM | WEIGHT: 179 LBS | BODY MASS INDEX: 27.13 KG/M2 | DIASTOLIC BLOOD PRESSURE: 63 MMHG | OXYGEN SATURATION: 94 % | HEIGHT: 68 IN

## 2022-09-20 PROCEDURE — 87015 SPECIMEN INFECT AGNT CONCNTJ: CPT

## 2022-09-20 PROCEDURE — 2720000010 HC SURG SUPPLY STERILE: Performed by: INTERNAL MEDICINE

## 2022-09-20 PROCEDURE — 7100000011 HC PHASE II RECOVERY - ADDTL 15 MIN: Performed by: INTERNAL MEDICINE

## 2022-09-20 PROCEDURE — 3700000000 HC ANESTHESIA ATTENDED CARE: Performed by: INTERNAL MEDICINE

## 2022-09-20 PROCEDURE — 76000 FLUOROSCOPY <1 HR PHYS/QHP: CPT

## 2022-09-20 PROCEDURE — 87116 MYCOBACTERIA CULTURE: CPT

## 2022-09-20 PROCEDURE — 3609011100 HC BRONCHOSCOPY BRUSHINGS: Performed by: INTERNAL MEDICINE

## 2022-09-20 PROCEDURE — 88172 CYTP DX EVAL FNA 1ST EA SITE: CPT

## 2022-09-20 PROCEDURE — 3609010800 HC BRONCHOSCOPY ALVEOLAR LAVAGE: Performed by: INTERNAL MEDICINE

## 2022-09-20 PROCEDURE — 2580000003 HC RX 258: Performed by: INTERNAL MEDICINE

## 2022-09-20 PROCEDURE — 88173 CYTOPATH EVAL FNA REPORT: CPT

## 2022-09-20 PROCEDURE — 7100000001 HC PACU RECOVERY - ADDTL 15 MIN: Performed by: INTERNAL MEDICINE

## 2022-09-20 PROCEDURE — 2709999900 HC NON-CHARGEABLE SUPPLY: Performed by: INTERNAL MEDICINE

## 2022-09-20 PROCEDURE — 31627 NAVIGATIONAL BRONCHOSCOPY: CPT | Performed by: INTERNAL MEDICINE

## 2022-09-20 PROCEDURE — 31628 BRONCHOSCOPY/LUNG BX EACH: CPT | Performed by: INTERNAL MEDICINE

## 2022-09-20 PROCEDURE — 7100000000 HC PACU RECOVERY - FIRST 15 MIN: Performed by: INTERNAL MEDICINE

## 2022-09-20 PROCEDURE — 87205 SMEAR GRAM STAIN: CPT

## 2022-09-20 PROCEDURE — 31624 DX BRONCHOSCOPE/LAVAGE: CPT | Performed by: INTERNAL MEDICINE

## 2022-09-20 PROCEDURE — 88104 CYTOPATH FL NONGYN SMEARS: CPT

## 2022-09-20 PROCEDURE — 2500000003 HC RX 250 WO HCPCS: Performed by: NURSE ANESTHETIST, CERTIFIED REGISTERED

## 2022-09-20 PROCEDURE — 88112 CYTOPATH CELL ENHANCE TECH: CPT

## 2022-09-20 PROCEDURE — 3609027000 HC BRONCHOSCOPY: Performed by: INTERNAL MEDICINE

## 2022-09-20 PROCEDURE — 87102 FUNGUS ISOLATION CULTURE: CPT

## 2022-09-20 PROCEDURE — 7100000010 HC PHASE II RECOVERY - FIRST 15 MIN: Performed by: INTERNAL MEDICINE

## 2022-09-20 PROCEDURE — 31652 BRONCH EBUS SAMPLNG 1/2 NODE: CPT | Performed by: INTERNAL MEDICINE

## 2022-09-20 PROCEDURE — 6360000002 HC RX W HCPCS: Performed by: NURSE ANESTHETIST, CERTIFIED REGISTERED

## 2022-09-20 PROCEDURE — 88177 CYTP FNA EVAL EA ADDL: CPT

## 2022-09-20 PROCEDURE — 88342 IMHCHEM/IMCYTCHM 1ST ANTB: CPT

## 2022-09-20 PROCEDURE — 87206 SMEAR FLUORESCENT/ACID STAI: CPT

## 2022-09-20 PROCEDURE — 3609011900 HC BRONCHOSCOPY NEEDLE BX TRACHEA MAIN STEM&/BRON: Performed by: INTERNAL MEDICINE

## 2022-09-20 PROCEDURE — 71045 X-RAY EXAM CHEST 1 VIEW: CPT

## 2022-09-20 PROCEDURE — 31623 DX BRONCHOSCOPE/BRUSH: CPT | Performed by: INTERNAL MEDICINE

## 2022-09-20 PROCEDURE — 87070 CULTURE OTHR SPECIMN AEROBIC: CPT

## 2022-09-20 PROCEDURE — 3700000001 HC ADD 15 MINUTES (ANESTHESIA): Performed by: INTERNAL MEDICINE

## 2022-09-20 PROCEDURE — 88305 TISSUE EXAM BY PATHOLOGIST: CPT

## 2022-09-20 RX ORDER — SODIUM CHLORIDE 9 MG/ML
25 INJECTION, SOLUTION INTRAVENOUS PRN
Status: DISCONTINUED | OUTPATIENT
Start: 2022-09-20 | End: 2022-09-20 | Stop reason: HOSPADM

## 2022-09-20 RX ORDER — ROCURONIUM BROMIDE 10 MG/ML
INJECTION, SOLUTION INTRAVENOUS PRN
Status: DISCONTINUED | OUTPATIENT
Start: 2022-09-20 | End: 2022-09-20 | Stop reason: SDUPTHER

## 2022-09-20 RX ORDER — MIDAZOLAM HYDROCHLORIDE 1 MG/ML
1 INJECTION INTRAMUSCULAR; INTRAVENOUS EVERY 5 MIN PRN
Status: DISCONTINUED | OUTPATIENT
Start: 2022-09-20 | End: 2022-09-20 | Stop reason: HOSPADM

## 2022-09-20 RX ORDER — PROPOFOL 10 MG/ML
INJECTION, EMULSION INTRAVENOUS PRN
Status: DISCONTINUED | OUTPATIENT
Start: 2022-09-20 | End: 2022-09-20 | Stop reason: SDUPTHER

## 2022-09-20 RX ORDER — ONDANSETRON 2 MG/ML
4 INJECTION INTRAMUSCULAR; INTRAVENOUS EVERY 10 MIN PRN
Status: DISCONTINUED | OUTPATIENT
Start: 2022-09-20 | End: 2022-09-20 | Stop reason: HOSPADM

## 2022-09-20 RX ORDER — OXYCODONE HYDROCHLORIDE 5 MG/1
5 TABLET ORAL PRN
Status: DISCONTINUED | OUTPATIENT
Start: 2022-09-20 | End: 2022-09-20 | Stop reason: HOSPADM

## 2022-09-20 RX ORDER — LIDOCAINE HYDROCHLORIDE 20 MG/ML
INJECTION, SOLUTION INFILTRATION; PERINEURAL PRN
Status: DISCONTINUED | OUTPATIENT
Start: 2022-09-20 | End: 2022-09-20 | Stop reason: SDUPTHER

## 2022-09-20 RX ORDER — OXYCODONE HYDROCHLORIDE 5 MG/1
10 TABLET ORAL PRN
Status: DISCONTINUED | OUTPATIENT
Start: 2022-09-20 | End: 2022-09-20 | Stop reason: HOSPADM

## 2022-09-20 RX ORDER — HYDRALAZINE HYDROCHLORIDE 20 MG/ML
5 INJECTION INTRAMUSCULAR; INTRAVENOUS
Status: DISCONTINUED | OUTPATIENT
Start: 2022-09-20 | End: 2022-09-20 | Stop reason: HOSPADM

## 2022-09-20 RX ORDER — MEPERIDINE HYDROCHLORIDE 25 MG/ML
12.5 INJECTION INTRAMUSCULAR; INTRAVENOUS; SUBCUTANEOUS EVERY 5 MIN PRN
Status: DISCONTINUED | OUTPATIENT
Start: 2022-09-20 | End: 2022-09-20 | Stop reason: HOSPADM

## 2022-09-20 RX ORDER — SODIUM CHLORIDE, SODIUM LACTATE, POTASSIUM CHLORIDE, CALCIUM CHLORIDE 600; 310; 30; 20 MG/100ML; MG/100ML; MG/100ML; MG/100ML
INJECTION, SOLUTION INTRAVENOUS CONTINUOUS
Status: DISCONTINUED | OUTPATIENT
Start: 2022-09-20 | End: 2022-09-20 | Stop reason: HOSPADM

## 2022-09-20 RX ORDER — DEXAMETHASONE SODIUM PHOSPHATE 4 MG/ML
INJECTION, SOLUTION INTRA-ARTICULAR; INTRALESIONAL; INTRAMUSCULAR; INTRAVENOUS; SOFT TISSUE PRN
Status: DISCONTINUED | OUTPATIENT
Start: 2022-09-20 | End: 2022-09-20 | Stop reason: SDUPTHER

## 2022-09-20 RX ORDER — DIPHENHYDRAMINE HYDROCHLORIDE 50 MG/ML
12.5 INJECTION INTRAMUSCULAR; INTRAVENOUS
Status: DISCONTINUED | OUTPATIENT
Start: 2022-09-20 | End: 2022-09-20 | Stop reason: HOSPADM

## 2022-09-20 RX ORDER — SODIUM CHLORIDE 0.9 % (FLUSH) 0.9 %
5-40 SYRINGE (ML) INJECTION PRN
Status: DISCONTINUED | OUTPATIENT
Start: 2022-09-20 | End: 2022-09-20 | Stop reason: HOSPADM

## 2022-09-20 RX ORDER — ONDANSETRON 2 MG/ML
INJECTION INTRAMUSCULAR; INTRAVENOUS PRN
Status: DISCONTINUED | OUTPATIENT
Start: 2022-09-20 | End: 2022-09-20 | Stop reason: SDUPTHER

## 2022-09-20 RX ORDER — SODIUM CHLORIDE 0.9 % (FLUSH) 0.9 %
5-40 SYRINGE (ML) INJECTION EVERY 12 HOURS SCHEDULED
Status: DISCONTINUED | OUTPATIENT
Start: 2022-09-20 | End: 2022-09-20 | Stop reason: HOSPADM

## 2022-09-20 RX ADMIN — SODIUM CHLORIDE, POTASSIUM CHLORIDE, SODIUM LACTATE AND CALCIUM CHLORIDE: 600; 310; 30; 20 INJECTION, SOLUTION INTRAVENOUS at 12:17

## 2022-09-20 RX ADMIN — ROCURONIUM BROMIDE 50 MG: 10 INJECTION, SOLUTION INTRAVENOUS at 12:20

## 2022-09-20 RX ADMIN — ONDANSETRON HYDROCHLORIDE 4 MG: 2 INJECTION, SOLUTION INTRAMUSCULAR; INTRAVENOUS at 13:20

## 2022-09-20 RX ADMIN — LIDOCAINE HYDROCHLORIDE 60 MG: 20 INJECTION, SOLUTION INFILTRATION; PERINEURAL at 12:20

## 2022-09-20 RX ADMIN — ROCURONIUM BROMIDE 10 MG: 10 INJECTION, SOLUTION INTRAVENOUS at 13:20

## 2022-09-20 RX ADMIN — DEXAMETHASONE SODIUM PHOSPHATE 8 MG: 4 INJECTION, SOLUTION INTRAMUSCULAR; INTRAVENOUS at 13:20

## 2022-09-20 RX ADMIN — SUGAMMADEX 200 MG: 100 INJECTION, SOLUTION INTRAVENOUS at 14:20

## 2022-09-20 RX ADMIN — PROPOFOL 200 MG: 10 INJECTION, EMULSION INTRAVENOUS at 12:20

## 2022-09-20 ASSESSMENT — PAIN - FUNCTIONAL ASSESSMENT: PAIN_FUNCTIONAL_ASSESSMENT: 0-10

## 2022-09-20 ASSESSMENT — PAIN DESCRIPTION - DESCRIPTORS: DESCRIPTORS: DULL;THROBBING

## 2022-09-20 NOTE — OP NOTE
Operative Note      Patient: Ayana Vick  YOB: 1960  MRN: [id number]    Date of Procedure: 9/20/2022    Pre-Op Diagnosis: LUNG NODULE    Post-Op Diagnosis: Same       Procedure(s):  1-ROBOTIC Bronch ,left upper lobe X 8 biopsy   2-BRONCHOSCOPY/TRANSBRONCHIAL NEEDLE BIOPSY ROBOTIC JERROD   3-BRONCHOSCOPY BRUSHINGS ROBOTIC JERROD   4-BRONCHOSCOPY ALVEOLAR LAVAGE  5- EBUS _FNA station 11 L    Today, we have discussed the indications, risks, benefits, and side effects of external beam radiotherapy and its use in the management of bone metastases. We discussed the natural history of the disease and also discussed the rationale of radiation treatment after surgery. PROCEDURE: Fiberoptic bronchoscopy with endobronchial ultrasound-guided biopsy of lymph nodes and robotic bronchoscopy-guided JERROD  TBBx, brushes and BAL. DESCRIPTION OF PROCEDURE: Informed consent was obtained from the patient after explaining the risks and benefits. A time out was taken. Total intravenous anesthesia was kindly provided by the anesthetist.     The scope was passed with ease via size ET tube. Direct visualization of the lymph nodes was obtained using endobronchial ultrasound (EBUS) guidance. A complete ultrasound lymph node exam was performed for lymph node stations 4, 7, 10 and 11. The following lymph nodes were subjected to EBUS guided biopsy using standard technique and in the following order:    1. Station L 11  (approximately 1 cm in short axis) 3 passes    Subsequently, a standard bronchoscope was used to perform a complete airway inspection. Patent airways with no evidence of endobronchial lesion. Scattered clear secretion with normal mucosa. Robotic bronchoscopy protocol CT chest obtained and preoperative planning was performed creating pathways to the JERROD  lesion.  I then introduced my robotic bronchoscopy/EMN scope through the ETT and successfully completed registration, with good correlation between robotic mapping and bronchoscopic imaging. With the assistance of fused navigation, I advanced my scope all the way to the 1 cm  mass. The tip of the working channel sat within . 1 cm of the mass. I confirmed positioning with fluoroscopy. After anchoring in the positioning which was most favorable to obtain biopsies, confirmed positioning with radial probe ultrasonography. Under fluro FNA, Brushes, transbronchial biopsies and BAL were performed. 1.  Washings were obtained from throughout the airways. 2.  Brushings were obtained in the JERROD  (3 brushes)  3. BAL was obtained from the JERROD  (10 cc)  4. Transbronchial biopsies were obtained from JERROD    5. FNA was obtained from station L 11    After confirmation of hemostasis, I withdrew my scope all the way out. The patient tolerated the procedure well. Estimated blood loss was less than 5 ml. Recovery will be per the anesthesia team.      RENATA (Rapid On-Site Examination) preliminary report No malignancy     FOLLOW UP:  is with me in approximately three to five days. Patient is instructed to call with concerns and if follow up has not already been scheduled. In the event of severe symptoms or if the patient is unable to reach my office, instructions are given to proceed to the emergency department.               Guera Melara MD      [facility]  Radiation Oncology

## 2022-09-20 NOTE — H&P
P  [facility]                                               Pulmonary Clinic Consult     I had the pleasure of seeing  Ada Rudolph     HISTORY OF PRESENT ILLNESS:    Ada Rudolph is a 63y.o. year old  Who start smoking at age  17  And increase gradually   1.5 pack daily ,he still smoke    He was diagnosed 10 year,he received pills as he states ,he was done with therapy long time and was told he is in remission     The Patient comes in with SOB that has been going on  for the last few years Associated with .cough and he usually has clear sputum    He states that it get worse with exercise or walking long distance and he can walk less 300 feet- 1/2 And go 1-2 flight of stairs before get short winded        Sleep history :  Snoring yes   Feel tired during the day yes   Wake up fresh no   excessive daytime sleepiness yes             ALLERGIES:    Allergies   Allergen Reactions    Bactrim [Sulfamethoxazole-Trimethoprim] Other (See Comments)     States makes yeast infection on his penis    Codeine Nausea Only     Pt tolerates Oxycodone       PAST MEDICAL HISTORY:       Diagnosis Date    Arthritis 1/2011    SHOULDERS AND KNEES    CAD (coronary artery disease)     Chronic back pain     COPD (chronic obstructive pulmonary disease) (HCC)     GERD (gastroesophageal reflux disease)     ONCE WEEKLY TAKE TUMS    Hyperlipidemia     Hypertension     Kidney stone     Lumbar herniated disc     Chronic Pain    Lymphoma of gastrointestinal tract ([facility] [address] May 2013    Pneumonia     Thyroid disease     overactive thyroid    Vitamin B deficiency        MEDICATIONS:   Current Facility-Administered Medications   Medication Dose Route Frequency Provider Last Rate Last Admin    lactated ringers infusion   IntraVENous Continuous Sury Nguyen MD   New Bag at 09/20/22 1217    sodium chloride flush 0.9 % injection 5-40 mL  5-40 mL IntraVENous 2 times per day Timothy Segal MD        sodium chloride flush 0.9 % injection 5-40 mL  5-40 mL IntraVENous PRN Rodrick Pitt MD        0.9 % sodium chloride infusion  25 mL IntraVENous PRN Rodrick Pitt MD        meperidine (DEMEROL) injection 12.5 mg  12.5 mg IntraVENous Q5 Min PRN Rodrick Pitt MD        HYDROmorphone (DILAUDID) injection 0.25 mg  0.25 mg IntraVENous Q5 Min PRN Rodrick Pitt MD        HYDROmorphone (DILAUDID) injection 0.5 mg  0.5 mg IntraVENous Q5 Min PRN Rodrick Pitt MD        oxyCODONE (ROXICODONE) immediate release tablet 5 mg  5 mg Oral PRN Rodrick Pitt MD        Or    oxyCODONE (ROXICODONE) immediate release tablet 10 mg  10 mg Oral PRN Rodrick Pitt MD        ondansetron [facility]) injection 4 mg  4 mg IntraVENous Q10 Min PRN Rodrick Pitt MD        midazolam (VERSED) injection 1 mg  1 mg IntraVENous Q5 Min PRN Rodrick Pitt MD        diphenhydrAMINE (BENADRYL) injection 12.5 mg  12.5 mg IntraVENous Once PRN Rodrick Pitt MD        hydrALAZINE (APRESOLINE) injection 5 mg  5 mg IntraVENous Q15 Min PRN Rodrcik Pitt MD           SOCIAL AND OCCUPATIONAL HEALTH:  Social History     Tobacco Use   Smoking Status Every Day    Packs/day: 1.00    Years: 45.00    Pack years: 45.00    Types: Cigarettes   Smokeless Tobacco Never     TB :  Pets   Industrial exposure:  Birds :    SURGICAL HISTORY:   Past Surgical History:   Procedure Laterality Date    CARDIAC CATHETERIZATION  2/20/2015    stent placed    CARPAL TUNNEL RELEASE      chioma.     CERVICAL DISCECTOMY  02/08/2017    Anterior discectomy, anterior foraminotomy C5/C6 and C6-7    CYSTOSCOPY  6/5/15    CYSTOSCOPY N/A 02/09/2018    DIAGNOSTIC CARDIAC CATH LAB PROCEDURE      ENDOSCOPY, COLON, DIAGNOSTIC      HERNIA REPAIR      KNEE ARTHROSCOPY      LITHOTRIPSY      LITHOTRIPSY Left 11/5/15    OTHER SURGICAL HISTORY  1/20/11    video arthroscopy of right shoulder with subcromial d ecompression and arthroscopic sarbjit    OTHER SURGICAL HISTORY  5/1/13    Excision Lesion Right Angle of Mouth    SHOULDER SURGERY      Left Shoulder X 3; right Shoulder X 5    SHOULDER SURGERY Left 05/30/2017    UPPER GASTROINTESTINAL ENDOSCOPY  07/26/2012    UPPER GASTROINTESTINAL ENDOSCOPY N/A 6/21/2019    EGD BIOPSY performed by Luisa Lucas DO at [address]:   Lung cancer:wife has Lung cancer   DVT or PE no     REVIEW OF SYSTEMS:  Constitutional: General health is good . There has been no weight changes. No fevers, fatigue or weakness. Head: Patient denies any history of trauma, convulsive disorder or syncope. Skin:  Patient denies history of changes in pigmentation, eruptions or pruritus. No easy bruising or bleeding. EENT: no nasal congestion   Cardiovascular ,No chest pain ,No edema ,  Respiration:SOB:  ++,ROCHE :++  Gastrointestinal:No GI bleed ,no melena  ,no hematemesis    Musculoskeletal: no joint pain ,no swelling  Neurological:no , syncope. Denies twitching, convulsions, loss of consciousness or memory. Endocrine:  . No history of goiter, exophthalmos or dryness of skin. The patient has no history of diabetes. Hematopoietic:  No history of bleeding disorders or easy bruising. Rheumatic:  No connective tissue disease history or polyarthritis/inflammatory joint disease. PHYSICAL EXAMINATION:  Vitals:    09/20/22 1515   BP: 104/63   Pulse: 84   Resp: 24   Temp:    SpO2: 94%     Constitutional: This is a well developed, well nourished 63y.o. year old male who is alert, oriented, cooperative and in no apparent distress. Head was normocephalic and atraumatic. EENT: Mallampati class :  Extraocular muscles intact. External canals are patent and no discharge was appreciated. Septum was midline,   mucosa was without erythema, exudates or cobblestoning. No thrush was noted. Neck: Supple without thyromegaly. No elevated JVP. Trachea was midline. No carotid bruits were auscultated.     Respiratory: Rhonchi scattered     Cardiovascular: Regular without murmur, clicks, gallops or rubs. There is no left or right ventricular heave. Pulses:  Carotid, radial and femoral pulses were equally bilaterally. Abdomen: Slightly rounded and soft without organomegaly. No rebound, rigidity or guarding was appreciated. Lymphatic: No lymphadenopathy. Musculoskeletal: no joint deformity . Extremities: no edema   Skin:  Warm and dry. Good color, turgor and pigmentation. No lesions or scars. Neurological/Psychiatric: The patient's general behavior, level of consciousness, thought content and emotional status is normal.  Cranial nerves II-XII are intact. DATA:   PFT   IMPRESSION:    1-Lung mass left side X 2   2-h/o Lymphoma  3-COPD  4-KELLIE               PLAN:      -.discuss option of biopsy ,CT guided vs doing Bronch/ebus and assess l;ymph node as well as robotic to attempt biopsy left upper lobe mass ,if not diagnostic then may consider CT guided vs ct guided ,he want to proceed with bronch ebus/robotic  All risks, benefits, alternatives and potential complications explained thoroughly including, but not limited to, bleeding, infection, lung injury, pneumothorax . , heart attack, prolonged ventilation,  and even death, and the patient agrees to proceed. -PFT   -PET scan   -start Trelegy   sleep study down the road . zoby    Flu and Pneumovax     Thank you for allowing me to participate in [facility]. I will keep following with you ,should you have any concerns ,please contact us at [facility] pulmonary office     Sincerely,        Mauro Richardson MD  Pulmonary & Critical Care Medicine     NOTE: This report was transcribed using voice recognition software. Every effort was made to ensure accuracy; however, inadvertent computerized transcription errors may be present.

## 2022-09-20 NOTE — DISCHARGE INSTRUCTIONS
PATIENT INSTRUCTIONS  POST-SEDATION    Rubin Arteaga        IMMEDIATELY FOLLOWING PROCEDURE:     1) Do not drive or operate machinery for the first twenty four hours after surgery. 2) Do not make any important decisions for twenty four hours after surgery or while taking narcotic pain medications or sedatives. 3) You should NOT BE LEFT UNATTENDED OR ALONE. A responsible adult should be with you for the rest of the day of your procedure and also during the night for your protection and safety. 4) You may experience some light headedness. Rest at home with activity as tolerated. You may not need to go to bed, but it is important to rest for the next 24 hours. You should not engage in athletic sports such as basketball, volleyball, jogging, skating, or activities requiring refined motor skills for 24 hours. 5) If you develop intractable nausea and vomiting or a severe headache please notify your doctor immediately. 6) You are not expected to have any fever, but if you feel warm, take your temperature. If you have a fever 101 degrees or higher, call your doctor. If you have had an Endoscopy:     ** Eat lightly for your first meal and gradually resume your normal / prescribed diet. DO NOT eat or drink until your gag reflex returns. ** If you have a sore throat you may use lozenges, or salt water gargles. ** If you have had a colonoscopy, do not expect a normal bowel movement for approximately three days due to the cleansing of the large intestine prior to colonoscopy. ONCE YOU ARE HOME, IF YOU SHOULD HAVE:    Difficulty in breathing, persistent nausea or vomiting, bleeding you feel is excessive, or pain that is unusual, increased abdominal bloating, or any swelling, fever / chills, call your physician. If you cannot contact your physician, but feel that your signs and symptoms need a physician's attention, go to the Emergency Department.       FOLLOW-UP:      Please follow up with Dr. Alina Corbin as scheduled. Call Dr. Flor Nguyen if there are any respiratory concerns. [phone]    BRONCHOSCOPY:    1) Nothing to eat or drink for 2 hours after procedure. Then start with sips of water, if no difficulty, may drink and eat. 2) Go home and rest for the rest of the day after procedure. 3) May return to normal activity the next day after procedure. 4) You may cough up a few specks of blood, but anything more than specks, (1) one tablespoon or greater, call your physician. If unavailable, go to the nearest emergency room.     5) Remember to report any of the following symptoms to your physician:      -  Increased difficulty in breathing      -  Chest discomfort      -  Coughing up of blood (as noted above)      -  Fever (temperature greater than 100 degrees F. or when associated with shaking, chills, or shivering)      -  Other unusual symptoms that concern you

## 2022-09-20 NOTE — ANESTHESIA POSTPROCEDURE EVALUATION
Department of Anesthesiology  Postprocedure Note    Patient: Sly Zaman  MRN: [id number]  YOB: 1960  Date of evaluation: 9/20/2022      Procedure Summary     Date: 09/20/22 Room / Location: [place] [address] / [facility]    Anesthesia Start: 1845 Anesthesia Stop: 3739    Procedures:       ROBOT/EBUS WF W/ANES. (12:00) NO LABS      BRONCHOSCOPY/TRANSBRONCHIAL NEEDLE BIOPSY ROBOTIC      BRONCHOSCOPY BRUSHINGS ROBOTIC      BRONCHOSCOPY ALVEOLAR LAVAGE Diagnosis:       Lung nodule      (LUNG NODULE)    Surgeons: Trevor Riggs MD Responsible Provider: Sofi Mcdonald MD    Anesthesia Type: general ASA Status: 3          Anesthesia Type: No value filed.     Jeanette Phase I: Jeanette Score: 8    Jeanette Phase II: Jeanette Score: 10      Anesthesia Post Evaluation    Patient location during evaluation: PACU  Level of consciousness: awake  Airway patency: patent  Nausea & Vomiting: no nausea  Complications: no  Cardiovascular status: blood pressure returned to baseline  Respiratory status: acceptable  Hydration status: euvolemic

## 2022-09-22 ENCOUNTER — HOSPITAL ENCOUNTER (OUTPATIENT)
Dept: PET IMAGING | Age: 62
Discharge: HOME | End: 2022-09-22
Payer: MEDICARE

## 2022-09-22 DIAGNOSIS — R91.1 LUNG NODULE: ICD-10-CM

## 2022-09-22 LAB
CULTURE, RESPIRATORY: NORMAL
GRAM STAIN RESULT: NORMAL

## 2022-09-22 PROCEDURE — 3430000000 HC RX DIAGNOSTIC RADIOPHARMACEUTICAL: Performed by: INTERNAL MEDICINE

## 2022-09-22 PROCEDURE — A9552 F18 FDG: HCPCS | Performed by: INTERNAL MEDICINE

## 2022-09-22 PROCEDURE — 78815 PET IMAGE W/CT SKULL-THIGH: CPT

## 2022-09-22 RX ORDER — FLUDEOXYGLUCOSE F 18 200 MCI/ML
16.43 INJECTION, SOLUTION INTRAVENOUS
Status: COMPLETED | OUTPATIENT
Start: 2022-09-22 | End: 2022-09-22

## 2022-09-22 RX ADMIN — FLUDEOXYGLUCOSE F 18 16.43 MILLICURIE: 200 INJECTION, SOLUTION INTRAVENOUS at 07:48

## 2022-09-23 ENCOUNTER — TELEPHONE (OUTPATIENT)
Dept: PULMONOLOGY | Age: 62
End: 2022-09-23

## 2022-09-23 ENCOUNTER — HOSPITAL ENCOUNTER (OUTPATIENT)
Age: 62
Discharge: HOME | End: 2022-09-23
Payer: MEDICARE

## 2022-09-23 ENCOUNTER — HOSPITAL ENCOUNTER (OUTPATIENT)
Dept: GENERAL RADIOLOGY | Age: 62
Discharge: HOME | End: 2022-09-23
Payer: MEDICARE

## 2022-09-23 DIAGNOSIS — R06.02 SOB (SHORTNESS OF BREATH): ICD-10-CM

## 2022-09-23 DIAGNOSIS — R06.02 SOB (SHORTNESS OF BREATH): Primary | ICD-10-CM

## 2022-09-23 PROCEDURE — 71046 X-RAY EXAM CHEST 2 VIEWS: CPT

## 2022-09-23 RX ORDER — DOXYCYCLINE HYCLATE 100 MG
100 TABLET ORAL 2 TIMES DAILY
Qty: 10 TABLET | Refills: 0 | Status: SHIPPED | OUTPATIENT
Start: 2022-09-23 | End: 2022-09-28

## 2022-09-23 RX ORDER — PREDNISONE 20 MG/1
20 TABLET ORAL DAILY
Qty: 5 TABLET | Refills: 0 | Status: SHIPPED | OUTPATIENT
Start: 2022-09-23 | End: 2022-09-28

## 2022-09-23 NOTE — PROGRESS NOTES
Talked to patient and his wife   He seems he has fever/sweating  and SOB and vomiting   I advised to come ER and have work up   Also discuss about biopsy results ,howevere will take care of this issue later /CT guided biopsy plan later when feels better

## 2022-09-23 NOTE — TELEPHONE ENCOUNTER
Received call from pt wife who stated since pt has had procedure on Tuesday 09/20/2022 and since then he has had trouble breathing, fever, chills and sweats. Pt breathing is worse when laying down. Unable to get relief by using rescue inhaler    Do you have the following symptoms? Shortness of Breath  yes  Wheezing  no  Cough  yes  Cough Characteristics:  Productive    yes  Sputum Color    clear / foamy  Hemoptysis     Consistency of sputum   thick     Fever:    yes    Temp:unable to record  Chills/Sweats:  yes  What other symptoms are you having?:  weakness    How long have you had these symptoms? Tuesday after procedure    [facility]: [facility]    Have you been vaccinated for covid? Yes  Have you received a booster vaccine? No          Review medications and allergies: Allergies? Currently on Antibiotics? (Drug/Dose/Frequency and how long on?) no        Systemic Steroids? (Drug/Dose/Frequency and how long on?) no     Last sick call taken on 05/21/2019. Meds prescribed tessalon pearls and gareth BROWNING, by Dr Zachery Jimenez 09/16/2022 with Dr. Charles Wilkins:    1-Lung mass left side X 2   2-h/o Lymphoma  3-COPD  4-KELLIE                PLAN:      -.discuss option of biopsy ,CT guided vs doing Bronch/ebus and assess l;ymph node as well as robotic to attempt biopsy left upper lobe mass ,if not diagnostic then may consider CT guided vs ct guided ,he want to proceed with bronch ebus/robotic  All risks, benefits, alternatives and potential complications explained thoroughly including, but not limited to, bleeding, infection, lung injury, pneumothorax . , heart attack, prolonged ventilation,  and even death, and the patient agrees to proceed.      -PFT   -PET scan   -start [facility]   sleep study down the road

## 2022-09-27 ENCOUNTER — APPOINTMENT (OUTPATIENT)
Dept: CT IMAGING | Age: 62
End: 2022-09-27
Payer: MEDICARE

## 2022-09-27 ENCOUNTER — HOSPITAL ENCOUNTER (EMERGENCY)
Age: 62
Discharge: HOME | End: 2022-09-28
Attending: STUDENT IN AN ORGANIZED HEALTH CARE EDUCATION/TRAINING PROGRAM
Payer: MEDICARE

## 2022-09-27 ENCOUNTER — APPOINTMENT (OUTPATIENT)
Dept: GENERAL RADIOLOGY | Age: 62
End: 2022-09-27
Payer: MEDICARE

## 2022-09-27 DIAGNOSIS — J18.9 PNEUMONIA OF BOTH LOWER LOBES DUE TO INFECTIOUS ORGANISM: ICD-10-CM

## 2022-09-27 DIAGNOSIS — E87.6 HYPOKALEMIA: ICD-10-CM

## 2022-09-27 DIAGNOSIS — R06.02 SHORTNESS OF BREATH: Primary | ICD-10-CM

## 2022-09-27 DIAGNOSIS — R07.81 PLEURITIC CHEST PAIN: ICD-10-CM

## 2022-09-27 DIAGNOSIS — E83.42 HYPOMAGNESEMIA: ICD-10-CM

## 2022-09-27 DIAGNOSIS — R11.2 NON-INTRACTABLE VOMITING WITH NAUSEA, UNSPECIFIED VOMITING TYPE: ICD-10-CM

## 2022-09-27 LAB
A/G RATIO: 1.2 (ref 1.1–2.2)
ALBUMIN SERPL-MCNC: 3.7 G/DL (ref 3.4–5)
ALP BLD-CCNC: 125 U/L (ref 40–129)
ALT SERPL-CCNC: 14 U/L (ref 10–40)
ANION GAP SERPL CALCULATED.3IONS-SCNC: 15 MMOL/L (ref 3–16)
AST SERPL-CCNC: 10 U/L (ref 15–37)
BASE EXCESS VENOUS: 3.5 MMOL/L (ref -3–3)
BASOPHILS ABSOLUTE: 0 K/UL (ref 0–0.2)
BASOPHILS RELATIVE PERCENT: 0.2 %
BILIRUB SERPL-MCNC: 0.8 MG/DL (ref 0–1)
BUN BLDV-MCNC: 16 MG/DL (ref 7–20)
CALCIUM SERPL-MCNC: 9.9 MG/DL (ref 8.3–10.6)
CARBOXYHEMOGLOBIN: 1.6 % (ref 0–1.5)
CHLORIDE BLD-SCNC: 99 MMOL/L (ref 99–110)
CO2: 25 MMOL/L (ref 21–32)
CREAT SERPL-MCNC: 0.7 MG/DL (ref 0.8–1.3)
D DIMER: 0.68 UG/ML FEU (ref 0–0.6)
EOSINOPHILS ABSOLUTE: 0 K/UL (ref 0–0.6)
EOSINOPHILS RELATIVE PERCENT: 0.2 %
GFR AFRICAN AMERICAN: >60
GFR NON-AFRICAN AMERICAN: >60
GLUCOSE BLD-MCNC: 113 MG/DL (ref 70–99)
HCO3 VENOUS: 27.5 MMOL/L (ref 23–29)
HCT VFR BLD CALC: 33.4 % (ref 40.5–52.5)
HEMOGLOBIN: 11.2 G/DL (ref 13.5–17.5)
LYMPHOCYTES ABSOLUTE: 1.6 K/UL (ref 1–5.1)
LYMPHOCYTES RELATIVE PERCENT: 17.3 %
MAGNESIUM: 1.4 MG/DL (ref 1.8–2.4)
MCH RBC QN AUTO: 28 PG (ref 26–34)
MCHC RBC AUTO-ENTMCNC: 33.5 G/DL (ref 31–36)
MCV RBC AUTO: 83.8 FL (ref 80–100)
METHEMOGLOBIN VENOUS: 0.3 %
MONOCYTES ABSOLUTE: 0.4 K/UL (ref 0–1.3)
MONOCYTES RELATIVE PERCENT: 4.7 %
NEUTROPHILS ABSOLUTE: 7.2 K/UL (ref 1.7–7.7)
NEUTROPHILS RELATIVE PERCENT: 77.6 %
O2 SAT, VEN: 56 %
O2 THERAPY: ABNORMAL
PCO2, VEN: 39.7 MMHG (ref 40–50)
PDW BLD-RTO: 17.1 % (ref 12.4–15.4)
PH VENOUS: 7.46 (ref 7.35–7.45)
PLATELET # BLD: 304 K/UL (ref 135–450)
PMV BLD AUTO: 8.8 FL (ref 5–10.5)
PO2, VEN: 28 MMHG (ref 25–40)
POTASSIUM REFLEX MAGNESIUM: 3.2 MMOL/L (ref 3.5–5.1)
RBC # BLD: 3.98 M/UL (ref 4.2–5.9)
SARS-COV-2, NAAT: NOT DETECTED
SODIUM BLD-SCNC: 139 MMOL/L (ref 136–145)
TCO2 CALC VENOUS: 29 MMOL/L
TOTAL PROTEIN: 6.7 G/DL (ref 6.4–8.2)
TROPONIN: <0.01 NG/ML
WBC # BLD: 9.2 K/UL (ref 4–11)

## 2022-09-27 PROCEDURE — 71045 X-RAY EXAM CHEST 1 VIEW: CPT

## 2022-09-27 PROCEDURE — 6360000004 HC RX CONTRAST MEDICATION: Performed by: STUDENT IN AN ORGANIZED HEALTH CARE EDUCATION/TRAINING PROGRAM

## 2022-09-27 PROCEDURE — 87635 SARS-COV-2 COVID-19 AMP PRB: CPT

## 2022-09-27 PROCEDURE — 80053 COMPREHEN METABOLIC PANEL: CPT

## 2022-09-27 PROCEDURE — 36415 COLL VENOUS BLD VENIPUNCTURE: CPT

## 2022-09-27 PROCEDURE — 84484 ASSAY OF TROPONIN QUANT: CPT

## 2022-09-27 PROCEDURE — 6360000002 HC RX W HCPCS: Performed by: STUDENT IN AN ORGANIZED HEALTH CARE EDUCATION/TRAINING PROGRAM

## 2022-09-27 PROCEDURE — 96368 THER/DIAG CONCURRENT INF: CPT

## 2022-09-27 PROCEDURE — 96366 THER/PROPH/DIAG IV INF ADDON: CPT

## 2022-09-27 PROCEDURE — 99285 EMERGENCY DEPT VISIT HI MDM: CPT

## 2022-09-27 PROCEDURE — 85025 COMPLETE CBC W/AUTO DIFF WBC: CPT

## 2022-09-27 PROCEDURE — 82803 BLOOD GASES ANY COMBINATION: CPT

## 2022-09-27 PROCEDURE — 2580000003 HC RX 258: Performed by: STUDENT IN AN ORGANIZED HEALTH CARE EDUCATION/TRAINING PROGRAM

## 2022-09-27 PROCEDURE — 96367 TX/PROPH/DG ADDL SEQ IV INF: CPT

## 2022-09-27 PROCEDURE — 96375 TX/PRO/DX INJ NEW DRUG ADDON: CPT

## 2022-09-27 PROCEDURE — 85379 FIBRIN DEGRADATION QUANT: CPT

## 2022-09-27 PROCEDURE — 83735 ASSAY OF MAGNESIUM: CPT

## 2022-09-27 PROCEDURE — 83690 ASSAY OF LIPASE: CPT

## 2022-09-27 PROCEDURE — 96365 THER/PROPH/DIAG IV INF INIT: CPT

## 2022-09-27 PROCEDURE — 71260 CT THORAX DX C+: CPT | Performed by: STUDENT IN AN ORGANIZED HEALTH CARE EDUCATION/TRAINING PROGRAM

## 2022-09-27 PROCEDURE — 93005 ELECTROCARDIOGRAM TRACING: CPT | Performed by: STUDENT IN AN ORGANIZED HEALTH CARE EDUCATION/TRAINING PROGRAM

## 2022-09-27 RX ORDER — IPRATROPIUM BROMIDE AND ALBUTEROL SULFATE 2.5; .5 MG/3ML; MG/3ML
1 SOLUTION RESPIRATORY (INHALATION)
Status: CANCELLED | OUTPATIENT
Start: 2022-09-27

## 2022-09-27 RX ORDER — POTASSIUM CHLORIDE 750 MG/1
40 TABLET, EXTENDED RELEASE ORAL ONCE
Status: COMPLETED | OUTPATIENT
Start: 2022-09-27 | End: 2022-09-28

## 2022-09-27 RX ORDER — PROMETHAZINE HYDROCHLORIDE 25 MG/ML
12.5 INJECTION, SOLUTION INTRAMUSCULAR; INTRAVENOUS ONCE
Status: DISCONTINUED | OUTPATIENT
Start: 2022-09-27 | End: 2022-09-27

## 2022-09-27 RX ORDER — ONDANSETRON 2 MG/ML
4 INJECTION INTRAMUSCULAR; INTRAVENOUS ONCE
Status: COMPLETED | OUTPATIENT
Start: 2022-09-27 | End: 2022-09-27

## 2022-09-27 RX ORDER — MAGNESIUM SULFATE IN WATER 40 MG/ML
2000 INJECTION, SOLUTION INTRAVENOUS ONCE
Status: COMPLETED | OUTPATIENT
Start: 2022-09-27 | End: 2022-09-27

## 2022-09-27 RX ORDER — METOCLOPRAMIDE HYDROCHLORIDE 5 MG/ML
10 INJECTION INTRAMUSCULAR; INTRAVENOUS ONCE
Status: COMPLETED | OUTPATIENT
Start: 2022-09-27 | End: 2022-09-27

## 2022-09-27 RX ADMIN — CEFTRIAXONE SODIUM 1000 MG: 1 INJECTION, POWDER, FOR SOLUTION INTRAMUSCULAR; INTRAVENOUS at 23:50

## 2022-09-27 RX ADMIN — IOPAMIDOL 75 ML: 755 INJECTION, SOLUTION INTRAVENOUS at 21:16

## 2022-09-27 RX ADMIN — AZITHROMYCIN MONOHYDRATE 500 MG: 500 INJECTION, POWDER, LYOPHILIZED, FOR SOLUTION INTRAVENOUS at 22:09

## 2022-09-27 RX ADMIN — METOCLOPRAMIDE HYDROCHLORIDE 10 MG: 5 INJECTION INTRAMUSCULAR; INTRAVENOUS at 23:50

## 2022-09-27 RX ADMIN — MAGNESIUM SULFATE HEPTAHYDRATE 2000 MG: 2 INJECTION, SOLUTION INTRAVENOUS at 22:07

## 2022-09-27 RX ADMIN — ONDANSETRON HYDROCHLORIDE 4 MG: 2 INJECTION, SOLUTION INTRAMUSCULAR; INTRAVENOUS at 22:26

## 2022-09-27 NOTE — ED PROVIDER NOTES
[facility] EMERGENCY DEPARTMENT      CHIEF COMPLAINT  Shortness of breath       HISTORY OF PRESENT ILLNESS  Elsy Hui is a 63 y.o. male with a past medical history of NH lymphoma in remission but found to have spots on lungs, coronary artery disease, COPD, hyperlipidemia who presents to the ED complaining of shortness of breath. Had lung biopsy and PET scan 1w ago  Has been sick- cough- productive of clear sputum  Reports shortness of breath and pleuritic chest pain-patient reports pain only when coughing  Reports episode of syncope from coughing-this is something that has occurred previously for him (states that he has been diagnosed with coughing related syncope), did not hit head, denies trauma  O2 level at home was reportedly 85%  He also reports vomiting and diarrhea. He denies any abdominal pain or dysuria. Reports fever tmax 100.9 2d ago    Was treated with 5d course of prednisone and doxycycline- symptoms have worsened despite treatment    Denies history of blood clots or active malignancy. Patient denies unilateral leg swelling, hemoptysis, recent travel or surgery/immobilization, or OCP or other hormone use. Family history: Patient denies family history of aortic pathology  Family History   Problem Relation Age of Onset    Heart Disease Father     Cancer Mother     Cancer Sister      Patient does smoke. Patient denies cocaine or other drug use. Old records reviewed:   Stress Test 5/2019  Summary    Normal LV function. There is normal isotope uptake at stress and rest. There is no evidence of    myocardial ischemia or scar. No other complaints, modifying factors or associated symptoms. I have reviewed the following from the nursing documentation.     Past Medical History:   Diagnosis Date    Arthritis 1/2011    SHOULDERS AND KNEES    CAD (coronary artery disease)     Chronic back pain     COPD (chronic obstructive pulmonary disease) (HCC)     GERD (gastroesophageal reflux disease)     ONCE WEEKLY TAKE TUMS    Hyperlipidemia     Hypertension     Kidney stone     Lumbar herniated disc     Chronic Pain    Lymphoma of gastrointestinal tract [facility]) May 2013    Pneumonia     Thyroid disease     overactive thyroid    Vitamin B deficiency      Past Surgical History:   Procedure Laterality Date    BRONCHOSCOPY N/A 9/20/2022    ROBOT/EBUS WF W/ANES. (12:00) NO LABS performed by Luanne Calderon MD at [place] [address]  9/20/2022    BRONCHOSCOPY/TRANSBRONCHIAL NEEDLE BIOPSY ROBOTIC performed by Luanne Calderon MD at [place] [address]  9/20/2022    BRONCHOSCOPY BRUSHINGS ROBOTIC performed by Luanne Calderon MD at [place] [address]  9/20/2022    BRONCHOSCOPY ALVEOLAR LAVAGE performed by Luanne Calderon MD at [address] [place] [address]  2/20/2015    stent placed    CARPAL TUNNEL RELEASE      chioma.     CERVICAL DISCECTOMY  02/08/2017    Anterior discectomy, anterior foraminotomy C5/C6 and C6-7    CYSTOSCOPY  6/5/15    CYSTOSCOPY N/A 02/09/2018    DIAGNOSTIC CARDIAC CATH LAB PROCEDURE      ENDOSCOPY, COLON, DIAGNOSTIC      HERNIA REPAIR      KNEE ARTHROSCOPY      LITHOTRIPSY      LITHOTRIPSY Left 11/5/15    OTHER SURGICAL HISTORY  1/20/11    video arthroscopy of right shoulder with subcromial d ecompression and arthroscopic sarbjit    OTHER SURGICAL HISTORY  5/1/13    Excision Lesion Right Angle of Mouth    SHOULDER SURGERY      Left Shoulder X 3; right Shoulder X 5    SHOULDER SURGERY Left 05/30/2017    UPPER GASTROINTESTINAL ENDOSCOPY  07/26/2012    UPPER GASTROINTESTINAL ENDOSCOPY N/A 6/21/2019    EGD BIOPSY performed by Laura Benton DO at [address] [facility]     Family History   Problem Relation Age of Onset    Heart Disease Father     Cancer Mother     Cancer Sister      Social History     Socioeconomic History    Marital status:      Spouse name: Not on file    Number of children: Not on file    Years of education: Not on file    Highest education level: Not on file   Occupational History    Not on file   Tobacco Use    Smoking status: Every Day     Packs/day: 1.00     Years: 45.00     Pack years: 45.00     Types: Cigarettes    Smokeless tobacco: Never   Vaping Use    Vaping Use: Former    Substances: Never   Substance and Sexual Activity    Alcohol use: No    Drug use: Yes     Frequency: 21.0 times per week     Types: Marijuana Gomez Kugel)    Sexual activity: Yes     Partners: Female   Other Topics Concern    Not on file   Social History Narrative    Not on file     Social Determinants of Health     Financial Resource Strain: Not on file   Food Insecurity: Not on file   Transportation Needs: Not on file   Physical Activity: Not on file   Stress: Not on file   Social Connections: Not on file   Intimate Partner Violence: Not on file   Housing Stability: Not on file     No current facility-administered medications for this encounter.      Current Outpatient Medications   Medication Sig Dispense Refill    azithromycin (ZITHROMAX) 250 MG tablet Take 1 tablet by mouth See Admin Instructions for 5 days 500mg on day 1 followed by 250mg on days 2 - 5 6 tablet 0    promethazine (PROMETHEGAN) 25 MG suppository Place 1 suppository rectally every 6 hours as needed for Nausea 15 suppository 0    Magnesium Oxide (MAG-OXIDE) 200 MG TABS Take 1 tablet by mouth in the morning and at bedtime for 3 days 6 tablet 0    predniSONE (DELTASONE) 20 MG tablet Take 1 tablet by mouth daily for 5 days 5 tablet 0    doxycycline hyclate (VIBRA-TABS) 100 MG tablet Take 1 tablet by mouth 2 times daily for 5 days 10 tablet 0    DALIRESP 500 MCG tablet Take 500 mcg by mouth      fluticasone-umeclidin-vilant (TRELEGY ELLIPTA) 100-62.5-25 MCG/INH AEPB Inhale 1 puff into the lungs daily 1 each 3    potassium chloride (KLOR-CON) 10 MEQ extended release tablet TAKE TWO TABLETS BY MOUTH DAILY 180 tablet 3    atorvastatin (LIPITOR) 80 MG tablet TAKE ONE TABLET BY MOUTH ABDOMEN: No tenderness. Soft. Non-distended. No masses. No organomegaly. No guarding or rebound. MUSCULOSKELETAL: No extremity edema. Compartments soft. No deformity. No tenderness in the extremities. All extremities neurovascularly intact. SKIN: Warm and dry. No acute rashes. NEUROLOGICAL: Alert and oriented. No gross facial drooping. Strength 5/5, sensation intact. PSYCHIATRIC: Normal mood and affect. LABS  I have reviewed all labs for this visit.    Results for orders placed or performed during the hospital encounter of 09/27/22   COVID-19, Rapid    Specimen: Nasopharyngeal Swab   Result Value Ref Range    SARS-CoV-2, NAAT Not Detected Not Detected   CBC with Auto Differential   Result Value Ref Range    WBC 9.2 4.0 - 11.0 K/uL    RBC 3.98 (L) 4.20 - 5.90 M/uL    Hemoglobin 11.2 (L) 13.5 - 17.5 g/dL    Hematocrit 33.4 (L) 40.5 - 52.5 %    MCV 83.8 80.0 - 100.0 fL    MCH 28.0 26.0 - 34.0 pg    MCHC 33.5 31.0 - 36.0 g/dL    RDW 17.1 (H) 12.4 - 15.4 %    Platelets 837 589 - 020 K/uL    MPV 8.8 5.0 - 10.5 fL    Neutrophils % 77.6 %    Lymphocytes % 17.3 %    Monocytes % 4.7 %    Eosinophils % 0.2 %    Basophils % 0.2 %    Neutrophils Absolute 7.2 1.7 - 7.7 K/uL    Lymphocytes Absolute 1.6 1.0 - 5.1 K/uL    Monocytes Absolute 0.4 0.0 - 1.3 K/uL    Eosinophils Absolute 0.0 0.0 - 0.6 K/uL    Basophils Absolute 0.0 0.0 - 0.2 K/uL   CMP w/ Reflex to MG   Result Value Ref Range    Sodium 139 136 - 145 mmol/L    Potassium reflex Magnesium 3.2 (L) 3.5 - 5.1 mmol/L    Chloride 99 99 - 110 mmol/L    CO2 25 21 - 32 mmol/L    Anion Gap 15 3 - 16    Glucose 113 (H) 70 - 99 mg/dL    BUN 16 7 - 20 mg/dL    Creatinine 0.7 (L) 0.8 - 1.3 mg/dL    GFR Non-African American >60 >60    GFR African American >60 >60    Calcium 9.9 8.3 - 10.6 mg/dL    Total Protein 6.7 6.4 - 8.2 g/dL    Albumin 3.7 3.4 - 5.0 g/dL    Albumin/Globulin Ratio 1.2 1.1 - 2.2    Total Bilirubin 0.8 0.0 - 1.0 mg/dL    Alkaline Phosphatase 125 40 - 129 prominent      Mild bibasilar atelectasis or early infiltrates or less likely early   interstitial edema. ED COURSE / MDM  Patient seen and evaluated. Old records reviewed and pertinent information included in HPI. Labs and imaging reviewed and results discussed with patient. Overall patient in no acute distress, presenting for shortness of breath and cough. Physical exam remarkable for chronic ill appearance. Differential diagnosis includes but is not limited to: Pneumonia, pneumothorax, pleural effusion, ACS, CHF exacerbation, COPD exacerbation, asthma, pulmonary embolism, arrhythmia, anemia    EKG, laboratory studies, and imaging obtained. Workup showed:    ED Course as of 09/28/22 0345   Tue Sep 27, 2022   2012 Blood gas shows no acidemia or hypercarbia. In fact shows mild alkalemia to 7.46. Patient did not have wheezing on exam, good air exchange. Overall low suspicion for COPD exacerbation. [ER]   2020 Troponin within normal limits, EKG without evidence of acute ischemia. He reports some chest discomfort only with coughing. Overall low suspicion for ACS. [ER]   2020 The Ekg interpreted by me shows  normal sinus rhythm with a rate of 73  Axis is   Normal  QTc is  prolonged to 478  Intervals and Durations are unremarkable. ST Segments: nonspecific changes  Unable to view previous EKG from 4/15/2021 [ER]   2021 Hypokalemia to 3.2. No other electrolyte abnormalities or evidence of kidney dysfunction. [ER]   2022 Liver function testing unremarkable [ER]   2022 D-dimer is elevated to 0.68, patient is reporting pleuritic chest pain. Will obtain CT PE study. [ER]   2045 Patient does have hypomagnesemia with magnesium of 1.4. Potassium and magnesium repletion given [ER]   2045 Anemia at 11.2. No leukocytosis or thrombocytopenia.  [ER]   2047 COVID swab negative [ER]   2116 CXR: IMPRESSION:  Mild central pulmonary congestion or pulmonary artery hypertension which is more prominent Mild bibasilar atelectasis or early infiltrates or less likely early interstitial edema. -------------  No evidence of fluid overload on exam, lower suspicion for pulmonary edema. Patient does have infiltrates, will give azithromycin and ceftriaxone. [ER]   2207 CT PE: IMPRESSION:  1. No evidence for acute pulmonary embolism. 2.  Improving bilateral airspace disease compared to PET-CT 09/22/2022. No  new airspace disease identified in the interval.     3.  Redemonstration of pulmonary nodules measuring up to 9 mm in the left upper lobe, as recently evaluated. Noncontrast CT follow-up in 3 months is recommended. -----------  No evidence of pulmonary embolism. Previously noted pneumonia is improving. Stable pulmonary nodules [ER]   Wed Sep 28, 2022   0024 Lipase is within normal limits, low suspicion for pancreatitis [ER]   0024 Patient tolerated ambulation with oxygen saturation at 95%    Vomiting resolved with treatment in the emergency department. Patient was offered admission but declines, he wishes to attend his oncology appointment today. Strict return precautions given.   Consider this an informed discharge. [ER]      ED Course User Index  [ER] Helena Burgos MD        During the patient's ED course, the patient was given:  Medications   potassium chloride (KLOR-CON M) extended release tablet 40 mEq (40 mEq Oral Given 9/28/22 0024)   magnesium sulfate 2000 mg in 50 mL IVPB premix (0 mg IntraVENous Stopped 9/27/22 2350)   iopamidol (ISOVUE-370) 76 % injection 75 mL (75 mLs IntraVENous Given 9/27/22 2116)   cefTRIAXone (ROCEPHIN) 1,000 mg in dextrose 5 % 50 mL IVPB mini-bag (0 mg IntraVENous Stopped 9/28/22 0024)     And   azithromycin (ZITHROMAX) 500 mg in D5W 250ml addavial (0 mg IntraVENous Stopped 9/27/22 2348)   ondansetron (ZOFRAN) injection 4 mg (4 mg IntraVENous Given 9/27/22 2226)   metoclopramide (REGLAN) injection 10 mg (10 mg IntraVENous Given 9/27/22 2350)        Is this patient to be included in the SEP-1 Core Measure due to severe sepsis or septic shock? No   Exclusion criteria - the patient is NOT to be included for SEP-1 Core Measure due to:  2+ SIRS criteria are not met     Patient presented with symptoms consistent with pneumonia, patient recently treated for pneumonia but reported symptoms have not improved. Imaging shows improving infiltrates compared to previous PET scan. We will provide further antibiotics. Patient did not demonstrate any hypoxia in the emergency department even with ambulation. His COVID swab was negative. No evidence of pulmonary embolism. I did offer patient admission for further monitoring and evaluation but he declines. Even though imaging shows pneumonia is likely improving, given patient reports worsening symptoms, will give another course of antibiotics. No evidence of hypoxia in the emergency department. Patient reporting vomiting and diarrhea. He denies any abdominal pain. Abdominal exam is benign. Patient tolerating p.o. in the emergency department after treatment for vomiting. Patient did have hypomagnesemia and hypokalemia, repletion started in the emergency department. Patient was offered admission but declined. I discussed the nature and purpose, risks and benefits, as well as, the alternatives of admission with Karime Navarro. Karimezachery Navarro was given the time and opportunity to ask questions and consider their options, and after the discussion, Karimezachery Navarro decided to refuse. I informed Karime Navarro that refusal could lead to worsening symptoms or death. Prior to refusing, their nurse and I determined and agreed that Karime Navarro had the capacity to make this decision and understood the consequences of that decision. They appear clinically sober, to be mentating appropriately, free from distracting injury, appear to have intact insight, judgement, and reason.  Specifically, they were able to verbally state back in a coherent manner their current medical condition, the proposed course of treatment, and the risks/benfits/ alternatives of treatment verses leaving. I consider this an informed discharge. After refusal, I made every reasonable opportunity to treat Pete Serra to the best of my ability. They understand that they may return to seek medical attention here whenever they choose. Patient was provided with prescriptions for Azithromax, Phenergan, and magnesium. Return precautions given. Encouraged PCP follow-up in 1 day, patient has follow-up with his oncologist later today. Patient discharged in stable condition. I estimate there is LOW risk for ACUTE CORONARY SYNDROME, PULMONARY EMBOLISM, PNEUMOTHORAX, RUPTURED ESOPHAGUS OR THORACIC AORTIC DISSECTION, thus I consider the discharge disposition reasonable. Pete Serra and I have discussed the diagnosis and risks, and we agree with discharging home with close follow-up. We also discussed returning to the Emergency Department immediately if new or worsening symptoms occur. We have discussed the symptoms which are most concerning that necessitate immediate return. CLINICAL IMPRESSION  1. Shortness of breath    2. Pleuritic chest pain    3. Hypokalemia    4. Hypomagnesemia    5. Pneumonia of both lower lobes due to infectious organism    6. Non-intractable vomiting with nausea, unspecified vomiting type        Blood pressure (!) 144/81, pulse 85, temperature 98.1 °F (36.7 °C), temperature source Oral, resp. rate 18, SpO2 95 %. Bryant Maloney was discharged to home in stable condition. Patient was given scripts for the following medications. I counseled patient how to take these medications.    Discharge Medication List as of 9/28/2022 12:43 AM        START taking these medications    Details   azithromycin (ZITHROMAX) 250 MG tablet Take 1 tablet by mouth See Admin Instructions for 5 days 500mg on day 1 followed by 250mg on days 2 - 5, Disp-6 tablet, R-0Normal      promethazine (PROMETHEGAN) 25 MG suppository Place 1 suppository rectally every 6 hours as needed for Nausea, Disp-15 suppository, R-0Normal      Magnesium Oxide (MAG-OXIDE) 200 MG TABS Take 1 tablet by mouth in the morning and at bedtime for 3 days, Disp-6 tablet, R-0Normal             Follow-up with:  Faby Hirsch MD  [address] [place] [address] DrVictorino  [address] [facility] [address] [place] [address]  [phone]    Call in 1 day      [place] Emergency Department  [address]  [phone]  Go to   As needed, If symptoms worsen    Your oncologist    Go today  as scheduled    DISCLAIMER: This chart was created using Dragon dictation software. Efforts were made by me to ensure accuracy, however some errors may be present due to limitations of this technology and occasionally words are not transcribed correctly.              Justino Monroy MD  09/28/22 1694

## 2022-09-28 VITALS
DIASTOLIC BLOOD PRESSURE: 81 MMHG | TEMPERATURE: 98.1 F | SYSTOLIC BLOOD PRESSURE: 144 MMHG | OXYGEN SATURATION: 95 % | RESPIRATION RATE: 18 BRPM | HEART RATE: 85 BPM

## 2022-09-28 LAB
EKG ATRIAL RATE: 73 BPM
EKG DIAGNOSIS: NORMAL
EKG P AXIS: 64 DEGREES
EKG P-R INTERVAL: 134 MS
EKG Q-T INTERVAL: 434 MS
EKG QRS DURATION: 86 MS
EKG QTC CALCULATION (BAZETT): 478 MS
EKG R AXIS: 40 DEGREES
EKG T AXIS: 47 DEGREES
EKG VENTRICULAR RATE: 73 BPM
LIPASE: 25 U/L (ref 13–60)

## 2022-09-28 PROCEDURE — 93010 ELECTROCARDIOGRAM REPORT: CPT | Performed by: INTERNAL MEDICINE

## 2022-09-28 PROCEDURE — 6370000000 HC RX 637 (ALT 250 FOR IP): Performed by: STUDENT IN AN ORGANIZED HEALTH CARE EDUCATION/TRAINING PROGRAM

## 2022-09-28 RX ORDER — AZITHROMYCIN 250 MG/1
250 TABLET, FILM COATED ORAL SEE ADMIN INSTRUCTIONS
Qty: 6 TABLET | Refills: 0 | Status: SHIPPED | OUTPATIENT
Start: 2022-09-28 | End: 2022-10-03

## 2022-09-28 RX ORDER — ADHESIVE TAPE 3"X 2.3 YD
1 TAPE, NON-MEDICATED TOPICAL 2 TIMES DAILY
Qty: 6 TABLET | Refills: 0 | Status: SHIPPED | OUTPATIENT
Start: 2022-09-28 | End: 2022-10-01

## 2022-09-28 RX ORDER — PROMETHAZINE HYDROCHLORIDE 25 MG/1
25 SUPPOSITORY RECTAL EVERY 6 HOURS PRN
Qty: 15 SUPPOSITORY | Refills: 0 | Status: SHIPPED | OUTPATIENT
Start: 2022-09-28 | End: 2022-10-05

## 2022-09-28 RX ADMIN — POTASSIUM CHLORIDE 40 MEQ: 10 TABLET, EXTENDED RELEASE ORAL at 00:24

## 2022-09-28 NOTE — DISCHARGE INSTRUCTIONS
We discussed possible admission but you declined at this time. Please have a low threshold to return to the emergency department if your symptoms are worsening. You have been started on further antibiotics though your imaging does show improving pneumonia. You have been prescribed another antinausea medication as well as magnesium.   Please continue to take your potassium at home

## 2022-10-04 ENCOUNTER — HOSPITAL ENCOUNTER (OUTPATIENT)
Dept: PULMONOLOGY | Age: 62
Discharge: HOME | End: 2022-10-04
Payer: MEDICARE

## 2022-10-04 ENCOUNTER — HOSPITAL ENCOUNTER (OUTPATIENT)
Dept: GENERAL RADIOLOGY | Age: 62
Discharge: HOME | End: 2022-10-04
Payer: MEDICARE

## 2022-10-04 ENCOUNTER — HOSPITAL ENCOUNTER (OUTPATIENT)
Age: 62
Discharge: HOME | End: 2022-10-04
Payer: MEDICARE

## 2022-10-04 DIAGNOSIS — R19.7 ACUTE DIARRHEA: ICD-10-CM

## 2022-10-04 DIAGNOSIS — R91.1 LUNG NODULE: ICD-10-CM

## 2022-10-04 LAB
DLCO %PRED: 41 %
DLCO PRED: NORMAL
DLCO/VA %PRED: NORMAL
DLCO/VA PRED: NORMAL
DLCO/VA: NORMAL
DLCO: NORMAL
EXPIRATORY TIME-POST: NORMAL
EXPIRATORY TIME: NORMAL
FEF 25-75% %CHNG: NORMAL
FEF 25-75% %PRED-POST: NORMAL
FEF 25-75% %PRED-PRE: NORMAL
FEF 25-75% PRED: NORMAL
FEF 25-75%-POST: NORMAL
FEF 25-75%-PRE: NORMAL
FEV1 %PRED-POST: 83 %
FEV1 %PRED-PRE: 82 %
FEV1 PRED: NORMAL
FEV1-POST: NORMAL
FEV1-PRE: NORMAL
FEV1/FVC %PRED-POST: NORMAL
FEV1/FVC %PRED-PRE: NORMAL
FEV1/FVC PRED: NORMAL
FEV1/FVC-POST: 79 %
FEV1/FVC-PRE: 80 %
FVC %PRED-POST: NORMAL
FVC %PRED-PRE: NORMAL
FVC PRED: NORMAL
FVC-POST: NORMAL
FVC-PRE: NORMAL
GAW %PRED: NORMAL
GAW PRED: NORMAL
GAW: NORMAL
IC %PRED: NORMAL
IC PRED: NORMAL
IC: NORMAL
MEP: NORMAL
MIP: NORMAL
MVV %PRED-PRE: NORMAL
MVV PRED: NORMAL
MVV-PRE: NORMAL
PEF %PRED-POST: NORMAL
PEF %PRED-PRE: NORMAL
PEF PRED: NORMAL
PEF%CHNG: NORMAL
PEF-POST: NORMAL
PEF-PRE: NORMAL
RAW %PRED: NORMAL
RAW PRED: NORMAL
RAW: NORMAL
RV %PRED: NORMAL
RV PRED: NORMAL
RV: NORMAL
SVC %PRED: NORMAL
SVC PRED: NORMAL
SVC: NORMAL
TLC %PRED: 73 %
TLC PRED: NORMAL
TLC: NORMAL
VA %PRED: NORMAL
VA PRED: NORMAL
VA: NORMAL
VTG %PRED: NORMAL
VTG PRED: NORMAL
VTG: NORMAL

## 2022-10-04 PROCEDURE — 6370000000 HC RX 637 (ALT 250 FOR IP): Performed by: INTERNAL MEDICINE

## 2022-10-04 PROCEDURE — 94640 AIRWAY INHALATION TREATMENT: CPT

## 2022-10-04 PROCEDURE — 94726 PLETHYSMOGRAPHY LUNG VOLUMES: CPT

## 2022-10-04 PROCEDURE — 94060 EVALUATION OF WHEEZING: CPT

## 2022-10-04 PROCEDURE — 94729 DIFFUSING CAPACITY: CPT

## 2022-10-04 PROCEDURE — 74018 RADEX ABDOMEN 1 VIEW: CPT

## 2022-10-04 PROCEDURE — 94618 PULMONARY STRESS TESTING: CPT

## 2022-10-04 RX ORDER — ALBUTEROL SULFATE 90 UG/1
4 AEROSOL, METERED RESPIRATORY (INHALATION) ONCE
Status: COMPLETED | OUTPATIENT
Start: 2022-10-04 | End: 2022-10-04

## 2022-10-04 RX ADMIN — Medication 4 PUFF: at 08:20

## 2022-10-04 ASSESSMENT — PULMONARY FUNCTION TESTS
FEV1_PERCENT_PREDICTED_POST: 83
FEV1/FVC_PRE: 80
FEV1_PERCENT_PREDICTED_PRE: 82
FEV1/FVC_POST: 79

## 2022-10-04 NOTE — PROCEDURES
[address]                 [address] [place] [address]                               PULMONARY FUNCTION    PATIENT NAME: Doron Welsh SR                    :        1960  MED REC NO:   [id number]                          ROOM:  ACCOUNT NO:   [de-identified]                           ADMIT DATE: 10/04/2022  PROVIDER:     Jaqueline Higgins MD    DATE OF PROCEDURE:  10/04/2022    INDICATION:  Lung nodule. FINDINGS:  1. Spirometry revealed no evidence of obstructive defect. FEV1 is 2.74  liters, which is 82% of predicted. No significant response to  bronchodilators. FEV1/FVC ratio of 80%. 2.  Lung volume revealed mild restrictive defect. Total lung capacity  of 4.96 liters, which is 73% of predicted. 3.  Diffusion capacity is severely decreased at 12.28, which is 41% of  predicted. 4.  Flow volume loops appeared to be normal.  5. In comparison with the test done in 2017, there is significant  deterioration in diffusion capacity, total lung capacity. 6.  Six-minute walk was done per [facility] protocol. The  patient was able to walk 890 feet. Saturation on room air at rest was  97% with the heart rate of 77. Desaturation on exertion to 93%. Max  heart rate of 96. CONCLUSION:  1. No evidence of obstructive defect or bronchodilator response. 2.  Mild restrictive defect with severely decreased diffusion capacity  worsening in comparison with the test done in . Clinical  correlation is warranted for ILD. 3.  Six-minute walk with significant desaturation to 93%. However, no  O2 is required.         Taty Ruvalcaba MD    D: 10/04/2022 14:41:33       T: 10/04/2022 16:19:39     [email or web address]  Job#: [id number]     Doc#: [id number]    CC:

## 2022-10-24 LAB
FUNGUS (MYCOLOGY) CULTURE: NORMAL
FUNGUS STAIN: NORMAL

## 2022-10-25 LAB
AFB CULTURE (MYCOBACTERIA): NORMAL
AFB SMEAR: NORMAL

## 2022-11-08 LAB
AFB CULTURE (MYCOBACTERIA): NORMAL
AFB SMEAR: NORMAL

## 2022-11-10 ENCOUNTER — TELEPHONE (OUTPATIENT)
Dept: PULMONOLOGY | Age: 62
End: 2022-11-10

## 2022-11-10 ENCOUNTER — OFFICE VISIT (OUTPATIENT)
Dept: PULMONOLOGY | Age: 62
End: 2022-11-10
Payer: MEDICARE

## 2022-11-10 VITALS
BODY MASS INDEX: 26.37 KG/M2 | HEART RATE: 73 BPM | WEIGHT: 174 LBS | SYSTOLIC BLOOD PRESSURE: 128 MMHG | HEIGHT: 68 IN | OXYGEN SATURATION: 97 % | DIASTOLIC BLOOD PRESSURE: 70 MMHG | RESPIRATION RATE: 18 BRPM

## 2022-11-10 DIAGNOSIS — R91.1 LUNG NODULE: Primary | ICD-10-CM

## 2022-11-10 PROCEDURE — 3078F DIAST BP <80 MM HG: CPT | Performed by: INTERNAL MEDICINE

## 2022-11-10 PROCEDURE — G8484 FLU IMMUNIZE NO ADMIN: HCPCS | Performed by: INTERNAL MEDICINE

## 2022-11-10 PROCEDURE — G8427 DOCREV CUR MEDS BY ELIG CLIN: HCPCS | Performed by: INTERNAL MEDICINE

## 2022-11-10 PROCEDURE — G8419 CALC BMI OUT NRM PARAM NOF/U: HCPCS | Performed by: INTERNAL MEDICINE

## 2022-11-10 PROCEDURE — 3074F SYST BP LT 130 MM HG: CPT | Performed by: INTERNAL MEDICINE

## 2022-11-10 PROCEDURE — 4004F PT TOBACCO SCREEN RCVD TLK: CPT | Performed by: INTERNAL MEDICINE

## 2022-11-10 PROCEDURE — 3017F COLORECTAL CA SCREEN DOC REV: CPT | Performed by: INTERNAL MEDICINE

## 2022-11-10 PROCEDURE — 99214 OFFICE O/P EST MOD 30 MIN: CPT | Performed by: INTERNAL MEDICINE

## 2022-11-10 RX ORDER — BUPROPION HYDROCHLORIDE 150 MG/1
150 TABLET ORAL EVERY MORNING
Qty: 30 TABLET | Refills: 0 | Status: SHIPPED | OUTPATIENT
Start: 2022-11-10

## 2022-11-10 NOTE — PROGRESS NOTES
[facility]                                               Pulmonary Clinic Consult     I had the pleasure of seeing  Lou Summers     Chief Complaint   Patient presents with    Follow-up     3-4 wk cxr/bronch/fu       HISTORY OF PRESENT ILLNESS:    Lou Summers is a 63y.o. year old  Who start smoking at age  17  And increase gradually   1.5 pack daily ,he still smoke    He was diagnosed 10 year,he received pills as he states ,he was done with therapy long time and was told he is in remission     The Patient comes in with SOB that has been going on  for the last few years Associated with .cough and he usually has clear sputum    He states that it get worse with exercise or walking long distance and he can walk less 300 feet- 1/2 And go 1-2 flight of stairs before get short winded        Patient underwent biopsy JERROD ,central nodule and was negative   Culture al was negative  Unfortunately he continue to smoke   His cough and SOB has improved  Could not tolerate Trelegy   PFT does 10 /22          ALLERGIES:    Allergies   Allergen Reactions    Bactrim [Sulfamethoxazole-Trimethoprim] Other (See Comments)     States makes yeast infection on his penis    Codeine Nausea Only     Pt tolerates Oxycodone    Sulfa Antibiotics      Other reaction(s):  Other (See Comments)  Yeast infection  Yeast infection         PAST MEDICAL HISTORY:       Diagnosis Date    Arthritis 1/2011    SHOULDERS AND KNEES    CAD (coronary artery disease)     Chronic back pain     COPD (chronic obstructive pulmonary disease) (HCC)     GERD (gastroesophageal reflux disease)     ONCE WEEKLY TAKE TUMS    Hyperlipidemia     Hypertension     Kidney stone     Lumbar herniated disc     Chronic Pain    Lymphoma of gastrointestinal tract [facility]) May 2013    Pneumonia     Thyroid disease     overactive thyroid    Vitamin B deficiency        MEDICATIONS:   Current Outpatient Medications   Medication Sig Dispense Refill    Magnesium Oxide (MAG-OXIDE) 200 MG TABS Take 1 tablet by mouth in the morning and at bedtime for 3 days 6 tablet 0    DALIRESP 500 MCG tablet Take 500 mcg by mouth      potassium chloride (KLOR-CON) 10 MEQ extended release tablet TAKE TWO TABLETS BY MOUTH DAILY 180 tablet 3    atorvastatin (LIPITOR) 80 MG tablet TAKE ONE TABLET BY MOUTH DAILY 90 tablet 3    diazePAM (VALIUM) 5 MG tablet Take 5 mg by mouth every 6 hours as needed. ondansetron (ZOFRAN) 8 MG tablet       medical marijuana Inhale 1 Units into the lungs as needed. OMEPRAZOLE PO Take 40 mg by mouth daily 1 capsule by mouth daily      albuterol sulfate HFA (PROVENTIL;VENTOLIN;PROAIR) 108 (90 Base) MCG/ACT inhaler Inhale 2 puffs into the lungs every 6 hours as needed for Wheezing      zolpidem (AMBIEN) 10 MG tablet Take by mouth nightly as needed for Sleep.      levothyroxine (SYNTHROID) 75 MCG tablet       tamsulosin (FLOMAX) 0.4 MG capsule       tiZANidine (ZANAFLEX) 4 MG tablet 4 mg every 8 hours as needed       nitroGLYCERIN (NITROSTAT) 0.4 MG SL tablet Place 1 tablet under the tongue every 5 minutes as needed for Chest pain 25 tablet 3    Sucralfate (CARAFATE PO) Take 1 g by mouth 4 times daily       FLUoxetine (PROZAC) 20 MG capsule Take 40 mg by mouth daily       pregabalin (LYRICA) 50 MG capsule Take 150 mg by mouth 3 times daily. fluticasone-umeclidin-vilant (TRELEGY ELLIPTA) 100-62.5-25 MCG/INH AEPB Inhale 1 puff into the lungs daily (Patient not taking: Reported on 11/10/2022) 1 each 3    risperiDONE (RISPERDAL) 0.5 MG tablet Take 0.5 mg by mouth nightly (Patient not taking: No sig reported)       No current facility-administered medications for this visit.        SOCIAL AND OCCUPATIONAL HEALTH:  Social History     Tobacco Use   Smoking Status Every Day    Packs/day: 1.00    Years: 45.00    Pack years: 45.00    Types: Cigarettes   Smokeless Tobacco Never     TB :  Pets   Industrial exposure:  Birds :    SURGICAL HISTORY: Past Surgical History:   Procedure Laterality Date    BRONCHOSCOPY N/A 9/20/2022    ROBOT/EBUS WF W/ANES. (12:00) NO LABS performed by Zakiya Meraz MD at [facility]  9/20/2022    BRONCHOSCOPY/TRANSBRONCHIAL NEEDLE BIOPSY ROBOTIC performed by Zakiya Meraz MD at [facility]  9/20/2022    BRONCHOSCOPY BRUSHINGS ROBOTIC performed by Zakiya Meraz MD at [facility]  9/20/2022    BRONCHOSCOPY ALVEOLAR LAVAGE performed by Zakiya Meraz MD at [address] [place] [address]  2/20/2015    stent placed    CARPAL TUNNEL RELEASE      chioma. CERVICAL DISCECTOMY  02/08/2017    Anterior discectomy, anterior foraminotomy C5/C6 and C6-7    CYSTOSCOPY  6/5/15    CYSTOSCOPY N/A 02/09/2018    DIAGNOSTIC CARDIAC CATH LAB PROCEDURE      ENDOSCOPY, COLON, DIAGNOSTIC      HERNIA REPAIR      KNEE ARTHROSCOPY      LITHOTRIPSY      LITHOTRIPSY Left 11/5/15    OTHER SURGICAL HISTORY  1/20/11    video arthroscopy of right shoulder with subcromial d ecompression and arthroscopic sarbjit    OTHER SURGICAL HISTORY  5/1/13    Excision Lesion Right Angle of Mouth    SHOULDER SURGERY      Left Shoulder X 3; right Shoulder X 5    SHOULDER SURGERY Left 05/30/2017    UPPER GASTROINTESTINAL ENDOSCOPY  07/26/2012    UPPER GASTROINTESTINAL ENDOSCOPY N/A 6/21/2019    EGD BIOPSY performed by Ines Michel DO at [address]:   Lung cancer:wife has Lung cancer   DVT or PE no     REVIEW OF SYSTEMS:  Constitutional: General health is good . There has been no weight changes. No fevers, fatigue or weakness. Head: Patient denies any history of trauma, convulsive disorder or syncope. Skin:  Patient denies history of changes in pigmentation, eruptions or pruritus. No easy bruising or bleeding.   EENT: no nasal congestion   Cardiovascular ,No chest pain ,No edema ,  Respiration:SOB:  ++,ROCHE :++  Gastrointestinal:No GI bleed ,no melena  ,no hematemesis    Musculoskeletal: no joint pain ,no swelling  Neurological:no , syncope. Denies twitching, convulsions, loss of consciousness or memory. Endocrine:  . No history of goiter, exophthalmos or dryness of skin. The patient has no history of diabetes. Hematopoietic:  No history of bleeding disorders or easy bruising. Rheumatic:  No connective tissue disease history or polyarthritis/inflammatory joint disease. PHYSICAL EXAMINATION:  Vitals:    11/10/22 1045   BP: 128/70   Pulse: 73   Resp: 18   SpO2: 97%     Constitutional: This is a well developed, well nourished 63y.o. year old male who is alert, oriented, cooperative and in no apparent distress. Head was normocephalic and atraumatic. EENT: Mallampati class :  Extraocular muscles intact. External canals are patent and no discharge was appreciated. Septum was midline,   mucosa was without erythema, exudates or cobblestoning. No thrush was noted. Neck: Supple without thyromegaly. No elevated JVP. Trachea was midline. No carotid bruits were auscultated. Respiratory: Rhonchi scattered     Cardiovascular: Regular without murmur, clicks, gallops or rubs. There is no left or right ventricular heave. Pulses:  Carotid, radial and femoral pulses were equally bilaterally. Abdomen: Slightly rounded and soft without organomegaly. No rebound, rigidity or guarding was appreciated. Lymphatic: No lymphadenopathy. Musculoskeletal: no joint deformity . Extremities: no edema   Skin:  Warm and dry. Good color, turgor and pigmentation. No lesions or scars. Neurological/Psychiatric: The patient's general behavior, level of consciousness, thought content and emotional status is normal.  Cranial nerves II-XII are intact.       DATA:   PFT   IMPRESSION:    1-Lung mass left side X 2   2-h/o Lymphoma  3-COPD  4-KELLIE               PLAN:      Will try stiolto as trelegy gave him rash  CT december will review ,if an increase in

## 2022-11-25 ENCOUNTER — HOSPITAL ENCOUNTER (EMERGENCY)
Age: 62
Discharge: HOME | End: 2022-11-25
Attending: EMERGENCY MEDICINE
Payer: MEDICARE

## 2022-11-25 ENCOUNTER — APPOINTMENT (OUTPATIENT)
Dept: GENERAL RADIOLOGY | Age: 62
End: 2022-11-25
Payer: MEDICARE

## 2022-11-25 VITALS
OXYGEN SATURATION: 100 % | DIASTOLIC BLOOD PRESSURE: 75 MMHG | RESPIRATION RATE: 16 BRPM | TEMPERATURE: 98.2 F | HEIGHT: 68 IN | BODY MASS INDEX: 26.52 KG/M2 | WEIGHT: 175 LBS | HEART RATE: 85 BPM | SYSTOLIC BLOOD PRESSURE: 136 MMHG

## 2022-11-25 DIAGNOSIS — B34.9 ACUTE VIRAL SYNDROME: Primary | ICD-10-CM

## 2022-11-25 DIAGNOSIS — J44.1 COPD WITH ACUTE EXACERBATION (HCC): ICD-10-CM

## 2022-11-25 DIAGNOSIS — R68.89 FLU-LIKE SYMPTOMS: ICD-10-CM

## 2022-11-25 LAB
A/G RATIO: 1.4 (ref 1.1–2.2)
ALBUMIN SERPL-MCNC: 4.2 G/DL (ref 3.4–5)
ALP BLD-CCNC: 144 U/L (ref 40–129)
ALT SERPL-CCNC: 197 U/L (ref 10–40)
ANION GAP SERPL CALCULATED.3IONS-SCNC: 14 MMOL/L (ref 3–16)
AST SERPL-CCNC: 185 U/L (ref 15–37)
BASOPHILS ABSOLUTE: 0 K/UL (ref 0–0.2)
BASOPHILS RELATIVE PERCENT: 0.3 %
BILIRUB SERPL-MCNC: 0.3 MG/DL (ref 0–1)
BUN BLDV-MCNC: 12 MG/DL (ref 7–20)
CALCIUM SERPL-MCNC: 9.8 MG/DL (ref 8.3–10.6)
CHLORIDE BLD-SCNC: 99 MMOL/L (ref 99–110)
CO2: 25 MMOL/L (ref 21–32)
CREAT SERPL-MCNC: 0.9 MG/DL (ref 0.8–1.3)
EOSINOPHILS ABSOLUTE: 0 K/UL (ref 0–0.6)
EOSINOPHILS RELATIVE PERCENT: 0 %
GFR SERPL CREATININE-BSD FRML MDRD: >60 ML/MIN/{1.73_M2}
GLUCOSE BLD-MCNC: 116 MG/DL (ref 70–99)
HCT VFR BLD CALC: 37 % (ref 40.5–52.5)
HEMOGLOBIN: 12.4 G/DL (ref 13.5–17.5)
LYMPHOCYTES ABSOLUTE: 0.4 K/UL (ref 1–5.1)
LYMPHOCYTES RELATIVE PERCENT: 7.1 %
MCH RBC QN AUTO: 28.1 PG (ref 26–34)
MCHC RBC AUTO-ENTMCNC: 33.6 G/DL (ref 31–36)
MCV RBC AUTO: 83.7 FL (ref 80–100)
MONOCYTES ABSOLUTE: 0.4 K/UL (ref 0–1.3)
MONOCYTES RELATIVE PERCENT: 8.5 %
NEUTROPHILS ABSOLUTE: 4.3 K/UL (ref 1.7–7.7)
NEUTROPHILS RELATIVE PERCENT: 84.1 %
PDW BLD-RTO: 16 % (ref 12.4–15.4)
PLATELET # BLD: 178 K/UL (ref 135–450)
PMV BLD AUTO: 8.4 FL (ref 5–10.5)
POTASSIUM REFLEX MAGNESIUM: 4.1 MMOL/L (ref 3.5–5.1)
RAPID INFLUENZA  B AGN: NEGATIVE
RAPID INFLUENZA A AGN: NEGATIVE
RBC # BLD: 4.42 M/UL (ref 4.2–5.9)
SARS-COV-2, NAAT: NOT DETECTED
SODIUM BLD-SCNC: 138 MMOL/L (ref 136–145)
TOTAL PROTEIN: 7.3 G/DL (ref 6.4–8.2)
TROPONIN: <0.01 NG/ML
WBC # BLD: 5.1 K/UL (ref 4–11)

## 2022-11-25 PROCEDURE — 80053 COMPREHEN METABOLIC PANEL: CPT

## 2022-11-25 PROCEDURE — 71045 X-RAY EXAM CHEST 1 VIEW: CPT

## 2022-11-25 PROCEDURE — 6370000000 HC RX 637 (ALT 250 FOR IP)

## 2022-11-25 PROCEDURE — 87635 SARS-COV-2 COVID-19 AMP PRB: CPT

## 2022-11-25 PROCEDURE — 80074 ACUTE HEPATITIS PANEL: CPT

## 2022-11-25 PROCEDURE — 93005 ELECTROCARDIOGRAM TRACING: CPT | Performed by: EMERGENCY MEDICINE

## 2022-11-25 PROCEDURE — 87804 INFLUENZA ASSAY W/OPTIC: CPT

## 2022-11-25 PROCEDURE — 96374 THER/PROPH/DIAG INJ IV PUSH: CPT

## 2022-11-25 PROCEDURE — 6370000000 HC RX 637 (ALT 250 FOR IP): Performed by: EMERGENCY MEDICINE

## 2022-11-25 PROCEDURE — 84484 ASSAY OF TROPONIN QUANT: CPT

## 2022-11-25 PROCEDURE — 85025 COMPLETE CBC W/AUTO DIFF WBC: CPT

## 2022-11-25 PROCEDURE — 6360000002 HC RX W HCPCS: Performed by: EMERGENCY MEDICINE

## 2022-11-25 PROCEDURE — 36415 COLL VENOUS BLD VENIPUNCTURE: CPT

## 2022-11-25 PROCEDURE — 99285 EMERGENCY DEPT VISIT HI MDM: CPT

## 2022-11-25 RX ORDER — IBUPROFEN 200 MG
TABLET ORAL
Status: COMPLETED
Start: 2022-11-25 | End: 2022-11-25

## 2022-11-25 RX ORDER — BENZONATATE 100 MG/1
100 CAPSULE ORAL 3 TIMES DAILY PRN
Qty: 10 CAPSULE | Refills: 0 | Status: SHIPPED | OUTPATIENT
Start: 2022-11-25 | End: 2022-12-02

## 2022-11-25 RX ORDER — IBUPROFEN 400 MG/1
400 TABLET ORAL ONCE
Status: COMPLETED | OUTPATIENT
Start: 2022-11-25 | End: 2022-11-25

## 2022-11-25 RX ORDER — IBUPROFEN 800 MG/1
800 TABLET ORAL EVERY 6 HOURS PRN
Qty: 120 TABLET | Refills: 3 | Status: SHIPPED | OUTPATIENT
Start: 2022-11-25

## 2022-11-25 RX ORDER — BENZONATATE 100 MG/1
100 CAPSULE ORAL ONCE
Status: COMPLETED | OUTPATIENT
Start: 2022-11-25 | End: 2022-11-25

## 2022-11-25 RX ORDER — PREDNISONE 20 MG/1
60 TABLET ORAL DAILY
Qty: 12 TABLET | Refills: 0 | Status: SHIPPED | OUTPATIENT
Start: 2022-11-25 | End: 2022-11-29

## 2022-11-25 RX ORDER — PSEUDOEPHEDRINE HCL 30 MG
30-60 TABLET ORAL EVERY 6 HOURS PRN
Qty: 28 TABLET | Refills: 1 | Status: SHIPPED | OUTPATIENT
Start: 2022-11-25 | End: 2022-12-02

## 2022-11-25 RX ORDER — ALBUTEROL SULFATE 90 UG/1
2 AEROSOL, METERED RESPIRATORY (INHALATION) EVERY 6 HOURS PRN
Qty: 18 G | Refills: 3 | Status: SHIPPED | OUTPATIENT
Start: 2022-11-25

## 2022-11-25 RX ORDER — METHYLPREDNISOLONE SODIUM SUCCINATE 125 MG/2ML
125 INJECTION, POWDER, LYOPHILIZED, FOR SOLUTION INTRAMUSCULAR; INTRAVENOUS ONCE
Status: COMPLETED | OUTPATIENT
Start: 2022-11-25 | End: 2022-11-25

## 2022-11-25 RX ORDER — IPRATROPIUM BROMIDE AND ALBUTEROL SULFATE 2.5; .5 MG/3ML; MG/3ML
1 SOLUTION RESPIRATORY (INHALATION) ONCE
Status: COMPLETED | OUTPATIENT
Start: 2022-11-25 | End: 2022-11-25

## 2022-11-25 RX ORDER — PSEUDOEPHEDRINE HCL 30 MG
60 TABLET ORAL ONCE
Status: COMPLETED | OUTPATIENT
Start: 2022-11-25 | End: 2022-11-25

## 2022-11-25 RX ADMIN — IPRATROPIUM BROMIDE AND ALBUTEROL SULFATE 1 AMPULE: 2.5; .5 SOLUTION RESPIRATORY (INHALATION) at 16:28

## 2022-11-25 RX ADMIN — IBUPROFEN 400 MG: 200 TABLET, FILM COATED ORAL at 16:28

## 2022-11-25 RX ADMIN — METHYLPREDNISOLONE SODIUM SUCCINATE 125 MG: 125 INJECTION, POWDER, FOR SOLUTION INTRAMUSCULAR; INTRAVENOUS at 16:28

## 2022-11-25 RX ADMIN — BENZONATATE 100 MG: 100 CAPSULE ORAL at 16:28

## 2022-11-25 RX ADMIN — PSEUDOEPHEDRINE HCL 60 MG: 30 TABLET, FILM COATED ORAL at 18:41

## 2022-11-25 RX ADMIN — IBUPROFEN 400 MG: 400 TABLET ORAL at 16:28

## 2022-11-25 ASSESSMENT — PAIN - FUNCTIONAL ASSESSMENT: PAIN_FUNCTIONAL_ASSESSMENT: 0-10

## 2022-11-25 ASSESSMENT — PAIN SCALES - GENERAL
PAINLEVEL_OUTOF10: 7
PAINLEVEL_OUTOF10: 4

## 2022-11-25 NOTE — ED NOTES
Discharge instructions and medications reviewed with patient. Patient verbalized understanding of medications and follow up. All questions answered at this time. IV removed, dressing applied, and bleeding controlled. Skin warm, pink, and dry. Patient alert and oriented x4. Pt ambulated to lobby with a stable gait. Patient discharged home with 6 prescriptions.        Rishi Melgar RN  11/25/22 9977

## 2022-11-25 NOTE — ED PROVIDER NOTES
[address] PROVIDER NOTE    Patient Identification  Pt Name: Queenie Bloom  MRN: [id number]  D[birth year]gfdevin 1960  Date of evaluation: 11/25/2022  Provider: Uli Covington MD  PCP: Bisi Kenney MD    Chief Complaint  URI (Pt having cough, congestion, low grade fever for past 3 days. Thinks he has pna. )      HPI  (History provided by patient)  This is a 63 y.o. male who was brought in by self for possible pneumonia. He reports he has had cough productive of clear sputum, congestion, fever, chills, body aches, fatigue/malaise, nausea and vomiting. He denies hematemesis and hemoptysis. He reports he has had similar symptoms in the past with pneumonia. He has a history of COPD. He complains of left upper chest pain, worse with cough, described as aching in character and type. Shortness of breath and chest pain are worse when he lays flat. ROS  10 systems reviewed, pertinent positives/negatives per HPI otherwise noted to be negative. I have reviewed the following nursing documentation:  Allergies: Bactrim [sulfamethoxazole-trimethoprim], Codeine, and Sulfa antibiotics    Past medical history:   Past Medical History:   Diagnosis Date    Arthritis 1/2011    SHOULDERS AND KNEES    CAD (coronary artery disease)     Chronic back pain     COPD (chronic obstructive pulmonary disease) (HCC)     GERD (gastroesophageal reflux disease)     ONCE WEEKLY TAKE TUMS    Hyperlipidemia     Hypertension     Kidney stone     Lumbar herniated disc     Chronic Pain    Lymphoma of gastrointestinal tract [facility]) May 2013    Pneumonia     Thyroid disease     overactive thyroid    Vitamin B deficiency      Past surgical history:   Past Surgical History:   Procedure Laterality Date    BRONCHOSCOPY N/A 9/20/2022    ROBOT/EBUS WF W/ANES.  (12:00) NO LABS performed by Melania Ly MD at [place] [address]  9/20/2022    BRONCHOSCOPY/TRANSBRONCHIAL NEEDLE BIOPSY ROBOTIC performed by Melania Ly MD at [facility]    BRONCHOSCOPY  9/20/2022    BRONCHOSCOPY BRUSHINGS ROBOTIC performed by Tish Parsons MD at [facility] [address]  9/20/2022    BRONCHOSCOPY ALVEOLAR LAVAGE performed by Tish Parsons MD at [address] [facility]  2/20/2015    stent placed    CARPAL TUNNEL RELEASE      chioma.     CERVICAL DISCECTOMY  02/08/2017    Anterior discectomy, anterior foraminotomy C5/C6 and C6-7    CYSTOSCOPY  6/5/15    CYSTOSCOPY N/A 02/09/2018    DIAGNOSTIC CARDIAC CATH LAB PROCEDURE      ENDOSCOPY, COLON, DIAGNOSTIC      HERNIA REPAIR      KNEE ARTHROSCOPY      LITHOTRIPSY      LITHOTRIPSY Left 11/5/15    OTHER SURGICAL HISTORY  1/20/11    video arthroscopy of right shoulder with subcromial d ecompression and arthroscopic sarbjit    OTHER SURGICAL HISTORY  5/1/13    Excision Lesion Right Angle of Mouth    SHOULDER SURGERY      Left Shoulder X 3; right Shoulder X 5    SHOULDER SURGERY Left 05/30/2017    UPPER GASTROINTESTINAL ENDOSCOPY  07/26/2012    UPPER GASTROINTESTINAL ENDOSCOPY N/A 6/21/2019    EGD BIOPSY performed by Darrell Ricardo DO at [address] medications:   Discharge Medication List as of 11/25/2022  6:25 PM        CONTINUE these medications which have NOT CHANGED    Details   buPROPion (WELLBUTRIN XL) 150 MG extended release tablet Take 1 tablet by mouth every morning, Disp-30 tablet, R-0Normal      Magnesium Oxide (MAG-OXIDE) 200 MG TABS Take 1 tablet by mouth in the morning and at bedtime for 3 days, Disp-6 tablet, R-0Normal      DALIRESP 500 MCG tablet Take 500 mcg by mouth, DAWHistorical Med      fluticasone-umeclidin-vilant (TRELEGY ELLIPTA) 100-62.5-25 MCG/INH AEPB Inhale 1 puff into the lungs daily, Disp-1 each, R-3Normal      potassium chloride (KLOR-CON) 10 MEQ extended release tablet TAKE TWO TABLETS BY MOUTH DAILY, Disp-180 tablet, R-3Normal      atorvastatin (LIPITOR) 80 MG tablet TAKE ONE TABLET BY MOUTH DAILY, Disp-90 tablet, R-3Normal      diazePAM (VALIUM) 5 MG tablet Take 5 mg by mouth every 6 hours as needed. Historical Med      ondansetron (ZOFRAN) 8 MG tablet Historical Med      medical marijuana Inhale 1 Units into the lungs as needed. Historical Med      OMEPRAZOLE PO Take 40 mg by mouth daily 1 capsule by mouth dailyHistorical Med      risperiDONE (RISPERDAL) 0.5 MG tablet Take 0.5 mg by mouth nightlyHistorical Med      zolpidem (AMBIEN) 10 MG tablet Take by mouth nightly as needed for Sleep. Historical Med      levothyroxine (SYNTHROID) 75 MCG tablet Historical Med      tamsulosin (FLOMAX) 0.4 MG capsule Historical Med      tiZANidine (ZANAFLEX) 4 MG tablet 4 mg every 8 hours as needed Historical Med      nitroGLYCERIN (NITROSTAT) 0.4 MG SL tablet Place 1 tablet under the tongue every 5 minutes as needed for Chest pain, Disp-25 tablet, R-3      Sucralfate (CARAFATE PO) Take 1 g by mouth 4 times daily Historical Med      FLUoxetine (PROZAC) 20 MG capsule Take 40 mg by mouth daily Historical Med      pregabalin (LYRICA) 50 MG capsule Take 150 mg by mouth 3 times daily. Historical Med             Social history:  reports that he has been smoking cigarettes. He has a 45.00 pack-year smoking history. He has never used smokeless tobacco. He reports current drug use. Frequency: 21.00 times per week. Drug: Marijuana [employer]). He reports that he does not drink alcohol. Family history:    Family History   Problem Relation Age of Onset    Heart Disease Father     Cancer Mother     Cancer Sister        Exam  ED Triage Vitals [11/25/22 1532]   BP Temp Temp Source Heart Rate Resp SpO2 Height Weight   (!) 153/85 98.2 °F (36.8 °C) Oral 85 18 96 % 5' 8\" (1.727 m) 175 lb (79.4 kg)     Nursing note and vitals reviewed. Constitutional: Well developed, well nourished. Non-toxic in appearance. HENT:      Head: Normocephalic and atraumatic. Ears: External ears normal.      Nose: Nose normal.     Mouth: Membrane mucosa moist and pink.   Eyes: Anicteric sclera. No discharge. Neck: Supple. Trachea midline. Cardiovascular: RRR; no murmurs, rubs, or gallops. Pulmonary/Chest: Effort normal. No respiratory distress. Diminished breath sounds bilaterally with faint wheezes. No rales or rhonchi. Abdominal: Soft. No distension. Musculoskeletal: Moves all extremities. No gross deformity. No significant lower extremity edema  Neurological: Alert and oriented. Face symmetric. Speech is clear. Skin: Warm and dry. No rash. Psychiatric: Normal mood and affect. Behavior is normal.      EKG  The Ekg interpreted by me in the absence of a cardiologist shows. normal sinus rhythm with a rate of 73  Axis is   Normal  QTc is  normal  Intervals and Durations are unremarkable. No specific ST-T wave changes appreciated. No evidence of acute ischemia. No significant change from prior EKG dated 9/27/2022      Radiology  XR CHEST PORTABLE   Final Result   No acute abnormality.              Labs  Results for orders placed or performed during the hospital encounter of 11/25/22   Rapid influenza A/B antigens    Specimen: Nasopharyngeal   Result Value Ref Range    Rapid Influenza A Ag Negative Negative    Rapid Influenza B Ag Negative Negative   COVID-19, Rapid    Specimen: Nasopharyngeal Swab   Result Value Ref Range    SARS-CoV-2, NAAT Not Detected Not Detected   CBC with Auto Differential   Result Value Ref Range    WBC 5.1 4.0 - 11.0 K/uL    RBC 4.42 4.20 - 5.90 M/uL    Hemoglobin 12.4 (L) 13.5 - 17.5 g/dL    Hematocrit 37.0 (L) 40.5 - 52.5 %    MCV 83.7 80.0 - 100.0 fL    MCH 28.1 26.0 - 34.0 pg    MCHC 33.6 31.0 - 36.0 g/dL    RDW 16.0 (H) 12.4 - 15.4 %    Platelets 933 324 - 294 K/uL    MPV 8.4 5.0 - 10.5 fL    Neutrophils % 84.1 %    Lymphocytes % 7.1 %    Monocytes % 8.5 %    Eosinophils % 0.0 %    Basophils % 0.3 %    Neutrophils Absolute 4.3 1.7 - 7.7 K/uL    Lymphocytes Absolute 0.4 (L) 1.0 - 5.1 K/uL    Monocytes Absolute 0.4 0.0 - 1.3 K/uL Eosinophils Absolute 0.0 0.0 - 0.6 K/uL    Basophils Absolute 0.0 0.0 - 0.2 K/uL   Comprehensive Metabolic Panel w/ Reflex to MG   Result Value Ref Range    Sodium 138 136 - 145 mmol/L    Potassium reflex Magnesium 4.1 3.5 - 5.1 mmol/L    Chloride 99 99 - 110 mmol/L    CO2 25 21 - 32 mmol/L    Anion Gap 14 3 - 16    Glucose 116 (H) 70 - 99 mg/dL    BUN 12 7 - 20 mg/dL    Creatinine 0.9 0.8 - 1.3 mg/dL    Est, Glom Filt Rate >60 >60    Calcium 9.8 8.3 - 10.6 mg/dL    Total Protein 7.3 6.4 - 8.2 g/dL    Albumin 4.2 3.4 - 5.0 g/dL    Albumin/Globulin Ratio 1.4 1.1 - 2.2    Total Bilirubin 0.3 0.0 - 1.0 mg/dL    Alkaline Phosphatase 144 (H) 40 - 129 U/L     (H) 10 - 40 U/L     (H) 15 - 37 U/L   Troponin   Result Value Ref Range    Troponin <0.01 <0.01 ng/mL     MDM and ED Course  Despite the patient's concern for pneumonia, his chest x-ray showed no evidence of this. I do suspect a COPD exacerbation given the patient's history. He did feel better after receiving nebulizer treatment and steroids. Influenza and COVID were negative. I suspect he is experiencing a different respiratory virus among many other currently going around at this time. I am discharging him home with symptomatic treatment. I estimate there is LOW risk for ACUTE CORONARY SYNDROME, RESPIRATORY FAILURE/ARREST, ACUTE CONGESTIVE HEART FAILURE, CARDIAC TAMPONADE, PNEUMONIA, PNEUMOTHORAX, PERICARDITIS, PULMONARY EMBOLISM, AORTIC DISSECTION, and ARDS,  thus I consider the discharge disposition reasonable. Gina Wallace and I have discussed the diagnosis and risks, and we agree with discharging home to follow-up with their primary doctor. We also discussed returning to the Emergency Department immediately if new or worsening symptoms occur. We have discussed the symptoms which are most concerning (e.g., bloody sputum, fever, worsening pain or shortness of breath, vomiting) that necessitate immediate return. Final Impression  1. Acute viral syndrome    2. Flu-like symptoms    3. COPD with acute exacerbation (HCC)        Blood pressure 136/75, pulse 85, temperature 98.2 °F (36.8 °C), temperature source Oral, resp. rate 16, height 5' 8\" (1.727 m), weight 175 lb (79.4 kg), SpO2 100 %. Disposition:  DISPOSITION Decision To Discharge 11/25/2022 06:22:26 PM      Patient Referrals:  [facility]  [address] [facility] [address] [facility],[address]  [phone]    As needed, If symptoms worsen or new symptoms develop    Lanre Mckoy MD  [address] [place] [address]   [address] [place] [address] [facility]  [phone]    In 3 days  for re-evaluation    Discharge Medications:  Discharge Medication List as of 11/25/2022  6:25 PM        START taking these medications    Details   pseudoephedrine (SUDAFED) 30 MG tablet Take 1-2 tablets by mouth every 6 hours as needed for Congestion, Disp-28 tablet, R-1Normal      ibuprofen (ADVIL;MOTRIN) 800 MG tablet Take 1 tablet by mouth every 6 hours as needed for Pain, Disp-120 tablet, R-3Normal      benzonatate (TESSALON PERLES) 100 MG capsule Take 1 capsule by mouth 3 times daily as needed for Cough, Disp-10 capsule, R-0Normal      predniSONE (DELTASONE) 20 MG tablet Take 3 tablets by mouth daily for 4 days, Disp-12 tablet, R-0Normal      albuterol (PROVENTIL) (5 MG/ML) 0.5% nebulizer solution Take 0.5 mLs by nebulization 4 times daily as needed for Wheezing, Disp-120 each, R-3Normal             This chart was generated using the Dragon dictation system. I created this record but it may contain dictation errors given the limitations of this technology.        Obdulia Roberts MD  11/30/22 6401

## 2022-11-26 LAB
EKG ATRIAL RATE: 73 BPM
EKG DIAGNOSIS: NORMAL
EKG P AXIS: 77 DEGREES
EKG P-R INTERVAL: 152 MS
EKG Q-T INTERVAL: 404 MS
EKG QRS DURATION: 86 MS
EKG QTC CALCULATION (BAZETT): 445 MS
EKG R AXIS: 67 DEGREES
EKG T AXIS: 71 DEGREES
EKG VENTRICULAR RATE: 73 BPM
HAV IGM SER IA-ACNC: NORMAL
HEPATITIS B CORE IGM ANTIBODY: NORMAL
HEPATITIS B SURFACE ANTIGEN INTERPRETATION: NORMAL
HEPATITIS C ANTIBODY INTERPRETATION: NORMAL

## 2022-11-26 PROCEDURE — 93010 ELECTROCARDIOGRAM REPORT: CPT | Performed by: INTERNAL MEDICINE

## 2022-12-27 ENCOUNTER — HOSPITAL ENCOUNTER (OUTPATIENT)
Dept: CT IMAGING | Age: 62
Discharge: HOME | End: 2022-12-27
Payer: MEDICARE

## 2022-12-27 DIAGNOSIS — Z85.72 PERSONAL HISTORY OF MALIGNANT LYMPHOMA: ICD-10-CM

## 2022-12-27 PROCEDURE — 71260 CT THORAX DX C+: CPT

## 2022-12-27 PROCEDURE — 6360000004 HC RX CONTRAST MEDICATION: Performed by: INTERNAL MEDICINE

## 2022-12-27 RX ADMIN — DIATRIZOATE MEGLUMINE AND DIATRIZOATE SODIUM 12 ML: 660; 100 LIQUID ORAL; RECTAL at 08:52

## 2022-12-27 RX ADMIN — IOPAMIDOL 75 ML: 755 INJECTION, SOLUTION INTRAVENOUS at 08:53

## 2022-12-27 NOTE — TELEPHONE ENCOUNTER
Pt completed CT ordered by [facility]. Please review    Narrative   EXAMINATION:   CT OF THE CHEST, ABDOMEN, AND PELVIS WITH CONTRAST 12/27/2022 8:26 am       TECHNIQUE:   CT of the chest, abdomen and pelvis was performed with the administration of   intravenous contrast. Multiplanar reformatted images are provided for review. Automated exposure control, iterative reconstruction, and/or weight based   adjustment of the mA/kV was utilized to reduce the radiation dose to as low   as reasonably achievable. COMPARISON:   September 7, 2022. HISTORY:   ORDERING SYSTEM PROVIDED HISTORY: Personal history of malignant lymphoma   TECHNOLOGIST PROVIDED HISTORY:   Additional Contrast?->Oral   STAT Creatinine as needed:->Yes   Reason for exam:->surviellance   Reason for Exam: surveillance, personal history of lymphoma,pt was on an oral   medicine for the lymphoma,  pt states he has belly pain a lot along with   nausea takes medicine to help,  history- CAD, COPD, GERD, HTN, kidnbey stone,   pneumonia, thyroid disease, surg- bronchoscopy, hernia repair, lithotripsy,   EGD,       FINDINGS:   Visualized portions of the thyroid gland demonstrate nodularity seen in the   right thyroid lobe best seen on axial image 9. A punctate calcification is   seen in the left thyroid lobe. There is no significant axillary pre-vascular   paratracheal or subcarinal adenopathy. The main pulmonary artery is patent   up to the 2nd and 3rd order branches. The terminal sub selective branches   are limited in evaluation. The heart demonstrates coronary artery calcifications. There are no   pericardial effusions. The liver demonstrates a tiny hypodensity seen superiorly likely related to   cyst, benign-appearing seen on image 20. There is no intrahepatic biliary   ductal dilatation. Main portal vein is patent. The gallbladder is partially   collapsed and is grossly unremarkable.   The adrenal glands demonstrate   nodularity seen in the left adrenal gland with a mean Hounsfield unit of 36   which is incompletely evaluated but remains overall without significant   change when compared to the previous study. Kidneys enhance symmetrically. A left-sided extrarenal pelvis is seen. There is no hydronephrosis. Punctate nonobstructing calculi are seen in the lower and mid pole the left   kidney. The spleen and pancreas are unremarkable. The stomach distends with   contrast food particles and air and is otherwise unremarkable. The   small-bowel loops are unevenly distended and are grossly unremarkable. The   appendix is unremarkable. The bladder is partially collapsed and   demonstrates some degree asymmetric wall thickening seen laterally best seen   on axial image 142. The prostate gland is prominent appearing contains   calcifications as well as the seminal vesicles. The prostate gland measures   up to 5.9 cm by 5.1 cm. There is no free fluid seen in the pelvis. There is   no significant pelvic adenopathy. The large bowel is partially collapsed   distally and remains predominantly collapsed at the level of the descending   colon and distends more proximal to that with stool and air and is otherwise   unremarkable. The soft tissues are unremarkable. The bones demonstrate   incompletely spinal fusion hardware noted over the lower cervical spine. Multilevel degenerative changes with osteophytes and disc disease is also   noted. Degenerative changes are also seen at the hip joint spaces   bilaterally. Lungs demonstrate bilateral moderate emphysematous changes seen. The trachea   and central bronchi are patent. There is a 9 mm lung nodule seen in the left   upper lobe anteriorly best seen on axial image 43. This remains overall   without significant change. More inferiorly in the lungs are seen few   scattered less than 6 mm lung nodules.   There is a slightly cavitary 8 mm   lung nodule seen in the right upper lobe laterally and posteriorly on image   54 which is new when compared to the previous exam.  More inferior to that   seen few scattered less than 6 mm lung nodules. These are most prominent in   the lower lobes and demonstrates slight degree of tree-in-bud best seen on   axial image 86 and 87. There is associated bronchial wall thickening as well   seen. These extend more inferiorly involve the lower lobes. There are no   pleural effusions. Impression   Patchy areas of nodularity seen with moderate emphysematous changes seen in   the lungs. Interval development of new nodularity seen in the right upper   lobe and bilateral lower lobes, right greater than left. Findings likely   related to an underlying superimposed infectious/inflammatory etiology. Underlying malignancy not ruled out. Previous dominant 9 mm lung nodule in   the left upper lobe and 1.3 cm left upper lobe central nodule remains without   change. Underlying malignancy in these lung nodules suspected. Recommend   comparison clinical exam and short-term interval follow-up study. Follow-up   with PET-CT is also recommended. Some degree of asymmetric bladder wall thickening seen. Recommend comparison   with clinical exam/urinalysis and direct visualization. Punctate nonobstructing calculi seen in the left kidney. Enlarged and prominent appearing prostate gland and seminal vesicles are also   seen. Recommend comparison with clinical exam and PSA value. Degenerative changes and coronary artery calcifications noted.

## 2023-01-10 ENCOUNTER — OFFICE VISIT (OUTPATIENT)
Dept: PULMONOLOGY | Age: 63
End: 2023-01-10
Payer: MEDICARE

## 2023-01-10 VITALS
SYSTOLIC BLOOD PRESSURE: 144 MMHG | HEART RATE: 78 BPM | WEIGHT: 176 LBS | OXYGEN SATURATION: 98 % | DIASTOLIC BLOOD PRESSURE: 78 MMHG | BODY MASS INDEX: 26.76 KG/M2

## 2023-01-10 DIAGNOSIS — R91.1 LUNG NODULE: Primary | ICD-10-CM

## 2023-01-10 PROCEDURE — 3017F COLORECTAL CA SCREEN DOC REV: CPT | Performed by: INTERNAL MEDICINE

## 2023-01-10 PROCEDURE — G8419 CALC BMI OUT NRM PARAM NOF/U: HCPCS | Performed by: INTERNAL MEDICINE

## 2023-01-10 PROCEDURE — 3077F SYST BP >= 140 MM HG: CPT | Performed by: INTERNAL MEDICINE

## 2023-01-10 PROCEDURE — 4004F PT TOBACCO SCREEN RCVD TLK: CPT | Performed by: INTERNAL MEDICINE

## 2023-01-10 PROCEDURE — 3078F DIAST BP <80 MM HG: CPT | Performed by: INTERNAL MEDICINE

## 2023-01-10 PROCEDURE — 99214 OFFICE O/P EST MOD 30 MIN: CPT | Performed by: INTERNAL MEDICINE

## 2023-01-10 PROCEDURE — G8428 CUR MEDS NOT DOCUMENT: HCPCS | Performed by: INTERNAL MEDICINE

## 2023-01-10 PROCEDURE — G8484 FLU IMMUNIZE NO ADMIN: HCPCS | Performed by: INTERNAL MEDICINE

## 2023-01-10 NOTE — PROGRESS NOTES
[facility]                                               Pulmonary Clinic Consult     I had the pleasure of seeing  Aleksandr Sullivan     No chief complaint on file. HISTORY OF PRESENT ILLNESS:    Aleksandr Sullivan is a 63y.o. year old  Who start smoking at age  17  And increase gradually   1.5 pack daily ,he still smoke    He was diagnosed 10 year,he received pills as he states ,he was done with therapy long time and was told he is in remission     The Patient comes in with SOB that has been going on  for the last few years Associated with .cough and he usually has clear sputum    He states that it get worse with exercise or walking long distance and he can walk less 300 feet- 1/2 And go 1-2 flight of stairs before get short winded        Patient underwent biopsy JERROD ,central nodule and was negative   Culture al was negative  Unfortunately he continue to smoke   His cough and SOB has improved  Could not tolerate Trelegy   PFT does 10 /22      Today Visit  He has been doing fair however had flu December and he did ok but symptoms were severe and visited ER  He was given p[prednisone   He has been doing fair  Following with urology       ALLERGIES:    Allergies   Allergen Reactions    Bactrim [Sulfamethoxazole-Trimethoprim] Other (See Comments)     States makes yeast infection on his penis    Codeine Nausea Only     Pt tolerates Oxycodone    Sulfa Antibiotics      Other reaction(s):  Other (See Comments)  Yeast infection  Yeast infection         PAST MEDICAL HISTORY:       Diagnosis Date    Arthritis 1/2011    SHOULDERS AND KNEES    CAD (coronary artery disease)     Chronic back pain     COPD (chronic obstructive pulmonary disease) ([facility])     GERD (gastroesophageal reflux disease)     ONCE WEEKLY TAKE TUMS    Hyperlipidemia     Hypertension     Kidney stone     Lumbar herniated disc     Chronic Pain    Lymphoma of gastrointestinal tract [facility]) May 2013    Pneumonia Thyroid disease     overactive thyroid    Vitamin B deficiency        MEDICATIONS:   Current Outpatient Medications   Medication Sig Dispense Refill    ibuprofen (ADVIL;MOTRIN) 800 MG tablet Take 1 tablet by mouth every 6 hours as needed for Pain 120 tablet 3    albuterol sulfate HFA (PROVENTIL;VENTOLIN;PROAIR) 108 (90 Base) MCG/ACT inhaler Inhale 2 puffs into the lungs every 6 hours as needed for Wheezing 18 g 3    albuterol (PROVENTIL) (5 MG/ML) 0.5% nebulizer solution Take 0.5 mLs by nebulization 4 times daily as needed for Wheezing 120 each 3    buPROPion (WELLBUTRIN XL) 150 MG extended release tablet Take 1 tablet by mouth every morning 30 tablet 0    DALIRESP 500 MCG tablet Take 500 mcg by mouth      potassium chloride (KLOR-CON) 10 MEQ extended release tablet TAKE TWO TABLETS BY MOUTH DAILY 180 tablet 3    atorvastatin (LIPITOR) 80 MG tablet TAKE ONE TABLET BY MOUTH DAILY 90 tablet 3    diazePAM (VALIUM) 5 MG tablet Take 5 mg by mouth every 6 hours as needed. ondansetron (ZOFRAN) 8 MG tablet       medical marijuana Inhale 1 Units into the lungs as needed. OMEPRAZOLE PO Take 40 mg by mouth daily 1 capsule by mouth daily      zolpidem (AMBIEN) 10 MG tablet Take by mouth nightly as needed for Sleep.      levothyroxine (SYNTHROID) 75 MCG tablet       tamsulosin (FLOMAX) 0.4 MG capsule       tiZANidine (ZANAFLEX) 4 MG tablet 4 mg every 8 hours as needed       nitroGLYCERIN (NITROSTAT) 0.4 MG SL tablet Place 1 tablet under the tongue every 5 minutes as needed for Chest pain 25 tablet 3    Sucralfate (CARAFATE PO) Take 1 g by mouth 4 times daily       FLUoxetine (PROZAC) 20 MG capsule Take 40 mg by mouth daily       pregabalin (LYRICA) 50 MG capsule Take 150 mg by mouth 3 times daily.        Magnesium Oxide (MAG-OXIDE) 200 MG TABS Take 1 tablet by mouth in the morning and at bedtime for 3 days 6 tablet 0    risperiDONE (RISPERDAL) 0.5 MG tablet Take 0.5 mg by mouth nightly (Patient not taking: No sig reported)       No current facility-administered medications for this visit. SOCIAL AND OCCUPATIONAL HEALTH:  Social History     Tobacco Use   Smoking Status Every Day    Packs/day: 1.00    Years: 45.00    Pack years: 45.00    Types: Cigarettes   Smokeless Tobacco Never     TB :  Pets   Industrial exposure:  Birds :    SURGICAL HISTORY:   Past Surgical History:   Procedure Laterality Date    BRONCHOSCOPY N/A 9/20/2022    ROBOT/EBUS WF W/ANES. (12:00) NO LABS performed by Melania Ly MD at [place] [address]  9/20/2022    BRONCHOSCOPY/TRANSBRONCHIAL NEEDLE BIOPSY ROBOTIC performed by Melania Ly MD at [place] [address]  9/20/2022    BRONCHOSCOPY BRUSHINGS ROBOTIC performed by Melania Ly MD at [place] [address]  9/20/2022    BRONCHOSCOPY ALVEOLAR LAVAGE performed by Melania Ly MD at [address] [place] [address]  2/20/2015    stent placed    CARPAL TUNNEL RELEASE      chioma. CERVICAL DISCECTOMY  02/08/2017    Anterior discectomy, anterior foraminotomy C5/C6 and C6-7    CYSTOSCOPY  6/5/15    CYSTOSCOPY N/A 02/09/2018    DIAGNOSTIC CARDIAC CATH LAB PROCEDURE      ENDOSCOPY, COLON, DIAGNOSTIC      HERNIA REPAIR      KNEE ARTHROSCOPY      LITHOTRIPSY      LITHOTRIPSY Left 11/5/15    OTHER SURGICAL HISTORY  1/20/11    video arthroscopy of right shoulder with subcromial d ecompression and arthroscopic sarbjit    OTHER SURGICAL HISTORY  5/1/13    Excision Lesion Right Angle of Mouth    SHOULDER SURGERY      Left Shoulder X 3; right Shoulder X 5    SHOULDER SURGERY Left 05/30/2017    UPPER GASTROINTESTINAL ENDOSCOPY  07/26/2012    UPPER GASTROINTESTINAL ENDOSCOPY N/A 6/21/2019    EGD BIOPSY performed by Danisha Yepez DO at [address]:   Lung cancer:wife has Lung cancer   DVT or PE no     REVIEW OF SYSTEMS:  Constitutional: General health is good . There has been no weight changes.  No fevers, fatigue or weakness. Head: Patient denies any history of trauma, convulsive disorder or syncope. Skin:  Patient denies history of changes in pigmentation, eruptions or pruritus. No easy bruising or bleeding. EENT: no nasal congestion   Cardiovascular ,No chest pain ,No edema ,  Respiration:SOB:  ++,ROCHE :++  Gastrointestinal:No GI bleed ,no melena  ,no hematemesis    Musculoskeletal: no joint pain ,no swelling  Neurological:no , syncope. Denies twitching, convulsions, loss of consciousness or memory. Endocrine:  . No history of goiter, exophthalmos or dryness of skin. The patient has no history of diabetes. Hematopoietic:  No history of bleeding disorders or easy bruising. Rheumatic:  No connective tissue disease history or polyarthritis/inflammatory joint disease. PHYSICAL EXAMINATION:  Vitals:    01/10/23 0951   BP: (!) 144/78   Pulse: 78   SpO2: 98%     Constitutional: This is a well developed, well nourished 63y.o. year old male who is alert, oriented, cooperative and in no apparent distress. Head was normocephalic and atraumatic. EENT: Mallampati class :  Extraocular muscles intact. External canals are patent and no discharge was appreciated. Septum was midline,   mucosa was without erythema, exudates or cobblestoning. No thrush was noted. Neck: Supple without thyromegaly. No elevated JVP. Trachea was midline. No carotid bruits were auscultated. Respiratory: Rhonchi scattered     Cardiovascular: Regular without murmur, clicks, gallops or rubs. There is no left or right ventricular heave. Pulses:  Carotid, radial and femoral pulses were equally bilaterally. Abdomen: Slightly rounded and soft without organomegaly. No rebound, rigidity or guarding was appreciated. Lymphatic: No lymphadenopathy. Musculoskeletal: no joint deformity . Extremities: no edema   Skin:  Warm and dry. Good color, turgor and pigmentation. No lesions or scars.   Neurological/Psychiatric: The patient's general behavior, level of consciousness, thought content and emotional status is normal.  Cranial nerves II-XII are intact. DATA:   PFT             IMPRESSION:    1-Lung mass left side X 2   2-h/o Lymphoma  3-COPD  4-KELLIE               PLAN:       stiolto  did not help . as trelegy gave him rash  Lung nodules Left upper lobe with no changes however they are new compare with CT done in 2020 . I offer CT guided biopsy   Discuss with patient nd wife and plan tow ait 3 months and check CT   Quit smoking   -PFT no obstruction  -PET scan reviewed with tanya ,some activity left hilar ,attempted biopsy and still stable     sleep study down the road     Discussed in details with him and his wife ,all questions and concerns were answered  Flu and Pneumovax as per primary     Thank you for allowing me to participate in [facility]. I will keep following with you ,should you have any concerns ,please contact us at [facility] pulmonary office     Sincerely,        Solomon Nicholson MD  Pulmonary & Critical Care Medicine     NOTE: This report was transcribed using voice recognition software. Every effort was made to ensure accuracy; however, inadvertent computerized transcription errors may be present.

## 2023-01-18 NOTE — PROGRESS NOTES
Pharmacy note:  Vancomycin Day #  1  Patient is a 63 yo male admitted with an abscess/cellulitis of the right hand. He was started on Ceftriaxone. Pt was also started on Vancomycin to cover gram positive organisms, including MRSA. WBC           BUN/SCr            Calc. CrCl                Ht.             Wt.  12.3             8/0.7                   113.2 ml/min           5'8.5\"         70.3 kg. Patient received vancomycin 1750 mg IVPB in ED. Based on patient's age, weight, and renal function will continue therapy with Vancomycin 1250 mg IVPB q12h and check trough at steady state. Will adjust as necessary. Jg Patten, Pharm. D. 10/1/2020 10:32 PM No

## 2023-03-15 ENCOUNTER — HOSPITAL ENCOUNTER (OUTPATIENT)
Dept: CT IMAGING | Age: 63
Discharge: HOME | End: 2023-03-15
Payer: MEDICARE

## 2023-03-15 DIAGNOSIS — R91.1 LUNG NODULE: ICD-10-CM

## 2023-03-15 PROCEDURE — 71250 CT THORAX DX C-: CPT

## 2023-04-05 ENCOUNTER — OFFICE VISIT (OUTPATIENT)
Dept: PULMONOLOGY | Age: 63
End: 2023-04-05
Payer: MEDICARE

## 2023-04-05 ENCOUNTER — TELEPHONE (OUTPATIENT)
Dept: PULMONOLOGY | Age: 63
End: 2023-04-05

## 2023-04-05 VITALS
HEART RATE: 81 BPM | DIASTOLIC BLOOD PRESSURE: 72 MMHG | HEIGHT: 68 IN | BODY MASS INDEX: 27.28 KG/M2 | RESPIRATION RATE: 16 BRPM | SYSTOLIC BLOOD PRESSURE: 138 MMHG | WEIGHT: 180 LBS | OXYGEN SATURATION: 97 %

## 2023-04-05 DIAGNOSIS — R91.1 LUNG NODULE: Primary | ICD-10-CM

## 2023-04-05 PROCEDURE — 3078F DIAST BP <80 MM HG: CPT | Performed by: INTERNAL MEDICINE

## 2023-04-05 PROCEDURE — 4004F PT TOBACCO SCREEN RCVD TLK: CPT | Performed by: INTERNAL MEDICINE

## 2023-04-05 PROCEDURE — 99214 OFFICE O/P EST MOD 30 MIN: CPT | Performed by: INTERNAL MEDICINE

## 2023-04-05 PROCEDURE — 3075F SYST BP GE 130 - 139MM HG: CPT | Performed by: INTERNAL MEDICINE

## 2023-04-05 PROCEDURE — 3017F COLORECTAL CA SCREEN DOC REV: CPT | Performed by: INTERNAL MEDICINE

## 2023-04-05 PROCEDURE — G8419 CALC BMI OUT NRM PARAM NOF/U: HCPCS | Performed by: INTERNAL MEDICINE

## 2023-04-05 PROCEDURE — G8427 DOCREV CUR MEDS BY ELIG CLIN: HCPCS | Performed by: INTERNAL MEDICINE

## 2023-04-05 NOTE — PROGRESS NOTES
in the morning and at bedtime for 3 days 6 tablet 0     No current facility-administered medications for this visit. SOCIAL AND OCCUPATIONAL HEALTH:  Social History     Tobacco Use   Smoking Status Every Day    Packs/day: 1.00    Years: 45.00    Pack years: 45.00    Types: Cigarettes   Smokeless Tobacco Never     TB :  Pets   Industrial exposure:  Birds :    SURGICAL HISTORY:   Past Surgical History:   Procedure Laterality Date    BRONCHOSCOPY N/A 9/20/2022    ROBOT/EBUS WF W/ANES. (12:00) NO LABS performed by Collette Heath, MD at [place] [address]  9/20/2022    BRONCHOSCOPY/TRANSBRONCHIAL NEEDLE BIOPSY ROBOTIC performed by Collette Heath, MD at [place] [address]  9/20/2022    BRONCHOSCOPY BRUSHINGS ROBOTIC performed by Collette Heath, MD at [place] [address]  9/20/2022    BRONCHOSCOPY ALVEOLAR LAVAGE performed by Collette Heath, MD at [address] [place] [address]  2/20/2015    stent placed    CARPAL TUNNEL RELEASE      chioma. CERVICAL DISCECTOMY  02/08/2017    Anterior discectomy, anterior foraminotomy C5/C6 and C6-7    CYSTOSCOPY  6/5/15    CYSTOSCOPY N/A 02/09/2018    DIAGNOSTIC CARDIAC CATH LAB PROCEDURE      ENDOSCOPY, COLON, DIAGNOSTIC      HERNIA REPAIR      KNEE ARTHROSCOPY      LITHOTRIPSY      LITHOTRIPSY Left 11/5/15    OTHER SURGICAL HISTORY  1/20/11    video arthroscopy of right shoulder with subcromial d ecompression and arthroscopic sarbjit    OTHER SURGICAL HISTORY  5/1/13    Excision Lesion Right Angle of Mouth    SHOULDER SURGERY      Left Shoulder X 3; right Shoulder X 5    SHOULDER SURGERY Left 05/30/2017    UPPER GASTROINTESTINAL ENDOSCOPY  07/26/2012    UPPER GASTROINTESTINAL ENDOSCOPY N/A 6/21/2019    EGD BIOPSY performed by Alisa Haque DO at [address]:   Lung cancer:wife has Lung cancer   DVT or PE no     REVIEW OF SYSTEMS:  Constitutional: General health is good .  There has been

## 2023-06-10 RX ORDER — ATORVASTATIN CALCIUM 80 MG/1
80 TABLET, FILM COATED ORAL DAILY
Qty: 90 TABLET | Refills: 3 | Status: SHIPPED | OUTPATIENT
Start: 2023-06-10

## 2023-06-29 ENCOUNTER — HOSPITAL ENCOUNTER (OUTPATIENT)
Dept: CT IMAGING | Age: 63
Discharge: HOME | End: 2023-06-29
Payer: MEDICARE

## 2023-06-29 ENCOUNTER — HOSPITAL ENCOUNTER (OUTPATIENT)
Age: 63
Discharge: HOME | End: 2023-06-29
Payer: MEDICARE

## 2023-06-29 DIAGNOSIS — R91.1 LUNG NODULE: ICD-10-CM

## 2023-06-29 PROCEDURE — 71250 CT THORAX DX C-: CPT

## 2023-06-30 ENCOUNTER — TELEPHONE (OUTPATIENT)
Dept: CARDIOLOGY CLINIC | Age: 63
End: 2023-06-30

## 2023-06-30 NOTE — TELEPHONE ENCOUNTER
Next avilable with SRJ is 9/28 at [facility] and 9/29 at [facility]. Can pt be worked in with [address] [place] [address] for a sooner date as procedure is 7/12?

## 2023-06-30 NOTE — TELEPHONE ENCOUNTER
I would suggest trying perhaps another provider if sooner availability needed, alternatively NP appt would be reasonable as well. Thank you.

## 2023-06-30 NOTE — TELEPHONE ENCOUNTER
CARDIAC CLEARANCE REQUEST    What type of procedure are you having: Urolift prostate procedure    Are you taking any blood thinners:n/a if there are any medications that might need to be held please have cardiologist determine that      When is your procedure scheduled for: 7/12/23    What physician is performing your procedure:Dr.Eric Ag (spelling?)    Phone Number:[phone]    Fax number to send the letter: fax letter to [phone]

## 2023-07-03 NOTE — TELEPHONE ENCOUNTER
Spoke with pt and he stated 's office, the surgeon, set the pt up for an ekg already at the [facility] physicians in [facility] on 7/7/23 at 11am. Can we go off that ekg or does the pt ultimately need to be seen with our group? Please route message back as a hp to [employer]. *If needs to be seen pt can be scheduled with [facility] (mark pt) for 7/5/23 at 215pm ok'd per rnbh.

## 2023-07-03 NOTE — TELEPHONE ENCOUNTER
WINNIE Mirza/GRAZYNA hernández could we use the 7/5/23 at 215pm slot with [facility] for this clx?  Np's next available is august.

## 2023-07-03 NOTE — TELEPHONE ENCOUNTER
For us to provide clearance the patient needs to be seen in office with our group. Please schedule with [facility] (mark pt) for 7/5/23 at 215pm ok'd per rnbh.

## 2023-07-05 ENCOUNTER — OFFICE VISIT (OUTPATIENT)
Dept: CARDIOLOGY CLINIC | Age: 63
End: 2023-07-05
Payer: MEDICARE

## 2023-07-05 ENCOUNTER — OFFICE VISIT (OUTPATIENT)
Dept: PULMONOLOGY | Age: 63
End: 2023-07-05
Payer: MEDICARE

## 2023-07-05 ENCOUNTER — HOSPITAL ENCOUNTER (OUTPATIENT)
Age: 63
Discharge: HOME | End: 2023-07-05
Payer: MEDICARE

## 2023-07-05 VITALS
BODY MASS INDEX: 26.9 KG/M2 | DIASTOLIC BLOOD PRESSURE: 60 MMHG | HEART RATE: 68 BPM | SYSTOLIC BLOOD PRESSURE: 96 MMHG | OXYGEN SATURATION: 96 % | WEIGHT: 177.5 LBS | HEIGHT: 68 IN

## 2023-07-05 VITALS
BODY MASS INDEX: 26.98 KG/M2 | SYSTOLIC BLOOD PRESSURE: 100 MMHG | HEART RATE: 70 BPM | HEIGHT: 68 IN | OXYGEN SATURATION: 98 % | WEIGHT: 178 LBS | DIASTOLIC BLOOD PRESSURE: 62 MMHG

## 2023-07-05 DIAGNOSIS — E78.2 MIXED HYPERLIPIDEMIA: ICD-10-CM

## 2023-07-05 DIAGNOSIS — Z01.818 PRE-OP EVALUATION: Primary | ICD-10-CM

## 2023-07-05 DIAGNOSIS — R91.1 LUNG NODULE: Primary | ICD-10-CM

## 2023-07-05 DIAGNOSIS — R91.1 LUNG NODULE: ICD-10-CM

## 2023-07-05 PROCEDURE — 99214 OFFICE O/P EST MOD 30 MIN: CPT | Performed by: INTERNAL MEDICINE

## 2023-07-05 PROCEDURE — 86612 BLASTOMYCES ANTIBODY: CPT

## 2023-07-05 PROCEDURE — 3074F SYST BP LT 130 MM HG: CPT | Performed by: INTERNAL MEDICINE

## 2023-07-05 PROCEDURE — G8419 CALC BMI OUT NRM PARAM NOF/U: HCPCS | Performed by: INTERNAL MEDICINE

## 2023-07-05 PROCEDURE — 3078F DIAST BP <80 MM HG: CPT | Performed by: INTERNAL MEDICINE

## 2023-07-05 PROCEDURE — 87385 HISTOPLASMA CAPSUL AG IA: CPT

## 2023-07-05 PROCEDURE — 4004F PT TOBACCO SCREEN RCVD TLK: CPT | Performed by: INTERNAL MEDICINE

## 2023-07-05 PROCEDURE — G8427 DOCREV CUR MEDS BY ELIG CLIN: HCPCS | Performed by: INTERNAL MEDICINE

## 2023-07-05 PROCEDURE — 3017F COLORECTAL CA SCREEN DOC REV: CPT | Performed by: INTERNAL MEDICINE

## 2023-07-05 PROCEDURE — 93000 ELECTROCARDIOGRAM COMPLETE: CPT | Performed by: INTERNAL MEDICINE

## 2023-07-05 RX ORDER — NITROGLYCERIN 0.4 MG/1
0.4 TABLET SUBLINGUAL EVERY 5 MIN PRN
Qty: 25 TABLET | Refills: 3 | Status: SHIPPED
Start: 2023-07-05

## 2023-07-05 RX ORDER — NICOTINE 21 MG/24HR
1 PATCH, TRANSDERMAL 24 HOURS TRANSDERMAL EVERY 24 HOURS
Qty: 30 PATCH | Refills: 0 | Status: SHIPPED | OUTPATIENT
Start: 2023-07-05

## 2023-07-05 RX ORDER — OXYCODONE HYDROCHLORIDE AND ACETAMINOPHEN 5; 325 MG/1; MG/1
1 TABLET ORAL EVERY 6 HOURS PRN
COMMUNITY
Start: 2023-04-18

## 2023-07-05 NOTE — PROGRESS NOTES
Endoscopy, colon, diagnostic; other surgical history (5/1/13); Cystoscopy (6/5/15); Lithotripsy; Lithotripsy (Left, 11/5/15); Diagnostic Cardiac Cath Lab Procedure; Cardiac catheterization (2/20/2015); Cervical discectomy (02/08/2017); shoulder surgery (Left, 05/30/2017); Cystoscopy (N/A, 02/09/2018); Upper gastrointestinal endoscopy (N/A, 6/21/2019); bronchoscopy (N/A, 9/20/2022); bronchoscopy (9/20/2022); bronchoscopy (9/20/2022); and bronchoscopy (9/20/2022). Social History:   reports that he has been smoking cigarettes. He has a 45.00 pack-year smoking history. He has never used smokeless tobacco. He reports current drug use. Frequency: 21.00 times per week. Drug: Marijuana Renea Sella). He reports that he does not drink alcohol. Family History:  family history includes Cancer in his mother and sister; Heart Disease in his father. Home Medications:  Prior to Admission medications    Medication Sig Start Date End Date Taking?  Authorizing Provider   nicotine (NICOTINE STEP 1) 21 MG/24HR Place 1 patch onto the skin every 24 hours 7/5/23   Sury Gaxiola MD   nicotine (NICOTINE STEP 3) 7 MG/24HR Place 1 patch onto the skin every 24 hours 7/5/23   Sury Gaxiola MD   nicotine (NICOTINE STEP 2) 14 MG/24HR Place 1 patch onto the skin every 24 hours 7/5/23   Sury Gaxiola MD   atorvastatin (LIPITOR) 80 MG tablet Take 1 tablet by mouth daily 6/10/23   Ike Hernandez MD   ibuprofen (ADVIL;MOTRIN) 800 MG tablet Take 1 tablet by mouth every 6 hours as needed for Pain 11/25/22   Megha Olivo MD   albuterol sulfate HFA (PROVENTIL;VENTOLIN;PROAIR) 108 (90 Base) MCG/ACT inhaler Inhale 2 puffs into the lungs every 6 hours as needed for Wheezing 11/25/22   Angel Almonte MD   albuterol (PROVENTIL) (5 MG/ML) 0.5% nebulizer solution Take 0.5 mLs by nebulization 4 times daily as needed for Wheezing 11/25/22   Angel Almonte MD   buPROPion (WELLBUTRIN XL) 150 MG extended release tablet Take 1 tablet by mouth every

## 2023-07-05 NOTE — PROGRESS NOTES
[facility]  Pulmonary, Critical Care [facility]                                               Pulmonary Clinic Consult     I had the pleasure of seeing  William Alberto     Chief Complaint   Patient presents with    Pulmonary Nodule    Results     Ct chest         HISTORY OF PRESENT ILLNESS:    William Alberto is a 66y.o. year old  Who start smoking at age  17  And increase gradually   1.5 pack daily ,he still smoke    He was diagnosed 10 year,he received pills as he states ,he was done with therapy long time and was told he is in remission     The Patient comes in with SOB that has been going on  for the last few years Associated with .cough and he usually has clear sputum    He states that it get worse with exercise or walking long distance and he can walk less 300 feet- 1/2 And go 1-2 flight of stairs before get short winded        Patient underwent biopsy JERROD ,central nodule and was negative   Culture al was negative  Unfortunately he continue to smoke   His cough and SOB has improved  Could not tolerate Trelegy   PFT does 10 /22      Today Visit  He has been doing fair   Some SOB and ROCHE  Did not have biopsy as was told by IR he has to be admitted with chest tube . He has been doing fair  Wife with him       ALLERGIES:    Allergies   Allergen Reactions    Bactrim [Sulfamethoxazole-Trimethoprim] Other (See Comments)     States makes yeast infection on his penis    Codeine Nausea Only     Pt tolerates Oxycodone    Sulfa Antibiotics      Other reaction(s):  Other (See Comments)  Yeast infection  Yeast infection      Sulfamethoxazole     Trimethoprim        PAST MEDICAL HISTORY:       Diagnosis Date    Arthritis 1/2011    SHOULDERS AND KNEES    CAD (coronary artery disease)     Chronic back pain     COPD (chronic obstructive pulmonary disease) ([facility])     GERD (gastroesophageal reflux disease)     ONCE WEEKLY TAKE TUMS    Hyperlipidemia     Hypertension     Kidney stone     Lumbar herniated disc

## 2023-07-07 LAB
B DERMAT AB SER-ACNC: 0.3 IV
H CAPSUL AG UR IA-ACNC: NOT DETECTED NG/ML
H CAPSUL AG UR QL IA: NOT DETECTED

## 2023-07-11 ENCOUNTER — APPOINTMENT (OUTPATIENT)
Dept: GENERAL RADIOLOGY | Age: 63
End: 2023-07-11
Payer: MEDICARE

## 2023-07-11 ENCOUNTER — HOSPITAL ENCOUNTER (EMERGENCY)
Age: 63
Discharge: HOME | End: 2023-07-11
Attending: EMERGENCY MEDICINE
Payer: MEDICARE

## 2023-07-11 VITALS
DIASTOLIC BLOOD PRESSURE: 77 MMHG | HEART RATE: 77 BPM | SYSTOLIC BLOOD PRESSURE: 125 MMHG | RESPIRATION RATE: 16 BRPM | BODY MASS INDEX: 26.52 KG/M2 | OXYGEN SATURATION: 98 % | TEMPERATURE: 98 F | HEIGHT: 68 IN | WEIGHT: 175 LBS

## 2023-07-11 DIAGNOSIS — S59.901A ELBOW INJURY, RIGHT, INITIAL ENCOUNTER: Primary | ICD-10-CM

## 2023-07-11 DIAGNOSIS — M25.729 OLECRANON BONE SPUR: ICD-10-CM

## 2023-07-11 PROCEDURE — 99283 EMERGENCY DEPT VISIT LOW MDM: CPT

## 2023-07-11 PROCEDURE — 73080 X-RAY EXAM OF ELBOW: CPT

## 2023-07-11 PROCEDURE — 6370000000 HC RX 637 (ALT 250 FOR IP): Performed by: EMERGENCY MEDICINE

## 2023-07-11 RX ORDER — HYDROCODONE BITARTRATE AND ACETAMINOPHEN 5; 325 MG/1; MG/1
1 TABLET ORAL EVERY 6 HOURS PRN
Qty: 12 TABLET | Refills: 0 | Status: SHIPPED | OUTPATIENT
Start: 2023-07-11 | End: 2023-07-14

## 2023-07-11 RX ORDER — HYDROCODONE BITARTRATE AND ACETAMINOPHEN 5; 325 MG/1; MG/1
1 TABLET ORAL ONCE
Status: COMPLETED | OUTPATIENT
Start: 2023-07-11 | End: 2023-07-11

## 2023-07-11 RX ADMIN — HYDROCODONE BITARTRATE AND ACETAMINOPHEN 1 TABLET: 5; 325 TABLET ORAL at 09:12

## 2023-07-11 ASSESSMENT — PAIN DESCRIPTION - LOCATION: LOCATION: ELBOW;WRIST

## 2023-07-11 ASSESSMENT — PAIN DESCRIPTION - FREQUENCY: FREQUENCY: CONTINUOUS

## 2023-07-11 ASSESSMENT — PAIN SCALES - GENERAL: PAINLEVEL_OUTOF10: 10

## 2023-07-11 ASSESSMENT — PAIN DESCRIPTION - ORIENTATION: ORIENTATION: RIGHT

## 2023-07-11 ASSESSMENT — PAIN DESCRIPTION - PAIN TYPE: TYPE: ACUTE PAIN

## 2023-07-11 ASSESSMENT — PAIN - FUNCTIONAL ASSESSMENT: PAIN_FUNCTIONAL_ASSESSMENT: 0-10

## 2023-07-11 NOTE — ED PROVIDER NOTES
5-325 MG per tablet 1 tablet (1 tablet Oral Given 7/11/23 0912)       Labs reviewed    Imaging Independently interpreted by me-concur with radiologist interpretation      MEDICAL DECISION MAKING:    Medications Ordered    Medications   HYDROcodone-acetaminophen (NORCO) 5-325 MG per tablet 1 tablet (1 tablet Oral Given 7/11/23 0912)        Prescriptions Written:    New Prescriptions    HYDROCODONE-ACETAMINOPHEN (NORCO) 5-325 MG PER TABLET    Take 1 tablet by mouth every 6 hours as needed for Pain for up to 3 days. Intended supply: 3 days. Take lowest dose possible to manage pain Max Daily Amount: 4 tablets         Problems Addressed This Visit:      1.  Elbow injury: Small effusion noted on radiograph with a probable acute fracture of the olecranon spur. Patient also has significant pain with pronation and supination cannot rule out occult radial head fracture. No significant swelling on exam no ecchymoses he is neurovascularly intact good distal pulses good sensation. He was placed in a sling to stabilize and for comfort. Small number of Vicodin for pain control. Patient is not able to currently take NSAIDs as he has a planned UroLift procedure tomorrow and was asked to hold aspirin and NSAIDs. Patient was given a referral to orthopedics, Dr. Jasmin Jose,. Follow-up within 3 to 5 days. Return for increasing pain swelling numbness tingling or weakness. Discussed Patient with another provider(s) and Healthcare Team:     Social Determinants of Health:   Care of patient discussed with nursing team and nursing documentation reviewed. IMPRESSION:  1. Elbow injury, right, initial encounter    2. Olecranon bone spur           Critical Care Time: none     DISPOSITION: home    Condition at Discharge/Transfer from Department:  Stable        In cases where narcotics are prescribed, DELON report was obtained, reviewed, and made part of record.  After examining available information, and risks of prescribing

## 2023-07-11 NOTE — ED NOTES
Reviewed patient discharge instructions at this time, copy given to patient. No questions or concerns. Patient voiced understanding.         Rose Mary Osorio RN  07/11/23 [id number]

## 2023-07-12 ENCOUNTER — TELEPHONE (OUTPATIENT)
Dept: ORTHOPEDIC SURGERY | Age: 63
End: 2023-07-12

## 2023-07-12 NOTE — TELEPHONE ENCOUNTER
Patient has been scheduled. Patient did not want to wait until next week and stated he is willing to come anywhere to be seen sooner.

## 2023-07-12 NOTE — TELEPHONE ENCOUNTER
Appointment Request     Patient requesting earlier appointment: Yes  Appointment offered to patient: 07  Patient Contact Number: [phone]    PT WAS SEEN AT [facility] [facility] [address] OJDEA ON 7/11/23 HAS FRACTURED ELBOW  WAS ON CALL.  PT NOT ABLE TO GO TO ANY OTHER OFFICE THEN JAVI JACOB DUE TO INJURY    PLEASE CALL PT WITH SOONER APPT

## 2023-07-14 ENCOUNTER — OFFICE VISIT (OUTPATIENT)
Dept: ORTHOPEDIC SURGERY | Age: 63
End: 2023-07-14

## 2023-07-14 VITALS — WEIGHT: 175 LBS | BODY MASS INDEX: 26.52 KG/M2 | HEIGHT: 68 IN

## 2023-07-14 DIAGNOSIS — S52.021A CLOSED FRACTURE OF OLECRANON PROCESS OF RIGHT ULNA, INITIAL ENCOUNTER: Primary | ICD-10-CM

## 2023-07-14 DIAGNOSIS — F17.200 CURRENT SMOKER: ICD-10-CM

## 2023-07-15 PROBLEM — S52.021A CLOSED FRACTURE OF RIGHT OLECRANON PROCESS: Status: ACTIVE | Noted: 2023-07-15

## 2023-07-15 NOTE — PROGRESS NOTES
CHIEF COMPLAINT: Right elbow pain/ minimally displaced olecranon avulsion fracture. DATE OF INJURY: 7/10/1023, DOT 7/14/2023    HISTORY:  Mr. Gloria May is a 66 y.o.  male right handed who presents today for evaluation of a right elbow injury. The patient reports that this injury occurred after a fall. He was first seen and evaluated in [place], when he was x-rayed and splinted, and asked to f/u with Orthopedics. The patient denies any other injuries. Movement makes the pain worse, the splint and resting makes the pain better. No numbness or tingling sensation. Past Medical History:   Diagnosis Date    Arthritis 1/2011    SHOULDERS AND KNEES    CAD (coronary artery disease)     Chronic back pain     COPD (chronic obstructive pulmonary disease) (HCC)     GERD (gastroesophageal reflux disease)     ONCE WEEKLY TAKE TUMS    Hyperlipidemia     Hypertension     Kidney stone     Lumbar herniated disc     Chronic Pain    Lymphoma of gastrointestinal tract [facility]) May 2013    Pneumonia     Thyroid disease     overactive thyroid    Vitamin B deficiency        Past Surgical History:   Procedure Laterality Date    BRONCHOSCOPY N/A 9/20/2022    ROBOT/EBUS WF W/ANES. (12:00) NO LABS performed by Luke Ewing MD at [address] [place] [address]  9/20/2022    BRONCHOSCOPY/TRANSBRONCHIAL NEEDLE BIOPSY ROBOTIC performed by Luke Ewing MD at [address] [place] [address]  9/20/2022    BRONCHOSCOPY BRUSHINGS ROBOTIC performed by Luke Ewing MD at [address] [place] [address]  9/20/2022    BRONCHOSCOPY ALVEOLAR LAVAGE performed by Luke Ewing MD at [facility]  2/20/2015    stent placed    CARPAL TUNNEL RELEASE      chioma.     CERVICAL DISCECTOMY  02/08/2017    Anterior discectomy, anterior foraminotomy C5/C6 and C6-7    CYSTOSCOPY  6/5/15    CYSTOSCOPY N/A 02/09/2018    DIAGNOSTIC CARDIAC CATH LAB PROCEDURE      ENDOSCOPY, COLON, DIAGNOSTIC      HERNIA REPAIR

## 2023-08-17 ENCOUNTER — HOSPITAL ENCOUNTER (OUTPATIENT)
Age: 63
Discharge: HOME | End: 2023-08-17
Attending: INTERNAL MEDICINE
Payer: MEDICARE

## 2023-08-17 ENCOUNTER — HOSPITAL ENCOUNTER (OUTPATIENT)
Dept: CT IMAGING | Age: 63
Discharge: HOME | End: 2023-08-17
Attending: INTERNAL MEDICINE
Payer: MEDICARE

## 2023-08-17 DIAGNOSIS — Z85.72 PERSONAL HISTORY OF MALIGNANT LYMPHOMA: ICD-10-CM

## 2023-08-17 DIAGNOSIS — R91.8 LUNG NODULES: ICD-10-CM

## 2023-08-17 LAB
BUN SERPL-MCNC: 12 MG/DL (ref 7–20)
CREAT SERPL-MCNC: 0.9 MG/DL (ref 0.8–1.3)
GFR SERPLBLD CREATININE-BSD FMLA CKD-EPI: >60 ML/MIN/{1.73_M2}

## 2023-08-17 PROCEDURE — 84520 ASSAY OF UREA NITROGEN: CPT

## 2023-08-17 PROCEDURE — 71260 CT THORAX DX C+: CPT

## 2023-08-17 PROCEDURE — 36415 COLL VENOUS BLD VENIPUNCTURE: CPT

## 2023-08-17 PROCEDURE — 82565 ASSAY OF CREATININE: CPT

## 2023-08-17 PROCEDURE — 6360000004 HC RX CONTRAST MEDICATION: Performed by: INTERNAL MEDICINE

## 2023-08-17 RX ADMIN — IOMEPROL INJECTION 80 ML: 714 INJECTION, SOLUTION INTRAVASCULAR at 11:38

## 2023-08-22 ENCOUNTER — TELEPHONE (OUTPATIENT)
Dept: PULMONOLOGY | Age: 63
End: 2023-08-22

## 2023-08-22 NOTE — TELEPHONE ENCOUNTER
Pt had Ct Scan at [facility] AND LUNG [place].  Reba Omer Please call Pt will results

## 2023-09-14 RX ORDER — POTASSIUM CHLORIDE 750 MG/1
TABLET, FILM COATED, EXTENDED RELEASE ORAL
Qty: 180 TABLET | Refills: 3 | Status: SHIPPED | OUTPATIENT
Start: 2023-09-14

## 2023-10-16 ENCOUNTER — HOSPITAL ENCOUNTER (OUTPATIENT)
Age: 63
Discharge: HOME | End: 2023-10-16
Payer: MEDICARE

## 2023-10-16 ENCOUNTER — HOSPITAL ENCOUNTER (OUTPATIENT)
Dept: GENERAL RADIOLOGY | Age: 63
Discharge: HOME | End: 2023-10-16
Payer: MEDICARE

## 2023-10-16 DIAGNOSIS — J44.1 COPD EXACERBATION (HCC): ICD-10-CM

## 2023-10-16 PROCEDURE — 71046 X-RAY EXAM CHEST 2 VIEWS: CPT

## 2023-10-22 ENCOUNTER — APPOINTMENT (OUTPATIENT)
Dept: GENERAL RADIOLOGY | Age: 63
DRG: 207 | End: 2023-10-22
Payer: MEDICARE

## 2023-10-22 ENCOUNTER — HOSPITAL ENCOUNTER (INPATIENT)
Age: 63
LOS: 9 days | Discharge: HOME HEALTH | DRG: 207 | End: 2023-10-31
Attending: EMERGENCY MEDICINE | Admitting: INTERNAL MEDICINE
Payer: MEDICARE

## 2023-10-22 DIAGNOSIS — J44.1 COPD EXACERBATION (HCC): ICD-10-CM

## 2023-10-22 DIAGNOSIS — J44.9 CHRONIC OBSTRUCTIVE PULMONARY DISEASE, UNSPECIFIED COPD TYPE (HCC): ICD-10-CM

## 2023-10-22 DIAGNOSIS — J96.01 ACUTE RESPIRATORY FAILURE WITH HYPOXIA (HCC): ICD-10-CM

## 2023-10-22 DIAGNOSIS — J18.9 PNEUMONIA OF BOTH UPPER LOBES DUE TO INFECTIOUS ORGANISM: Primary | ICD-10-CM

## 2023-10-22 PROBLEM — E87.1 HYPONATREMIA: Status: ACTIVE | Noted: 2023-10-22

## 2023-10-22 PROBLEM — Z72.0 TOBACCO USE: Status: ACTIVE | Noted: 2023-10-22

## 2023-10-22 LAB
ALBUMIN SERPL-MCNC: 3.8 G/DL (ref 3.4–5)
ALBUMIN/GLOB SERPL: 1.2 {RATIO} (ref 1.1–2.2)
ALP SERPL-CCNC: 111 U/L (ref 40–129)
ALT SERPL-CCNC: 25 U/L (ref 10–40)
ANION GAP SERPL CALCULATED.3IONS-SCNC: 13 MMOL/L (ref 3–16)
AST SERPL-CCNC: 43 U/L (ref 15–37)
BASE EXCESS BLDA CALC-SCNC: -1.9 MMOL/L (ref -3–3)
BASE EXCESS BLDV CALC-SCNC: 0.1 MMOL/L (ref -3–3)
BASOPHILS # BLD: 0 K/UL (ref 0–0.2)
BASOPHILS NFR BLD: 0.1 %
BILIRUB SERPL-MCNC: 1.2 MG/DL (ref 0–1)
BUN SERPL-MCNC: 12 MG/DL (ref 7–20)
CALCIUM SERPL-MCNC: 9.1 MG/DL (ref 8.3–10.6)
CHLORIDE SERPL-SCNC: 94 MMOL/L (ref 99–110)
CO2 BLDA-SCNC: 22.7 MMOL/L
CO2 BLDV-SCNC: 22 MMOL/L
CO2 SERPL-SCNC: 21 MMOL/L (ref 21–32)
COHGB MFR BLDA: 0.7 % (ref 0–1.5)
COHGB MFR BLDV: 4.8 % (ref 0–1.5)
CREAT SERPL-MCNC: 0.7 MG/DL (ref 0.8–1.3)
D DIMER: 0.3 UG/ML FEU (ref 0–0.6)
DEPRECATED RDW RBC AUTO: 16.4 % (ref 12.4–15.4)
EKG ATRIAL RATE: 103 BPM
EKG DIAGNOSIS: NORMAL
EKG P AXIS: 69 DEGREES
EKG P-R INTERVAL: 132 MS
EKG Q-T INTERVAL: 368 MS
EKG QRS DURATION: 88 MS
EKG QTC CALCULATION (BAZETT): 482 MS
EKG R AXIS: 27 DEGREES
EKG T AXIS: 50 DEGREES
EKG VENTRICULAR RATE: 103 BPM
EOSINOPHIL # BLD: 0 K/UL (ref 0–0.6)
EOSINOPHIL NFR BLD: 0 %
FLUAV RNA RESP QL NAA+PROBE: NOT DETECTED
FLUBV RNA RESP QL NAA+PROBE: NOT DETECTED
GFR SERPLBLD CREATININE-BSD FMLA CKD-EPI: >60 ML/MIN/{1.73_M2}
GLUCOSE SERPL-MCNC: 150 MG/DL (ref 70–99)
HCO3 BLDA-SCNC: 21.7 MMOL/L (ref 21–29)
HCO3 BLDV-SCNC: 20.9 MMOL/L (ref 23–29)
HCT VFR BLD AUTO: 35.9 % (ref 40.5–52.5)
HGB BLD-MCNC: 11.7 G/DL (ref 13.5–17.5)
HGB BLDA-MCNC: 12 G/DL (ref 13.5–17.5)
LACTATE BLDV-SCNC: 2.3 MMOL/L (ref 0.4–1.9)
LACTATE BLDV-SCNC: 2.7 MMOL/L (ref 0.4–2)
LACTATE BLDV-SCNC: 2.7 MMOL/L (ref 0.4–2)
LACTATE BLDV-SCNC: 3.1 MMOL/L (ref 0.4–1.9)
LACTATE BLDV-SCNC: 4.2 MMOL/L (ref 0.4–2)
LYMPHOCYTES # BLD: 0.3 K/UL (ref 1–5.1)
LYMPHOCYTES NFR BLD: 2.5 %
MCH RBC QN AUTO: 27.8 PG (ref 26–34)
MCHC RBC AUTO-ENTMCNC: 32.6 G/DL (ref 31–36)
MCV RBC AUTO: 85.3 FL (ref 80–100)
METHGB MFR BLDA: 0.3 %
METHGB MFR BLDV: 0.3 %
MONOCYTES # BLD: 0.3 K/UL (ref 0–1.3)
MONOCYTES NFR BLD: 2.2 %
NEUTROPHILS # BLD: 12.3 K/UL (ref 1.7–7.7)
NEUTROPHILS NFR BLD: 95.2 %
NT-PROBNP SERPL-MCNC: 49 PG/ML (ref 0–124)
NT-PROBNP SERPL-MCNC: <36 PG/ML (ref 0–124)
O2 CT VFR BLDV CALC: 18 VOL %
O2 THERAPY: ABNORMAL
O2 THERAPY: ABNORMAL
PCO2 BLDA: 33.4 MMHG (ref 35–45)
PCO2 BLDV: 24.8 MMHG (ref 40–50)
PH BLDA: 7.43 [PH] (ref 7.35–7.45)
PH BLDV: 7.54 [PH] (ref 7.35–7.45)
PLATELET # BLD AUTO: 205 K/UL (ref 135–450)
PMV BLD AUTO: 9.1 FL (ref 5–10.5)
PO2 BLDA: 79.9 MMHG (ref 75–108)
PO2 BLDV: 89.3 MMHG (ref 25–40)
POTASSIUM SERPL-SCNC: 4.7 MMOL/L (ref 3.5–5.1)
PROCALCITONIN SERPL IA-MCNC: 0.12 NG/ML (ref 0–0.15)
PROT SERPL-MCNC: 7.1 G/DL (ref 6.4–8.2)
RBC # BLD AUTO: 4.21 M/UL (ref 4.2–5.9)
SAO2 % BLDA: 96.2 %
SAO2 % BLDV: 98 %
SARS-COV-2 RNA RESP QL NAA+PROBE: NOT DETECTED
SODIUM SERPL-SCNC: 128 MMOL/L (ref 136–145)
TROPONIN, HIGH SENSITIVITY: 11 NG/L (ref 0–22)
TROPONIN, HIGH SENSITIVITY: 8 NG/L (ref 0–22)
WBC # BLD AUTO: 12.9 K/UL (ref 4–11)

## 2023-10-22 PROCEDURE — 6360000002 HC RX W HCPCS: Performed by: EMERGENCY MEDICINE

## 2023-10-22 PROCEDURE — 85025 COMPLETE CBC W/AUTO DIFF WBC: CPT

## 2023-10-22 PROCEDURE — 80053 COMPREHEN METABOLIC PANEL: CPT

## 2023-10-22 PROCEDURE — 87449 NOS EACH ORGANISM AG IA: CPT

## 2023-10-22 PROCEDURE — 87070 CULTURE OTHR SPECIMN AEROBIC: CPT

## 2023-10-22 PROCEDURE — 36600 WITHDRAWAL OF ARTERIAL BLOOD: CPT

## 2023-10-22 PROCEDURE — 93005 ELECTROCARDIOGRAM TRACING: CPT | Performed by: EMERGENCY MEDICINE

## 2023-10-22 PROCEDURE — 6370000000 HC RX 637 (ALT 250 FOR IP)

## 2023-10-22 PROCEDURE — 2580000003 HC RX 258

## 2023-10-22 PROCEDURE — 87636 SARSCOV2 & INF A&B AMP PRB: CPT

## 2023-10-22 PROCEDURE — 82803 BLOOD GASES ANY COMBINATION: CPT

## 2023-10-22 PROCEDURE — 94761 N-INVAS EAR/PLS OXIMETRY MLT: CPT

## 2023-10-22 PROCEDURE — 96365 THER/PROPH/DIAG IV INF INIT: CPT

## 2023-10-22 PROCEDURE — 93010 ELECTROCARDIOGRAM REPORT: CPT | Performed by: INTERNAL MEDICINE

## 2023-10-22 PROCEDURE — 6360000002 HC RX W HCPCS: Performed by: INTERNAL MEDICINE

## 2023-10-22 PROCEDURE — 99285 EMERGENCY DEPT VISIT HI MDM: CPT

## 2023-10-22 PROCEDURE — 71045 X-RAY EXAM CHEST 1 VIEW: CPT

## 2023-10-22 PROCEDURE — 87040 BLOOD CULTURE FOR BACTERIA: CPT

## 2023-10-22 PROCEDURE — 6370000000 HC RX 637 (ALT 250 FOR IP): Performed by: EMERGENCY MEDICINE

## 2023-10-22 PROCEDURE — 83605 ASSAY OF LACTIC ACID: CPT

## 2023-10-22 PROCEDURE — 2060000000 HC ICU INTERMEDIATE R&B

## 2023-10-22 PROCEDURE — 99223 1ST HOSP IP/OBS HIGH 75: CPT

## 2023-10-22 PROCEDURE — 84484 ASSAY OF TROPONIN QUANT: CPT

## 2023-10-22 PROCEDURE — 83880 ASSAY OF NATRIURETIC PEPTIDE: CPT

## 2023-10-22 PROCEDURE — 99223 1ST HOSP IP/OBS HIGH 75: CPT | Performed by: INTERNAL MEDICINE

## 2023-10-22 PROCEDURE — 96375 TX/PRO/DX INJ NEW DRUG ADDON: CPT

## 2023-10-22 PROCEDURE — 2700000000 HC OXYGEN THERAPY PER DAY

## 2023-10-22 PROCEDURE — 85379 FIBRIN DEGRADATION QUANT: CPT

## 2023-10-22 PROCEDURE — 96368 THER/DIAG CONCURRENT INF: CPT

## 2023-10-22 PROCEDURE — 87081 CULTURE SCREEN ONLY: CPT

## 2023-10-22 PROCEDURE — 94640 AIRWAY INHALATION TREATMENT: CPT

## 2023-10-22 PROCEDURE — 2580000003 HC RX 258: Performed by: EMERGENCY MEDICINE

## 2023-10-22 PROCEDURE — 84145 PROCALCITONIN (PCT): CPT

## 2023-10-22 PROCEDURE — 6360000002 HC RX W HCPCS

## 2023-10-22 PROCEDURE — 36415 COLL VENOUS BLD VENIPUNCTURE: CPT

## 2023-10-22 PROCEDURE — 87205 SMEAR GRAM STAIN: CPT

## 2023-10-22 RX ORDER — SUCRALFATE 1 G/1
1 TABLET ORAL 4 TIMES DAILY
Status: DISCONTINUED | OUTPATIENT
Start: 2023-10-22 | End: 2023-10-22

## 2023-10-22 RX ORDER — SODIUM CHLORIDE 0.9 % (FLUSH) 0.9 %
5-40 SYRINGE (ML) INJECTION PRN
Status: DISCONTINUED | OUTPATIENT
Start: 2023-10-22 | End: 2023-10-31 | Stop reason: HOSPADM

## 2023-10-22 RX ORDER — ATORVASTATIN CALCIUM 40 MG/1
80 TABLET, FILM COATED ORAL NIGHTLY
Status: DISCONTINUED | OUTPATIENT
Start: 2023-10-22 | End: 2023-10-31 | Stop reason: HOSPADM

## 2023-10-22 RX ORDER — TIZANIDINE 4 MG/1
4 TABLET ORAL
Status: ON HOLD | COMMUNITY
End: 2023-10-26 | Stop reason: ALTCHOICE

## 2023-10-22 RX ORDER — FLUOXETINE HYDROCHLORIDE 20 MG/1
40 CAPSULE ORAL DAILY
Status: DISCONTINUED | OUTPATIENT
Start: 2023-10-22 | End: 2023-10-26

## 2023-10-22 RX ORDER — ACETYLCYSTEINE 200 MG/ML
600 SOLUTION ORAL; RESPIRATORY (INHALATION)
Status: DISCONTINUED | OUTPATIENT
Start: 2023-10-22 | End: 2023-10-27

## 2023-10-22 RX ORDER — 0.9 % SODIUM CHLORIDE 0.9 %
1000 INTRAVENOUS SOLUTION INTRAVENOUS ONCE
Status: COMPLETED | OUTPATIENT
Start: 2023-10-22 | End: 2023-10-22

## 2023-10-22 RX ORDER — TIZANIDINE 4 MG/1
4 TABLET ORAL EVERY 8 HOURS PRN
Status: DISCONTINUED | OUTPATIENT
Start: 2023-10-22 | End: 2023-10-23

## 2023-10-22 RX ORDER — ALBUTEROL SULFATE 2.5 MG/3ML
2.5 SOLUTION RESPIRATORY (INHALATION) ONCE
Status: DISCONTINUED | OUTPATIENT
Start: 2023-10-22 | End: 2023-10-23

## 2023-10-22 RX ORDER — ACETAMINOPHEN 650 MG/1
650 SUPPOSITORY RECTAL EVERY 6 HOURS PRN
Status: DISCONTINUED | OUTPATIENT
Start: 2023-10-22 | End: 2023-10-31 | Stop reason: HOSPADM

## 2023-10-22 RX ORDER — ALBUTEROL SULFATE 90 UG/1
2 AEROSOL, METERED RESPIRATORY (INHALATION) EVERY 6 HOURS PRN
Status: DISCONTINUED | OUTPATIENT
Start: 2023-10-22 | End: 2023-10-31 | Stop reason: HOSPADM

## 2023-10-22 RX ORDER — ONDANSETRON 4 MG/1
4 TABLET, ORALLY DISINTEGRATING ORAL EVERY 8 HOURS PRN
Status: DISCONTINUED | OUTPATIENT
Start: 2023-10-22 | End: 2023-10-31 | Stop reason: HOSPADM

## 2023-10-22 RX ORDER — ENOXAPARIN SODIUM 100 MG/ML
40 INJECTION SUBCUTANEOUS NIGHTLY
Status: DISCONTINUED | OUTPATIENT
Start: 2023-10-22 | End: 2023-10-31 | Stop reason: HOSPADM

## 2023-10-22 RX ORDER — ACETAMINOPHEN 325 MG/1
650 TABLET ORAL EVERY 6 HOURS PRN
Status: DISCONTINUED | OUTPATIENT
Start: 2023-10-22 | End: 2023-10-31 | Stop reason: HOSPADM

## 2023-10-22 RX ORDER — FUROSEMIDE 10 MG/ML
40 INJECTION INTRAMUSCULAR; INTRAVENOUS ONCE
Status: COMPLETED | OUTPATIENT
Start: 2023-10-22 | End: 2023-10-22

## 2023-10-22 RX ORDER — AZITHROMYCIN 250 MG/1
500 TABLET, FILM COATED ORAL EVERY 24 HOURS
Status: COMPLETED | OUTPATIENT
Start: 2023-10-23 | End: 2023-10-25

## 2023-10-22 RX ORDER — 0.9 % SODIUM CHLORIDE 0.9 %
1100 INTRAVENOUS SOLUTION INTRAVENOUS ONCE
Status: COMPLETED | OUTPATIENT
Start: 2023-10-22 | End: 2023-10-22

## 2023-10-22 RX ORDER — ALBUTEROL SULFATE 2.5 MG/3ML
2.5 SOLUTION RESPIRATORY (INHALATION) 4 TIMES DAILY PRN
Status: DISCONTINUED | OUTPATIENT
Start: 2023-10-22 | End: 2023-10-31 | Stop reason: HOSPADM

## 2023-10-22 RX ORDER — METHYLPREDNISOLONE SODIUM SUCCINATE 1 G/16ML
125 INJECTION, POWDER, LYOPHILIZED, FOR SOLUTION INTRAMUSCULAR; INTRAVENOUS ONCE
Status: COMPLETED | OUTPATIENT
Start: 2023-10-22 | End: 2023-10-22

## 2023-10-22 RX ORDER — NICOTINE 21 MG/24HR
1 PATCH, TRANSDERMAL 24 HOURS TRANSDERMAL EVERY 24 HOURS
Status: DISCONTINUED | OUTPATIENT
Start: 2023-10-22 | End: 2023-10-31 | Stop reason: HOSPADM

## 2023-10-22 RX ORDER — BENZONATATE 100 MG/1
100 CAPSULE ORAL 3 TIMES DAILY PRN
Status: DISCONTINUED | OUTPATIENT
Start: 2023-10-22 | End: 2023-10-31 | Stop reason: HOSPADM

## 2023-10-22 RX ORDER — ACETAMINOPHEN 500 MG
1000 TABLET ORAL ONCE
Status: COMPLETED | OUTPATIENT
Start: 2023-10-22 | End: 2023-10-22

## 2023-10-22 RX ORDER — OMEPRAZOLE 20 MG/1
40 CAPSULE, DELAYED RELEASE ORAL DAILY
Status: DISCONTINUED | OUTPATIENT
Start: 2023-10-22 | End: 2023-10-24

## 2023-10-22 RX ORDER — SODIUM CHLORIDE 0.9 % (FLUSH) 0.9 %
5-40 SYRINGE (ML) INJECTION EVERY 12 HOURS SCHEDULED
Status: DISCONTINUED | OUTPATIENT
Start: 2023-10-22 | End: 2023-10-31 | Stop reason: HOSPADM

## 2023-10-22 RX ORDER — OXYCODONE HYDROCHLORIDE AND ACETAMINOPHEN 5; 325 MG/1; MG/1
1 TABLET ORAL EVERY 4 HOURS PRN
Status: DISCONTINUED | OUTPATIENT
Start: 2023-10-22 | End: 2023-10-31 | Stop reason: HOSPADM

## 2023-10-22 RX ORDER — IPRATROPIUM BROMIDE AND ALBUTEROL SULFATE 2.5; .5 MG/3ML; MG/3ML
1 SOLUTION RESPIRATORY (INHALATION)
Status: DISCONTINUED | OUTPATIENT
Start: 2023-10-22 | End: 2023-10-24

## 2023-10-22 RX ORDER — ROFLUMILAST 500 UG/1
500 TABLET ORAL NIGHTLY
Status: DISCONTINUED | OUTPATIENT
Start: 2023-10-22 | End: 2023-10-24

## 2023-10-22 RX ORDER — ONDANSETRON 2 MG/ML
4 INJECTION INTRAMUSCULAR; INTRAVENOUS EVERY 6 HOURS PRN
Status: DISCONTINUED | OUTPATIENT
Start: 2023-10-22 | End: 2023-10-31 | Stop reason: HOSPADM

## 2023-10-22 RX ORDER — SODIUM CHLORIDE 9 MG/ML
INJECTION, SOLUTION INTRAVENOUS PRN
Status: DISCONTINUED | OUTPATIENT
Start: 2023-10-22 | End: 2023-10-31 | Stop reason: HOSPADM

## 2023-10-22 RX ORDER — POLYETHYLENE GLYCOL 3350 17 G/17G
17 POWDER, FOR SOLUTION ORAL DAILY PRN
Status: DISCONTINUED | OUTPATIENT
Start: 2023-10-22 | End: 2023-10-31 | Stop reason: HOSPADM

## 2023-10-22 RX ORDER — SODIUM CHLORIDE 9 MG/ML
INJECTION, SOLUTION INTRAVENOUS CONTINUOUS
Status: DISCONTINUED | OUTPATIENT
Start: 2023-10-22 | End: 2023-10-25

## 2023-10-22 RX ORDER — ZOLPIDEM TARTRATE 5 MG/1
5 TABLET ORAL NIGHTLY PRN
Status: DISCONTINUED | OUTPATIENT
Start: 2023-10-22 | End: 2023-10-31 | Stop reason: HOSPADM

## 2023-10-22 RX ORDER — DIAZEPAM 5 MG/1
5 TABLET ORAL EVERY 6 HOURS PRN
Status: DISCONTINUED | OUTPATIENT
Start: 2023-10-22 | End: 2023-10-29

## 2023-10-22 RX ORDER — ATORVASTATIN CALCIUM 40 MG/1
80 TABLET, FILM COATED ORAL DAILY
Status: DISCONTINUED | OUTPATIENT
Start: 2023-10-22 | End: 2023-10-22

## 2023-10-22 RX ORDER — ROFLUMILAST 500 UG/1
500 TABLET ORAL DAILY
Status: DISCONTINUED | OUTPATIENT
Start: 2023-10-22 | End: 2023-10-22

## 2023-10-22 RX ORDER — IPRATROPIUM BROMIDE AND ALBUTEROL SULFATE 2.5; .5 MG/3ML; MG/3ML
3 SOLUTION RESPIRATORY (INHALATION) ONCE
Status: COMPLETED | OUTPATIENT
Start: 2023-10-22 | End: 2023-10-22

## 2023-10-22 RX ADMIN — ACETAMINOPHEN 1000 MG: 500 TABLET ORAL at 11:32

## 2023-10-22 RX ADMIN — ACETYLCYSTEINE 600 MG: 200 INHALANT RESPIRATORY (INHALATION) at 22:54

## 2023-10-22 RX ADMIN — FUROSEMIDE 40 MG: 10 INJECTION, SOLUTION INTRAMUSCULAR; INTRAVENOUS at 23:24

## 2023-10-22 RX ADMIN — PREGABALIN 150 MG: 100 CAPSULE ORAL at 21:04

## 2023-10-22 RX ADMIN — ZOLPIDEM TARTRATE 5 MG: 5 TABLET ORAL at 21:13

## 2023-10-22 RX ADMIN — METHYLPREDNISOLONE SODIUM SUCCINATE 125 MG: 125 INJECTION INTRAMUSCULAR; INTRAVENOUS at 09:12

## 2023-10-22 RX ADMIN — ROFLUMILAST 500 MCG: 500 TABLET ORAL at 21:13

## 2023-10-22 RX ADMIN — DIAZEPAM 5 MG: 5 TABLET ORAL at 21:13

## 2023-10-22 RX ADMIN — IPRATROPIUM BROMIDE AND ALBUTEROL SULFATE 1 DOSE: 2.5; .5 SOLUTION RESPIRATORY (INHALATION) at 19:51

## 2023-10-22 RX ADMIN — SODIUM CHLORIDE 1000 ML: 9 INJECTION, SOLUTION INTRAVENOUS at 11:31

## 2023-10-22 RX ADMIN — ENOXAPARIN SODIUM 40 MG: 100 INJECTION SUBCUTANEOUS at 21:04

## 2023-10-22 RX ADMIN — OXYCODONE AND ACETAMINOPHEN 1 TABLET: 5; 325 TABLET ORAL at 18:24

## 2023-10-22 RX ADMIN — IPRATROPIUM BROMIDE AND ALBUTEROL SULFATE 3 DOSE: .5; 2.5 SOLUTION RESPIRATORY (INHALATION) at 09:08

## 2023-10-22 RX ADMIN — WATER 40 MG: 1 INJECTION INTRAMUSCULAR; INTRAVENOUS; SUBCUTANEOUS at 21:05

## 2023-10-22 RX ADMIN — ONDANSETRON 4 MG: 2 INJECTION INTRAMUSCULAR; INTRAVENOUS at 21:13

## 2023-10-22 RX ADMIN — ATORVASTATIN CALCIUM 80 MG: 40 TABLET, FILM COATED ORAL at 21:04

## 2023-10-22 RX ADMIN — SODIUM CHLORIDE: 9 INJECTION, SOLUTION INTRAVENOUS at 18:07

## 2023-10-22 RX ADMIN — TIZANIDINE 4 MG: 4 TABLET ORAL at 21:13

## 2023-10-22 RX ADMIN — SODIUM CHLORIDE 1000 MG: 900 INJECTION INTRAVENOUS at 10:06

## 2023-10-22 RX ADMIN — AZITHROMYCIN MONOHYDRATE 500 MG: 500 INJECTION, POWDER, LYOPHILIZED, FOR SOLUTION INTRAVENOUS at 10:42

## 2023-10-22 RX ADMIN — BENZONATATE 100 MG: 100 CAPSULE ORAL at 21:13

## 2023-10-22 RX ADMIN — IPRATROPIUM BROMIDE AND ALBUTEROL SULFATE 1 DOSE: 2.5; .5 SOLUTION RESPIRATORY (INHALATION) at 22:54

## 2023-10-22 RX ADMIN — Medication 10 ML: at 21:05

## 2023-10-22 RX ADMIN — SODIUM CHLORIDE 1000 ML: 9 INJECTION, SOLUTION INTRAVENOUS at 10:02

## 2023-10-22 ASSESSMENT — LIFESTYLE VARIABLES
HOW MANY STANDARD DRINKS CONTAINING ALCOHOL DO YOU HAVE ON A TYPICAL DAY: PATIENT DOES NOT DRINK
HOW OFTEN DO YOU HAVE A DRINK CONTAINING ALCOHOL: NEVER
HOW OFTEN DO YOU HAVE A DRINK CONTAINING ALCOHOL: NEVER

## 2023-10-22 ASSESSMENT — PAIN DESCRIPTION - LOCATION
LOCATION: GENERALIZED
LOCATION: BACK
LOCATION: CHEST;HIP
LOCATION: HEAD

## 2023-10-22 ASSESSMENT — PAIN DESCRIPTION - DESCRIPTORS
DESCRIPTORS: ACHING

## 2023-10-22 ASSESSMENT — PAIN DESCRIPTION - PAIN TYPE: TYPE: CHRONIC PAIN

## 2023-10-22 ASSESSMENT — PAIN DESCRIPTION - ONSET: ONSET: ON-GOING

## 2023-10-22 ASSESSMENT — PAIN - FUNCTIONAL ASSESSMENT
PAIN_FUNCTIONAL_ASSESSMENT: ACTIVITIES ARE NOT PREVENTED
PAIN_FUNCTIONAL_ASSESSMENT: ACTIVITIES ARE NOT PREVENTED

## 2023-10-22 ASSESSMENT — PAIN SCALES - GENERAL
PAINLEVEL_OUTOF10: 8
PAINLEVEL_OUTOF10: 10
PAINLEVEL_OUTOF10: 10

## 2023-10-22 ASSESSMENT — PAIN DESCRIPTION - FREQUENCY: FREQUENCY: CONTINUOUS

## 2023-10-22 ASSESSMENT — PAIN DESCRIPTION - ORIENTATION: ORIENTATION: LOWER;MID;UPPER

## 2023-10-22 NOTE — PROGRESS NOTES
Patient admitted to room 322 from ED. Patient oriented to room, call light, bed rails, phone, lights and bathroom. Patient instructed about the schedule of the day including: vital sign frequency, lab draws, possible tests, frequency of MD and staff rounds, daily weights, I &O's and prescribed diet. Yes Telemetry box in place, patient aware of placement and reason. Bed locked, in lowest position, side rails up 2/4, call light within reach. Recliner Assessment  Patient is not able to demonstrated the ability to move from a reclining position to an upright position within the recliner. however patient is alert, oriented and able to provide informed consent       4 Eyes Skin Assessment     NAME:  Sheldon Mcclelland OF BIRTH:  1960  MEDICAL RECORD NUMBER:  [id number]    The patient is being assessed for  Admission    I agree that at least one RN has performed a thorough Head to Toe Skin Assessment on the patient. ALL assessment sites listed below have been assessed. Areas assessed by both nurses:    Head, Face, Ears, Shoulders, Back, Chest, Arms, Elbows, Hands, Sacrum. Buttock, Coccyx, Ischium, Legs. Feet and Heels, and Under Medical Devices     Scattered bruising. Scabbed area to L arm, back of neck and L shoulder blade that heal and scab per wife and they need derm to see. Does the Patient have a Wound?  No noted wound(s)       Sukumar Prevention initiated by RN: No  Wound Care Orders initiated by RN: No    Pressure Injury (Stage 3,4, Unstageable, DTI, NWPT, and Complex wounds) if present, place Wound referral order by RN under : No    New Ostomies, if present place, Ostomy referral order under : No     Nurse 1 eSignature: Electronically signed by Cherie Carmichael RN on 10/22/23 at 6:39 PM EDT    **SHARE this note so that the co-signing nurse can place an eSignature**    Nurse 2 eSignature: Electronically signed by Epifanio Young RN on 10/22/23 at 6:48 PM EDT

## 2023-10-22 NOTE — PROGRESS NOTES
Telemetry Assignment Communication Form    Patient has orders for continuous telemetry OR pulse oximeter only orders    To be filled out by Clinical    Patient has Admission or Transfer orders and is assigned to Room Number: 734      (Once this top section is completed:  Select \"Route\" and send note to Fax number: [phone] ))      ___________________________________________________________________________      [facility]    Patient assigned to tele box number: __________________               (to be written in by [address] when telemetry box is assigned to patient)    ___________________________________________________________________________      Bedside RN confirming that the box listed above is in fact the telemetry box number being placed on the patient listed above.         X________________________________________ RN signature        __________________________________________RN assigned to Patient (please print)    _______________ Date    ____________ Time

## 2023-10-22 NOTE — CONSULTS
Reason for referral and CC: SOB, cough    HISTORY OF PRESENT ILLNESS: 65 yo male with a h/o COPD and actively smoking presented with several days of worsening SOB. Was hypoxic on exertion at home. Cough with yellow sputum. + fever/Chills. No CP. Failed Augmentin and steroid taper as outpatient. Pt states 5 years ago he was intubated for pneumonia. Past Medical History:   Diagnosis Date    Arthritis 1/2011    SHOULDERS AND KNEES    CAD (coronary artery disease)     Chronic back pain     COPD (chronic obstructive pulmonary disease) (HCC)     GERD (gastroesophageal reflux disease)     ONCE WEEKLY TAKE TUMS    Hyperlipidemia     Hypertension     Kidney stone     Lumbar herniated disc     Chronic Pain    Lymphoma of gastrointestinal tract [facility]) May 2013    Pneumonia     Thyroid disease     overactive thyroid    Vitamin B deficiency      Past Surgical History:   Procedure Laterality Date    BRONCHOSCOPY N/A 9/20/2022    ROBOT/EBUS WF W/ANES. (12:00) NO LABS performed by Eugene Carmichael MD at [address] [place] [address]  9/20/2022    BRONCHOSCOPY/TRANSBRONCHIAL NEEDLE BIOPSY ROBOTIC performed by Eugene Carmichael MD at [address] [place] [address]  9/20/2022    BRONCHOSCOPY BRUSHINGS ROBOTIC performed by Eugene Carmichael MD at [address] [place] [address]  9/20/2022    BRONCHOSCOPY ALVEOLAR LAVAGE performed by Eugene Carmichael MD at [facility]  2/20/2015    stent placed    CARPAL TUNNEL RELEASE      chioma.     CERVICAL DISCECTOMY  02/08/2017    Anterior discectomy, anterior foraminotomy C5/C6 and C6-7    CYSTOSCOPY  6/5/15    CYSTOSCOPY N/A 02/09/2018    DIAGNOSTIC CARDIAC CATH LAB PROCEDURE      ENDOSCOPY, COLON, DIAGNOSTIC      HERNIA REPAIR      KNEE ARTHROSCOPY      LITHOTRIPSY      LITHOTRIPSY Left 11/5/15    OTHER SURGICAL HISTORY  1/20/11    video arthroscopy of right shoulder with subcromial d ecompression and arthroscopic sarbjit    OTHER SURGICAL HISTORY  5/1/13 and Oriented to person, place and time. CBC:   Recent Labs     10/22/23  1124   WBC 12.9*   HGB 11.7*   HCT 35.9*   MCV 85.3        BMP:   Recent Labs     10/22/23  0852   *   K 4.7   CL 94*   CO2 21   BUN 12   CREATININE 0.7*        Recent Labs     10/22/23  0852   AST 43*   ALT 25   BILITOT 1.2*   ALKPHOS 111     No results for input(s): \"PROTIME\", \"INR\" in the last 72 hours. No results for input(s): \"NITRITE\", \"COLORU\", \"PHUR\", \"LABCAST\", \"WBCUA\", \"RBCUA\", \"MUCUS\", \"TRICHOMONAS\", \"YEAST\", \"BACTERIA\", \"CLARITYU\", \"SPECGRAV\", \"LEUKOCYTESUR\", \"UROBILINOGEN\", \"BILIRUBINUR\", \"BLOODU\", \"GLUCOSEU\", \"AMORPHOUS\" in the last 72 hours. Invalid input(s): \"KETONESU\"  No results for input(s): \"PHART\", \"RMQ0QSY\", \"PO2ART\" in the last 72 hours. Rapid flu/covid swab negative    Chest imaging was reviewed by me and showed CXR with bilateral patchy infiltrates    ASSESSMENT:  CAP  COPD with acute exacerbation  Acute hypoxic respiratory failure (on on home O2)  Hyponatremia  Pulmonary nodules followed by Dr. Leslie Francis. Negative EBUS/robotic bx 9/23/23. H/o gastric MALT  Tobacco abuse    PLAN:  Supplemental oxygen to maintain SaO2 >92%; wean as tolerated  Intensive inhaled bronchodilator therapy. IV solumedrol 40 mg IV Q12 hrs. Plan to switch to oral prednisone taper when improved. CTX and Azithromycin  Procal pending  Sputum GS&C.   Acapella QID to mobilize respiratory secretions  Tobacco cessation  I will make NPO after midnight in case he needs bronchoscopy tomorrow      Thank you Sierra Nogueira MD for this consult

## 2023-10-23 PROBLEM — E87.20 LACTIC ACIDOSIS: Status: ACTIVE | Noted: 2023-10-23

## 2023-10-23 PROBLEM — J44.1 COPD EXACERBATION (HCC): Status: ACTIVE | Noted: 2023-10-23

## 2023-10-23 PROBLEM — E87.8 ELECTROLYTE DISORDER: Status: ACTIVE | Noted: 2023-10-23

## 2023-10-23 LAB
ANION GAP SERPL CALCULATED.3IONS-SCNC: 12 MMOL/L (ref 3–16)
ANION GAP SERPL CALCULATED.3IONS-SCNC: 15 MMOL/L (ref 3–16)
BASOPHILS # BLD: 0.2 K/UL (ref 0–0.2)
BASOPHILS NFR BLD: 0.7 %
BUN SERPL-MCNC: 12 MG/DL (ref 7–20)
BUN SERPL-MCNC: 13 MG/DL (ref 7–20)
CALCIUM SERPL-MCNC: 8.7 MG/DL (ref 8.3–10.6)
CALCIUM SERPL-MCNC: 9.3 MG/DL (ref 8.3–10.6)
CHLORIDE SERPL-SCNC: 100 MMOL/L (ref 99–110)
CHLORIDE SERPL-SCNC: 100 MMOL/L (ref 99–110)
CO2 SERPL-SCNC: 21 MMOL/L (ref 21–32)
CO2 SERPL-SCNC: 22 MMOL/L (ref 21–32)
CREAT SERPL-MCNC: 0.6 MG/DL (ref 0.8–1.3)
CREAT SERPL-MCNC: 0.8 MG/DL (ref 0.8–1.3)
DEPRECATED RDW RBC AUTO: 16.2 % (ref 12.4–15.4)
EOSINOPHIL # BLD: 0 K/UL (ref 0–0.6)
EOSINOPHIL NFR BLD: 0 %
GFR SERPLBLD CREATININE-BSD FMLA CKD-EPI: >60 ML/MIN/{1.73_M2}
GFR SERPLBLD CREATININE-BSD FMLA CKD-EPI: >60 ML/MIN/{1.73_M2}
GLUCOSE BLD-MCNC: 149 MG/DL (ref 70–99)
GLUCOSE SERPL-MCNC: 140 MG/DL (ref 70–99)
GLUCOSE SERPL-MCNC: 179 MG/DL (ref 70–99)
HCT VFR BLD AUTO: 38 % (ref 40.5–52.5)
HGB BLD-MCNC: 12 G/DL (ref 13.5–17.5)
LACTATE BLDV-SCNC: 1.5 MMOL/L (ref 0.4–2)
LACTATE BLDV-SCNC: 2.5 MMOL/L (ref 0.4–2)
LACTATE BLDV-SCNC: 4.9 MMOL/L (ref 0.4–2)
LEGIONELLA AG UR QL: NORMAL
LYMPHOCYTES # BLD: 0.6 K/UL (ref 1–5.1)
LYMPHOCYTES NFR BLD: 2.5 %
MAGNESIUM SERPL-MCNC: 1.7 MG/DL (ref 1.8–2.4)
MCH RBC QN AUTO: 27.5 PG (ref 26–34)
MCHC RBC AUTO-ENTMCNC: 31.7 G/DL (ref 31–36)
MCV RBC AUTO: 87 FL (ref 80–100)
MONOCYTES # BLD: 0.9 K/UL (ref 0–1.3)
MONOCYTES NFR BLD: 4 %
NEUTROPHILS # BLD: 21.9 K/UL (ref 1.7–7.7)
NEUTROPHILS NFR BLD: 92.8 %
PERFORMED ON: ABNORMAL
PLATELET # BLD AUTO: 260 K/UL (ref 135–450)
PMV BLD AUTO: 8.8 FL (ref 5–10.5)
POTASSIUM SERPL-SCNC: 3.3 MMOL/L (ref 3.5–5.1)
POTASSIUM SERPL-SCNC: 3.6 MMOL/L (ref 3.5–5.1)
RBC # BLD AUTO: 4.37 M/UL (ref 4.2–5.9)
S PNEUM AG UR QL: NORMAL
SODIUM SERPL-SCNC: 134 MMOL/L (ref 136–145)
SODIUM SERPL-SCNC: 136 MMOL/L (ref 136–145)
WBC # BLD AUTO: 23.6 K/UL (ref 4–11)

## 2023-10-23 PROCEDURE — 94761 N-INVAS EAR/PLS OXIMETRY MLT: CPT

## 2023-10-23 PROCEDURE — 6370000000 HC RX 637 (ALT 250 FOR IP)

## 2023-10-23 PROCEDURE — 83605 ASSAY OF LACTIC ACID: CPT

## 2023-10-23 PROCEDURE — 6360000002 HC RX W HCPCS

## 2023-10-23 PROCEDURE — 36415 COLL VENOUS BLD VENIPUNCTURE: CPT

## 2023-10-23 PROCEDURE — 2000000000 HC ICU R&B

## 2023-10-23 PROCEDURE — 80048 BASIC METABOLIC PNL TOTAL CA: CPT

## 2023-10-23 PROCEDURE — 5A09557 ASSISTANCE WITH RESPIRATORY VENTILATION, GREATER THAN 96 CONSECUTIVE HOURS, CONTINUOUS POSITIVE AIRWAY PRESSURE: ICD-10-PCS | Performed by: INTERNAL MEDICINE

## 2023-10-23 PROCEDURE — 6370000000 HC RX 637 (ALT 250 FOR IP): Performed by: INTERNAL MEDICINE

## 2023-10-23 PROCEDURE — 2700000000 HC OXYGEN THERAPY PER DAY

## 2023-10-23 PROCEDURE — 99291 CRITICAL CARE FIRST HOUR: CPT | Performed by: INTERNAL MEDICINE

## 2023-10-23 PROCEDURE — 83735 ASSAY OF MAGNESIUM: CPT

## 2023-10-23 PROCEDURE — 2580000003 HC RX 258

## 2023-10-23 PROCEDURE — 94640 AIRWAY INHALATION TREATMENT: CPT

## 2023-10-23 PROCEDURE — 94660 CPAP INITIATION&MGMT: CPT

## 2023-10-23 PROCEDURE — 6360000002 HC RX W HCPCS: Performed by: INTERNAL MEDICINE

## 2023-10-23 PROCEDURE — 93306 TTE W/DOPPLER COMPLETE: CPT

## 2023-10-23 PROCEDURE — 85025 COMPLETE CBC W/AUTO DIFF WBC: CPT

## 2023-10-23 PROCEDURE — 99233 SBSQ HOSP IP/OBS HIGH 50: CPT | Performed by: INTERNAL MEDICINE

## 2023-10-23 RX ORDER — HYDROCODONE BITARTRATE AND HOMATROPINE METHYLBROMIDE ORAL SOLUTION 5; 1.5 MG/5ML; MG/5ML
5 LIQUID ORAL EVERY 4 HOURS PRN
Status: DISCONTINUED | OUTPATIENT
Start: 2023-10-23 | End: 2023-10-26

## 2023-10-23 RX ORDER — MAGNESIUM SULFATE IN WATER 40 MG/ML
2000 INJECTION, SOLUTION INTRAVENOUS ONCE
Status: COMPLETED | OUTPATIENT
Start: 2023-10-23 | End: 2023-10-23

## 2023-10-23 RX ORDER — TIZANIDINE 4 MG/1
4 TABLET ORAL ONCE
Status: COMPLETED | OUTPATIENT
Start: 2023-10-23 | End: 2023-10-23

## 2023-10-23 RX ORDER — POTASSIUM CHLORIDE 750 MG/1
20 TABLET, EXTENDED RELEASE ORAL 2 TIMES DAILY WITH MEALS
Status: DISCONTINUED | OUTPATIENT
Start: 2023-10-23 | End: 2023-10-23

## 2023-10-23 RX ORDER — FUROSEMIDE 10 MG/ML
40 INJECTION INTRAMUSCULAR; INTRAVENOUS ONCE
Status: COMPLETED | OUTPATIENT
Start: 2023-10-23 | End: 2023-10-23

## 2023-10-23 RX ORDER — NICOTINE 21 MG/24HR
1 PATCH, TRANSDERMAL 24 HOURS TRANSDERMAL DAILY
Status: DISCONTINUED | OUTPATIENT
Start: 2023-10-23 | End: 2023-10-24

## 2023-10-23 RX ADMIN — POTASSIUM CHLORIDE 20 MEQ: 750 TABLET, EXTENDED RELEASE ORAL at 08:38

## 2023-10-23 RX ADMIN — IPRATROPIUM BROMIDE AND ALBUTEROL SULFATE 1 DOSE: 2.5; .5 SOLUTION RESPIRATORY (INHALATION) at 22:42

## 2023-10-23 RX ADMIN — OXYCODONE AND ACETAMINOPHEN 1 TABLET: 5; 325 TABLET ORAL at 18:40

## 2023-10-23 RX ADMIN — ZOLPIDEM TARTRATE 5 MG: 5 TABLET ORAL at 20:37

## 2023-10-23 RX ADMIN — ACETYLCYSTEINE 600 MG: 200 INHALANT RESPIRATORY (INHALATION) at 07:43

## 2023-10-23 RX ADMIN — ENOXAPARIN SODIUM 40 MG: 100 INJECTION SUBCUTANEOUS at 20:37

## 2023-10-23 RX ADMIN — IPRATROPIUM BROMIDE AND ALBUTEROL SULFATE 1 DOSE: 2.5; .5 SOLUTION RESPIRATORY (INHALATION) at 18:57

## 2023-10-23 RX ADMIN — Medication 10 ML: at 08:39

## 2023-10-23 RX ADMIN — ACETYLCYSTEINE 600 MG: 200 INHALANT RESPIRATORY (INHALATION) at 18:57

## 2023-10-23 RX ADMIN — WATER 40 MG: 1 INJECTION INTRAMUSCULAR; INTRAVENOUS; SUBCUTANEOUS at 20:37

## 2023-10-23 RX ADMIN — MUPIROCIN: 20 OINTMENT TOPICAL at 20:37

## 2023-10-23 RX ADMIN — FLUOXETINE 40 MG: 20 CAPSULE ORAL at 08:43

## 2023-10-23 RX ADMIN — Medication 10 ML: at 19:48

## 2023-10-23 RX ADMIN — IPRATROPIUM BROMIDE AND ALBUTEROL SULFATE 1 DOSE: 2.5; .5 SOLUTION RESPIRATORY (INHALATION) at 07:43

## 2023-10-23 RX ADMIN — TIZANIDINE 4 MG: 4 TABLET ORAL at 21:47

## 2023-10-23 RX ADMIN — HYDROCODONE BITARTRATE AND HOMATROPINE METHYLBROMIDE 5 ML: 1.5; 5 SYRUP ORAL at 20:37

## 2023-10-23 RX ADMIN — WATER 40 MG: 1 INJECTION INTRAMUSCULAR; INTRAVENOUS; SUBCUTANEOUS at 08:38

## 2023-10-23 RX ADMIN — MAGNESIUM SULFATE HEPTAHYDRATE 2000 MG: 40 INJECTION, SOLUTION INTRAVENOUS at 08:54

## 2023-10-23 RX ADMIN — CEFTRIAXONE SODIUM 1000 MG: 1 INJECTION, POWDER, FOR SOLUTION INTRAMUSCULAR; INTRAVENOUS at 08:51

## 2023-10-23 RX ADMIN — IPRATROPIUM BROMIDE AND ALBUTEROL SULFATE 1 DOSE: 2.5; .5 SOLUTION RESPIRATORY (INHALATION) at 14:37

## 2023-10-23 RX ADMIN — DIAZEPAM 5 MG: 5 TABLET ORAL at 21:54

## 2023-10-23 RX ADMIN — AZITHROMYCIN 500 MG: 250 TABLET, FILM COATED ORAL at 08:38

## 2023-10-23 RX ADMIN — IPRATROPIUM BROMIDE AND ALBUTEROL SULFATE 1 DOSE: 2.5; .5 SOLUTION RESPIRATORY (INHALATION) at 11:07

## 2023-10-23 RX ADMIN — ROFLUMILAST 500 MCG: 500 TABLET ORAL at 20:37

## 2023-10-23 RX ADMIN — LEVOTHYROXINE SODIUM 75 MCG: 25 TABLET ORAL at 05:36

## 2023-10-23 RX ADMIN — FUROSEMIDE 40 MG: 10 INJECTION, SOLUTION INTRAMUSCULAR; INTRAVENOUS at 05:29

## 2023-10-23 RX ADMIN — PREGABALIN 150 MG: 100 CAPSULE ORAL at 20:37

## 2023-10-23 RX ADMIN — PREGABALIN 150 MG: 100 CAPSULE ORAL at 08:42

## 2023-10-23 RX ADMIN — ATORVASTATIN CALCIUM 80 MG: 40 TABLET, FILM COATED ORAL at 20:37

## 2023-10-23 RX ADMIN — OXYCODONE AND ACETAMINOPHEN 1 TABLET: 5; 325 TABLET ORAL at 08:43

## 2023-10-23 RX ADMIN — ACETAMINOPHEN 650 MG: 325 TABLET ORAL at 05:35

## 2023-10-23 RX ADMIN — SODIUM CHLORIDE: 9 INJECTION, SOLUTION INTRAVENOUS at 09:39

## 2023-10-23 ASSESSMENT — PAIN DESCRIPTION - ONSET: ONSET: ON-GOING

## 2023-10-23 ASSESSMENT — PAIN DESCRIPTION - LOCATION
LOCATION: CHEST
LOCATION: CHEST;BACK
LOCATION: CHEST;HEAD;BACK
LOCATION: HEAD
LOCATION: CHEST

## 2023-10-23 ASSESSMENT — PAIN DESCRIPTION - FREQUENCY: FREQUENCY: CONTINUOUS

## 2023-10-23 ASSESSMENT — PAIN DESCRIPTION - DESCRIPTORS
DESCRIPTORS: DISCOMFORT
DESCRIPTORS: DISCOMFORT

## 2023-10-23 ASSESSMENT — PAIN SCALES - GENERAL
PAINLEVEL_OUTOF10: 6
PAINLEVEL_OUTOF10: 6
PAINLEVEL_OUTOF10: 8
PAINLEVEL_OUTOF10: 8
PAINLEVEL_OUTOF10: 0

## 2023-10-23 ASSESSMENT — PAIN DESCRIPTION - ORIENTATION: ORIENTATION: MID

## 2023-10-23 NOTE — PROGRESS NOTES
ABG drawn from right radial x 1  attempt(s). Sight prepped per policy and procedure. Modified Devyn's test positive. Pressure held to sight after procedure for five minutes. No hematoma present. Pulse present after procedure.  Patient on 12L hfnc

## 2023-10-23 NOTE — PROGRESS NOTES
Patien with worsening coughing, high flow at 12 liters, non-rebreather 15 liters, patient 02 dropping to low 60%. ncturnist notified, transfer to ICU bed 10 with respiratory and clinical notified, notified MD to come to ICU bed 10 to evaluate patient

## 2023-10-23 NOTE — PROGRESS NOTES
Hand off report given to WINNIE Welsh. Patient is stable showing no signs of distress and has no current needs at this time. Call light is in reach and bed is in lowest position. Care is transferred at this time.

## 2023-10-23 NOTE — CARE COORDINATION
Case Management Assessment  Initial Evaluation    Date/Time of Evaluation: 10/23/2023 9:52 AM  Assessment Completed by: Alirio Cortez RN    If patient is discharged prior to next notation, then this note serves as note for discharge by case management. Patient Name: Janet Key                   YOB: 1960  Diagnosis: Acute respiratory failure with hypoxia ([address]) [J96.01]  Pneumonia of both upper lobes due to infectious organism [J18.9]  Pneumonia, unspecified organism [J18.9]                   Date / Time: 10/22/2023  8:27 AM    Patient Admission Status: Inpatient   Readmission Risk (Low < 19, Mod (19-27), High > 27): No data recorded  Current PCP: Cely Bueno MD  PCP verified by ? Yes Shahram Arredondo)    Chart Reviewed: Yes      History Provided by: Patient  Patient Orientation: Alert and Oriented    Patient Cognition: Alert    Hospitalization in the last 30 days (Readmission):  No    If yes, Readmission Assessment in CM Navigator will be completed. Advance Directives:      Code Status: Full Code   Patient's Primary Decision Maker is: Legal Next of Kin      Discharge Planning:    Patient lives with: Spouse/Significant Other Type of Home: House  Primary Care Giver: Self  Patient Support Systems include: Spouse/Significant Other   Current Financial resources: Medicaid, Medicare  Current community resources: None  Current services prior to admission: Durable Medical Equipment            Current DME: [employer]            Type of Home Care services:  None    ADLS  Prior functional level: Independent in ADLs/IADLs  Current functional level: Independent in ADLs/IADLs    PT AM-PAC:   /24  OT AM-PAC:   /24    Family can provide assistance at DC: Yes  Would you like Case Management to discuss the discharge plan with any other family members/significant others, and if so, who?  Yes (Spouse Edita Gresham)  Plans to Return to Present Housing: Yes  Other Identified Issues/Barriers to RETURNING to

## 2023-10-23 NOTE — PROGRESS NOTES
Pt tolerating vapotherm 30LPM 80% fio2 pt has coughing episodes with o2 de saturation in the low 70's. External catheter still functioning properly.   Irene Moralse RN, BSN

## 2023-10-23 NOTE — PROGRESS NOTES
Patient to ICU bed 10, patient with increased respirations in the 50s, 02 sat 50-61 %, after several minutes of coughing spell, patient unable to recover and 02 sat remained in 50's, Rapid Response called for physician to evaluate, respiratory therapist at bedside, primary nurses at bedside, physician quickly to bedside to evaluate

## 2023-10-23 NOTE — PROGRESS NOTES
Dr. Aleah Lawson at bedside for rounds    -BiPap  -NS 75 hr  -nicoderm patch  -electrolytes replaced    Kenneth Blevins RN, BSN

## 2023-10-23 NOTE — ACP (ADVANCE CARE PLANNING)
Advance Care Planning     Advance Care Planning Inpatient Note  [facility] Department    Today's Date: 10/23/2023  Unit: [facility] ICU    Received request from admission screening and patient. Upon review of chart and communication with care team, patient's decision making abilities are not in question. . Patient and Spouse was/were present in the room during visit. Goals of ACP Conversation:  Discuss advance care planning documents    Health Care Decision Makers:       Primary Decision Maker: Reilly Moreland Spouse - [phone]  Summary:  Completed [facility]    Advance Care Planning Documents (Patient Wishes):  Healthcare Power of /Advance Directive Appointment of Health Care Agent  Living Will/Advance Directive     Assessment:  Pt wanting to be on life support if there is a chance of recovery, but does not want to be if there is no chance of recovery. Interventions:  Provided education on documents for clarity and greater understanding  Assisted in the completion of documents according to patient's wishes at this time    Care Preferences Communicated:     Hospitalization:  If the patient's health worsens and it becomes clear that the chance of recovery is unlikely,     the patient prefers comfort-focused treatment without hospitalization. Ventilation:   If the patient, in their present state of health, suddenly became very ill and unable to breathe on their own,     the patient would desire the use of a ventilator (breathing machine). If their health worsens and it becomes clear that the change of recovery is unlikely,     the patient would desire the use of a ventilator (breathing machine). Resuscitation:  In the event the patient's heart stopped as a result of an underlying serious health condition, the patient communicates a preference for      resuscitative attempts (CPR).     Outcomes/Plan:  ACP Discussion: Completed  New advance directive

## 2023-10-23 NOTE — PROGRESS NOTES
Pt continues to have coughing spells o2 sats in the 70's pt placed on BiPap by RN. Dr. Angélica coker for PRN cough syrup.

## 2023-10-23 NOTE — DISCHARGE INSTRUCTIONS
Your information:  Name: Romi Olson  : 1960    Your instructions: Follow-up with Dr Anastasia Coronado, call for appointment. Follow-up with your urologist call Dr. Jenniffer Juan for appointment r/t urine retention and munguia management. Follow-up with PCP in 1 week, call for appointment. What to do after you leave the hospital:    Recommended diet: regular diet    Recommended activity: activity as tolerated        The following personal items were collected during your admission and were returned to you:    Belongings  Dental Appliances: None  Vision - Corrective Lenses: Eyeglasses, At home  Hearing Aid: None  Clothing: Socks, Undergarments, Pants, At bedside, Jacket/Coat, Pajamas, Slippers, [employer], Footwear  Jewelry: None  Body Piercings Removed: N/A  Electronic Devices: Cell Phone,   Weapons (Notify Protective Services/Security): None  Other Valuables: Wallet, Sent home  Home Medications: None  Valuables Given To: Family (Comment)  Provide Name(s) of Who Valuable(s) Were Given To: spouse  Responsible person(s) in the waiting room: n/a  Patient approves for provider to speak to responsible person post operatively: Yes    Information obtained by:  By signing below, I understand that if any problems occur once I leave the hospital I am to contact PCP. I understand and acknowledge receipt of the instructions indicated above.

## 2023-10-23 NOTE — PROGRESS NOTES
Pt having coughing spells. Ability for saturations to recover using nonrebreather plus HF nasal canula diminished. Doctor messaged.  Considering bipap

## 2023-10-23 NOTE — PLAN OF CARE
Problem: Discharge Planning  Goal: Discharge to home or other facility with appropriate resources  Outcome: Progressing  Flowsheets (Taken 10/22/2023 1751 by Lolita De Leon RN)  Discharge to home or other facility with appropriate resources:   Identify barriers to discharge with patient and caregiver   Arrange for needed discharge resources and transportation as appropriate     Problem: Pain  Goal: Verbalizes/displays adequate comfort level or baseline comfort level  Outcome: Progressing     Problem: Safety - Adult  Goal: Free from fall injury  Outcome: Progressing     Problem: ABCDS Injury Assessment  Goal: Absence of physical injury  Outcome: Progressing

## 2023-10-23 NOTE — PROGRESS NOTES
HOSPITALIST  X-COVERAGE    10/22/2023 10:51 PM    Patient Information: Jerrica Muñoz   Date of Admit:  10/22/2023  Primary Care Physician:  Charity Sapp MD    Problem:  Notified by nursing staff of increasing respiratory rate (40) and O2 needs (12 L)    Subjective:    Pulmonary note reviewed. Intubated in past. Patient states gets SOB with coughing. Objective:  Height: 1.74 m (5' 8.5\"), Weight - Scale: 79.4 kg (175 lb), BP: (!) 144/81, Pain 0-10: Pain Level: 10;     In moderate respiratory distress  Chest with moderate diffuse rhonchi  RR approximately 303    Assesment:  Worsening hypoxic failure    May be fluid overloaded, consider PE, consider cardiac ischemia in setting of hypoxemia      Plan:  Mucomyst HHN Txs  Tessalon PRN  Stat albuterol HHN Tx  STAT CXR, ABG, troponin, D-dimer and BNP  Instructed RN to turn off IVF  Consider bipap support      Marco Grissom MD  10/22/2023 10:51 PM

## 2023-10-24 ENCOUNTER — APPOINTMENT (OUTPATIENT)
Dept: GENERAL RADIOLOGY | Age: 63
DRG: 207 | End: 2023-10-24
Payer: MEDICARE

## 2023-10-24 LAB
ANION GAP SERPL CALCULATED.3IONS-SCNC: 9 MMOL/L (ref 3–16)
APPEARANCE BRONCH: CLEAR
BACTERIA SPEC RESP CULT: NORMAL
BASE EXCESS BLDA CALC-SCNC: -0.7 MMOL/L (ref -3–3)
BASE EXCESS BLDA CALC-SCNC: -2.5 MMOL/L (ref -3–3)
BASE EXCESS BLDA CALC-SCNC: 0.5 MMOL/L (ref -3–3)
BASOPHILS # BLD: 0.1 K/UL (ref 0–0.2)
BASOPHILS NFR BLD: 0.6 %
BUN SERPL-MCNC: 17 MG/DL (ref 7–20)
CALCIUM SERPL-MCNC: 8.9 MG/DL (ref 8.3–10.6)
CHLORIDE SERPL-SCNC: 101 MMOL/L (ref 99–110)
CLOT SPEC QL: ABNORMAL
CO2 BLDA-SCNC: 25.6 MMOL/L
CO2 BLDA-SCNC: 28.2 MMOL/L
CO2 BLDA-SCNC: 29.1 MMOL/L
CO2 SERPL-SCNC: 26 MMOL/L (ref 21–32)
COHGB MFR BLDA: 0.3 % (ref 0–1.5)
COHGB MFR BLDA: 0.3 % (ref 0–1.5)
COHGB MFR BLDA: 0.5 % (ref 0–1.5)
COLOR BRONCH: COLORLESS
CREAT SERPL-MCNC: 0.6 MG/DL (ref 0.8–1.3)
DEPRECATED RDW RBC AUTO: 16.2 % (ref 12.4–15.4)
EOSINOPHIL # BLD: 0 K/UL (ref 0–0.6)
EOSINOPHIL NFR BLD: 0 %
GFR SERPLBLD CREATININE-BSD FMLA CKD-EPI: >60 ML/MIN/{1.73_M2}
GLUCOSE BLD-MCNC: 161 MG/DL (ref 70–99)
GLUCOSE BLD-MCNC: 164 MG/DL (ref 70–99)
GLUCOSE BLD-MCNC: 179 MG/DL (ref 70–99)
GLUCOSE BLD-MCNC: 190 MG/DL (ref 70–99)
GLUCOSE SERPL-MCNC: 142 MG/DL (ref 70–99)
GRAM STN SPEC: NORMAL
HCO3 BLDA-SCNC: 24.1 MMOL/L (ref 21–29)
HCO3 BLDA-SCNC: 26.7 MMOL/L (ref 21–29)
HCO3 BLDA-SCNC: 27.2 MMOL/L (ref 21–29)
HCT VFR BLD AUTO: 34.6 % (ref 40.5–52.5)
HGB BLD-MCNC: 11.1 G/DL (ref 13.5–17.5)
HGB BLDA-MCNC: 11.3 G/DL (ref 13.5–17.5)
HGB BLDA-MCNC: 12.8 G/DL (ref 13.5–17.5)
HGB BLDA-MCNC: 13.1 G/DL (ref 13.5–17.5)
LYMPHOCYTES # BLD: 0.7 K/UL (ref 1–5.1)
LYMPHOCYTES NFR BLD: 5.1 %
LYMPHOCYTES NFR BRONCH MANUAL: 5 % (ref 5–10)
MACROPHAGES NFR BRONCH MANUAL: 54 % (ref 90–95)
MCH RBC QN AUTO: 27.7 PG (ref 26–34)
MCHC RBC AUTO-ENTMCNC: 32 G/DL (ref 31–36)
MCV RBC AUTO: 86.5 FL (ref 80–100)
METHGB MFR BLDA: 0.1 %
METHGB MFR BLDA: 0.3 %
METHGB MFR BLDA: 0.3 %
MONOCYTES # BLD: 0.5 K/UL (ref 0–1.3)
MONOCYTES NFR BLD: 4 %
NEUTROPHILS # BLD: 12.3 K/UL (ref 1.7–7.7)
NEUTROPHILS NFR BLD: 90.3 %
NEUTROPHILS NFR BRONCH MANUAL: 41 % (ref 5–10)
NUC CELL # BRONCH MANUAL: 103 /CUMM
O2 THERAPY: ABNORMAL
PCO2 BLDA: 48.9 MMHG (ref 35–45)
PCO2 BLDA: 50 MMHG (ref 35–45)
PCO2 BLDA: 60.4 MMHG (ref 35–45)
PERFORMED ON: ABNORMAL
PH BLDA: 7.27 [PH] (ref 7.35–7.45)
PH BLDA: 7.31 [PH] (ref 7.35–7.45)
PH BLDA: 7.34 [PH] (ref 7.35–7.45)
PLATELET # BLD AUTO: 209 K/UL (ref 135–450)
PMV BLD AUTO: 8.9 FL (ref 5–10.5)
PO2 BLDA: 106 MMHG (ref 75–108)
PO2 BLDA: 168.2 MMHG (ref 75–108)
PO2 BLDA: 213 MMHG (ref 75–108)
POTASSIUM SERPL-SCNC: 4 MMOL/L (ref 3.5–5.1)
RBC # BLD AUTO: 4 M/UL (ref 4.2–5.9)
RBC # FLD MANUAL: 1300 /CUMM
SAO2 % BLDA: 97.4 %
SAO2 % BLDA: 99 %
SAO2 % BLDA: 99.3 %
SODIUM SERPL-SCNC: 136 MMOL/L (ref 136–145)
TOTAL CELLS COUNTED BRONCH: 100
TRIGL SERPL-MCNC: 60 MG/DL (ref 0–150)
WBC # BLD AUTO: 13.6 K/UL (ref 4–11)

## 2023-10-24 PROCEDURE — 87252 VIRUS INOCULATION TISSUE: CPT

## 2023-10-24 PROCEDURE — 94660 CPAP INITIATION&MGMT: CPT

## 2023-10-24 PROCEDURE — 99233 SBSQ HOSP IP/OBS HIGH 50: CPT | Performed by: INTERNAL MEDICINE

## 2023-10-24 PROCEDURE — 94761 N-INVAS EAR/PLS OXIMETRY MLT: CPT

## 2023-10-24 PROCEDURE — 31500 INSERT EMERGENCY AIRWAY: CPT | Performed by: INTERNAL MEDICINE

## 2023-10-24 PROCEDURE — 0BH17EZ INSERTION OF ENDOTRACHEAL AIRWAY INTO TRACHEA, VIA NATURAL OR ARTIFICIAL OPENING: ICD-10-PCS | Performed by: INTERNAL MEDICINE

## 2023-10-24 PROCEDURE — 31624 DX BRONCHOSCOPE/LAVAGE: CPT | Performed by: INTERNAL MEDICINE

## 2023-10-24 PROCEDURE — 6370000000 HC RX 637 (ALT 250 FOR IP)

## 2023-10-24 PROCEDURE — 87116 MYCOBACTERIA CULTURE: CPT

## 2023-10-24 PROCEDURE — 02HV33Z INSERTION OF INFUSION DEVICE INTO SUPERIOR VENA CAVA, PERCUTANEOUS APPROACH: ICD-10-PCS | Performed by: INTERNAL MEDICINE

## 2023-10-24 PROCEDURE — 71045 X-RAY EXAM CHEST 1 VIEW: CPT

## 2023-10-24 PROCEDURE — 88305 TISSUE EXAM BY PATHOLOGIST: CPT

## 2023-10-24 PROCEDURE — 87206 SMEAR FLUORESCENT/ACID STAI: CPT

## 2023-10-24 PROCEDURE — 0BJ08ZZ INSPECTION OF TRACHEOBRONCHIAL TREE, VIA NATURAL OR ARTIFICIAL OPENING ENDOSCOPIC: ICD-10-PCS | Performed by: INTERNAL MEDICINE

## 2023-10-24 PROCEDURE — 87081 CULTURE SCREEN ONLY: CPT

## 2023-10-24 PROCEDURE — 89051 BODY FLUID CELL COUNT: CPT

## 2023-10-24 PROCEDURE — 5A1945Z RESPIRATORY VENTILATION, 24-96 CONSECUTIVE HOURS: ICD-10-PCS | Performed by: INTERNAL MEDICINE

## 2023-10-24 PROCEDURE — 31622 DX BRONCHOSCOPE/WASH: CPT

## 2023-10-24 PROCEDURE — 2580000003 HC RX 258: Performed by: INTERNAL MEDICINE

## 2023-10-24 PROCEDURE — 84478 ASSAY OF TRIGLYCERIDES: CPT

## 2023-10-24 PROCEDURE — 99291 CRITICAL CARE FIRST HOUR: CPT | Performed by: INTERNAL MEDICINE

## 2023-10-24 PROCEDURE — 36415 COLL VENOUS BLD VENIPUNCTURE: CPT

## 2023-10-24 PROCEDURE — 2700000000 HC OXYGEN THERAPY PER DAY

## 2023-10-24 PROCEDURE — 6360000002 HC RX W HCPCS: Performed by: INTERNAL MEDICINE

## 2023-10-24 PROCEDURE — 2580000003 HC RX 258

## 2023-10-24 PROCEDURE — 88312 SPECIAL STAINS GROUP 1: CPT

## 2023-10-24 PROCEDURE — 87631 RESP VIRUS 3-5 TARGETS: CPT

## 2023-10-24 PROCEDURE — 87070 CULTURE OTHR SPECIMN AEROBIC: CPT

## 2023-10-24 PROCEDURE — 85025 COMPLETE CBC W/AUTO DIFF WBC: CPT

## 2023-10-24 PROCEDURE — 87205 SMEAR GRAM STAIN: CPT

## 2023-10-24 PROCEDURE — 6370000000 HC RX 637 (ALT 250 FOR IP): Performed by: INTERNAL MEDICINE

## 2023-10-24 PROCEDURE — 87015 SPECIMEN INFECT AGNT CONCNTJ: CPT

## 2023-10-24 PROCEDURE — 2000000000 HC ICU R&B

## 2023-10-24 PROCEDURE — 2500000003 HC RX 250 WO HCPCS: Performed by: INTERNAL MEDICINE

## 2023-10-24 PROCEDURE — 5A1955Z RESPIRATORY VENTILATION, GREATER THAN 96 CONSECUTIVE HOURS: ICD-10-PCS | Performed by: INTERNAL MEDICINE

## 2023-10-24 PROCEDURE — 80048 BASIC METABOLIC PNL TOTAL CA: CPT

## 2023-10-24 PROCEDURE — 82803 BLOOD GASES ANY COMBINATION: CPT

## 2023-10-24 PROCEDURE — 6360000002 HC RX W HCPCS

## 2023-10-24 PROCEDURE — 87102 FUNGUS ISOLATION CULTURE: CPT

## 2023-10-24 PROCEDURE — 36556 INSERT NON-TUNNEL CV CATH: CPT | Performed by: INTERNAL MEDICINE

## 2023-10-24 PROCEDURE — 36556 INSERT NON-TUNNEL CV CATH: CPT

## 2023-10-24 PROCEDURE — 88112 CYTOPATH CELL ENHANCE TECH: CPT

## 2023-10-24 PROCEDURE — 94640 AIRWAY INHALATION TREATMENT: CPT

## 2023-10-24 PROCEDURE — 94002 VENT MGMT INPAT INIT DAY: CPT

## 2023-10-24 PROCEDURE — C9113 INJ PANTOPRAZOLE SODIUM, VIA: HCPCS | Performed by: INTERNAL MEDICINE

## 2023-10-24 RX ORDER — LIDOCAINE HYDROCHLORIDE 40 MG/ML
SOLUTION TOPICAL
Status: DISPENSED
Start: 2023-10-24 | End: 2023-10-24

## 2023-10-24 RX ORDER — PROPOFOL 10 MG/ML
5-50 INJECTION, EMULSION INTRAVENOUS CONTINUOUS
Status: DISCONTINUED | OUTPATIENT
Start: 2023-10-24 | End: 2023-10-24

## 2023-10-24 RX ORDER — PROPOFOL 10 MG/ML
5-50 INJECTION, EMULSION INTRAVENOUS CONTINUOUS
Status: DISCONTINUED | OUTPATIENT
Start: 2023-10-24 | End: 2023-10-29

## 2023-10-24 RX ORDER — IPRATROPIUM BROMIDE AND ALBUTEROL SULFATE 2.5; .5 MG/3ML; MG/3ML
1 SOLUTION RESPIRATORY (INHALATION)
Status: DISCONTINUED | OUTPATIENT
Start: 2023-10-24 | End: 2023-10-29

## 2023-10-24 RX ORDER — MIDAZOLAM HYDROCHLORIDE 5 MG/ML
INJECTION INTRAMUSCULAR; INTRAVENOUS
Status: COMPLETED
Start: 2023-10-24 | End: 2023-10-24

## 2023-10-24 RX ORDER — TIZANIDINE 4 MG/1
4 TABLET ORAL 3 TIMES DAILY
Status: DISCONTINUED | OUTPATIENT
Start: 2023-10-24 | End: 2023-10-24

## 2023-10-24 RX ORDER — FENTANYL CITRATE 50 UG/ML
50 INJECTION, SOLUTION INTRAMUSCULAR; INTRAVENOUS
Status: DISCONTINUED | OUTPATIENT
Start: 2023-10-24 | End: 2023-10-29

## 2023-10-24 RX ORDER — PROPOFOL 10 MG/ML
INJECTION, EMULSION INTRAVENOUS
Status: COMPLETED
Start: 2023-10-24 | End: 2023-10-24

## 2023-10-24 RX ORDER — DEXMEDETOMIDINE HYDROCHLORIDE 4 UG/ML
.1-1.5 INJECTION, SOLUTION INTRAVENOUS CONTINUOUS
Status: DISCONTINUED | OUTPATIENT
Start: 2023-10-24 | End: 2023-10-24

## 2023-10-24 RX ORDER — PANTOPRAZOLE SODIUM 40 MG/10ML
40 INJECTION, POWDER, LYOPHILIZED, FOR SOLUTION INTRAVENOUS DAILY
Status: DISCONTINUED | OUTPATIENT
Start: 2023-10-24 | End: 2023-10-31

## 2023-10-24 RX ORDER — FENTANYL CITRATE-0.9 % NACL/PF 20 MCG/2ML
50 SYRINGE (ML) INTRAVENOUS ONCE
Status: DISCONTINUED | OUTPATIENT
Start: 2023-10-24 | End: 2023-10-30

## 2023-10-24 RX ORDER — MIDAZOLAM HYDROCHLORIDE 1 MG/ML
2 INJECTION INTRAMUSCULAR; INTRAVENOUS
Status: DISCONTINUED | OUTPATIENT
Start: 2023-10-24 | End: 2023-10-29

## 2023-10-24 RX ORDER — CHLORHEXIDINE GLUCONATE ORAL RINSE 1.2 MG/ML
15 SOLUTION DENTAL 2 TIMES DAILY
Status: DISCONTINUED | OUTPATIENT
Start: 2023-10-24 | End: 2023-10-29

## 2023-10-24 RX ORDER — MIDAZOLAM HYDROCHLORIDE 1 MG/ML
4 INJECTION INTRAMUSCULAR; INTRAVENOUS ONCE
Status: COMPLETED | OUTPATIENT
Start: 2023-10-24 | End: 2023-10-24

## 2023-10-24 RX ORDER — FENTANYL CITRATE 50 UG/ML
INJECTION, SOLUTION INTRAMUSCULAR; INTRAVENOUS
Status: DISPENSED
Start: 2023-10-24 | End: 2023-10-24

## 2023-10-24 RX ORDER — LINEZOLID 2 MG/ML
600 INJECTION, SOLUTION INTRAVENOUS EVERY 12 HOURS
Status: DISCONTINUED | OUTPATIENT
Start: 2023-10-24 | End: 2023-10-25

## 2023-10-24 RX ORDER — FENTANYL CITRATE-0.9 % NACL/PF 10 MCG/ML
25-200 PLASTIC BAG, INJECTION (ML) INTRAVENOUS CONTINUOUS
Status: DISCONTINUED | OUTPATIENT
Start: 2023-10-24 | End: 2023-10-25

## 2023-10-24 RX ADMIN — CEFEPIME 2000 MG: 2 INJECTION, POWDER, FOR SOLUTION INTRAVENOUS at 12:35

## 2023-10-24 RX ADMIN — ATORVASTATIN CALCIUM 80 MG: 40 TABLET, FILM COATED ORAL at 20:33

## 2023-10-24 RX ADMIN — WATER 40 MG: 1 INJECTION INTRAMUSCULAR; INTRAVENOUS; SUBCUTANEOUS at 10:56

## 2023-10-24 RX ADMIN — Medication 3 MG/HR: at 12:26

## 2023-10-24 RX ADMIN — DIAZEPAM 5 MG: 5 TABLET ORAL at 07:46

## 2023-10-24 RX ADMIN — MUPIROCIN: 20 OINTMENT TOPICAL at 20:34

## 2023-10-24 RX ADMIN — LINEZOLID 600 MG: 600 INJECTION, SOLUTION INTRAVENOUS at 11:14

## 2023-10-24 RX ADMIN — CEFEPIME HYDROCHLORIDE 2000 MG: 2 INJECTION, POWDER, FOR SOLUTION INTRAVENOUS at 20:35

## 2023-10-24 RX ADMIN — IPRATROPIUM BROMIDE AND ALBUTEROL SULFATE 1 DOSE: .5; 2.5 SOLUTION RESPIRATORY (INHALATION) at 18:52

## 2023-10-24 RX ADMIN — LEVOTHYROXINE SODIUM 75 MCG: 25 TABLET ORAL at 05:41

## 2023-10-24 RX ADMIN — PREGABALIN 150 MG: 100 CAPSULE ORAL at 20:33

## 2023-10-24 RX ADMIN — FENTANYL CITRATE 50 MCG: 50 INJECTION INTRAMUSCULAR; INTRAVENOUS at 19:46

## 2023-10-24 RX ADMIN — MIDAZOLAM HYDROCHLORIDE: 5 INJECTION, SOLUTION INTRAMUSCULAR; INTRAVENOUS at 11:35

## 2023-10-24 RX ADMIN — HYDROCODONE BITARTRATE AND HOMATROPINE METHYLBROMIDE 5 ML: 1.5; 5 SYRUP ORAL at 05:14

## 2023-10-24 RX ADMIN — OXYCODONE AND ACETAMINOPHEN 1 TABLET: 5; 325 TABLET ORAL at 05:41

## 2023-10-24 RX ADMIN — ACETYLCYSTEINE 600 MG: 200 INHALANT RESPIRATORY (INHALATION) at 07:20

## 2023-10-24 RX ADMIN — ENOXAPARIN SODIUM 40 MG: 100 INJECTION SUBCUTANEOUS at 20:33

## 2023-10-24 RX ADMIN — Medication 0.4 MCG/KG/HR: at 09:36

## 2023-10-24 RX ADMIN — PROPOFOL 20 MCG/KG/MIN: 10 INJECTION, EMULSION INTRAVENOUS at 10:01

## 2023-10-24 RX ADMIN — IPRATROPIUM BROMIDE AND ALBUTEROL SULFATE 1 DOSE: 2.5; .5 SOLUTION RESPIRATORY (INHALATION) at 07:19

## 2023-10-24 RX ADMIN — CHLORHEXIDINE GLUCONATE 0.12% ORAL RINSE 15 ML: 1.2 LIQUID ORAL at 20:33

## 2023-10-24 RX ADMIN — MUPIROCIN: 20 OINTMENT TOPICAL at 10:56

## 2023-10-24 RX ADMIN — IPRATROPIUM BROMIDE AND ALBUTEROL SULFATE 1 DOSE: .5; 2.5 SOLUTION RESPIRATORY (INHALATION) at 11:11

## 2023-10-24 RX ADMIN — Medication 50 MCG/HR: at 12:09

## 2023-10-24 RX ADMIN — WATER 40 MG: 1 INJECTION INTRAMUSCULAR; INTRAVENOUS; SUBCUTANEOUS at 20:33

## 2023-10-24 RX ADMIN — Medication 100 MCG/HR: at 19:58

## 2023-10-24 RX ADMIN — AZITHROMYCIN 500 MG: 250 TABLET, FILM COATED ORAL at 10:55

## 2023-10-24 RX ADMIN — FENTANYL CITRATE 50 MCG: 50 INJECTION INTRAMUSCULAR; INTRAVENOUS at 12:20

## 2023-10-24 RX ADMIN — IPRATROPIUM BROMIDE AND ALBUTEROL SULFATE 1 DOSE: .5; 2.5 SOLUTION RESPIRATORY (INHALATION) at 15:30

## 2023-10-24 RX ADMIN — MIDAZOLAM HYDROCHLORIDE 2 MG: 1 INJECTION, SOLUTION INTRAMUSCULAR; INTRAVENOUS at 19:45

## 2023-10-24 RX ADMIN — ACETYLCYSTEINE 600 MG: 200 INHALANT RESPIRATORY (INHALATION) at 18:52

## 2023-10-24 RX ADMIN — Medication 10 ML: at 20:34

## 2023-10-24 RX ADMIN — BENZONATATE 100 MG: 100 CAPSULE ORAL at 07:46

## 2023-10-24 RX ADMIN — PANTOPRAZOLE SODIUM 40 MG: 40 INJECTION, POWDER, LYOPHILIZED, FOR SOLUTION INTRAVENOUS at 10:56

## 2023-10-24 RX ADMIN — PREGABALIN 150 MG: 100 CAPSULE ORAL at 10:55

## 2023-10-24 RX ADMIN — IPRATROPIUM BROMIDE AND ALBUTEROL SULFATE 1 DOSE: .5; 2.5 SOLUTION RESPIRATORY (INHALATION) at 22:45

## 2023-10-24 RX ADMIN — SODIUM CHLORIDE: 9 INJECTION, SOLUTION INTRAVENOUS at 00:07

## 2023-10-24 RX ADMIN — FENTANYL CITRATE 50 MCG: 50 INJECTION INTRAMUSCULAR; INTRAVENOUS at 11:31

## 2023-10-24 RX ADMIN — MIDAZOLAM HYDROCHLORIDE 4 MG: 1 INJECTION, SOLUTION INTRAMUSCULAR; INTRAVENOUS at 12:16

## 2023-10-24 ASSESSMENT — PULMONARY FUNCTION TESTS
PIF_VALUE: 25
PIF_VALUE: 25
PIF_VALUE: 26
PIF_VALUE: 27
PIF_VALUE: 25
PIF_VALUE: 25
PIF_VALUE: 26
PIF_VALUE: 20
PIF_VALUE: 26
PIF_VALUE: 24
PIF_VALUE: 26
PIF_VALUE: 26
PIF_VALUE: 24
PIF_VALUE: 26
PIF_VALUE: 25
PIF_VALUE: 25
PIF_VALUE: 26
PIF_VALUE: 13
PIF_VALUE: 20

## 2023-10-24 ASSESSMENT — PAIN SCALES - GENERAL
PAINLEVEL_OUTOF10: 8
PAINLEVEL_OUTOF10: 8

## 2023-10-24 ASSESSMENT — PAIN DESCRIPTION - LOCATION: LOCATION: CHEST

## 2023-10-24 ASSESSMENT — PAIN DESCRIPTION - ORIENTATION: ORIENTATION: MID

## 2023-10-24 ASSESSMENT — PAIN DESCRIPTION - DESCRIPTORS: DESCRIPTORS: TIGHTNESS;ACHING

## 2023-10-24 NOTE — PROGRESS NOTES
Physician Progress Note      PATIENT:               Manuelito Pichardo  CSN #:                  [id number]  :                       1960  ADMIT DATE:       10/22/2023 8:27 AM  DISCH DATE:  RESPONDING  PROVIDER #:        Comfort Cortes MD          QUERY TEXT:    Pt admitted with pneumonia. Noted documentation of sepsis in ED. If   possible, please document in progress notes and discharge summary:    The medical record reflects the following:  Risk Factors: pneumonia, COPD exacerbation  Clinical Indicators: WBC 12.9, lactic acid 2.3-3.1, acute hypoxic respiratory   failure, vitals on 10/22: T 98.7, RR 18-42, HR , SBP , SPO2 91-96%  Treatment: Vapotherm, serial labs, IV Rocephin/Zithromax, IVF bolus x 2,   cultures, supportive care    Thank you,  Asya Gutierrez RN, CDS  [email or web address]  Options provided:  -- Sepsis confirmed present on admission  -- Sepsis ruled out  -- Other - I will add my own diagnosis  -- Disagree - Not applicable / Not valid  -- Disagree - Clinically unable to determine / Unknown  -- Refer to Clinical Documentation Reviewer    PROVIDER RESPONSE TEXT:    The diagnosis of sepsis was ruled out. Query created by: Asya Gutierrez on 10/23/2023 2:34 PM      Electronically signed by:   Comfort Cortes MD 10/24/2023 8:11 AM

## 2023-10-24 NOTE — PROGRESS NOTES
Upon Arrival, Pts spo2 in the 76s. Started bagging with 100% fio2. Pt awake and agitated. Bagged for 10 mins. Pt sedated and placed back on ventilator, increased peep to 10, FiO2 to 100% and RR to 24. Spo2 90%. Pt stable at this time.

## 2023-10-24 NOTE — PROGRESS NOTES
SaO2 continues to drop into the 70's. Pt placed on BiPap. Pt remains tachpneic. Dr. Serna Felt updated.

## 2023-10-24 NOTE — PROGRESS NOTES
AM assessment done. Pt coughing & SaO2 drops to 70's. Pt very tachpneic. Vapotherm  40L & 100%. 100% non-rebreather placed. Will order CXR & ABG's.

## 2023-10-24 NOTE — PROGRESS NOTES
10/24/23 1933   Patient Observation   Pulse 67   Respirations 24   SpO2 100 %   Observations ETT SIZE 7.5, SECURED AT 23 LIP LINE. AMBU BAG AT HEAD OF BED. WATER GOOD. Breath Sounds   Right Upper Lobe Diminished   Right Middle Lobe Crackles   Right Lower Lobe Diminished   Left Upper Lobe Diminished   Left Lower Lobe Crackles   Vent Information   Vent Mode AC/VC   Ventilator Settings   FiO2  (S)  75 %   Vt (Set, mL) 400 mL   Resp Rate (Set) 24 bpm   PEEP/CPAP (cmH2O) 10   Vent Patient Data (Readings)   Vt (Measured) 389 mL   Peak Inspiratory Pressure (cmH2O) 26 cmH2O   Rate Measured 24 br/min   Minute Volume (L/min) 9.98 Liters   Peak Inspiratory Flow (lpm) 55 L/sec   Mean Airway Pressure (cmH2O) 15 cmH20   Plateau Pressure (cm H2O) 20 cm H2O   Driving Pressure 10   I:E Ratio 1:2.10   Flow Sensitivity 3 L/min   Static Compliance (L/cm H2O) 39   Dynamic Compliance (L/cm H2O) 24 L/cm H2O   Vent Alarm Settings   High Pressure (cmH2O) 40 cmH2O   Low Minute Volume (lpm) 2 L/min   High Minute Volume (lpm) 25 L/min   Low Exhaled Vt (ml) 210 mL   High Exhaled Vt (ml) 700 mL   RR High (bpm) 30 br/min   Apnea (secs) 20 secs   Additional Respiratoray Assessments   Humidification Source Heated wire   Humidification Temp 36.9   Circuit Condensation Drained   Ambu Bag With Mask At Bedside Yes   Airway Clearance   Suction ET Tube   Suction Device Inline suction catheter   Sputum Method Obtained Endotracheal   Sputum Amount Small   Sputum Color/Odor Clear   Sputum Consistency Thick   ETT    Placement Date/Time: 10/24/23 5661   Present on Admission/Arrival: Yes  Placed By: Licensed provider  Placement Verified By: Auscultation;Capnometry; Chest X-ray;Colorimetric ETCO2 device;Esophageal detection device;Direct visualization  Preoxygenation. ..    Secured At 23 cm   Measured From Lips   ETT Placement (S)  Right   Secured By Commercial tube louis   Site Assessment Dry

## 2023-10-24 NOTE — PROGRESS NOTES
10/24/23 1945   Patient Observation   Pulse 98   Respirations (!) 31   SpO2 (!) 81 %   Ventilator Settings   FiO2  (S)  100 %   Vt (Set, mL) 400 mL   Resp Rate (Set) 24 bpm   PEEP/CPAP (cmH2O) 10   Vent Patient Data (Readings)   Vt (Measured) 397 mL   Peak Inspiratory Pressure (cmH2O) 13 cmH2O   Rate Measured 26 br/min   Minute Volume (L/min) 10.7 Liters   Mean Airway Pressure (cmH2O) 4.2 cmH20   Plateau Pressure (cm H2O) 20 cm H2O   Driving Pressure 10   I:E Ratio 1:1.90   Vent Alarm Settings   High Pressure (cmH2O) 40 cmH2O   Low Minute Volume (lpm) 2 L/min   RR High (bpm) 30 br/min

## 2023-10-24 NOTE — PROGRESS NOTES
10/24/23 0253   NIV Type   Equipment Type V60   Mode Bilevel   Mask Type Full face mask   Mask Size Medium   Assessment   Pulse 75   Respirations 26   SpO2 99 %   Settings/Measurements   IPAP 14 cmH20   CPAP/EPAP 7 cmH2O   Vt (Measured) 899 mL   Rate Ordered 16   FiO2  (S)  50 %   Minute Volume (L/min) 26.4 Liters   Mask Leak (lpm) 11 lpm   Patient's Home Machine No   Alarm Settings   Alarms On Y

## 2023-10-24 NOTE — PROGRESS NOTES
10/24/23 0535   Oxygen Therapy/Pulse Ox   O2 Therapy Oxygen humidified   O2 Device Heated high flow cannula   O2 Flow Rate (L/min) (S)  35 L/min   FiO2  (S)  100 %   Pulse 97   Respirations (!) 34   SpO2 (!) 89 %

## 2023-10-24 NOTE — PROCEDURES
ENDOTRACHEAL INTUBATION    INDICATION: Life threatening respiratory failure    Informed Consent: was obtained from patient and wife    TIME OUT: taken    SEDATION: Fentanyl, Versed, etomidate and rocuronium    PROCEDURE: Using direct laryngoscopy, the vocal cords were well visualized and an 7.5 mm endotracheal tube was place directly through the cords. Good breath sounds auscultated bilaterally without sounds over abdomen. Good return of CO2 on the monitor. CXR confirmed satisfactory position. Procedure: Nontunneled central venous access, right IJ    Indication: invasive hemodynamic monitoring, frequent blood draws, ensure stable IV access    Informed Consent: was obtained from patient and wife    Time Out: taken    Procedure: Sterile prep with chlorhexidine. Full maximum sterile field/barrier technique was followed (with cap and mask and sterile gown and large sterile sheet and hand hygeine and 2% chlorhexidine). Aqueous lidocaine anesthetic. Direct ultrasound visualization of the right internal jugular vein, with placement of central venous catheter using modified seldinger technique. Good dark venous blood from all 3 lumens. CXR confirmed appropriate placement in mid to distal SVC. No immediate complication.     Recommendation: remove central line at earliest time feasible to mitigate infectious risks

## 2023-10-24 NOTE — PROGRESS NOTES
10/23/23 0163   Oxygen Therapy/Pulse Ox   O2 Therapy Oxygen humidified   O2 Device Heated high flow cannula   O2 Flow Rate (L/min) 30 L/min   FiO2  (S)  100 %   Pulse 96   Respirations 27   SpO2 91 %   Skin Assessment Clean, dry, & intact

## 2023-10-24 NOTE — PROGRESS NOTES
10/23/23 7662   Oxygen Therapy/Pulse Ox   O2 Therapy Oxygen humidified   O2 Device Heated high flow cannula   O2 Flow Rate (L/min) 30 L/min   FiO2  100 %   Pulse 98   Respirations 26   SpO2 96 %

## 2023-10-24 NOTE — PROGRESS NOTES
10/23/23 6794   NIV Type   NIV Started/Stopped (S)  On   Equipment Type V60   Mode Bilevel   Mask Type Full face mask   Mask Size Medium   Assessment   Pulse 93   Respirations 28   SpO2 97 %   Level of Consciousness 0   Comfort Level Good   Using Accessory Muscles No   Mask Compliance Good   Skin Assessment Clean, dry, & intact   Skin Protection for O2 Device Yes   Settings/Measurements   IPAP 14 cmH20   CPAP/EPAP 7 cmH2O   Vt (Measured) 658 mL   Rate Ordered 16   FiO2  (S)  60 %   Minute Volume (L/min) 19.4 Liters   Mask Leak (lpm) 19 lpm   Patient's Home Machine No   Alarm Settings   Alarms On Y

## 2023-10-24 NOTE — PROGRESS NOTES
10/24/23 1003   Patient Observation   Pulse (!) 104   Respirations 21   SpO2 97 %   Vent Information   Vent Mode AC/VC   $Ventilation $Initial Day   Ventilator Settings   FiO2  100 %   Vt (Set, mL) 300 mL   Resp Rate (Set) 20 bpm   PEEP/CPAP (cmH2O) 8   Vent Patient Data (Readings)   Vt (Measured) 375 mL   Peak Inspiratory Pressure (cmH2O) 20 cmH2O   Rate Measured 21 br/min   Minute Volume (L/min) 7.89 Liters   Peak Expiratory Flow (lpm) 55 L/min   Mean Airway Pressure (cmH2O) 12 cmH20   I:E Ratio 1:2.3   Backup Apnea On

## 2023-10-24 NOTE — PROGRESS NOTES
10/24/23 0806   NIV Type   NIV Started/Stopped (S)  On   Mode Bilevel   Mask Type Full face mask   Mask Size Medium   Assessment   Pulse 94   Respirations (!) 44   SpO2 (!) 89 %   Comfort Level Fair   Using Accessory Muscles Yes   Mask Compliance Good   Skin Assessment Clean, dry, & intact   Skin Protection for O2 Device Yes   Orientation Middle   Location Nose   Settings/Measurements   PIP Observed 27 cm H20   IPAP 18 cmH20   CPAP/EPAP 8 cmH2O   Rate Ordered 16   Insp Rise Time (%) 1 %   FiO2  100 %   I Time/ I Time % 0.9 s   Mask Leak (lpm) 4 lpm   Patient's Home Machine No   Alarm Settings   Alarms On Y   Low Pressure (cmH2O) 8 cmH2O   High Pressure (cmH2O) 35 cmH2O   Delay Alarm 20 sec(s)   Apnea (secs) 20 secs   RR Low (bpm) 14   RR High (bpm) 50 br/min   Patient Observation   Observations Pt had a coughing spell. Pt desated into the 60s on vapotherm and non rebreather. Pt red and broke out into a sweat.  Blood gas down by RN and sent to lab

## 2023-10-24 NOTE — PROGRESS NOTES
Pt had coughing spell that lasted minutes. Pt desaturated to 58% with difficult recovery remaining in 62s. . Vapotherm increased to 100%. RT called to bedside. Pt now calm and satting at 94%.      Electronically signed by Nahun Conway RN on 10/23/2023 at 10:34 PM

## 2023-10-24 NOTE — PROGRESS NOTES
Pt asked about his night time dose of Zanaflex. When this RN went to pull pts meds I noticed that Xanaflax was d/c'd in [facility]. This RN informed pt and looked into notes and found that Dr. Lauri Rose note from this am said to continue the [place]. I called pharmacy and spoke to Gigi Cowden. Gigi Cowden stated that Dr. Odalis Ann was who d/c'd the med. Pt stated that he has to have his Xanaflex for his back pain. This RN sent [facility] to Dr. Pete Mandel and asked him to look into why the med was d/c'd and if he could have a dose. Dr. Pete Mandel ordered one time dose for tonight and advised to pass along to dayshift to discuss with Dr. Odalis Ann.      Electronically signed by Gorge Mensah RN on 10/23/2023 at 10:15 PM

## 2023-10-24 NOTE — PROCEDURES
PROCEDURE:  BRONCHOSCOPY WITH BRONCHOALVEOLAR LAVAGE    PRE-PROCEDURE EVALUATION:  Nursing History and Physical reviewed and agreed upon     Allergies and medications have been reviewed    HENT: ETT in place. Intubated not able to assess Mallampati. Cardiovascular: Normal rate, regular rhythm, normal heart sounds. Pulmonary/Chest: No wheezes. Few rhonchi. Few rales. Abdominal: Soft. Bowel sounds are normal. No distension. ASA CLASS    IV. Severe Systemic Disease which is a threat to life. Sedation plan:   Continue ICU sedation    Post Procedure Plan   Continue ICU care     The risks and benefits as well as alternatives to the procedure have been discussed with the patient's family. The patient's next of kin understands and agrees to proceed. DESCRIPTION OF PROCEDURE: A time out was taken. ICU sedation continued. The scope was passed with ease via the endotracheal tube. A complete airway inspection was performed. No endobronchial lesions were identified. There were thin minimal secretions lower lobe airways. Washings were obtained throughout the airways. A Bronchoalveolar lavage was obtained from the left lower lobe with good return. The patient tolerated the procedure well. Estimated blood loss was less than 5 ml. Recovery will be per endoscopy protocol. FOLLOW UP:  Cultures and cytology.

## 2023-10-24 NOTE — PROGRESS NOTES
Comprehensive Nutrition Assessment    Type and Reason for Visit:  Initial, Consult (consult for TF ordering and management)    Nutrition Recommendations/Plan:   Start TF order - ADULT TUBE FEEDING; Orogastric; Standard with Fiber formula - Jevity 1.5 with a goal rate of 50 ml/hr x 20 hours. Start with 20 ml/hr and increase by 15 ml every 4 hours, as tolerated by patient, until goal rate can be achieved and maintained. Water flushes, 30 ml every 4 hours for tube patency. Please administer one prosource TF 20 protein packet once daily via feeding tube. Monitor TF start, rate, intake, and tolerance + water flushes + administration of one prosource TF 20 protein packet once daily. Monitor respiratory/vent status, sedation type/amount (propofol is infusing at 20 mcg x 24 hours which = 241 kcals from lipids), TG checks, and plan of care. Monitor nutrition-related labs, bowel function, and weight trends. Malnutrition Assessment:  Malnutrition Status:   At risk for malnutrition (10/24/23 1043)    Context:  Acute Illness     Findings of the 6 clinical characteristics of malnutrition:  Energy Intake:  Mild decrease in energy intake   Weight Loss:  No significant weight loss     Body Fat Loss:  Unable to assess     Muscle Mass Loss:  Unable to assess    Fluid Accumulation:  No significant fluid accumulation     Strength:  Not Performed    Nutrition Assessment:    patient is nutritionally compromised AEB poor appetite and po intake r/t respiratory dysfunction PTA and he is at risk for further compromise d/t need for intubation this am, NPO status, and altered nutrition-related labs; will start TF today    Nutrition Related Findings:    patient presented to the ED with c/o respiratory dysfunction that was not improving, despite his PCP starting him on abx PTA; patient was intubated this am for worsening respiratory status; NS at 75 ml/hr; no documented BM for this admission; patient did not consume breakfast this

## 2023-10-24 NOTE — PROGRESS NOTES
10/24/23 1113   Ventilator Settings   FiO2  90 %   Vt (Set, mL) 400 mL   Resp Rate (Set) 20 bpm   PEEP/CPAP (cmH2O) 8   Vent Patient Data (Readings)   Vt (Measured) 382 mL   Peak Inspiratory Pressure (cmH2O) 20 cmH2O   Rate Measured 27 br/min   Minute Volume (L/min) 11.3 Liters   Peak Expiratory Flow (lpm) 55 L/min   Mean Airway Pressure (cmH2O) 13 cmH20   I:E Ratio 1:2.4   Backup Apnea On   Vent Alarm Settings   High Pressure (cmH2O) 40 cmH2O   Low Minute Volume (lpm) 2.05 L/min   High Minute Volume (lpm) 25 L/min   Low Exhaled Vt (ml) 210 mL   High Exhaled Vt (ml) 700 mL   RR High (bpm) 30 br/min   Apnea (secs) 20 secs   Additional Respiratoray Assessments   Humidification Source Heated wire   Humidification Temp 37   Circuit Condensation Not drained   Ambu Bag With Mask At Bedside Yes   ETT    Placement Date/Time: 10/24/23 2050   Present on Admission/Arrival: Yes  Placed By: Licensed provider  Placement Verified By: Auscultation;Capnometry; Chest X-ray;Colorimetric ETCO2 device;Esophageal detection device;Direct visualization  Preoxygenation. ..    Secured At 23 cm   Measured From Lips   ETT Placement Right   Secured By Commercial tube louis   Site Assessment Dry

## 2023-10-24 NOTE — PROCEDURES
Consent verified for bronchoscopy. Timeout per policy. Procedure performed by Dr. Jamie Robin. Bronchoscopy assist performed on 100 FiO2.    6 ml of 1% lidocaine instilled and 200 ml of 0.9% normal saline instilled. Pt tolerated bronchoscopy without difficulty, airway support per RTD. Patient SpO2 within acceptable range throughout bronchoscopy procedure. Sedation provided/monitored per nurse. No s/s distress, no complications noted. See physician notes.   Continue plan of care

## 2023-10-24 NOTE — PROGRESS NOTES
10/24/23 1533   Patient Observation   Pulse 67   Respirations 24   SpO2 99 %   Vent Information   Vent Mode AC/VC   Ventilator Settings   FiO2  90 %   Vt (Set, mL) 400 mL   Resp Rate (Set) 24 bpm   PEEP/CPAP (cmH2O) 10   Vent Patient Data (Readings)   Vt (Measured) 408 mL   Peak Inspiratory Pressure (cmH2O) 26 cmH2O   Rate Measured 24 br/min   Minute Volume (L/min) 9.82 Liters   Peak Expiratory Flow (lpm) 55 L/min   Mean Airway Pressure (cmH2O) 15 cmH20   I:E Ratio 1:2.3   Backup Apnea On   Vent Alarm Settings   High Pressure (cmH2O) 40 cmH2O   Low Minute Volume (lpm) 2.05 L/min   High Minute Volume (lpm) 25 L/min   Low Exhaled Vt (ml) 210 mL   High Exhaled Vt (ml) 700 mL   RR Low (bpm) 14   RR High (bpm) 30 br/min   Apnea (secs) 20 secs   Additional Respiratoray Assessments   Humidification Source Heated wire   Humidification Temp 36.8   Circuit Condensation Drained   Ambu Bag With Mask At Bedside Yes   Airway Clearance   Suction ET Tube   Sputum Amount Scant   Sputum Color/Odor Clear   ETT    Placement Date/Time: 10/24/23 7837   Present on Admission/Arrival: Yes  Placed By: Licensed provider  Placement Verified By: Auscultation;Capnometry; Chest X-ray;Colorimetric ETCO2 device;Esophageal detection device;Direct visualization  Preoxygenation. ..    Secured At 23 cm   Measured From Lips   ETT Placement Center   Secured By Commercial tube louis   Site Assessment Dry

## 2023-10-24 NOTE — PLAN OF CARE
Problem: Discharge Planning  Goal: Discharge to home or other facility with appropriate resources  Outcome: Progressing  Flowsheets (Taken 10/23/2023 2000)  Discharge to home or other facility with appropriate resources: Identify barriers to discharge with patient and caregiver     Problem: Pain  Goal: Verbalizes/displays adequate comfort level or baseline comfort level  Outcome: Progressing  Flowsheets (Taken 10/23/2023 2000)  Verbalizes/displays adequate comfort level or baseline comfort level: Encourage patient to monitor pain and request assistance     Problem: Safety - Adult  Goal: Free from fall injury  Outcome: Progressing     Problem: ABCDS Injury Assessment  Goal: Absence of physical injury  Outcome: Progressing     Problem: Respiratory - Adult  Goal: Achieves optimal ventilation and oxygenation  Outcome: Progressing

## 2023-10-25 ENCOUNTER — APPOINTMENT (OUTPATIENT)
Dept: GENERAL RADIOLOGY | Age: 63
DRG: 207 | End: 2023-10-25
Payer: MEDICARE

## 2023-10-25 LAB
ACID FAST STN SPEC QL: NORMAL
ACID FAST STN SPEC QL: NORMAL
ANION GAP SERPL CALCULATED.3IONS-SCNC: 12 MMOL/L (ref 3–16)
BASE EXCESS BLDA CALC-SCNC: -1.7 MMOL/L (ref -3–3)
BASOPHILS # BLD: 0 K/UL (ref 0–0.2)
BASOPHILS NFR BLD: 0.1 %
BUN SERPL-MCNC: 15 MG/DL (ref 7–20)
CALCIUM SERPL-MCNC: 8.9 MG/DL (ref 8.3–10.6)
CHLORIDE SERPL-SCNC: 101 MMOL/L (ref 99–110)
CO2 BLDA-SCNC: 25.3 MMOL/L
CO2 SERPL-SCNC: 26 MMOL/L (ref 21–32)
COHGB MFR BLDA: 0.3 % (ref 0–1.5)
CREAT SERPL-MCNC: 0.6 MG/DL (ref 0.8–1.3)
DEPRECATED RDW RBC AUTO: 16 % (ref 12.4–15.4)
EOSINOPHIL # BLD: 0 K/UL (ref 0–0.6)
EOSINOPHIL NFR BLD: 0.1 %
GFR SERPLBLD CREATININE-BSD FMLA CKD-EPI: >60 ML/MIN/{1.73_M2}
GLUCOSE BLD-MCNC: 128 MG/DL (ref 70–99)
GLUCOSE BLD-MCNC: 153 MG/DL (ref 70–99)
GLUCOSE BLD-MCNC: 186 MG/DL (ref 70–99)
GLUCOSE BLD-MCNC: 208 MG/DL (ref 70–99)
GLUCOSE SERPL-MCNC: 166 MG/DL (ref 70–99)
HCO3 BLDA-SCNC: 23.9 MMOL/L (ref 21–29)
HCT VFR BLD AUTO: 31.9 % (ref 40.5–52.5)
HGB BLD-MCNC: 10.4 G/DL (ref 13.5–17.5)
HGB BLDA-MCNC: 11.5 G/DL (ref 13.5–17.5)
LOEFFLER MB STN SPEC: NORMAL
LOEFFLER MB STN SPEC: NORMAL
LYMPHOCYTES # BLD: 0.4 K/UL (ref 1–5.1)
LYMPHOCYTES NFR BLD: 4.4 %
MAGNESIUM SERPL-MCNC: 2 MG/DL (ref 1.8–2.4)
MCH RBC QN AUTO: 28.5 PG (ref 26–34)
MCHC RBC AUTO-ENTMCNC: 32.7 G/DL (ref 31–36)
MCV RBC AUTO: 87.2 FL (ref 80–100)
METHGB MFR BLDA: 0.3 %
MONOCYTES # BLD: 0.3 K/UL (ref 0–1.3)
MONOCYTES NFR BLD: 3.8 %
MRSA SPEC QL CULT: NORMAL
NEUTROPHILS # BLD: 7.5 K/UL (ref 1.7–7.7)
NEUTROPHILS NFR BLD: 91.6 %
O2 THERAPY: ABNORMAL
PCO2 BLDA: 43.8 MMHG (ref 35–45)
PERFORMED ON: ABNORMAL
PH BLDA: 7.36 [PH] (ref 7.35–7.45)
PLATELET # BLD AUTO: 193 K/UL (ref 135–450)
PMV BLD AUTO: 9.4 FL (ref 5–10.5)
PO2 BLDA: 134.1 MMHG (ref 75–108)
POTASSIUM SERPL-SCNC: 3.4 MMOL/L (ref 3.5–5.1)
RBC # BLD AUTO: 3.66 M/UL (ref 4.2–5.9)
SAO2 % BLDA: 98.6 %
SODIUM SERPL-SCNC: 139 MMOL/L (ref 136–145)
WBC # BLD AUTO: 8.2 K/UL (ref 4–11)

## 2023-10-25 PROCEDURE — 94640 AIRWAY INHALATION TREATMENT: CPT

## 2023-10-25 PROCEDURE — 85025 COMPLETE CBC W/AUTO DIFF WBC: CPT

## 2023-10-25 PROCEDURE — 94003 VENT MGMT INPAT SUBQ DAY: CPT

## 2023-10-25 PROCEDURE — 71045 X-RAY EXAM CHEST 1 VIEW: CPT

## 2023-10-25 PROCEDURE — 6370000000 HC RX 637 (ALT 250 FOR IP): Performed by: INTERNAL MEDICINE

## 2023-10-25 PROCEDURE — 51702 INSERT TEMP BLADDER CATH: CPT

## 2023-10-25 PROCEDURE — 99291 CRITICAL CARE FIRST HOUR: CPT | Performed by: INTERNAL MEDICINE

## 2023-10-25 PROCEDURE — 82803 BLOOD GASES ANY COMBINATION: CPT

## 2023-10-25 PROCEDURE — 6360000002 HC RX W HCPCS

## 2023-10-25 PROCEDURE — 80048 BASIC METABOLIC PNL TOTAL CA: CPT

## 2023-10-25 PROCEDURE — 2000000000 HC ICU R&B

## 2023-10-25 PROCEDURE — C9113 INJ PANTOPRAZOLE SODIUM, VIA: HCPCS | Performed by: INTERNAL MEDICINE

## 2023-10-25 PROCEDURE — 2580000003 HC RX 258: Performed by: INTERNAL MEDICINE

## 2023-10-25 PROCEDURE — 99233 SBSQ HOSP IP/OBS HIGH 50: CPT | Performed by: INTERNAL MEDICINE

## 2023-10-25 PROCEDURE — 2580000003 HC RX 258

## 2023-10-25 PROCEDURE — 51798 US URINE CAPACITY MEASURE: CPT

## 2023-10-25 PROCEDURE — 36415 COLL VENOUS BLD VENIPUNCTURE: CPT

## 2023-10-25 PROCEDURE — 94761 N-INVAS EAR/PLS OXIMETRY MLT: CPT

## 2023-10-25 PROCEDURE — 6360000002 HC RX W HCPCS: Performed by: INTERNAL MEDICINE

## 2023-10-25 PROCEDURE — 83735 ASSAY OF MAGNESIUM: CPT

## 2023-10-25 PROCEDURE — 2500000003 HC RX 250 WO HCPCS: Performed by: INTERNAL MEDICINE

## 2023-10-25 PROCEDURE — 2700000000 HC OXYGEN THERAPY PER DAY

## 2023-10-25 PROCEDURE — 6370000000 HC RX 637 (ALT 250 FOR IP)

## 2023-10-25 RX ORDER — FENTANYL CITRATE-0.9 % NACL/PF 10 MCG/ML
25-200 PLASTIC BAG, INJECTION (ML) INTRAVENOUS CONTINUOUS
Status: DISCONTINUED | OUTPATIENT
Start: 2023-10-25 | End: 2023-10-29

## 2023-10-25 RX ORDER — DIAZEPAM 5 MG/1
5 TABLET ORAL EVERY 8 HOURS
Status: DISCONTINUED | OUTPATIENT
Start: 2023-10-25 | End: 2023-10-26

## 2023-10-25 RX ADMIN — DIAZEPAM 5 MG: 5 TABLET ORAL at 17:14

## 2023-10-25 RX ADMIN — IPRATROPIUM BROMIDE AND ALBUTEROL SULFATE 1 DOSE: .5; 2.5 SOLUTION RESPIRATORY (INHALATION) at 18:56

## 2023-10-25 RX ADMIN — CHLORHEXIDINE GLUCONATE 0.12% ORAL RINSE 15 ML: 1.2 LIQUID ORAL at 20:53

## 2023-10-25 RX ADMIN — PREGABALIN 150 MG: 100 CAPSULE ORAL at 20:54

## 2023-10-25 RX ADMIN — POTASSIUM BICARBONATE 40 MEQ: 782 TABLET, EFFERVESCENT ORAL at 09:40

## 2023-10-25 RX ADMIN — IPRATROPIUM BROMIDE AND ALBUTEROL SULFATE 1 DOSE: .5; 2.5 SOLUTION RESPIRATORY (INHALATION) at 07:38

## 2023-10-25 RX ADMIN — Medication 100 MCG/HR: at 04:41

## 2023-10-25 RX ADMIN — PROPOFOL 40 MCG/KG/MIN: 10 INJECTION, EMULSION INTRAVENOUS at 21:21

## 2023-10-25 RX ADMIN — PANTOPRAZOLE SODIUM 40 MG: 40 INJECTION, POWDER, LYOPHILIZED, FOR SOLUTION INTRAVENOUS at 08:09

## 2023-10-25 RX ADMIN — PROPOFOL 50 MCG/KG/MIN: 10 INJECTION, EMULSION INTRAVENOUS at 07:41

## 2023-10-25 RX ADMIN — IPRATROPIUM BROMIDE AND ALBUTEROL SULFATE 1 DOSE: .5; 2.5 SOLUTION RESPIRATORY (INHALATION) at 11:21

## 2023-10-25 RX ADMIN — DIAZEPAM 5 MG: 5 TABLET ORAL at 09:40

## 2023-10-25 RX ADMIN — MUPIROCIN: 20 OINTMENT TOPICAL at 08:09

## 2023-10-25 RX ADMIN — ACETYLCYSTEINE 600 MG: 200 INHALANT RESPIRATORY (INHALATION) at 07:50

## 2023-10-25 RX ADMIN — AZITHROMYCIN 500 MG: 250 TABLET, FILM COATED ORAL at 08:09

## 2023-10-25 RX ADMIN — IPRATROPIUM BROMIDE AND ALBUTEROL SULFATE 1 DOSE: .5; 2.5 SOLUTION RESPIRATORY (INHALATION) at 16:00

## 2023-10-25 RX ADMIN — CEFEPIME HYDROCHLORIDE 2000 MG: 2 INJECTION, POWDER, FOR SOLUTION INTRAVENOUS at 04:42

## 2023-10-25 RX ADMIN — PREGABALIN 150 MG: 100 CAPSULE ORAL at 08:09

## 2023-10-25 RX ADMIN — POLYETHYLENE GLYCOL (3350) 17 G: 17 POWDER, FOR SOLUTION ORAL at 12:05

## 2023-10-25 RX ADMIN — PROPOFOL 20 MCG/KG/MIN: 10 INJECTION, EMULSION INTRAVENOUS at 14:13

## 2023-10-25 RX ADMIN — MIDAZOLAM HYDROCHLORIDE 2 MG: 1 INJECTION, SOLUTION INTRAMUSCULAR; INTRAVENOUS at 03:14

## 2023-10-25 RX ADMIN — Medication 100 MCG/HR: at 23:19

## 2023-10-25 RX ADMIN — CEFEPIME HYDROCHLORIDE 2000 MG: 2 INJECTION, POWDER, FOR SOLUTION INTRAVENOUS at 18:23

## 2023-10-25 RX ADMIN — WATER 40 MG: 1 INJECTION INTRAMUSCULAR; INTRAVENOUS; SUBCUTANEOUS at 08:09

## 2023-10-25 RX ADMIN — Medication 10 ML: at 08:10

## 2023-10-25 RX ADMIN — ENOXAPARIN SODIUM 40 MG: 100 INJECTION SUBCUTANEOUS at 20:53

## 2023-10-25 RX ADMIN — CHLORHEXIDINE GLUCONATE 0.12% ORAL RINSE 15 ML: 1.2 LIQUID ORAL at 07:43

## 2023-10-25 RX ADMIN — PROPOFOL 50 MCG/KG/MIN: 10 INJECTION, EMULSION INTRAVENOUS at 00:16

## 2023-10-25 RX ADMIN — MIDAZOLAM HYDROCHLORIDE 2 MG: 1 INJECTION, SOLUTION INTRAMUSCULAR; INTRAVENOUS at 23:22

## 2023-10-25 RX ADMIN — CEFEPIME HYDROCHLORIDE 2000 MG: 2 INJECTION, POWDER, FOR SOLUTION INTRAVENOUS at 10:51

## 2023-10-25 RX ADMIN — WATER 40 MG: 1 INJECTION INTRAMUSCULAR; INTRAVENOUS; SUBCUTANEOUS at 20:54

## 2023-10-25 RX ADMIN — Medication 1 MG/HR: at 23:21

## 2023-10-25 RX ADMIN — Medication 10 ML: at 20:54

## 2023-10-25 RX ADMIN — ATORVASTATIN CALCIUM 80 MG: 40 TABLET, FILM COATED ORAL at 20:54

## 2023-10-25 RX ADMIN — MUPIROCIN: 20 OINTMENT TOPICAL at 20:55

## 2023-10-25 RX ADMIN — Medication 100 MCG/HR: at 13:10

## 2023-10-25 RX ADMIN — ACETYLCYSTEINE 600 MG: 200 INHALANT RESPIRATORY (INHALATION) at 18:56

## 2023-10-25 RX ADMIN — LEVOTHYROXINE SODIUM 75 MCG: 25 TABLET ORAL at 07:38

## 2023-10-25 RX ADMIN — PROPOFOL 50 MCG/KG/MIN: 10 INJECTION, EMULSION INTRAVENOUS at 04:37

## 2023-10-25 RX ADMIN — LINEZOLID 600 MG: 600 INJECTION, SOLUTION INTRAVENOUS at 00:39

## 2023-10-25 RX ADMIN — IPRATROPIUM BROMIDE AND ALBUTEROL SULFATE 1 DOSE: .5; 2.5 SOLUTION RESPIRATORY (INHALATION) at 03:06

## 2023-10-25 RX ADMIN — IPRATROPIUM BROMIDE AND ALBUTEROL SULFATE 1 DOSE: .5; 2.5 SOLUTION RESPIRATORY (INHALATION) at 22:27

## 2023-10-25 ASSESSMENT — PULMONARY FUNCTION TESTS
PIF_VALUE: 25
PIF_VALUE: 27
PIF_VALUE: 27
PIF_VALUE: 22
PIF_VALUE: 28
PIF_VALUE: 26
PIF_VALUE: 26
PIF_VALUE: 25
PIF_VALUE: 26
PIF_VALUE: 26
PIF_VALUE: 33
PIF_VALUE: 27
PIF_VALUE: 19
PIF_VALUE: 31
PIF_VALUE: 27
PIF_VALUE: 22
PIF_VALUE: 29
PIF_VALUE: 28
PIF_VALUE: 26
PIF_VALUE: 22
PIF_VALUE: 24
PIF_VALUE: 18
PIF_VALUE: 26
PIF_VALUE: 25
PIF_VALUE: 24
PIF_VALUE: 25
PIF_VALUE: 26
PIF_VALUE: 24
PIF_VALUE: 25
PIF_VALUE: 25
PIF_VALUE: 34
PIF_VALUE: 30
PIF_VALUE: 27
PIF_VALUE: 22
PIF_VALUE: 22
PIF_VALUE: 26
PIF_VALUE: 29
PIF_VALUE: 24
PIF_VALUE: 25
PIF_VALUE: 16
PIF_VALUE: 22
PIF_VALUE: 19
PIF_VALUE: 26
PIF_VALUE: 30
PIF_VALUE: 25
PIF_VALUE: 27
PIF_VALUE: 25
PIF_VALUE: 22

## 2023-10-25 ASSESSMENT — PAIN SCALES - GENERAL: PAINLEVEL_OUTOF10: 0

## 2023-10-25 NOTE — PROGRESS NOTES
RT suctioned pt, he began to cough hard, entire body shaking, diaphoretic, HR to 120, sats dropped to 79, versed restarted at 1 mg/hr,propofol increased to 30 mcg/kg/min. After approx 5 mins, pt calm, resting, no longer coughing. Sats to 97, .

## 2023-10-25 NOTE — PROGRESS NOTES
IDR completed, vent settings weaned to FIO2 60, ETT advanced by RT. Weaning sedation as tolerated. Potassium supplemented. TF advanced to 48 which is goal.  Pt remains in restraints to protect self from ETT dislodgement.

## 2023-10-25 NOTE — PROGRESS NOTES
AM assessment completed, will continue to titrate sedation for RASS -1, Pt currently tolerating VENT via ETT, VSS. Family updated via phone.

## 2023-10-25 NOTE — CARE COORDINATION
Met with RN at bedside. Patient still intubated. Will trial patient in the AM. Plan remains to go home with spouse at MD.

## 2023-10-25 NOTE — PROGRESS NOTES
ABG results received. Dr. Ventura Skill updated. Order received.      Latest Reference Range & Units 10/24/23 11:05   Hemoglobin, Art, Extended 13.5 - 17.5 g/dL 12.8 (L)   pH, Arterial 7.350 - 7.450  7.272 (L)   pCO2, Arterial 35.0 - 45.0 mmHg 60.4 (H)   pO2, Arterial 75.0 - 108.0 mmHg 213.0 (H)   (L): Data is abnormally low  (H): Data is abnormally high

## 2023-10-25 NOTE — PROGRESS NOTES
Pt had two episodes of coughing this shift, requiring PRN medication for restlessness/agitation/anxiety. Pt coughs and whole body shakes, difficult to console. Respiratory was at the bedside suctioning and making vent changes both times.

## 2023-10-25 NOTE — PROGRESS NOTES
10/24/23 2246   Patient Observation   Pulse 73   Respirations 23   SpO2 100 %   Observations ETT SIZE 7.5, SECURED AT 23 LIP LINE. AMBU BAG AT HEAD OF BED. WATER GOOD. Breath Sounds   Right Upper Lobe Diminished   Right Middle Lobe Diminished   Right Lower Lobe Diminished   Left Upper Lobe Diminished   Left Lower Lobe Diminished   Vent Information   Vent Mode AC/VC   Ventilator Settings   FiO2  (S)  80 %   Vt (Set, mL) 400 mL   Resp Rate (Set) 24 bpm   PEEP/CPAP (cmH2O) 10   Vent Patient Data (Readings)   Vt (Measured) 395 mL   Peak Inspiratory Pressure (cmH2O) 25 cmH2O   Rate Measured 24 br/min   Minute Volume (L/min) 9.55 Liters   Peak Inspiratory Flow (lpm) 55 L/sec   Mean Airway Pressure (cmH2O) 15 cmH20   Plateau Pressure (cm H2O) 18 cm H2O   Driving Pressure 8   I:E Ratio 1:2.10   Flow Sensitivity 3 L/min   Vent Alarm Settings   High Pressure (cmH2O) 40 cmH2O   Low Minute Volume (lpm) 2 L/min   High Minute Volume (lpm) 25 L/min   Low Exhaled Vt (ml) 210 mL   High Exhaled Vt (ml) 700 mL   RR High (bpm) 30 br/min   Apnea (secs) 20 secs   Additional Respiratoray Assessments   Humidification Source Heated wire   Humidification Temp 36.6   Circuit Condensation Drained   Ambu Bag With Mask At Bedside Yes   Airway Clearance   Suction ET Tube   Suction Device Inline suction catheter   Sputum Method Obtained Endotracheal   Sputum Amount Small   Sputum Color/Odor Clear   Sputum Consistency Thick   ETT    Placement Date/Time: 10/24/23 2714   Present on Admission/Arrival: Yes  Placed By: Licensed provider  Placement Verified By: Auscultation;Capnometry; Chest X-ray;Colorimetric ETCO2 device;Esophageal detection device;Direct visualization  Preoxygenation. ..    Secured At 23 cm   Measured From Lips   ETT Placement (S)  Center   Secured By Commercial tube louis   Site Assessment Dry

## 2023-10-25 NOTE — PROGRESS NOTES
Reassessment completed.   No major changes, continue to wean FIO2 and PEEP as tolerated, Sedation weaned to RASS -1, see MAR

## 2023-10-25 NOTE — PROGRESS NOTES
Order obtained for bladder scan and munguia. Pt 500ml out this shift. Bladder scan >999, munguia placed at this time. Urine carloz,clear and malodorous. Pt drained 1000mls, blood noted in munguia.

## 2023-10-25 NOTE — PROGRESS NOTES
ABG results received & Dr. Burnham Shelter updated.      Latest Reference Range & Units 10/24/23 16:40   Hemoglobin, Art, Extended 13.5 - 17.5 g/dL 11.3 (L)   pH, Arterial 7.350 - 7.450  7.345 (L)   pCO2, Arterial 35.0 - 45.0 mmHg 50.0 (H)   pO2, Arterial 75.0 - 108.0 mmHg 168.2 (H)   HCO3, Arterial 21.0 - 29.0 mmol/L 26.7   TCO2 (calc), Art Not Established mmol/L 28.2   (L): Data is abnormally low  (H): Data is abnormally high

## 2023-10-25 NOTE — PROGRESS NOTES
Dr. Lashanda Hernandez @ the bedside. Central line placed and pt intubated. Pt tolerated procedure well. CM-SR, ST, VSS, pt remains afebrile, UO WNL. Will obtain ABG's in 1 hr.

## 2023-10-25 NOTE — PROGRESS NOTES
10/25/23 0307   Patient Observation   Pulse 82   Respirations 21   SpO2 99 %   Observations ETT SIZE 7.5, SECURED AT 23 LIP LINE. AMBU BAG AT HEAD OF BED. WATER GOOD. Breath Sounds   Right Upper Lobe Diminished   Right Middle Lobe Diminished   Right Lower Lobe Diminished   Left Upper Lobe Diminished   Left Lower Lobe Diminished   Vent Information   Vent Mode AC/VC   Ventilator Settings   FiO2  (S)  70 %   Vt (Set, mL) 400 mL   Resp Rate (Set) 24 bpm   PEEP/CPAP (cmH2O) 10   Vent Patient Data (Readings)   Vt (Measured) 390 mL   Peak Inspiratory Pressure (cmH2O) 25 cmH2O   Rate Measured 24 br/min   Minute Volume (L/min) 9.87 Liters   Peak Inspiratory Flow (lpm) 55 L/sec   Mean Airway Pressure (cmH2O) 15 cmH20   Plateau Pressure (cm H2O) 17 cm H2O   Driving Pressure 7   I:E Ratio 1:2.10   Flow Sensitivity 3 L/min   Vent Alarm Settings   High Pressure (cmH2O) 40 cmH2O   Low Minute Volume (lpm) 2 L/min   High Minute Volume (lpm) 25 L/min   Low Exhaled Vt (ml) 210 mL   High Exhaled Vt (ml) 700 mL   RR High (bpm) 30 br/min   Apnea (secs) 20 secs   Additional Respiratoray Assessments   Humidification Source Heated wire   Humidification Temp 36.7   Circuit Condensation Drained   Ambu Bag With Mask At Bedside Yes   Airway Clearance   Suction ET Tube   Suction Device Inline suction catheter   Sputum Method Obtained Endotracheal   Sputum Amount Small   Sputum Color/Odor Clear   Sputum Consistency Thick   ETT    Placement Date/Time: 10/24/23 7449   Present on Admission/Arrival: Yes  Placed By: Licensed provider  Placement Verified By: Auscultation;Capnometry; Chest X-ray;Colorimetric ETCO2 device;Esophageal detection device;Direct visualization  Preoxygenation. ..    Secured At 23 cm   Measured From Lips   ETT Placement (S)  Left   Secured By Commercial tube louis   Site Assessment Dry

## 2023-10-25 NOTE — PLAN OF CARE
Problem: Safety - Medical Restraint  Goal: Remains free of injury from restraints (Restraint for Interference with Medical Device)  Description: INTERVENTIONS:  1. Determine that other, less restrictive measures have been tried or would not be effective before applying the restraint  2. Evaluate the patient's condition at the time of restraint application  3. Inform patient/family regarding the reason for restraint  4.  Q2H: Monitor safety, psychosocial status, comfort, nutrition and hydration  Outcome: Progressing  Flowsheets  Taken 10/25/2023 0800 by Nalini Sanchez RN  Remains free of injury from restraints (restraint for interference with medical device):   Evaluate the patient's condition at the time of restraint application   Inform patient/family regarding the reason for restraint   Every 2 hours: Monitor safety, psychosocial status, comfort, nutrition and hydration  Taken 10/24/2023 2306 by Dana Choi RN  Remains free of injury from restraints (restraint for interference with medical device):   Determine that other, less restrictive measures have been tried or would not be effective before applying the restraint   Evaluate the patient's condition at the time of restraint application   Inform patient/family regarding the reason for restraint   Every 2 hours: Monitor safety, psychosocial status, comfort, nutrition and hydration

## 2023-10-25 NOTE — PROGRESS NOTES
10/25/23 1858   Patient Observation   Pulse 84   Respirations 24   SpO2 99 %   Observations ett size 7.5, secured at 24 lip line. ambu bag at head of bed.  water good. Breath Sounds   Right Upper Lobe Diminished   Right Middle Lobe Diminished   Right Lower Lobe Diminished   Left Upper Lobe Diminished   Left Lower Lobe Diminished   Vent Information   Vent Mode AC/VC   Ventilator Settings   FiO2  50 %   Vt (Set, mL) 400 mL   Resp Rate (Set) 24 bpm   PEEP/CPAP (cmH2O) 8   Vent Patient Data (Readings)   Vt (Measured) 394 mL   Peak Inspiratory Pressure (cmH2O) 22 cmH2O   Rate Measured 24 br/min   Minute Volume (L/min) 9.46 Liters   Peak Inspiratory Flow (lpm) 55 L/sec   Mean Airway Pressure (cmH2O) 13 cmH20   Plateau Pressure (cm H2O) 21 cm H2O   Driving Pressure 13   I:E Ratio 1:2.10   Flow Sensitivity 3 L/min   Static Compliance (L/cm H2O) 30   Dynamic Compliance (L/cm H2O) 28 L/cm H2O   Vent Alarm Settings   High Pressure (cmH2O) 40 cmH2O   Low Minute Volume (lpm) 2 L/min   High Minute Volume (lpm) 25 L/min   Low Exhaled Vt (ml) 210 mL   High Exhaled Vt (ml) 700 mL   RR High (bpm) 35 br/min   Apnea (secs) 20 secs   Additional Respiratoray Assessments   Humidification Source Heated wire   Humidification Temp 36.9   Circuit Condensation Drained   Ambu Bag With Mask At Bedside Yes   Airway Clearance   Suction ET Tube   Suction Device Inline suction catheter   Sputum Method Obtained Endotracheal   Sputum Amount Small   Sputum Color/Odor Clear   Sputum Consistency Thick   ETT    Placement Date/Time: 10/24/23 6371   Present on Admission/Arrival: Yes  Placed By: Licensed provider  Placement Verified By: Auscultation;Capnometry; Chest X-ray;Colorimetric ETCO2 device;Esophageal detection device;Direct visualization  Preoxygenation. ..    Secured At 24 cm   Measured From Lips   ETT Placement (S)  Right   Secured By Commercial tube louis   Site Assessment Dry

## 2023-10-26 ENCOUNTER — APPOINTMENT (OUTPATIENT)
Dept: GENERAL RADIOLOGY | Age: 63
DRG: 207 | End: 2023-10-26
Payer: MEDICARE

## 2023-10-26 LAB
ANION GAP SERPL CALCULATED.3IONS-SCNC: 10 MMOL/L (ref 3–16)
BACTERIA BLD CULT ORG #2: NORMAL
BACTERIA BLD CULT: NORMAL
BACTERIA SPEC RESP CULT: NORMAL
BACTERIA SPEC RESP CULT: NORMAL
BASE EXCESS BLDA CALC-SCNC: 3.9 MMOL/L (ref -3–3)
BASOPHILS # BLD: 0 K/UL (ref 0–0.2)
BASOPHILS NFR BLD: 0.2 %
BUN SERPL-MCNC: 23 MG/DL (ref 7–20)
CALCIUM SERPL-MCNC: 8.7 MG/DL (ref 8.3–10.6)
CHLORIDE SERPL-SCNC: 102 MMOL/L (ref 99–110)
CO2 BLDA-SCNC: 31.8 MMOL/L
CO2 SERPL-SCNC: 32 MMOL/L (ref 21–32)
COHGB MFR BLDA: 0.1 % (ref 0–1.5)
CREAT SERPL-MCNC: 0.6 MG/DL (ref 0.8–1.3)
DEPRECATED RDW RBC AUTO: 16.3 % (ref 12.4–15.4)
EOSINOPHIL # BLD: 0 K/UL (ref 0–0.6)
EOSINOPHIL NFR BLD: 0 %
FLUAV RNA SPEC QL NAA+PROBE: NOT DETECTED
FLUBV RNA SPEC QL NAA+PROBE: NOT DETECTED
GFR SERPLBLD CREATININE-BSD FMLA CKD-EPI: >60 ML/MIN/{1.73_M2}
GLUCOSE BLD-MCNC: 110 MG/DL (ref 70–99)
GLUCOSE BLD-MCNC: 131 MG/DL (ref 70–99)
GLUCOSE BLD-MCNC: 132 MG/DL (ref 70–99)
GLUCOSE BLD-MCNC: 144 MG/DL (ref 70–99)
GLUCOSE SERPL-MCNC: 157 MG/DL (ref 70–99)
GRAM STN SPEC: NORMAL
GRAM STN SPEC: NORMAL
HCO3 BLDA-SCNC: 30.1 MMOL/L (ref 21–29)
HCT VFR BLD AUTO: 31.7 % (ref 40.5–52.5)
HGB BLD-MCNC: 10.4 G/DL (ref 13.5–17.5)
HGB BLDA-MCNC: 11.2 G/DL (ref 13.5–17.5)
LYMPHOCYTES # BLD: 0.6 K/UL (ref 1–5.1)
LYMPHOCYTES NFR BLD: 6.4 %
MCH RBC QN AUTO: 28.2 PG (ref 26–34)
MCHC RBC AUTO-ENTMCNC: 32.7 G/DL (ref 31–36)
MCV RBC AUTO: 86.3 FL (ref 80–100)
METHGB MFR BLDA: 0.3 %
MONOCYTES # BLD: 0.3 K/UL (ref 0–1.3)
MONOCYTES NFR BLD: 3.6 %
NEUTROPHILS # BLD: 8 K/UL (ref 1.7–7.7)
NEUTROPHILS NFR BLD: 89.8 %
O2 THERAPY: ABNORMAL
PCO2 BLDA: 53.2 MMHG (ref 35–45)
PERFORMED ON: ABNORMAL
PH BLDA: 7.37 [PH] (ref 7.35–7.45)
PLATELET # BLD AUTO: 231 K/UL (ref 135–450)
PMV BLD AUTO: 9.1 FL (ref 5–10.5)
PO2 BLDA: 81.1 MMHG (ref 75–108)
POTASSIUM SERPL-SCNC: 4.3 MMOL/L (ref 3.5–5.1)
RBC # BLD AUTO: 3.68 M/UL (ref 4.2–5.9)
RSV RNA SPEC QL NAA+PROBE: NOT DETECTED
RSV SOURCE: NORMAL
SAO2 % BLDA: 95.5 %
SODIUM SERPL-SCNC: 144 MMOL/L (ref 136–145)
TSH SERPL DL<=0.005 MIU/L-ACNC: 0.6 UIU/ML (ref 0.27–4.2)
WBC # BLD AUTO: 8.9 K/UL (ref 4–11)

## 2023-10-26 PROCEDURE — 71045 X-RAY EXAM CHEST 1 VIEW: CPT

## 2023-10-26 PROCEDURE — 85025 COMPLETE CBC W/AUTO DIFF WBC: CPT

## 2023-10-26 PROCEDURE — 6370000000 HC RX 637 (ALT 250 FOR IP)

## 2023-10-26 PROCEDURE — 36415 COLL VENOUS BLD VENIPUNCTURE: CPT

## 2023-10-26 PROCEDURE — 84443 ASSAY THYROID STIM HORMONE: CPT

## 2023-10-26 PROCEDURE — 6370000000 HC RX 637 (ALT 250 FOR IP): Performed by: INTERNAL MEDICINE

## 2023-10-26 PROCEDURE — 6360000002 HC RX W HCPCS

## 2023-10-26 PROCEDURE — 94003 VENT MGMT INPAT SUBQ DAY: CPT

## 2023-10-26 PROCEDURE — 2000000000 HC ICU R&B

## 2023-10-26 PROCEDURE — 94640 AIRWAY INHALATION TREATMENT: CPT

## 2023-10-26 PROCEDURE — 6360000002 HC RX W HCPCS: Performed by: INTERNAL MEDICINE

## 2023-10-26 PROCEDURE — 94761 N-INVAS EAR/PLS OXIMETRY MLT: CPT

## 2023-10-26 PROCEDURE — 99233 SBSQ HOSP IP/OBS HIGH 50: CPT | Performed by: INTERNAL MEDICINE

## 2023-10-26 PROCEDURE — 2500000003 HC RX 250 WO HCPCS: Performed by: INTERNAL MEDICINE

## 2023-10-26 PROCEDURE — 80048 BASIC METABOLIC PNL TOTAL CA: CPT

## 2023-10-26 PROCEDURE — 2580000003 HC RX 258: Performed by: INTERNAL MEDICINE

## 2023-10-26 PROCEDURE — 82803 BLOOD GASES ANY COMBINATION: CPT

## 2023-10-26 PROCEDURE — 2700000000 HC OXYGEN THERAPY PER DAY

## 2023-10-26 PROCEDURE — 99291 CRITICAL CARE FIRST HOUR: CPT | Performed by: INTERNAL MEDICINE

## 2023-10-26 PROCEDURE — 2580000003 HC RX 258

## 2023-10-26 PROCEDURE — C9113 INJ PANTOPRAZOLE SODIUM, VIA: HCPCS | Performed by: INTERNAL MEDICINE

## 2023-10-26 RX ORDER — TIZANIDINE 4 MG/1
8 TABLET ORAL EVERY 8 HOURS PRN
COMMUNITY

## 2023-10-26 RX ORDER — LEVOTHYROXINE SODIUM 0.1 MG/1
100 TABLET ORAL DAILY
COMMUNITY

## 2023-10-26 RX ORDER — QUETIAPINE FUMARATE 25 MG/1
25 TABLET, FILM COATED ORAL 2 TIMES DAILY
Status: DISCONTINUED | OUTPATIENT
Start: 2023-10-26 | End: 2023-10-28

## 2023-10-26 RX ORDER — LANOLIN ALCOHOL/MO/W.PET/CERES
400 CREAM (GRAM) TOPICAL DAILY
COMMUNITY

## 2023-10-26 RX ORDER — SUCRALFATE 1 G/1
1 TABLET ORAL 4 TIMES DAILY
Status: ON HOLD | COMMUNITY
End: 2023-10-31 | Stop reason: HOSPADM

## 2023-10-26 RX ORDER — DIAZEPAM 10 MG/1
10 TABLET ORAL EVERY 8 HOURS
Status: DISCONTINUED | OUTPATIENT
Start: 2023-10-26 | End: 2023-10-29

## 2023-10-26 RX ORDER — HYDROCODONE BITARTRATE AND HOMATROPINE METHYLBROMIDE ORAL SOLUTION 5; 1.5 MG/5ML; MG/5ML
5 LIQUID ORAL EVERY 6 HOURS
Status: DISCONTINUED | OUTPATIENT
Start: 2023-10-26 | End: 2023-10-30

## 2023-10-26 RX ORDER — DEXMEDETOMIDINE HYDROCHLORIDE 4 UG/ML
.1-1.5 INJECTION, SOLUTION INTRAVENOUS CONTINUOUS
Status: DISCONTINUED | OUTPATIENT
Start: 2023-10-26 | End: 2023-10-30

## 2023-10-26 RX ORDER — FLUOXETINE 20 MG/5ML
40 SOLUTION ORAL DAILY
Status: DISCONTINUED | OUTPATIENT
Start: 2023-10-27 | End: 2023-10-30

## 2023-10-26 RX ORDER — TAMSULOSIN HYDROCHLORIDE 0.4 MG/1
0.4 CAPSULE ORAL
COMMUNITY

## 2023-10-26 RX ADMIN — IPRATROPIUM BROMIDE AND ALBUTEROL SULFATE 1 DOSE: .5; 2.5 SOLUTION RESPIRATORY (INHALATION) at 19:11

## 2023-10-26 RX ADMIN — MIDAZOLAM HYDROCHLORIDE 2 MG: 1 INJECTION, SOLUTION INTRAMUSCULAR; INTRAVENOUS at 07:45

## 2023-10-26 RX ADMIN — Medication 0.4 MCG/KG/HR: at 13:36

## 2023-10-26 RX ADMIN — FLUOXETINE 40 MG: 20 CAPSULE ORAL at 08:48

## 2023-10-26 RX ADMIN — ACETYLCYSTEINE 600 MG: 200 INHALANT RESPIRATORY (INHALATION) at 19:12

## 2023-10-26 RX ADMIN — PROPOFOL 40 MCG/KG/MIN: 10 INJECTION, EMULSION INTRAVENOUS at 18:35

## 2023-10-26 RX ADMIN — Medication 10 ML: at 08:50

## 2023-10-26 RX ADMIN — PREGABALIN 150 MG: 100 CAPSULE ORAL at 20:31

## 2023-10-26 RX ADMIN — FENTANYL CITRATE 50 MCG: 50 INJECTION INTRAMUSCULAR; INTRAVENOUS at 03:41

## 2023-10-26 RX ADMIN — IPRATROPIUM BROMIDE AND ALBUTEROL SULFATE 1 DOSE: .5; 2.5 SOLUTION RESPIRATORY (INHALATION) at 15:14

## 2023-10-26 RX ADMIN — LEVOTHYROXINE SODIUM 75 MCG: 25 TABLET ORAL at 08:48

## 2023-10-26 RX ADMIN — PROPOFOL 40 MCG/KG/MIN: 10 INJECTION, EMULSION INTRAVENOUS at 03:34

## 2023-10-26 RX ADMIN — PROPOFOL 40 MCG/KG/MIN: 10 INJECTION, EMULSION INTRAVENOUS at 07:51

## 2023-10-26 RX ADMIN — PANTOPRAZOLE SODIUM 40 MG: 40 INJECTION, POWDER, LYOPHILIZED, FOR SOLUTION INTRAVENOUS at 09:01

## 2023-10-26 RX ADMIN — HYDROCODONE BITARTRATE AND HOMATROPINE METHYLBROMIDE 5 ML: 5; 1.5 SOLUTION ORAL at 17:32

## 2023-10-26 RX ADMIN — ACETYLCYSTEINE 600 MG: 200 INHALANT RESPIRATORY (INHALATION) at 07:17

## 2023-10-26 RX ADMIN — MIDAZOLAM HYDROCHLORIDE 2 MG: 1 INJECTION, SOLUTION INTRAMUSCULAR; INTRAVENOUS at 03:36

## 2023-10-26 RX ADMIN — Medication 10 ML: at 20:24

## 2023-10-26 RX ADMIN — IPRATROPIUM BROMIDE AND ALBUTEROL SULFATE 1 DOSE: .5; 2.5 SOLUTION RESPIRATORY (INHALATION) at 02:44

## 2023-10-26 RX ADMIN — QUETIAPINE FUMARATE 25 MG: 25 TABLET ORAL at 20:31

## 2023-10-26 RX ADMIN — CEFEPIME HYDROCHLORIDE 2000 MG: 2 INJECTION, POWDER, FOR SOLUTION INTRAVENOUS at 11:05

## 2023-10-26 RX ADMIN — MUPIROCIN: 20 OINTMENT TOPICAL at 08:48

## 2023-10-26 RX ADMIN — MIDAZOLAM HYDROCHLORIDE 2 MG: 1 INJECTION, SOLUTION INTRAMUSCULAR; INTRAVENOUS at 15:16

## 2023-10-26 RX ADMIN — IPRATROPIUM BROMIDE AND ALBUTEROL SULFATE 1 DOSE: .5; 2.5 SOLUTION RESPIRATORY (INHALATION) at 07:17

## 2023-10-26 RX ADMIN — MIDAZOLAM HYDROCHLORIDE 2 MG: 1 INJECTION, SOLUTION INTRAMUSCULAR; INTRAVENOUS at 10:57

## 2023-10-26 RX ADMIN — PROPOFOL 40 MCG/KG/MIN: 10 INJECTION, EMULSION INTRAVENOUS at 13:25

## 2023-10-26 RX ADMIN — CHLORHEXIDINE GLUCONATE 0.12% ORAL RINSE 15 ML: 1.2 LIQUID ORAL at 08:48

## 2023-10-26 RX ADMIN — HYDROCODONE BITARTRATE AND HOMATROPINE METHYLBROMIDE 5 ML: 5; 1.5 SOLUTION ORAL at 13:26

## 2023-10-26 RX ADMIN — Medication 100 MCG/HR: at 08:47

## 2023-10-26 RX ADMIN — PROPOFOL 35 MCG/KG/MIN: 10 INJECTION, EMULSION INTRAVENOUS at 23:36

## 2023-10-26 RX ADMIN — MUPIROCIN: 20 OINTMENT TOPICAL at 20:27

## 2023-10-26 RX ADMIN — Medication 50 MCG: at 15:09

## 2023-10-26 RX ADMIN — Medication 50 MCG: at 07:26

## 2023-10-26 RX ADMIN — Medication 100 MCG/HR: at 17:26

## 2023-10-26 RX ADMIN — Medication 0.8 MCG/KG/HR: at 20:11

## 2023-10-26 RX ADMIN — ENOXAPARIN SODIUM 40 MG: 100 INJECTION SUBCUTANEOUS at 20:22

## 2023-10-26 RX ADMIN — IPRATROPIUM BROMIDE AND ALBUTEROL SULFATE 1 DOSE: .5; 2.5 SOLUTION RESPIRATORY (INHALATION) at 11:14

## 2023-10-26 RX ADMIN — WATER 40 MG: 1 INJECTION INTRAMUSCULAR; INTRAVENOUS; SUBCUTANEOUS at 09:01

## 2023-10-26 RX ADMIN — CHLORHEXIDINE GLUCONATE 0.12% ORAL RINSE 15 ML: 1.2 LIQUID ORAL at 08:50

## 2023-10-26 RX ADMIN — CEFEPIME HYDROCHLORIDE 2000 MG: 2 INJECTION, POWDER, FOR SOLUTION INTRAVENOUS at 18:37

## 2023-10-26 RX ADMIN — Medication 50 MCG: at 11:00

## 2023-10-26 RX ADMIN — DIAZEPAM 10 MG: 10 TABLET ORAL at 17:32

## 2023-10-26 RX ADMIN — ATORVASTATIN CALCIUM 80 MG: 40 TABLET, FILM COATED ORAL at 20:30

## 2023-10-26 RX ADMIN — QUETIAPINE FUMARATE 25 MG: 25 TABLET ORAL at 13:38

## 2023-10-26 RX ADMIN — CHLORHEXIDINE GLUCONATE 0.12% ORAL RINSE 15 ML: 1.2 LIQUID ORAL at 20:22

## 2023-10-26 RX ADMIN — DIAZEPAM 5 MG: 5 TABLET ORAL at 08:49

## 2023-10-26 RX ADMIN — DIAZEPAM 5 MG: 5 TABLET ORAL at 02:44

## 2023-10-26 RX ADMIN — CEFEPIME HYDROCHLORIDE 2000 MG: 2 INJECTION, POWDER, FOR SOLUTION INTRAVENOUS at 03:35

## 2023-10-26 RX ADMIN — WATER 40 MG: 1 INJECTION INTRAMUSCULAR; INTRAVENOUS; SUBCUTANEOUS at 20:23

## 2023-10-26 RX ADMIN — PREGABALIN 150 MG: 100 CAPSULE ORAL at 08:49

## 2023-10-26 ASSESSMENT — PULMONARY FUNCTION TESTS
PIF_VALUE: 24
PIF_VALUE: 22
PIF_VALUE: 20
PIF_VALUE: 24
PIF_VALUE: 23
PIF_VALUE: 20
PIF_VALUE: 23
PIF_VALUE: 24
PIF_VALUE: 22
PIF_VALUE: 25
PIF_VALUE: 22
PIF_VALUE: 19
PIF_VALUE: 40
PIF_VALUE: 19
PIF_VALUE: 20
PIF_VALUE: 21
PIF_VALUE: 21
PIF_VALUE: 20
PIF_VALUE: 21
PIF_VALUE: 21
PIF_VALUE: 20
PIF_VALUE: 20

## 2023-10-26 ASSESSMENT — PAIN SCALES - GENERAL: PAINLEVEL_OUTOF10: 8

## 2023-10-26 ASSESSMENT — PAIN - FUNCTIONAL ASSESSMENT: PAIN_FUNCTIONAL_ASSESSMENT: PREVENTS OR INTERFERES SOME ACTIVE ACTIVITIES AND ADLS

## 2023-10-26 ASSESSMENT — PAIN DESCRIPTION - LOCATION: LOCATION: OTHER (COMMENT)

## 2023-10-26 NOTE — PROGRESS NOTES
CM-SR,ST, VSS, pt remains afebrile, UO WNL. Pt will open his eye to verbal stimuli. Pt is tolerating tube feeding. Pt is resting w/out evidence of distress @ this time.

## 2023-10-26 NOTE — PROGRESS NOTES
10/26/23 0245   Patient Observation   Pulse 82   Respirations 24   SpO2 98 %   Observations ETT SIZE 7.5, SECURED AT 24 LIP LIE. AMBU BAG AT HEAD OF BED. WATER GOOD. Breath Sounds   Right Upper Lobe Diminished   Right Middle Lobe Diminished   Right Lower Lobe Diminished   Left Upper Lobe Diminished   Left Lower Lobe Diminished   Vent Information   Vent Mode AC/VC   Ventilator Settings   FiO2  (S)  40 %   Vt (Set, mL) 400 mL   Resp Rate (Set) 24 bpm   PEEP/CPAP (cmH2O) 5   Vent Patient Data (Readings)   Vt (Measured) 395 mL   Peak Inspiratory Pressure (cmH2O) 20 cmH2O   Rate Measured 24 br/min   Minute Volume (L/min) 9.47 Liters   Peak Inspiratory Flow (lpm) 55 L/sec   Mean Airway Pressure (cmH2O) 10 cmH20   Plateau Pressure (cm H2O) 18 cm H2O   Driving Pressure 13   I:E Ratio 1:2.10   Flow Sensitivity 3 L/min   Vent Alarm Settings   High Pressure (cmH2O) 40 cmH2O   Low Minute Volume (lpm) 2 L/min   High Minute Volume (lpm) 25 L/min   Low Exhaled Vt (ml) 210 mL   High Exhaled Vt (ml) 700 mL   RR High (bpm) 35 br/min   Apnea (secs) 20 secs   Additional Respiratoray Assessments   Humidification Source Heated wire   Humidification Temp 36.8   Circuit Condensation Drained   Ambu Bag With Mask At Bedside Yes   Airway Clearance   Suction ET Tube   Suction Device Inline suction catheter   Sputum Method Obtained Endotracheal   Sputum Amount Small   Sputum Color/Odor Clear   Sputum Consistency Thick   ETT    Placement Date/Time: 10/24/23 7680   Present on Admission/Arrival: Yes  Placed By: Licensed provider  Placement Verified By: Auscultation;Capnometry; Chest X-ray;Colorimetric ETCO2 device;Esophageal detection device;Direct visualization  Preoxygenation. ..    Secured At 24 cm   Measured From Lips   ETT Placement (S)  Left   Secured By Commercial tube louis   Site Assessment Dry

## 2023-10-26 NOTE — PROGRESS NOTES
10/25/23 2229   Patient Observation   Pulse 85   Respirations 24   SpO2 99 %   Observations ETT SIZE 7.5, SECURED AT 24 LIP LINE. AMBU BAG AT HEAD OF BED. WATER GOOD. Breath Sounds   Right Upper Lobe Diminished   Right Middle Lobe Diminished   Right Lower Lobe Diminished   Left Upper Lobe Diminished   Left Lower Lobe Diminished   Vent Information   Vent Mode AC/VC   Ventilator Settings   FiO2  50 %   Vt (Set, mL) 400 mL   Resp Rate (Set) 24 bpm   PEEP/CPAP (cmH2O) 8   Vent Patient Data (Readings)   Vt (Measured) 394 mL   Peak Inspiratory Pressure (cmH2O) 22 cmH2O   Rate Measured 24 br/min   Minute Volume (L/min) 9.43 Liters   Peak Inspiratory Flow (lpm) 55 L/sec   Mean Airway Pressure (cmH2O) 13 cmH20   Plateau Pressure (cm H2O) 20 cm H2O   Driving Pressure 12   I:E Ratio 1:2.10   Flow Sensitivity 3 L/min   Vent Alarm Settings   High Pressure (cmH2O) 40 cmH2O   Low Minute Volume (lpm) 2 L/min   High Minute Volume (lpm) 25 L/min   Low Exhaled Vt (ml) 210 mL   High Exhaled Vt (ml) 700 mL   RR High (bpm) 35 br/min   Apnea (secs) 20 secs   Additional Respiratoray Assessments   Humidification Source Heated wire   Humidification Temp 36.9   Circuit Condensation Drained   Ambu Bag With Mask At Bedside Yes   Airway Clearance   Suction ET Tube   Suction Device Inline suction catheter   Sputum Method Obtained Endotracheal   Sputum Amount Small   Sputum Color/Odor Clear   Sputum Consistency Thick   ETT    Placement Date/Time: 10/24/23 4720   Present on Admission/Arrival: Yes  Placed By: Licensed provider  Placement Verified By: Auscultation;Capnometry; Chest X-ray;Colorimetric ETCO2 device;Esophageal detection device;Direct visualization  Preoxygenation. ..    Secured At 24 cm   Measured From Lips   ETT Placement (S)  Center   Secured By Commercial tube louis   Site Assessment Dry

## 2023-10-26 NOTE — PROGRESS NOTES
10/25/23 2233   Patient Observation   Pulse 87   Respirations 24   SpO2 99 %   Ventilator Settings   FiO2  50 %   Vt (Set, mL) 400 mL   Resp Rate (Set) 24 bpm   PEEP/CPAP (cmH2O) (S)  5   Vent Patient Data (Readings)   Vt (Measured) 395 mL   Peak Inspiratory Pressure (cmH2O) 18 cmH2O   Rate Measured 24 br/min   Minute Volume (L/min) 9.49 Liters   Mean Airway Pressure (cmH2O) 9.8 cmH20   Plateau Pressure (cm H2O) 20 cm H2O   Driving Pressure 15   I:E Ratio 1:2.10   Vent Alarm Settings   High Pressure (cmH2O) 40 cmH2O   Low Minute Volume (lpm) 2 L/min   RR High (bpm) 35 br/min   Treatment   $Treatment Type $Inhaled Therapy/Meds

## 2023-10-26 NOTE — PROGRESS NOTES
10/26/23 1915   Patient Observation   Pulse 68   Respirations 23   SpO2 99 %   Breath Sounds   Breath Sounds Bilateral Diminished   Vent Information   Vent Mode AC/VC   Ventilator Settings   FiO2  45 %   Vt (Set, mL) 400 mL   Resp Rate (Set) 24 bpm   PEEP/CPAP (cmH2O) 5   Vent Patient Data (Readings)   Vt (Measured) 393 mL   Peak Inspiratory Pressure (cmH2O) 22 cmH2O   Rate Measured 24 br/min   Minute Volume (L/min) 9.44 Liters   Mean Airway Pressure (cmH2O) 11 cmH20   Plateau Pressure (cm H2O) 21 cm H2O   Driving Pressure 16   I:E Ratio 1:2.10   Static Compliance (L/cm H2O) 25   Dynamic Compliance (L/cm H2O) 23.12 L/cm H2O   Vent Alarm Settings   High Pressure (cmH2O) 40 cmH2O   Low Minute Volume (lpm) 2 L/min   RR High (bpm) 35 br/min   Additional Respiratoray Assessments   Humidification Source Heated wire   Humidification Temp 36.8   Circuit Condensation Drained   Ambu Bag With Mask At Bedside Yes   Airway Clearance   Suction ET Tube;Oral   Subglottic Suction Done Yes   Suction Device Inline suction catheter;Garykauer   Sputum Method Obtained Endotracheal   Sputum Amount Moderate   Sputum Color/Odor Clear   Sputum Consistency Thick   ETT    Placement Date/Time: 10/24/23 3952   Present on Admission/Arrival: Yes  Placed By: Licensed provider  Placement Verified By: Auscultation;Capnometry; Chest X-ray;Colorimetric ETCO2 device;Esophageal detection device;Direct visualization  Preoxygenation. ..    Secured At 24 cm   Measured From Lips   ETT Placement Left   Secured By Commercial tube louis   Site Assessment Dry   Treatment   $Treatment Type $Inhaled Therapy/Meds

## 2023-10-26 NOTE — PROGRESS NOTES
AM assessment done. Pt coughing & trying to sit up in bed. SaO2 decrease to the 70's with coughing. Pt is slow to recover, but SaO2 do return to > 90% after pt calms. Will continue to monitor.

## 2023-10-26 NOTE — PROGRESS NOTES
0330-Pt coughing, became tachycardic, tachypneic, diaphoretic, face red, body shaking, unable to console, attempting to sit up in bed, hypoxic O2 in the 60's. PRN versed given, pt still fighting ventilator. Rass +3. PRN fentanyl given, with positive results. Pt resting calm. 0404-Pt having another episode, during central line dressing change. New dressing applied, but new drainage noted to dressing after changing. All lines patent and have adequate blood flow. 0413-medication able to be weaned back down successfully.

## 2023-10-26 NOTE — PROGRESS NOTES
Pt coughing desat to 83%, gave 100% fi02 to bring sats up. Suctioned for large amount of cloudy thick secretions.

## 2023-10-26 NOTE — PROGRESS NOTES
10/26/23 0721   Patient Observation   Pulse 81   Respirations 24   SpO2 96 %   Breath Sounds   Breath Sounds Bilateral Diminished   Right Upper Lobe Diminished   Right Middle Lobe Diminished   Right Lower Lobe Diminished   Left Upper Lobe Diminished   Left Lower Lobe Diminished   Ventilator Settings   FiO2  40 %   Vt (Set, mL) 400 mL   Resp Rate (Set) 24 bpm   PEEP/CPAP (cmH2O) 5   Vent Patient Data (Readings)   Vt (Measured) 394 mL   Peak Inspiratory Pressure (cmH2O) 24 cmH2O   Rate Measured 24 br/min   Minute Volume (L/min) 9.45 Liters   Mean Airway Pressure (cmH2O) 11 cmH20   Plateau Pressure (cm H2O) 19 cm H2O   Driving Pressure 14   I:E Ratio 1:2.10   Flow Sensitivity 3 L/min   Static Compliance (L/cm H2O) 28   Vent Alarm Settings   High Pressure (cmH2O) 40 cmH2O   Low Minute Volume (lpm) 2 L/min   Low Exhaled Vt (ml) 210 mL   High Exhaled Vt (ml) 700 mL   RR High (bpm) 35 br/min   Apnea (secs) 20 secs   Additional Respiratoray Assessments   Humidification Source Heated wire   Humidification Temp 37   Circuit Condensation Drained   Ambu Bag With Mask At Bedside Yes   ETT    Placement Date/Time: 10/24/23 0879   Present on Admission/Arrival: Yes  Placed By: Licensed provider  Placement Verified By: Auscultation;Capnometry; Chest X-ray;Colorimetric ETCO2 device;Esophageal detection device;Direct visualization  Preoxygenation. ..    Secured At 24 cm   Measured From Lips   ETT Placement Center   Secured By Commercial tube louis   Site Assessment Dry   Cuff Pressure 28 cm H2O   Ventilator Associated Pneumonia Bundle   Elevation of Head of Bed to 30-45 Degrees  Yes   Oral Care Completed Yes   Oral Care Performed Mouth suctioned;Mouthwash   Oral Suctioning Yes   ETT/Trach Suctioning Yes   Treatment   $Treatment Type $Inhaled Therapy/Meds

## 2023-10-26 NOTE — PROGRESS NOTES
2300- pt awake and very restless, kicking blankets off, opening eyes, trying to sit up. Pt pulling at restraints, PRN versed given after verbal redirection was attempted. Currently Rass -1 and comfortable.

## 2023-10-27 ENCOUNTER — APPOINTMENT (OUTPATIENT)
Dept: GENERAL RADIOLOGY | Age: 63
DRG: 207 | End: 2023-10-27
Payer: MEDICARE

## 2023-10-27 PROBLEM — R09.89 PULMONARY CONGESTION: Status: ACTIVE | Noted: 2023-10-27

## 2023-10-27 PROBLEM — T17.998A MUCUS PLUG IN RESPIRATORY TRACT: Status: ACTIVE | Noted: 2023-10-27

## 2023-10-27 PROBLEM — E44.1 MILD PROTEIN-CALORIE MALNUTRITION (HCC): Status: ACTIVE | Noted: 2023-10-27

## 2023-10-27 LAB
ANION GAP SERPL CALCULATED.3IONS-SCNC: 9 MMOL/L (ref 3–16)
BASE EXCESS BLDA CALC-SCNC: 5.8 MMOL/L (ref -3–3)
BASOPHILS # BLD: 0 K/UL (ref 0–0.2)
BASOPHILS NFR BLD: 0.2 %
BUN SERPL-MCNC: 21 MG/DL (ref 7–20)
CALCIUM SERPL-MCNC: 8.6 MG/DL (ref 8.3–10.6)
CHLORIDE SERPL-SCNC: 99 MMOL/L (ref 99–110)
CO2 BLDA-SCNC: 31.7 MMOL/L
CO2 SERPL-SCNC: 32 MMOL/L (ref 21–32)
COHGB MFR BLDA: 0.3 % (ref 0–1.5)
CREAT SERPL-MCNC: <0.5 MG/DL (ref 0.8–1.3)
DEPRECATED RDW RBC AUTO: 16.3 % (ref 12.4–15.4)
EOSINOPHIL # BLD: 0 K/UL (ref 0–0.6)
EOSINOPHIL NFR BLD: 0 %
GFR SERPLBLD CREATININE-BSD FMLA CKD-EPI: >60 ML/MIN/{1.73_M2}
GLUCOSE BLD-MCNC: 123 MG/DL (ref 70–99)
GLUCOSE BLD-MCNC: 138 MG/DL (ref 70–99)
GLUCOSE BLD-MCNC: 142 MG/DL (ref 70–99)
GLUCOSE BLD-MCNC: 144 MG/DL (ref 70–99)
GLUCOSE BLD-MCNC: 146 MG/DL (ref 70–99)
GLUCOSE BLD-MCNC: 173 MG/DL (ref 70–99)
GLUCOSE BLD-MCNC: 249 MG/DL (ref 70–99)
GLUCOSE SERPL-MCNC: 125 MG/DL (ref 70–99)
HCO3 BLDA-SCNC: 30.3 MMOL/L (ref 21–29)
HCT VFR BLD AUTO: 31.8 % (ref 40.5–52.5)
HGB BLD-MCNC: 10.3 G/DL (ref 13.5–17.5)
HGB BLDA-MCNC: 11.2 G/DL (ref 13.5–17.5)
LYMPHOCYTES # BLD: 0.9 K/UL (ref 1–5.1)
LYMPHOCYTES NFR BLD: 13.4 %
MCH RBC QN AUTO: 27.9 PG (ref 26–34)
MCHC RBC AUTO-ENTMCNC: 32.4 G/DL (ref 31–36)
MCV RBC AUTO: 86.3 FL (ref 80–100)
METHGB MFR BLDA: 0.3 %
MONOCYTES # BLD: 0.3 K/UL (ref 0–1.3)
MONOCYTES NFR BLD: 4.6 %
NEUTROPHILS # BLD: 5.7 K/UL (ref 1.7–7.7)
NEUTROPHILS NFR BLD: 81.8 %
O2 THERAPY: ABNORMAL
PCO2 BLDA: 43.8 MMHG (ref 35–45)
PERFORMED ON: ABNORMAL
PH BLDA: 7.46 [PH] (ref 7.35–7.45)
PLATELET # BLD AUTO: 224 K/UL (ref 135–450)
PMV BLD AUTO: 8.7 FL (ref 5–10.5)
PO2 BLDA: 82.8 MMHG (ref 75–108)
POTASSIUM SERPL-SCNC: 4.8 MMOL/L (ref 3.5–5.1)
PRELIMINARY: NORMAL
RBC # BLD AUTO: 3.69 M/UL (ref 4.2–5.9)
SAO2 % BLDA: 96.6 %
SODIUM SERPL-SCNC: 140 MMOL/L (ref 136–145)
TRIGL SERPL-MCNC: 151 MG/DL (ref 0–150)
WBC # BLD AUTO: 6.9 K/UL (ref 4–11)

## 2023-10-27 PROCEDURE — 2580000003 HC RX 258

## 2023-10-27 PROCEDURE — 87015 SPECIMEN INFECT AGNT CONCNTJ: CPT

## 2023-10-27 PROCEDURE — 94761 N-INVAS EAR/PLS OXIMETRY MLT: CPT

## 2023-10-27 PROCEDURE — 6370000000 HC RX 637 (ALT 250 FOR IP): Performed by: INTERNAL MEDICINE

## 2023-10-27 PROCEDURE — 87206 SMEAR FLUORESCENT/ACID STAI: CPT

## 2023-10-27 PROCEDURE — C9113 INJ PANTOPRAZOLE SODIUM, VIA: HCPCS | Performed by: INTERNAL MEDICINE

## 2023-10-27 PROCEDURE — 99233 SBSQ HOSP IP/OBS HIGH 50: CPT | Performed by: INTERNAL MEDICINE

## 2023-10-27 PROCEDURE — 71045 X-RAY EXAM CHEST 1 VIEW: CPT

## 2023-10-27 PROCEDURE — 80048 BASIC METABOLIC PNL TOTAL CA: CPT

## 2023-10-27 PROCEDURE — 31645 BRNCHSC W/THER ASPIR 1ST: CPT | Performed by: INTERNAL MEDICINE

## 2023-10-27 PROCEDURE — 2000000000 HC ICU R&B

## 2023-10-27 PROCEDURE — 6360000002 HC RX W HCPCS

## 2023-10-27 PROCEDURE — 31624 DX BRONCHOSCOPE/LAVAGE: CPT | Performed by: INTERNAL MEDICINE

## 2023-10-27 PROCEDURE — 87106 FUNGI IDENTIFICATION YEAST: CPT

## 2023-10-27 PROCEDURE — 6370000000 HC RX 637 (ALT 250 FOR IP)

## 2023-10-27 PROCEDURE — 87116 MYCOBACTERIA CULTURE: CPT

## 2023-10-27 PROCEDURE — 0B9D8ZX DRAINAGE OF RIGHT MIDDLE LUNG LOBE, VIA NATURAL OR ARTIFICIAL OPENING ENDOSCOPIC, DIAGNOSTIC: ICD-10-PCS | Performed by: INTERNAL MEDICINE

## 2023-10-27 PROCEDURE — 02HV33Z INSERTION OF INFUSION DEVICE INTO SUPERIOR VENA CAVA, PERCUTANEOUS APPROACH: ICD-10-PCS | Performed by: INTERNAL MEDICINE

## 2023-10-27 PROCEDURE — 2709999900 HC NON-CHARGEABLE SUPPLY: Performed by: INTERNAL MEDICINE

## 2023-10-27 PROCEDURE — 85025 COMPLETE CBC W/AUTO DIFF WBC: CPT

## 2023-10-27 PROCEDURE — 94003 VENT MGMT INPAT SUBQ DAY: CPT

## 2023-10-27 PROCEDURE — 99291 CRITICAL CARE FIRST HOUR: CPT | Performed by: INTERNAL MEDICINE

## 2023-10-27 PROCEDURE — 6360000002 HC RX W HCPCS: Performed by: INTERNAL MEDICINE

## 2023-10-27 PROCEDURE — 2700000000 HC OXYGEN THERAPY PER DAY

## 2023-10-27 PROCEDURE — 3609010800 HC BRONCHOSCOPY ALVEOLAR LAVAGE: Performed by: INTERNAL MEDICINE

## 2023-10-27 PROCEDURE — 99152 MOD SED SAME PHYS/QHP 5/>YRS: CPT | Performed by: INTERNAL MEDICINE

## 2023-10-27 PROCEDURE — 87102 FUNGUS ISOLATION CULTURE: CPT

## 2023-10-27 PROCEDURE — 94640 AIRWAY INHALATION TREATMENT: CPT

## 2023-10-27 PROCEDURE — 82803 BLOOD GASES ANY COMBINATION: CPT

## 2023-10-27 PROCEDURE — 87205 SMEAR GRAM STAIN: CPT

## 2023-10-27 PROCEDURE — 2500000003 HC RX 250 WO HCPCS: Performed by: INTERNAL MEDICINE

## 2023-10-27 PROCEDURE — 84478 ASSAY OF TRIGLYCERIDES: CPT

## 2023-10-27 PROCEDURE — 2580000003 HC RX 258: Performed by: INTERNAL MEDICINE

## 2023-10-27 PROCEDURE — 87070 CULTURE OTHR SPECIMN AEROBIC: CPT

## 2023-10-27 RX ORDER — SENNOSIDES A AND B 8.6 MG/1
2 TABLET, FILM COATED ORAL 2 TIMES DAILY
Status: DISCONTINUED | OUTPATIENT
Start: 2023-10-27 | End: 2023-10-30

## 2023-10-27 RX ORDER — FUROSEMIDE 10 MG/ML
20 INJECTION INTRAMUSCULAR; INTRAVENOUS ONCE
Status: COMPLETED | OUTPATIENT
Start: 2023-10-27 | End: 2023-10-27

## 2023-10-27 RX ORDER — SODIUM CHLORIDE FOR INHALATION 3 %
4 VIAL, NEBULIZER (ML) INHALATION 2 TIMES DAILY
Status: DISCONTINUED | OUTPATIENT
Start: 2023-10-27 | End: 2023-10-31 | Stop reason: HOSPADM

## 2023-10-27 RX ADMIN — HYDROCODONE BITARTRATE AND HOMATROPINE METHYLBROMIDE 5 ML: 5; 1.5 SOLUTION ORAL at 21:54

## 2023-10-27 RX ADMIN — PREGABALIN 150 MG: 100 CAPSULE ORAL at 10:00

## 2023-10-27 RX ADMIN — Medication 50 MCG: at 10:25

## 2023-10-27 RX ADMIN — DOCUSATE SODIUM 100 MG: 50 LIQUID ORAL at 10:00

## 2023-10-27 RX ADMIN — PROPOFOL 30 MCG/KG/MIN: 10 INJECTION, EMULSION INTRAVENOUS at 04:30

## 2023-10-27 RX ADMIN — ATORVASTATIN CALCIUM 80 MG: 40 TABLET, FILM COATED ORAL at 20:15

## 2023-10-27 RX ADMIN — SENNOSIDES 17.2 MG: 8.6 TABLET, FILM COATED ORAL at 20:15

## 2023-10-27 RX ADMIN — IPRATROPIUM BROMIDE AND ALBUTEROL SULFATE 1 DOSE: .5; 2.5 SOLUTION RESPIRATORY (INHALATION) at 06:55

## 2023-10-27 RX ADMIN — DOCUSATE SODIUM 100 MG: 50 LIQUID ORAL at 20:14

## 2023-10-27 RX ADMIN — Medication 50 MCG: at 10:32

## 2023-10-27 RX ADMIN — PROPOFOL 40 MCG/KG/MIN: 10 INJECTION, EMULSION INTRAVENOUS at 17:24

## 2023-10-27 RX ADMIN — IPRATROPIUM BROMIDE AND ALBUTEROL SULFATE 1 DOSE: .5; 2.5 SOLUTION RESPIRATORY (INHALATION) at 00:02

## 2023-10-27 RX ADMIN — MIDAZOLAM HYDROCHLORIDE 2 MG: 1 INJECTION, SOLUTION INTRAMUSCULAR; INTRAVENOUS at 10:29

## 2023-10-27 RX ADMIN — SENNOSIDES 17.2 MG: 8.6 TABLET, FILM COATED ORAL at 11:05

## 2023-10-27 RX ADMIN — ENOXAPARIN SODIUM 40 MG: 100 INJECTION SUBCUTANEOUS at 20:15

## 2023-10-27 RX ADMIN — PROPOFOL 40 MCG/KG/MIN: 10 INJECTION, EMULSION INTRAVENOUS at 11:51

## 2023-10-27 RX ADMIN — Medication 100 MCG/HR: at 21:31

## 2023-10-27 RX ADMIN — LEVOTHYROXINE SODIUM 75 MCG: 25 TABLET ORAL at 05:40

## 2023-10-27 RX ADMIN — Medication 0.7 MCG/KG/HR: at 08:07

## 2023-10-27 RX ADMIN — HYDROCODONE BITARTRATE AND HOMATROPINE METHYLBROMIDE 5 ML: 5; 1.5 SOLUTION ORAL at 10:01

## 2023-10-27 RX ADMIN — IPRATROPIUM BROMIDE AND ALBUTEROL SULFATE 1 DOSE: .5; 2.5 SOLUTION RESPIRATORY (INHALATION) at 19:28

## 2023-10-27 RX ADMIN — MUPIROCIN: 20 OINTMENT TOPICAL at 20:15

## 2023-10-27 RX ADMIN — ACETYLCYSTEINE 600 MG: 200 INHALANT RESPIRATORY (INHALATION) at 07:00

## 2023-10-27 RX ADMIN — DIAZEPAM 10 MG: 10 TABLET ORAL at 17:14

## 2023-10-27 RX ADMIN — MUPIROCIN: 20 OINTMENT TOPICAL at 10:16

## 2023-10-27 RX ADMIN — HYDROCODONE BITARTRATE AND HOMATROPINE METHYLBROMIDE 5 ML: 5; 1.5 SOLUTION ORAL at 04:42

## 2023-10-27 RX ADMIN — DIAZEPAM 10 MG: 10 TABLET ORAL at 10:02

## 2023-10-27 RX ADMIN — Medication 0.9 MCG/KG/HR: at 21:10

## 2023-10-27 RX ADMIN — PANTOPRAZOLE SODIUM 40 MG: 40 INJECTION, POWDER, LYOPHILIZED, FOR SOLUTION INTRAVENOUS at 10:01

## 2023-10-27 RX ADMIN — CEFEPIME HYDROCHLORIDE 2000 MG: 2 INJECTION, POWDER, FOR SOLUTION INTRAVENOUS at 02:22

## 2023-10-27 RX ADMIN — Medication 100 MCG/HR: at 02:30

## 2023-10-27 RX ADMIN — QUETIAPINE FUMARATE 25 MG: 25 TABLET ORAL at 10:02

## 2023-10-27 RX ADMIN — Medication 10 ML: at 20:15

## 2023-10-27 RX ADMIN — PREGABALIN 150 MG: 100 CAPSULE ORAL at 20:15

## 2023-10-27 RX ADMIN — IPRATROPIUM BROMIDE AND ALBUTEROL SULFATE 1 DOSE: .5; 2.5 SOLUTION RESPIRATORY (INHALATION) at 22:51

## 2023-10-27 RX ADMIN — DIAZEPAM 10 MG: 10 TABLET ORAL at 01:13

## 2023-10-27 RX ADMIN — DOXYCYCLINE 100 MG: 100 INJECTION, POWDER, LYOPHILIZED, FOR SOLUTION INTRAVENOUS at 20:16

## 2023-10-27 RX ADMIN — IPRATROPIUM BROMIDE AND ALBUTEROL SULFATE 1 DOSE: .5; 2.5 SOLUTION RESPIRATORY (INHALATION) at 02:53

## 2023-10-27 RX ADMIN — Medication 100 MCG/HR: at 10:39

## 2023-10-27 RX ADMIN — Medication 10 ML: at 10:07

## 2023-10-27 RX ADMIN — HYDROCODONE BITARTRATE AND HOMATROPINE METHYLBROMIDE 5 ML: 5; 1.5 SOLUTION ORAL at 00:18

## 2023-10-27 RX ADMIN — Medication 40 MG: at 10:06

## 2023-10-27 RX ADMIN — IPRATROPIUM BROMIDE AND ALBUTEROL SULFATE 1 DOSE: .5; 2.5 SOLUTION RESPIRATORY (INHALATION) at 10:56

## 2023-10-27 RX ADMIN — CHLORHEXIDINE GLUCONATE 0.12% ORAL RINSE 15 ML: 1.2 LIQUID ORAL at 10:00

## 2023-10-27 RX ADMIN — WATER 40 MG: 1 INJECTION INTRAMUSCULAR; INTRAVENOUS; SUBCUTANEOUS at 10:01

## 2023-10-27 RX ADMIN — FUROSEMIDE 20 MG: 10 INJECTION, SOLUTION INTRAMUSCULAR; INTRAVENOUS at 10:06

## 2023-10-27 RX ADMIN — CHLORHEXIDINE GLUCONATE 0.12% ORAL RINSE 15 ML: 1.2 LIQUID ORAL at 20:14

## 2023-10-27 RX ADMIN — Medication 0.8 MCG/KG/HR: at 14:17

## 2023-10-27 RX ADMIN — Medication 0.8 MCG/KG/HR: at 00:51

## 2023-10-27 RX ADMIN — IPRATROPIUM BROMIDE AND ALBUTEROL SULFATE 1 DOSE: .5; 2.5 SOLUTION RESPIRATORY (INHALATION) at 14:57

## 2023-10-27 RX ADMIN — QUETIAPINE FUMARATE 25 MG: 25 TABLET ORAL at 20:15

## 2023-10-27 RX ADMIN — WATER 40 MG: 1 INJECTION INTRAMUSCULAR; INTRAVENOUS; SUBCUTANEOUS at 20:14

## 2023-10-27 RX ADMIN — HYDROCODONE BITARTRATE AND HOMATROPINE METHYLBROMIDE 5 ML: 5; 1.5 SOLUTION ORAL at 17:14

## 2023-10-27 RX ADMIN — DOXYCYCLINE 100 MG: 100 INJECTION, POWDER, LYOPHILIZED, FOR SOLUTION INTRAVENOUS at 10:00

## 2023-10-27 RX ADMIN — Medication 4 ML: at 19:28

## 2023-10-27 RX ADMIN — PROPOFOL 40 MCG/KG/MIN: 10 INJECTION, EMULSION INTRAVENOUS at 21:54

## 2023-10-27 ASSESSMENT — PULMONARY FUNCTION TESTS
PIF_VALUE: 19
PIF_VALUE: 23
PIF_VALUE: 16
PIF_VALUE: 20
PIF_VALUE: 16
PIF_VALUE: 21
PIF_VALUE: 20
PIF_VALUE: 20
PIF_VALUE: 21
PIF_VALUE: 17
PIF_VALUE: 21
PIF_VALUE: 17
PIF_VALUE: 20
PIF_VALUE: 24
PIF_VALUE: 22
PIF_VALUE: 14

## 2023-10-27 NOTE — PROGRESS NOTES
10/27/23 0254   Patient Observation   Pulse 71   Respirations 24   SpO2 98 %   Breath Sounds   Breath Sounds Bilateral Diminished   Vent Information   Vent Mode AC/VC   Ventilator Settings   FiO2  40 %   Vt (Set, mL) 400 mL   Resp Rate (Set) 24 bpm   PEEP/CPAP (cmH2O) 5   Vent Patient Data (Readings)   Vt (Measured) 395 mL   Peak Inspiratory Pressure (cmH2O) 21 cmH2O   Rate Measured 24 br/min   Minute Volume (L/min) 9.48 Liters   Mean Airway Pressure (cmH2O) 11 cmH20   Plateau Pressure (cm H2O) 21 cm H2O   Driving Pressure 16   I:E Ratio 1:2.10   Vent Alarm Settings   High Pressure (cmH2O) 40 cmH2O   Low Minute Volume (lpm) 2 L/min   RR High (bpm) 35 br/min   Additional Respiratoray Assessments   Circuit Condensation Drained   Airway Clearance   Suction ET Tube   Suction Device Inline suction catheter   Sputum Method Obtained Endotracheal   Sputum Amount Small   Sputum Color/Odor Clear   Sputum Consistency Thick   ETT    Placement Date/Time: 10/24/23 8151   Present on Admission/Arrival: Yes  Placed By: Licensed provider  Placement Verified By: Auscultation;Capnometry; Chest X-ray;Colorimetric ETCO2 device;Esophageal detection device;Direct visualization  Preoxygenation. ..    ETT Placement Right   Treatment   $Treatment Type $Inhaled Therapy/Meds

## 2023-10-27 NOTE — PROCEDURES
PROCEDURE: Therapeutic/diagnostic BRONCHOSCOPY WITH BRONCHOALVEOLAR LAVAGE    PRE-PROCEDURE EVALUATION:  Nursing History and Physical reviewed and agreed upon     Allergies and medications have been reviewed    HENT: ETT in place. Intubated not able to assess Mallampati  Cardiovascular: Normal rate, regular rhythm, normal heart sounds. Pulmonary/Chest: No wheezes. + rhonchi. No rales. Abdominal: Soft. Bowel sounds are normal. No distension. ASA CLASS    IV. Severe Systemic Disease which is a threat to life. Sedation plan:   Continue ICU sedation    Post Procedure Plan   Continue ICU care     The risks and benefits as well as alternatives to the procedure have been discussed with the patient's family. The patient's next of kin understands and agrees to proceed. DESCRIPTION OF PROCEDURE: A time out was taken. ICU sedation continued. The scope was passed with ease via the endotracheal tube. A complete airway inspection was performed. No endobronchial lesions were identified. There were thick more like clear secretions with moderate mucous plugs bilaterally. Worse left lower lobe. Saline injection, hypertonic saline injection followed by therapeutic aspiration was performed. Slightly erythematous mucosa in the large airways. Washings were obtained throughout the airways. A Bronchoalveolar lavage was obtained from the right middle lobe with good return. The patient tolerated the procedure well. Estimated blood loss was less than 5 ml. Recovery will be per endoscopy protocol.     FOLLOW UP:  Cultures

## 2023-10-27 NOTE — PROGRESS NOTES
Comprehensive Nutrition Assessment    Type and Reason for Visit:  Reassess    Nutrition Recommendations/Plan:   Continue ADULT TUBE FEEDING; Orogastric; Standard with Fiber formula - Jevity 1.5 with a goal rate of 50 ml/hr x 20 hours + water flushes of 30 ml every 4 hours for tube patency + administration of one prosource TF 20 protein packet once daily. Monitor TF rate, intake, and tolerance + water flushes + administration of one prosource TF 20 protein packet once daily. Monitor respiratory/vent status, sedation type/amount (propofol is currently infusing at 30 mcg x 24 hours which = 370 kcals from lipids), TG check, and plan of care. Monitor nutrition-related labs, bowel function, and weight trends.       Malnutrition Assessment:  Malnutrition Status:  Mild malnutrition (10/27/23 1116)    Context:  Acute Illness     Findings of the 6 clinical characteristics of malnutrition:  Energy Intake:  Mild decrease in energy intake   Weight Loss:  No significant weight loss     Body Fat Loss:  Mild body fat loss Orbital   Muscle Mass Loss:  Mild muscle mass loss Temples (temporalis), Clavicles (pectoralis & deltoids), Calf (gastrocnemius)  Fluid Accumulation:  No significant fluid accumulation     Strength:  Not Performed    Nutrition Assessment:    patient is improving from a nutritional standpoint AEB patient is receiving TF at goal rate without issue, however, he remains at risk for further compromise d/t need for EN as sole source of nutrition, altered nutrition-related labs, and no documented BM for this admission; will continue Jevity 1.5 with a goal rate of 50 ml/hr x 20 hours + 30 ml water flushes every 4 hours + one prosource TF 20 protein packet once daily    Nutrition Related Findings:    patient remains intubated and sedated with 30 mcg propofol at this time; patient's wife was at bedside during rounds this am; patient has not had a BM during this admission so bowel regimen started today; TG check is pending at this time; + bronch again today; patient is not having any s/s of TF intolerance but RN checked residuals overnight and got -700 ml out - she wasted half and reinstilled half of residual; UBW is 168-175#, per patient's wife; patient typically has a BM every morning at home; he is edentulous but does not have difficulties chewing and/or swallowing; patient appears older than stated age; patient has ~ 3 places on him that his cancer doctor was concerned about because patient has had them for ~ 1 year and they will open up, heal, open up, etc. 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 and follows up with his cancer doctor on a regular basis Wound Type: None       Current Nutrition Intake & Therapies:    Average Meal Intake: NPO  Average Supplements Intake: NPO  Current Tube Feeding (TF) Orders:  Feeding Route: Orogastric  Formula: Standard with Fiber  Schedule: Continuous  Feeding Regimen: Jevity 1.5 with a goal rate of 50 ml/hr x 20 hours  Additives/Modulars: Protein (one prosource TF 20 protein packet once daily)  Water Flushes: 30 ml water flushes every 4 hours for tube patency  Current TF & Flush Orders Provides: TF is infusing at goal rate without issue  Goal TF & Flush Orders Provides: Jevity 1.5 with a goal rate of 50 ml/hr x 20 hours = 1000 ml TV, 1500 kcals, 64 g protein, and 760 ml free water + 30 ml water flushes every 4 hours for tube patency + 20 g protein and 80 kcals from one prosource TF 20 protein packet once daily (84 g protein and 1580 kcals total)    Anthropometric Measures:  Height: 174 cm (5' 8.5\")  Ideal Body Weight (IBW): 157 lbs (71 kg)    Admission Body Weight: 76 kg (167 lb 8.8 oz) (obtained on 10/24/23; actual weight)  Current Body Weight: 77.9 kg (171 lb 11.8 oz) (obtained on 10/27/23; actual weight), 109.4 % IBW. Weight Source: Bed Scale  Current BMI (kg/m2): 25.7  Usual Body Weight: 80.5 kg (177 lb 7.5 oz) (obtained on 7/5/23; actual weight)  % Weight Change (Calculated): -3.2  Weight Adjustment For: No Adjustment                 BMI Categories: Overweight (BMI 25.0-29. 9)    Estimated Daily Nutrient Needs:  Energy Requirements Based On: Kcal/kg  Weight Used for Energy Requirements: Current  Energy (kcal/day): 1520 - 1748 kcals based on 20-23 kcals/kg/CBW  Weight Used for Protein Requirements: Ideal  Protein (g/day): 85 - 99 g protein based on 1.2-1.4 g/kg/IBW  Method Used for Fluid Requirements: 1 ml/kcal  Fluid (ml/day): 1520 - 1748 ml    Nutrition Diagnosis:   Inadequate oral intake related to inadequate protein-energy intake, impaired respiratory function as evidenced by NPO or clear liquid status due to medical condition, intubation, nutrition support - enteral nutrition    Nutrition Interventions:   Food and/or Nutrient Delivery: Continue NPO, Continue Current Tube Feeding  Nutrition Education/Counseling: No recommendation at this time  Coordination of Nutrition Care: Continue to monitor while inpatient, Coordination of Care, Interdisciplinary Rounds  Plan of Care discussed with: ICU Team    Goals:  Previous Goal Met: Goal(s) Achieved  Goals: Tolerate nutrition support at goal rate       Nutrition Monitoring and Evaluation:   Behavioral-Environmental Outcomes: None Identified  Food/Nutrient Intake Outcomes: Enteral Nutrition Intake/Tolerance  Physical Signs/Symptoms Outcomes: Biochemical Data, Constipation, GI Status, Hemodynamic Status, Nutrition Focused Physical Findings, Skin, Weight    Discharge Planning:     Too soon to determine     Errol Harper, [id number] [facility] [place]  Contact: [phone]

## 2023-10-27 NOTE — PROGRESS NOTES
CM-SR, VSS, pt remains afebrile, UO WNL. Pt will open his eyes to verbal stimuli. FIO2 weaned to 45% & pt is tolerating well. Pt is resting w/out evidence of distress @ this time.

## 2023-10-27 NOTE — PROGRESS NOTES
10/27/23 0005   Patient Observation   Pulse 84   Respirations 27   SpO2 (!) 89 %   Breath Sounds   Breath Sounds Bilateral Diminished   Vent Information   Vent Mode AC/VC   Ventilator Settings   FiO2  40 %   Vt (Set, mL) 400 mL   Resp Rate (Set) 24 bpm   PEEP/CPAP (cmH2O) 5   Vent Patient Data (Readings)   Vt (Measured) 463 mL   Peak Inspiratory Pressure (cmH2O) 20 cmH2O   Rate Measured 29 br/min   Minute Volume (L/min) 11.4 Liters   Mean Airway Pressure (cmH2O) 6 cmH20   Plateau Pressure (cm H2O) 21 cm H2O   Driving Pressure 16   I:E Ratio 1:1.90   Vent Alarm Settings   High Pressure (cmH2O) 40 cmH2O   Low Minute Volume (lpm) 2 L/min   RR High (bpm) 35 br/min   Additional Respiratoray Assessments   Circuit Condensation Drained   Airway Clearance   Suction ET Tube   Suction Device Inline suction catheter   Sputum Method Obtained Endotracheal   Sputum Amount Moderate   Sputum Color/Odor Clear   Sputum Consistency Thick   ETT    Placement Date/Time: 10/24/23 8101   Present on Admission/Arrival: Yes  Placed By: Licensed provider  Placement Verified By: Auscultation;Capnometry; Chest X-ray;Colorimetric ETCO2 device;Esophageal detection device;Direct visualization  Preoxygenation. ..    ETT Placement Center   Treatment   $Treatment Type $Inhaled Therapy/Meds

## 2023-10-27 NOTE — PROGRESS NOTES
Pt SaO2 decreased to 80's. FIO2 increased to 60%. Pt is slow to recover. FIO2 increased to 60% by RT. Will attempt wean as tolerated.

## 2023-10-27 NOTE — PLAN OF CARE
Pt resting in bed with eyes closed, tolerating vent settings of 40% FiO2 with Peep 5, SpO2 97%, VSS. Peripheral IV's and right IJ patent with precedex, fentanyl, and propofol infusing per mar. Tube feed infusing at goal of 50 ml/hr with 720 ml residual. Bruno patent. Nicoderm patch on right arm. B/L soft wrist restraints assessed. No s/s of pain or distress.     Problem: Discharge Planning  Goal: Discharge to home or other facility with appropriate resources  10/26/2023 2229 by Miguelito Orlando RN  Outcome: Progressing  10/26/2023 2228 by Miguelito Orlando RN  Outcome: Progressing  Flowsheets (Taken 10/26/2023 2000)  Discharge to home or other facility with appropriate resources:   Identify barriers to discharge with patient and caregiver   Arrange for needed discharge resources and transportation as appropriate     Problem: Pain  Goal: Verbalizes/displays adequate comfort level or baseline comfort level  10/26/2023 2229 by Miguelito Orlando RN  Outcome: Progressing  10/26/2023 2228 by Miguelito Orlando RN  Outcome: Progressing  Flowsheets (Taken 10/26/2023 2000)  Verbalizes/displays adequate comfort level or baseline comfort level:   Encourage patient to monitor pain and request assistance   Assess pain using appropriate pain scale   Administer analgesics based on type and severity of pain and evaluate response   Implement non-pharmacological measures as appropriate and evaluate response     Problem: Safety - Adult  Goal: Free from fall injury  10/26/2023 2229 by Miguelito Orlando RN  Outcome: Progressing  10/26/2023 2228 by Miguelito Orlando RN  Outcome: Progressing     Problem: ABCDS Injury Assessment  Goal: Absence of physical injury  10/26/2023 2229 by Miguelito Orlando RN  Outcome: Progressing  10/26/2023 2228 by Miguelito Orlando RN  Outcome: Progressing     Problem: Respiratory - Adult  Goal: Achieves optimal ventilation and

## 2023-10-27 NOTE — PROGRESS NOTES
AM assessment done. Pt will open eyes to verbal stimuli. Pt will DAS spont but not to commands. UO WNL, pt remains afebrile.

## 2023-10-27 NOTE — PROGRESS NOTES
10/27/23 1929   Patient Observation   Observations 7.5 ett 25 at lip   Breath Sounds   Right Upper Lobe Diminished   Right Middle Lobe Diminished   Right Lower Lobe Diminished   Left Upper Lobe Diminished   Left Lower Lobe Diminished   Vent Information   Vent Mode AC/VC   Ventilator Settings   FiO2  45 %   Vt (Set, mL) 400 mL   Resp Rate (Set) 18 bpm   PEEP/CPAP (cmH2O) 5   Vent Patient Data (Readings)   Vt (Measured) 408 mL   Peak Inspiratory Pressure (cmH2O) 17 cmH2O   Rate Measured 19 br/min   Minute Volume (L/min) 7.7 Liters   Peak Inspiratory Flow (lpm) 55 L/sec   Mean Airway Pressure (cmH2O) 8.2 cmH20   I:E Ratio 1:3   Flow Sensitivity 3 L/min   Static Compliance (L/cm H2O) 37   Dynamic Compliance (L/cm H2O) 34 L/cm H2O   Vent Alarm Settings   High Pressure (cmH2O) 40 cmH2O   Low Minute Volume (lpm) 2 L/min   Low Exhaled Vt (ml) 210 mL   RR High (bpm) 35 br/min   Apnea (secs) 20 secs   Additional Respiratoray Assessments   Humidification Source Heated wire   Humidification Temp 37   Circuit Condensation Drained   Ambu Bag With Mask At Bedside Yes   Airway Clearance   Suction ET Tube   Suction Device Inline suction catheter   Sputum Method Obtained Endotracheal   Sputum Amount Small   Sputum Color/Odor Clear   Sputum Consistency Thick   ETT    Placement Date/Time: 10/24/23 4340   Present on Admission/Arrival: Yes  Placed By: Licensed provider  Placement Verified By: Auscultation;Capnometry; Chest X-ray;Colorimetric ETCO2 device;Esophageal detection device;Direct visualization  Preoxygenation. ..    Secured At 25 cm   Measured From Lips   ETT Placement Left   Secured By Commercial tube louis

## 2023-10-27 NOTE — PROGRESS NOTES
Mac Plata. Negative EBUS/robotic bx 9/23/23. H/o gastric MALT  40 pack year smoking   Valium use at home 5 mg Q hrs      PLAN:  Mechanical ventilation as per my orders. The ventilator was adjusted by me at the bedside for unstable, life threatening respiratory failure  Decrease RR 18   Advance ETT 2 cm down to 25 cm  Follow ABG and chest x-ray while on the ventilator  Supplemental oxygen to maintain SaO2 >92%; wean as tolerated  IV Precedex, Propofol for sedation, target RASS -2, with daily spontaneous awakening trial   Follow TG   Valium 10 mg TID   Seroquel 25 mg BID   Fentanyl and Versed PRN, gtt as needed  Head of bed 30 degrees or higher at all times  Daily spontaneous breathing trial once PEEP less than 8, FiO2 less than 55%  Closely monitory airways, clinical status, cardiac rhythm, vital signs, and urine output   Intensive inhaled bronchodilator therapy. IV solumedrol 40 mg IV Q12 hrs. Plan to switch to oral prednisone taper when improved. Hycodan Q 6 hrs   Cefepime D#4--> Doxy. Completed Zithromax and 2 days of Zyvox   Therapeutic bronchoscopy today. The risks and benefits of Bronchoscopy , alternatives to the procedure and doing nothing have been discussed with wife at the bedside including complications (collapse lung, pneumomediastinum, bleed, infection, mechanical ventilation, hospitalization, cardiopulmonary arrest and death). We also discussed the risks of sedation/anesthesia. It is my assessment that the benefit of the invasive procedure outweighs the risk. The patient understands and wishes to proceed.   Mucomyst BID---> HTS Neb BID   Electrolytes replacement   Lasix 20 mg IV x 1   TFs at goal   BM regiment   Blood sugar control ISS, with goal 150-180  DVT prophylaxis: Lovenox  MRSA prophylaxis: Greer Bean Wife [phone]- updated today at the bedside   Full code           Total critical care time caring for this patient with life threatening, unstable organ failure, including direct patient contact, management of life support systems, review of data including imaging and labs, discussions with other team members and physicians is 35 minutes so far today, excluding procedures.

## 2023-10-27 NOTE — PROGRESS NOTES
Dr. Mell Victoria @ the bedside for Bronch. Endo @ the bedside for procedure. 1030 Procedure started   1030 Versed 2 mg IVP   1030 Fentanyl 50 mcg bolus from the     Bag.   1035 Fentanyl 50mcg bolus from the     Bag.   1045 Procedure done. Pt tolerated procedure well.

## 2023-10-27 NOTE — PROGRESS NOTES
4 Eyes Skin Assessment     NAME:  Chandler Nagy  YOB: 1960  MEDICAL RECORD NUMBER:  [id number]    The patient is being assessed for  Shift Handoff    I agree that at least one RN has performed a thorough Head to Toe Skin Assessment on the patient. ALL assessment sites listed below have been assessed. Areas assessed by both nurses:    Head, Face, Ears, Shoulders, Back, Chest, Arms, Elbows, Hands, Sacrum. Buttock, Coccyx, Ischium, Legs. Feet and Heels, and Under Medical Devices         Does the Patient have a Wound?  No noted wound(s)       Sukumar Prevention initiated by RN: Yes  Wound Care Orders initiated by RN: No    Pressure Injury (Stage 3,4, Unstageable, DTI, NWPT, and Complex wounds) if present, place Wound referral order by RN under : No    New Ostomies, if present place, Ostomy referral order under : No     Nurse 1 eSignature: Electronically signed by Janeth Morrow RN on 10/26/23 at 10:33 PM EDT    **SHARE this note so that the co-signing nurse can place an eSignature**    Nurse 2 eSignature: Electronically signed by Alok Marques RN on 10/26/23 at 10:41 PM EDT

## 2023-10-27 NOTE — PROGRESS NOTES
Assisted M.D. with bronchoscopy. Pt placed on PCV 15 and 100% Fio2 during procedure. Pt. cameron well and without complication. Returned to previous settings after procedure.

## 2023-10-27 NOTE — PROGRESS NOTES
needing ICU transfer and vapotherm   -CXR shows worsening patchy bilateral infiltrates suspicious for pneumonia  -covid/flu negative   -MRSA, legionella and strep pneumoniae negative  -sputum and blood cultures NGTD  - on IV zyvox and Cefepime. Got 2 days of rocephin and 3 days of Zithromax. Stopped Zyvox. Got Cefepime for 4 days. This has been changed to Doxy. -IV solumedrol given  transitioned to po prednisone as able   -riaz leroy and albuterol prn  -tessalon prn  -monitor pulse ox and wean as able  -pulmonology consulted   - doubt CHF symptoms  with no peripheral edema and normal BNP, hold lasix, ECHO pending     #Hyponatremia resolved. Stopped IVF  -likely 2/2 reduced po intake  -IVF and repeat BMP with improvement  -back on Prozac  - got a dose of Lasix today. #CAD  -on Asa, statin        #Hypothyroidism  -on synthroid    - tsh 0.60. #GERD  -on PPI, carafate     #Chronic pain  -uses medical marijuana at home  -continue lyrica and zanaflex        #Gastric MALT Lymphoma  -f/w Dr. Charlee Cheung  -not currently undergoing treatment for this     #Tobacco use  -counseled cessation  -prn nicotine replacement            Note acute hypoxic respiratory failure 2/2 COPD and pneumonia makes patient higher risk for morbidity and mortality requiring testing and treatment.          DVT Prophylaxis: Lovenox   Diet: Diet NPO   Code Status: full code      Popeye Sharp MD, 10/27/2023 1:20 PM

## 2023-10-27 NOTE — PROGRESS NOTES
Versed gtt wasted with Andi Gutierrez RN. 125ml wasted.   Electronically signed by Joe Oliver RN on 10/27/2023 at 2:12 PM

## 2023-10-27 NOTE — PROGRESS NOTES
Rate decreased to 18 and ETT advanced 2 cm per Dr. Kat Vega.  [email or web address]

## 2023-10-28 ENCOUNTER — APPOINTMENT (OUTPATIENT)
Dept: GENERAL RADIOLOGY | Age: 63
DRG: 207 | End: 2023-10-28
Payer: MEDICARE

## 2023-10-28 LAB
ACID FAST STN SPEC QL: NORMAL
ACID FAST STN SPEC QL: NORMAL
ANION GAP SERPL CALCULATED.3IONS-SCNC: 9 MMOL/L (ref 3–16)
BASE EXCESS BLDA CALC-SCNC: 6.1 MMOL/L (ref -3–3)
BASOPHILS # BLD: 0 K/UL (ref 0–0.2)
BASOPHILS NFR BLD: 0.4 %
BUN SERPL-MCNC: 23 MG/DL (ref 7–20)
CALCIUM SERPL-MCNC: 9.2 MG/DL (ref 8.3–10.6)
CHLORIDE SERPL-SCNC: 100 MMOL/L (ref 99–110)
CO2 BLDA-SCNC: 32.7 MMOL/L
CO2 SERPL-SCNC: 31 MMOL/L (ref 21–32)
COHGB MFR BLDA: 0.3 % (ref 0–1.5)
CREAT SERPL-MCNC: <0.5 MG/DL (ref 0.8–1.3)
DEPRECATED RDW RBC AUTO: 16 % (ref 12.4–15.4)
EOSINOPHIL # BLD: 0 K/UL (ref 0–0.6)
EOSINOPHIL NFR BLD: 0.1 %
GFR SERPLBLD CREATININE-BSD FMLA CKD-EPI: >60 ML/MIN/{1.73_M2}
GLUCOSE BLD-MCNC: 128 MG/DL (ref 70–99)
GLUCOSE BLD-MCNC: 161 MG/DL (ref 70–99)
GLUCOSE BLD-MCNC: 167 MG/DL (ref 70–99)
GLUCOSE BLD-MCNC: 181 MG/DL (ref 70–99)
GLUCOSE BLD-MCNC: 189 MG/DL (ref 70–99)
GLUCOSE SERPL-MCNC: 191 MG/DL (ref 70–99)
HCO3 BLDA-SCNC: 31.3 MMOL/L (ref 21–29)
HCT VFR BLD AUTO: 33.4 % (ref 40.5–52.5)
HGB BLD-MCNC: 11.1 G/DL (ref 13.5–17.5)
HGB BLDA-MCNC: 11.9 G/DL (ref 13.5–17.5)
LYMPHOCYTES # BLD: 0.8 K/UL (ref 1–5.1)
LYMPHOCYTES NFR BLD: 8.9 %
MCH RBC QN AUTO: 28.4 PG (ref 26–34)
MCHC RBC AUTO-ENTMCNC: 33.2 G/DL (ref 31–36)
MCV RBC AUTO: 85.5 FL (ref 80–100)
METHGB MFR BLDA: 0.3 %
MONOCYTES # BLD: 0.3 K/UL (ref 0–1.3)
MONOCYTES NFR BLD: 3.5 %
NEUTROPHILS # BLD: 8 K/UL (ref 1.7–7.7)
NEUTROPHILS NFR BLD: 87.1 %
O2 THERAPY: ABNORMAL
PCO2 BLDA: 47.4 MMHG (ref 35–45)
PERFORMED ON: ABNORMAL
PH BLDA: 7.44 [PH] (ref 7.35–7.45)
PLATELET # BLD AUTO: 226 K/UL (ref 135–450)
PMV BLD AUTO: 9 FL (ref 5–10.5)
PO2 BLDA: 82.1 MMHG (ref 75–108)
POTASSIUM SERPL-SCNC: 4.9 MMOL/L (ref 3.5–5.1)
RBC # BLD AUTO: 3.91 M/UL (ref 4.2–5.9)
SAO2 % BLDA: 96.3 %
SODIUM SERPL-SCNC: 140 MMOL/L (ref 136–145)
WBC # BLD AUTO: 9.2 K/UL (ref 4–11)

## 2023-10-28 PROCEDURE — 94761 N-INVAS EAR/PLS OXIMETRY MLT: CPT

## 2023-10-28 PROCEDURE — 99233 SBSQ HOSP IP/OBS HIGH 50: CPT | Performed by: INTERNAL MEDICINE

## 2023-10-28 PROCEDURE — 85025 COMPLETE CBC W/AUTO DIFF WBC: CPT

## 2023-10-28 PROCEDURE — 6370000000 HC RX 637 (ALT 250 FOR IP)

## 2023-10-28 PROCEDURE — 71045 X-RAY EXAM CHEST 1 VIEW: CPT

## 2023-10-28 PROCEDURE — 6360000002 HC RX W HCPCS

## 2023-10-28 PROCEDURE — 6370000000 HC RX 637 (ALT 250 FOR IP): Performed by: INTERNAL MEDICINE

## 2023-10-28 PROCEDURE — 36600 WITHDRAWAL OF ARTERIAL BLOOD: CPT

## 2023-10-28 PROCEDURE — 6360000002 HC RX W HCPCS: Performed by: INTERNAL MEDICINE

## 2023-10-28 PROCEDURE — 99291 CRITICAL CARE FIRST HOUR: CPT | Performed by: INTERNAL MEDICINE

## 2023-10-28 PROCEDURE — 94640 AIRWAY INHALATION TREATMENT: CPT

## 2023-10-28 PROCEDURE — 36592 COLLECT BLOOD FROM PICC: CPT

## 2023-10-28 PROCEDURE — 2580000003 HC RX 258

## 2023-10-28 PROCEDURE — 2500000003 HC RX 250 WO HCPCS: Performed by: INTERNAL MEDICINE

## 2023-10-28 PROCEDURE — 80048 BASIC METABOLIC PNL TOTAL CA: CPT

## 2023-10-28 PROCEDURE — 2580000003 HC RX 258: Performed by: INTERNAL MEDICINE

## 2023-10-28 PROCEDURE — 2700000000 HC OXYGEN THERAPY PER DAY

## 2023-10-28 PROCEDURE — 82803 BLOOD GASES ANY COMBINATION: CPT

## 2023-10-28 PROCEDURE — 94003 VENT MGMT INPAT SUBQ DAY: CPT

## 2023-10-28 PROCEDURE — 2000000000 HC ICU R&B

## 2023-10-28 PROCEDURE — C9113 INJ PANTOPRAZOLE SODIUM, VIA: HCPCS | Performed by: INTERNAL MEDICINE

## 2023-10-28 RX ORDER — QUETIAPINE FUMARATE 25 MG/1
50 TABLET, FILM COATED ORAL 2 TIMES DAILY
Status: DISCONTINUED | OUTPATIENT
Start: 2023-10-28 | End: 2023-10-30

## 2023-10-28 RX ADMIN — PROPOFOL 40 MCG/KG/MIN: 10 INJECTION, EMULSION INTRAVENOUS at 03:09

## 2023-10-28 RX ADMIN — DIAZEPAM 10 MG: 10 TABLET ORAL at 03:08

## 2023-10-28 RX ADMIN — DIAZEPAM 10 MG: 10 TABLET ORAL at 08:14

## 2023-10-28 RX ADMIN — Medication 1 MCG/KG/HR: at 09:59

## 2023-10-28 RX ADMIN — CHLORHEXIDINE GLUCONATE 0.12% ORAL RINSE 15 ML: 1.2 LIQUID ORAL at 19:59

## 2023-10-28 RX ADMIN — SENNOSIDES 17.2 MG: 8.6 TABLET, FILM COATED ORAL at 08:10

## 2023-10-28 RX ADMIN — Medication 0.8 MCG/KG/HR: at 03:10

## 2023-10-28 RX ADMIN — IPRATROPIUM BROMIDE AND ALBUTEROL SULFATE 1 DOSE: .5; 2.5 SOLUTION RESPIRATORY (INHALATION) at 03:03

## 2023-10-28 RX ADMIN — PANTOPRAZOLE SODIUM 40 MG: 40 INJECTION, POWDER, LYOPHILIZED, FOR SOLUTION INTRAVENOUS at 08:10

## 2023-10-28 RX ADMIN — CHLORHEXIDINE GLUCONATE 0.12% ORAL RINSE 15 ML: 1.2 LIQUID ORAL at 08:10

## 2023-10-28 RX ADMIN — DOCUSATE SODIUM 100 MG: 50 LIQUID ORAL at 08:10

## 2023-10-28 RX ADMIN — QUETIAPINE FUMARATE 25 MG: 25 TABLET ORAL at 08:10

## 2023-10-28 RX ADMIN — IPRATROPIUM BROMIDE AND ALBUTEROL SULFATE 1 DOSE: .5; 2.5 SOLUTION RESPIRATORY (INHALATION) at 19:54

## 2023-10-28 RX ADMIN — Medication 4 ML: at 19:55

## 2023-10-28 RX ADMIN — Medication 10 ML: at 08:11

## 2023-10-28 RX ADMIN — Medication 1 MCG/KG/HR: at 20:01

## 2023-10-28 RX ADMIN — Medication 10 ML: at 20:00

## 2023-10-28 RX ADMIN — Medication 40 MG: at 08:14

## 2023-10-28 RX ADMIN — ENOXAPARIN SODIUM 40 MG: 100 INJECTION SUBCUTANEOUS at 19:59

## 2023-10-28 RX ADMIN — SENNOSIDES 17.2 MG: 8.6 TABLET, FILM COATED ORAL at 19:59

## 2023-10-28 RX ADMIN — HYDROCODONE BITARTRATE AND HOMATROPINE METHYLBROMIDE 5 ML: 5; 1.5 SOLUTION ORAL at 06:10

## 2023-10-28 RX ADMIN — SODIUM CHLORIDE: 9 INJECTION, SOLUTION INTRAVENOUS at 21:56

## 2023-10-28 RX ADMIN — DOXYCYCLINE 100 MG: 100 INJECTION, POWDER, LYOPHILIZED, FOR SOLUTION INTRAVENOUS at 21:56

## 2023-10-28 RX ADMIN — DOXYCYCLINE 100 MG: 100 INJECTION, POWDER, LYOPHILIZED, FOR SOLUTION INTRAVENOUS at 09:57

## 2023-10-28 RX ADMIN — HYDROCODONE BITARTRATE AND HOMATROPINE METHYLBROMIDE 5 ML: 5; 1.5 SOLUTION ORAL at 21:57

## 2023-10-28 RX ADMIN — HYDROCODONE BITARTRATE AND HOMATROPINE METHYLBROMIDE 5 ML: 5; 1.5 SOLUTION ORAL at 11:25

## 2023-10-28 RX ADMIN — DIAZEPAM 10 MG: 10 TABLET ORAL at 16:58

## 2023-10-28 RX ADMIN — IPRATROPIUM BROMIDE AND ALBUTEROL SULFATE 1 DOSE: .5; 2.5 SOLUTION RESPIRATORY (INHALATION) at 23:23

## 2023-10-28 RX ADMIN — PREGABALIN 150 MG: 100 CAPSULE ORAL at 19:59

## 2023-10-28 RX ADMIN — LEVOTHYROXINE SODIUM 75 MCG: 25 TABLET ORAL at 06:10

## 2023-10-28 RX ADMIN — IPRATROPIUM BROMIDE AND ALBUTEROL SULFATE 1 DOSE: .5; 2.5 SOLUTION RESPIRATORY (INHALATION) at 07:59

## 2023-10-28 RX ADMIN — Medication 1 MCG/KG/HR: at 15:13

## 2023-10-28 RX ADMIN — HYDROCODONE BITARTRATE AND HOMATROPINE METHYLBROMIDE 5 ML: 5; 1.5 SOLUTION ORAL at 16:57

## 2023-10-28 RX ADMIN — QUETIAPINE FUMARATE 50 MG: 25 TABLET ORAL at 20:00

## 2023-10-28 RX ADMIN — IPRATROPIUM BROMIDE AND ALBUTEROL SULFATE 1 DOSE: .5; 2.5 SOLUTION RESPIRATORY (INHALATION) at 16:06

## 2023-10-28 RX ADMIN — PREGABALIN 150 MG: 100 CAPSULE ORAL at 08:10

## 2023-10-28 RX ADMIN — WATER 40 MG: 1 INJECTION INTRAMUSCULAR; INTRAVENOUS; SUBCUTANEOUS at 20:00

## 2023-10-28 RX ADMIN — IPRATROPIUM BROMIDE AND ALBUTEROL SULFATE 1 DOSE: .5; 2.5 SOLUTION RESPIRATORY (INHALATION) at 11:43

## 2023-10-28 RX ADMIN — ATORVASTATIN CALCIUM 80 MG: 40 TABLET, FILM COATED ORAL at 19:59

## 2023-10-28 RX ADMIN — DOCUSATE SODIUM 100 MG: 50 LIQUID ORAL at 19:59

## 2023-10-28 RX ADMIN — Medication 4 ML: at 08:00

## 2023-10-28 RX ADMIN — WATER 40 MG: 1 INJECTION INTRAMUSCULAR; INTRAVENOUS; SUBCUTANEOUS at 08:10

## 2023-10-28 ASSESSMENT — PULMONARY FUNCTION TESTS
PIF_VALUE: 22
PIF_VALUE: 25
PIF_VALUE: 18
PIF_VALUE: 20
PIF_VALUE: 17
PIF_VALUE: 20
PIF_VALUE: 19
PIF_VALUE: 17
PIF_VALUE: 19
PIF_VALUE: 15
PIF_VALUE: 18
PIF_VALUE: 13
PIF_VALUE: 23

## 2023-10-28 NOTE — PROGRESS NOTES
Patient report given to WINNIE Farr. Patient has rested comfortably on the ventilator overnight without acute changes in assessment noted. Care transferred.

## 2023-10-28 NOTE — PROGRESS NOTES
Pt resting comfortably. VSS. Pt remains on precedex gtt for sedation.  TF at goal- residuals minimal.

## 2023-10-28 NOTE — PROGRESS NOTES
Propofol gtt and fentanyl gtt stopped for SBT. Tube feed paused. Pts spouse at bedside. High risk vesicant drug infusing:  __________    Multiple incompatible medications infusing:  _________    CVP Monitoring:  _________    Extremely difficult IV access challenge:  ___x_____    Continued need for central line access:  ____x______    Addressed with physician:  ___x_____    RIGHT PATIENT, RIGHT TIME, RIGHT LINE      AM assessment completed. AM labs reviewed. AM meds administered. PIV wnl. Pt in bilateral wrist restraints for safety. No new orders at present time.    Caleb Duane RN, BSN

## 2023-10-28 NOTE — PROGRESS NOTES
10/28/23 0304   Patient Observation   Observations 7.5 ett 25 at lip   Breath Sounds   Right Upper Lobe Diminished   Right Middle Lobe Diminished   Right Lower Lobe Diminished   Left Upper Lobe Diminished   Left Lower Lobe Diminished   Vent Information   Vent Mode AC/VC   Ventilator Settings   FiO2  45 %   Vt (Set, mL) 400 mL   Resp Rate (Set) 18 bpm   PEEP/CPAP (cmH2O) 5   Vent Patient Data (Readings)   Vt (Measured) 461 mL   Peak Inspiratory Pressure (cmH2O) 20 cmH2O   Rate Measured 18 br/min   Minute Volume (L/min) 7.1 Liters   Peak Inspiratory Flow (lpm) 55 L/sec   Mean Airway Pressure (cmH2O) 7.7 cmH20   I:E Ratio 1:3.2   Flow Sensitivity 3 L/min   Vent Alarm Settings   High Pressure (cmH2O) 40 cmH2O   Low Minute Volume (lpm) 2 L/min   Low Exhaled Vt (ml) 210 mL   RR High (bpm) 35 br/min   Apnea (secs) 20 secs   Additional Respiratoray Assessments   Humidification Source Heated wire   Humidification Temp 37   Circuit Condensation Drained   Ambu Bag With Mask At Bedside Yes   Airway Clearance   Suction ET Tube   Suction Device Inline suction catheter   Sputum Method Obtained Endotracheal   Sputum Amount Small   Sputum Color/Odor Clear   Sputum Consistency Thick   ETT    Placement Date/Time: 10/24/23 9200   Present on Admission/Arrival: Yes  Placed By: Licensed provider  Placement Verified By: Auscultation;Capnometry; Chest X-ray;Colorimetric ETCO2 device;Esophageal detection device;Direct visualization  Preoxygenation. ..    Secured At 25 cm   Measured From Lips   ETT Placement Right   Secured By Commercial tube louis

## 2023-10-28 NOTE — PROGRESS NOTES
10/28/23 1050   Encounter Summary   Encounter Overview/Reason  Initial Encounter   Service Provided For: Patient and family together   Referral/Consult From: [facility] Encounter  10/28/23  ( visited and prayed with spouse and patient)   Assessment/Intervention/Outcome   Intervention Prayer (assurance of)/[place]

## 2023-10-28 NOTE — PROGRESS NOTES
Patient provided a COPD Educational Folder that includes the following materials:     [x]  [address]: Managing your COPD  [x]  ALA: Getting the Most Out of Medication Delivery Devices  [x]  ALA: My COPD Action Plan  [x]  Better Breathers [employer]: [facility]   [x]  Smoking Cessation Classes  [x]  Outpatient Spiritual Care Services  [x]  Magnet: Signs of COPD    PATIENT/CAREGIVER TEACHING:   Level of patient/caregiver understanding able to:   [] Verbalize understanding   [] Demonstrate understanding       [] Teach back        [] Needs reinforcement     [x]  Other: provided to spouse    Electronically signed by Margarita Martinez RN on 10/28/2023 at 4:49 PM

## 2023-10-28 NOTE — PROGRESS NOTES
Patient care assumed, assessment completed as charted. Patient continues on ventilator, #7.5 at the 25 lip line. A/C 18, , FiO2 45%, PEEP 5. Right IJ CVC in place with Precedex infusing at 0.9mcg/kg/hr, Fentanyl at 100mcg/hr, and Propofol at 40mcg/kg/min. OG in place with tube feed running at 50mL/hr, munguia catheter patent, bilateral soft wrist restraints in place for continued patient safety. No further needs assessed at this time. Will monitor.

## 2023-10-28 NOTE — PLAN OF CARE
Problem: Discharge Planning  Goal: Discharge to home or other facility with appropriate resources  Outcome: Progressing  Flowsheets (Taken 10/26/2023 2000 by Nenita Quispe RN)  Discharge to home or other facility with appropriate resources:   Identify barriers to discharge with patient and caregiver   Arrange for needed discharge resources and transportation as appropriate     Problem: Pain  Goal: Verbalizes/displays adequate comfort level or baseline comfort level  Outcome: Progressing  Flowsheets (Taken 10/26/2023 2000 by Nenita Quispe RN)  Verbalizes/displays adequate comfort level or baseline comfort level:   Encourage patient to monitor pain and request assistance   Assess pain using appropriate pain scale   Administer analgesics based on type and severity of pain and evaluate response   Implement non-pharmacological measures as appropriate and evaluate response     Problem: Safety - Adult  Goal: Free from fall injury  Outcome: Progressing  Flowsheets (Taken 10/27/2023 2044)  Free From Fall Injury:   Instruct family/caregiver on patient safety   Based on caregiver fall risk screen, instruct family/caregiver to ask for assistance with transferring infant if caregiver noted to have fall risk factors  Note: Fall precautions in place.       Problem: ABCDS Injury Assessment  Goal: Absence of physical injury  Outcome: Progressing  Flowsheets (Taken 10/27/2023 2044)  Absence of Physical Injury: Implement safety measures based on patient assessment     Problem: Respiratory - Adult  Goal: Achieves optimal ventilation and oxygenation  Outcome: Progressing  Flowsheets (Taken 10/26/2023 2000 by Nenita Quispe RN)  Achieves optimal ventilation and oxygenation:   Assess for changes in respiratory status   Assess for changes in mentation and behavior   Position to facilitate oxygenation and minimize respiratory effort   Oxygen supplementation based on oxygen saturation or arterial blood gases   Assess the need for suctioning and aspirate as needed     Problem: Safety - Medical Restraint  Goal: Remains free of injury from restraints (Restraint for Interference with Medical Device)  Description: INTERVENTIONS:  1. Determine that other, less restrictive measures have been tried or would not be effective before applying the restraint  2. Evaluate the patient's condition at the time of restraint application  3. Inform patient/family regarding the reason for restraint  4.  Q2H: Monitor safety, psychosocial status, comfort, nutrition and hydration  Outcome: Progressing  Flowsheets  Taken 10/27/2023 2000 by Otoniel Bauer RN  Remains free of injury from restraints (restraint for interference with medical device):   Determine that other, less restrictive measures have been tried or would not be effective before applying the restraint   Every 2 hours: Monitor safety, psychosocial status, comfort, nutrition and hydration  Taken 10/27/2023 1800 by Magnus Concepcion RN  Remains free of injury from restraints (restraint for interference with medical device): Determine that other, less restrictive measures have been tried or would not be effective before applying the restraint  Taken 10/27/2023 1600 by Magnus Concepcion RN  Remains free of injury from restraints (restraint for interference with medical device): Determine that other, less restrictive measures have been tried or would not be effective before applying the restraint  Taken 10/27/2023 1400 by Magnus Concepcion RN  Remains free of injury from restraints (restraint for interference with medical device): Determine that other, less restrictive measures have been tried or would not be effective before applying the restraint  Taken 10/27/2023 1200 by Magnus Concepcion RN  Remains free of injury from restraints (restraint for interference with medical device): Determine that other, less restrictive measures have been tried or would not be effective before

## 2023-10-28 NOTE — CARE COORDINATION
INTERDISCIPLINARY PLAN OF CARE CONFERENCE    Date/Time: 10/27/2023 8:45 PM  Completed by: Deisi Raman RN, Case Management      Patient Name:  Clancy Homans  YOB: 1960  Admitting Diagnosis: Acute respiratory failure with hypoxia ([address]) [J96.01]  Pneumonia of both upper lobes due to infectious organism [J18.9]  Pneumonia, unspecified organism [J18.9]     Admit Date/Time:  10/22/2023  8:27 AM    Chart reviewed. Interdisciplinary team contacted or reviewed plan related to patient progress and discharge plans. Disciplines included Case Management, Nursing, and Dietitian. Current Status:10/22/2023  PT/OT recommendation for discharge plan of care: NA    Expected D/C Disposition:  Home    Patient improving. Plan remains to return home with spouse at discharge.

## 2023-10-28 NOTE — PROGRESS NOTES
4 Eyes Skin Assessment     NAME:  Aldo Barger  YOB: 1960  MEDICAL RECORD NUMBER:  [id number]    The patient is being assessed for  Shift Handoff    I agree that at least one RN has performed a thorough Head to Toe Skin Assessment on the patient. ALL assessment sites listed below have been assessed. Areas assessed by both nurses:    Head, Face, Ears, Shoulders, Back, Chest, Arms, Elbows, Hands, Sacrum. Buttock, Coccyx, Ischium, Legs. Feet and Heels, and Under Medical Devices         Does the Patient have a Wound? No noted wound(s)       Sukumar Prevention initiated by RN: Yes  Wound Care Orders initiated by RN: No    Pressure Injury (Stage 3,4, Unstageable, DTI, NWPT, and Complex wounds) if present, place Wound referral order by RN under : No    New Ostomies, if present place, Ostomy referral order under : No     Nurse 1 eSignature: Electronically signed by Harmeet Tran RN on 10/28/23 at 6:08 AM EDT    **SHARE this note so that the co-signing nurse can place an eSignature**    Nurse 2 eSignature: Electronically signed by Disha Mahmood RN on 10/28/23 at 6:09 AM EDT     Patient is not able to demonstrated the ability to move from a reclining position to an upright position within the recliner. Patient is confused, demented and /or unable to follow instruction.

## 2023-10-28 NOTE — PROGRESS NOTES
10/27/23 2251   Patient Observation   Observations 7.5 ett 25 at lip   Breath Sounds   Right Upper Lobe Diminished   Right Middle Lobe Diminished   Right Lower Lobe Diminished   Left Upper Lobe Diminished   Left Lower Lobe Diminished   Vent Information   Vent Mode AC/VC   Ventilator Settings   FiO2  45 %   Vt (Set, mL) 400 mL   Resp Rate (Set) 18 bpm   PEEP/CPAP (cmH2O) 5   Vent Patient Data (Readings)   Vt (Measured) 429 mL   Peak Inspiratory Pressure (cmH2O) 23 cmH2O   Rate Measured 21 br/min   Minute Volume (L/min) 8.3 Liters   Peak Inspiratory Flow (lpm) 55 L/sec   Mean Airway Pressure (cmH2O) 7.6 cmH20   I:E Ratio 1:3.2   Flow Sensitivity 3 L/min   Vent Alarm Settings   High Pressure (cmH2O) 40 cmH2O   Low Minute Volume (lpm) 2 L/min   Low Exhaled Vt (ml) 210 mL   RR High (bpm) 35 br/min   Apnea (secs) 20 secs   Additional Respiratoray Assessments   Humidification Source Heated wire   Humidification Temp 37   Circuit Condensation Drained   Ambu Bag With Mask At Bedside Yes   Airway Clearance   Suction ET Tube   Suction Device Inline suction catheter   Sputum Method Obtained Endotracheal   Sputum Amount Small   Sputum Color/Odor Clear   Sputum Consistency Thick   ETT    Placement Date/Time: 10/24/23 9228   Present on Admission/Arrival: Yes  Placed By: Licensed provider  Placement Verified By: Auscultation;Capnometry; Chest X-ray;Colorimetric ETCO2 device;Esophageal detection device;Direct visualization  Preoxygenation. ..    Secured At 25 cm   Measured From Lips   ETT Placement Center   Secured By Commercial tube louis

## 2023-10-29 ENCOUNTER — APPOINTMENT (OUTPATIENT)
Dept: GENERAL RADIOLOGY | Age: 63
DRG: 207 | End: 2023-10-29
Payer: MEDICARE

## 2023-10-29 LAB
ANION GAP SERPL CALCULATED.3IONS-SCNC: 9 MMOL/L (ref 3–16)
BACTERIA SPEC RESP CULT: ABNORMAL
BASE EXCESS BLDA CALC-SCNC: 3.4 MMOL/L (ref -3–3)
BASE EXCESS BLDA CALC-SCNC: 7.1 MMOL/L (ref -3–3)
BASOPHILS # BLD: 0.1 K/UL (ref 0–0.2)
BASOPHILS NFR BLD: 0.8 %
BUN SERPL-MCNC: 25 MG/DL (ref 7–20)
CALCIUM SERPL-MCNC: 9.1 MG/DL (ref 8.3–10.6)
CHLORIDE SERPL-SCNC: 97 MMOL/L (ref 99–110)
CO2 BLDA-SCNC: 28.9 MMOL/L
CO2 BLDA-SCNC: 32.6 MMOL/L
CO2 SERPL-SCNC: 31 MMOL/L (ref 21–32)
COHGB MFR BLDA: 0.3 % (ref 0–1.5)
COHGB MFR BLDA: 0.3 % (ref 0–1.5)
CREAT SERPL-MCNC: <0.5 MG/DL (ref 0.8–1.3)
DEPRECATED RDW RBC AUTO: 16 % (ref 12.4–15.4)
EOSINOPHIL # BLD: 0 K/UL (ref 0–0.6)
EOSINOPHIL NFR BLD: 0.1 %
GFR SERPLBLD CREATININE-BSD FMLA CKD-EPI: >60 ML/MIN/{1.73_M2}
GLUCOSE BLD-MCNC: 122 MG/DL (ref 70–99)
GLUCOSE BLD-MCNC: 161 MG/DL (ref 70–99)
GLUCOSE BLD-MCNC: 170 MG/DL (ref 70–99)
GLUCOSE BLD-MCNC: 91 MG/DL (ref 70–99)
GLUCOSE SERPL-MCNC: 158 MG/DL (ref 70–99)
GRAM STN SPEC: ABNORMAL
HCO3 BLDA-SCNC: 27.6 MMOL/L (ref 21–29)
HCO3 BLDA-SCNC: 31.3 MMOL/L (ref 21–29)
HCT VFR BLD AUTO: 35.5 % (ref 40.5–52.5)
HGB BLD-MCNC: 11.5 G/DL (ref 13.5–17.5)
HGB BLDA-MCNC: 12.3 G/DL (ref 13.5–17.5)
HGB BLDA-MCNC: 12.3 G/DL (ref 13.5–17.5)
LYMPHOCYTES # BLD: 1.1 K/UL (ref 1–5.1)
LYMPHOCYTES NFR BLD: 10.3 %
MCH RBC QN AUTO: 27.9 PG (ref 26–34)
MCHC RBC AUTO-ENTMCNC: 32.3 G/DL (ref 31–36)
MCV RBC AUTO: 86.4 FL (ref 80–100)
METHGB MFR BLDA: 0.3 %
METHGB MFR BLDA: 0.3 %
MONOCYTES # BLD: 0.5 K/UL (ref 0–1.3)
MONOCYTES NFR BLD: 4.5 %
NEUTROPHILS # BLD: 9 K/UL (ref 1.7–7.7)
NEUTROPHILS NFR BLD: 84.3 %
O2 THERAPY: ABNORMAL
O2 THERAPY: ABNORMAL
ORGANISM: ABNORMAL
ORGANISM: ABNORMAL
PCO2 BLDA: 40.5 MMHG (ref 35–45)
PCO2 BLDA: 42.7 MMHG (ref 35–45)
PERFORMED ON: ABNORMAL
PERFORMED ON: NORMAL
PH BLDA: 7.45 [PH] (ref 7.35–7.45)
PH BLDA: 7.48 [PH] (ref 7.35–7.45)
PLATELET # BLD AUTO: 230 K/UL (ref 135–450)
PMV BLD AUTO: 8.7 FL (ref 5–10.5)
PO2 BLDA: 80.2 MMHG (ref 75–108)
PO2 BLDA: 93.7 MMHG (ref 75–108)
POTASSIUM SERPL-SCNC: 4.6 MMOL/L (ref 3.5–5.1)
RBC # BLD AUTO: 4.11 M/UL (ref 4.2–5.9)
SAO2 % BLDA: 96.3 %
SAO2 % BLDA: 97.6 %
SODIUM SERPL-SCNC: 137 MMOL/L (ref 136–145)
WBC # BLD AUTO: 10.7 K/UL (ref 4–11)

## 2023-10-29 PROCEDURE — 80048 BASIC METABOLIC PNL TOTAL CA: CPT

## 2023-10-29 PROCEDURE — 2500000003 HC RX 250 WO HCPCS: Performed by: INTERNAL MEDICINE

## 2023-10-29 PROCEDURE — 6370000000 HC RX 637 (ALT 250 FOR IP): Performed by: INTERNAL MEDICINE

## 2023-10-29 PROCEDURE — 71045 X-RAY EXAM CHEST 1 VIEW: CPT

## 2023-10-29 PROCEDURE — 6360000002 HC RX W HCPCS: Performed by: INTERNAL MEDICINE

## 2023-10-29 PROCEDURE — 99291 CRITICAL CARE FIRST HOUR: CPT | Performed by: INTERNAL MEDICINE

## 2023-10-29 PROCEDURE — 85025 COMPLETE CBC W/AUTO DIFF WBC: CPT

## 2023-10-29 PROCEDURE — 82803 BLOOD GASES ANY COMBINATION: CPT

## 2023-10-29 PROCEDURE — 6370000000 HC RX 637 (ALT 250 FOR IP)

## 2023-10-29 PROCEDURE — 2580000003 HC RX 258

## 2023-10-29 PROCEDURE — C9113 INJ PANTOPRAZOLE SODIUM, VIA: HCPCS | Performed by: INTERNAL MEDICINE

## 2023-10-29 PROCEDURE — 94003 VENT MGMT INPAT SUBQ DAY: CPT

## 2023-10-29 PROCEDURE — 2700000000 HC OXYGEN THERAPY PER DAY

## 2023-10-29 PROCEDURE — 94761 N-INVAS EAR/PLS OXIMETRY MLT: CPT

## 2023-10-29 PROCEDURE — 94640 AIRWAY INHALATION TREATMENT: CPT

## 2023-10-29 PROCEDURE — 2000000000 HC ICU R&B

## 2023-10-29 PROCEDURE — 2580000003 HC RX 258: Performed by: INTERNAL MEDICINE

## 2023-10-29 PROCEDURE — 36600 WITHDRAWAL OF ARTERIAL BLOOD: CPT

## 2023-10-29 PROCEDURE — 99233 SBSQ HOSP IP/OBS HIGH 50: CPT | Performed by: INTERNAL MEDICINE

## 2023-10-29 PROCEDURE — 6360000002 HC RX W HCPCS

## 2023-10-29 PROCEDURE — 36592 COLLECT BLOOD FROM PICC: CPT

## 2023-10-29 RX ORDER — IPRATROPIUM BROMIDE AND ALBUTEROL SULFATE 2.5; .5 MG/3ML; MG/3ML
1 SOLUTION RESPIRATORY (INHALATION)
Status: DISCONTINUED | OUTPATIENT
Start: 2023-10-30 | End: 2023-10-31 | Stop reason: HOSPADM

## 2023-10-29 RX ORDER — DIAZEPAM 5 MG/1
5 TABLET ORAL EVERY 8 HOURS
Status: DISCONTINUED | OUTPATIENT
Start: 2023-10-29 | End: 2023-10-30

## 2023-10-29 RX ADMIN — IPRATROPIUM BROMIDE AND ALBUTEROL SULFATE 1 DOSE: .5; 2.5 SOLUTION RESPIRATORY (INHALATION) at 11:14

## 2023-10-29 RX ADMIN — Medication 4 ML: at 19:33

## 2023-10-29 RX ADMIN — HYDROCODONE BITARTRATE AND HOMATROPINE METHYLBROMIDE 5 ML: 5; 1.5 SOLUTION ORAL at 05:00

## 2023-10-29 RX ADMIN — PREGABALIN 150 MG: 100 CAPSULE ORAL at 20:39

## 2023-10-29 RX ADMIN — Medication 1 MCG/KG/HR: at 07:47

## 2023-10-29 RX ADMIN — WATER 40 MG: 1 INJECTION INTRAMUSCULAR; INTRAVENOUS; SUBCUTANEOUS at 20:39

## 2023-10-29 RX ADMIN — DOCUSATE SODIUM 100 MG: 50 LIQUID ORAL at 07:34

## 2023-10-29 RX ADMIN — IPRATROPIUM BROMIDE AND ALBUTEROL SULFATE 1 DOSE: .5; 2.5 SOLUTION RESPIRATORY (INHALATION) at 19:33

## 2023-10-29 RX ADMIN — ENOXAPARIN SODIUM 40 MG: 100 INJECTION SUBCUTANEOUS at 20:39

## 2023-10-29 RX ADMIN — WATER 40 MG: 1 INJECTION INTRAMUSCULAR; INTRAVENOUS; SUBCUTANEOUS at 07:34

## 2023-10-29 RX ADMIN — PANTOPRAZOLE SODIUM 40 MG: 40 INJECTION, POWDER, LYOPHILIZED, FOR SOLUTION INTRAVENOUS at 07:34

## 2023-10-29 RX ADMIN — IPRATROPIUM BROMIDE AND ALBUTEROL SULFATE 1 DOSE: .5; 2.5 SOLUTION RESPIRATORY (INHALATION) at 07:45

## 2023-10-29 RX ADMIN — Medication 10 ML: at 07:35

## 2023-10-29 RX ADMIN — DOXYCYCLINE 100 MG: 100 INJECTION, POWDER, LYOPHILIZED, FOR SOLUTION INTRAVENOUS at 09:53

## 2023-10-29 RX ADMIN — DOXYCYCLINE 100 MG: 100 INJECTION, POWDER, LYOPHILIZED, FOR SOLUTION INTRAVENOUS at 21:30

## 2023-10-29 RX ADMIN — Medication 0.4 MCG/KG/HR: at 17:52

## 2023-10-29 RX ADMIN — QUETIAPINE FUMARATE 50 MG: 25 TABLET ORAL at 07:34

## 2023-10-29 RX ADMIN — SENNOSIDES 17.2 MG: 8.6 TABLET, FILM COATED ORAL at 07:34

## 2023-10-29 RX ADMIN — LEVOTHYROXINE SODIUM 75 MCG: 25 TABLET ORAL at 05:01

## 2023-10-29 RX ADMIN — CHLORHEXIDINE GLUCONATE 0.12% ORAL RINSE 15 ML: 1.2 LIQUID ORAL at 07:34

## 2023-10-29 RX ADMIN — Medication 1 MCG/KG/HR: at 02:52

## 2023-10-29 RX ADMIN — DIAZEPAM 10 MG: 10 TABLET ORAL at 07:49

## 2023-10-29 RX ADMIN — IPRATROPIUM BROMIDE AND ALBUTEROL SULFATE 1 DOSE: .5; 2.5 SOLUTION RESPIRATORY (INHALATION) at 15:52

## 2023-10-29 RX ADMIN — PREGABALIN 150 MG: 100 CAPSULE ORAL at 07:34

## 2023-10-29 RX ADMIN — QUETIAPINE FUMARATE 50 MG: 25 TABLET ORAL at 20:39

## 2023-10-29 RX ADMIN — Medication 40 MG: at 07:36

## 2023-10-29 RX ADMIN — IPRATROPIUM BROMIDE AND ALBUTEROL SULFATE 1 DOSE: .5; 2.5 SOLUTION RESPIRATORY (INHALATION) at 03:18

## 2023-10-29 RX ADMIN — Medication 4 ML: at 07:46

## 2023-10-29 RX ADMIN — DIAZEPAM 10 MG: 10 TABLET ORAL at 01:20

## 2023-10-29 RX ADMIN — HYDROCODONE BITARTRATE AND HOMATROPINE METHYLBROMIDE 5 ML: 5; 1.5 SOLUTION ORAL at 10:50

## 2023-10-29 RX ADMIN — Medication 10 ML: at 20:40

## 2023-10-29 ASSESSMENT — PULMONARY FUNCTION TESTS
PIF_VALUE: 13
PIF_VALUE: 3
PIF_VALUE: 15
PIF_VALUE: 3
PIF_VALUE: 16
PIF_VALUE: 13
PIF_VALUE: 3
PIF_VALUE: 15
PIF_VALUE: 15
PIF_VALUE: 14

## 2023-10-29 NOTE — PROGRESS NOTES
Patient updates given to WINNIE Farr. Patient has rested comfortably on the ventilator overnight without acute changes in assessment noted. Care transferred.

## 2023-10-29 NOTE — PROGRESS NOTES
10/29/23 0319   Patient Observation   Observations 7.5 ett 25 at lip   Breath Sounds   Right Upper Lobe Diminished   Right Middle Lobe Diminished   Right Lower Lobe Diminished   Left Upper Lobe Diminished   Left Lower Lobe Diminished   Vent Information   Vent Mode AC/VC   Ventilator Settings   FiO2  45 %   Vt (Set, mL) 400 mL   Resp Rate (Set) 18 bpm   PEEP/CPAP (cmH2O) 5   Vent Patient Data (Readings)   Vt (Measured) 419 mL   Peak Inspiratory Pressure (cmH2O) 14 cmH2O   Rate Measured 23 br/min   Minute Volume (L/min) 9.4 Liters   Peak Inspiratory Flow (lpm) 55 L/sec   I:E Ratio 1:2.8   Flow Sensitivity 3 L/min   Vent Alarm Settings   High Pressure (cmH2O) 40 cmH2O   Low Minute Volume (lpm) 4 L/min   Low Exhaled Vt (ml) 250 mL   RR High (bpm) 35 br/min   Apnea (secs) 20 secs   Additional Respiratoray Assessments   Humidification Source Heated wire   Humidification Temp 35   Circuit Condensation Drained   Ambu Bag With Mask At Bedside Yes   Airway Clearance   Suction ET Tube   Suction Device Inline suction catheter   Sputum Method Obtained Endotracheal   Sputum Amount Moderate   Sputum Color/Odor White;Yellow   Sputum Consistency Thick   ETT    Placement Date/Time: 10/24/23 1650   Present on Admission/Arrival: Yes  Placed By: Licensed provider  Placement Verified By: Auscultation;Capnometry; Chest X-ray;Colorimetric ETCO2 device;Esophageal detection device;Direct visualization  Preoxygenation. ..    Secured At 25 cm   Measured From Lips   ETT Placement Right   Secured By Commercial tube louis

## 2023-10-29 NOTE — PROGRESS NOTES
Patient care assumed, assessment completed as charted. Patient continues on ventilator, #7.5 at the 25 lip line. A/C 18, , FiO2 45%, PEEP 5. Right IJ CVC in place with Precedex infusing at 0.9mcg/kg/hr. OG in place with tube feed running at 50mL/hr, munguia catheter patent, bilateral soft wrist restraints in place for continued patient safety. No further needs assessed at this time. Will monitor.

## 2023-10-29 NOTE — PROGRESS NOTES
Pt o2 sat 84% on 2 LNC, o2 increrased to 4 LNC o2 sat 88%, o2 increased to [facility]- saturation 92%.

## 2023-10-29 NOTE — PLAN OF CARE
Problem: Discharge Planning  Goal: Discharge to home or other facility with appropriate resources  Outcome: Progressing     Problem: Pain  Goal: Verbalizes/displays adequate comfort level or baseline comfort level  Outcome: Progressing     Problem: Safety - Adult  Goal: Free from fall injury  Outcome: Progressing  Note: Fall precautions in place. Problem: ABCDS Injury Assessment  Goal: Absence of physical injury  Outcome: Progressing     Problem: Respiratory - Adult  Goal: Achieves optimal ventilation and oxygenation  Outcome: Progressing     Problem: Safety - Medical Restraint  Goal: Remains free of injury from restraints (Restraint for Interference with Medical Device)  Description: INTERVENTIONS:  1. Determine that other, less restrictive measures have been tried or would not be effective before applying the restraint  2. Evaluate the patient's condition at the time of restraint application  3. Inform patient/family regarding the reason for restraint  4.  Q2H: Monitor safety, psychosocial status, comfort, nutrition and hydration  Outcome: Progressing  Flowsheets  Taken 10/28/2023 2000 by Kristofer Mallory RN  Remains free of injury from restraints (restraint for interference with medical device):   Determine that other, less restrictive measures have been tried or would not be effective before applying the restraint   Every 2 hours: Monitor safety, psychosocial status, comfort, nutrition and hydration  Taken 10/28/2023 1800 by Marlen Don RN  Remains free of injury from restraints (restraint for interference with medical device): Every 2 hours: Monitor safety, psychosocial status, comfort, nutrition and hydration  Taken 10/28/2023 1600 by Marlen Don RN  Remains free of injury from restraints (restraint for interference with medical device): Every 2 hours: Monitor safety, psychosocial status, comfort, nutrition and hydration  Taken 10/28/2023 1400 by Marlen Don RN  Remains free of injury from

## 2023-10-29 NOTE — PROGRESS NOTES
ABG's drawn from Right Radial x1 attempt By Joseph GAYTAN. Site prepped using proper technique. Modified Devyn's test performed and positive. Pressure held on site for 5 minutes after procedure. Pt tolerated well. Specimen sent to lab. Awaiting results.

## 2023-10-29 NOTE — PROGRESS NOTES
Kulwant Redd, RT at bedside to place pt on SBT. Precedex gtt continues, tube feed off. Restraints secured.     Jamin Dawson RN, BSN

## 2023-10-29 NOTE — PROGRESS NOTES
AM assessment completed. AM labs reviewed. AM meds administered. PIV wnl. Pt in bilateral wrist restraints for safety. No new orders at present time. Pts spouse at bedside. TF on hold for SBT. Precedex gtt continues. High risk vesicant drug infusing:  __________    Multiple incompatible medications infusing:  _________    CVP Monitoring:  _________    Extremely difficult IV access challenge:  ____x____    Continued need for central line access:  ___x_______    Addressed with physician:  ___x_____    RIGHT PATIENT, RIGHT TIME, RIGHT LINE    Awaiting rounds.     Cathy Valdes RN, BSN

## 2023-10-29 NOTE — PROGRESS NOTES
4 Eyes Skin Assessment     NAME:  Armando Montes  YOB: 1960  MEDICAL RECORD NUMBER:  [id number]    The patient is being assessed for  Shift Handoff    I agree that at least one RN has performed a thorough Head to Toe Skin Assessment on the patient. ALL assessment sites listed below have been assessed. Areas assessed by both nurses:    Head, Face, Ears, Shoulders, Back, Chest, Arms, Elbows, Hands, Sacrum. Buttock, Coccyx, Ischium, Legs. Feet and Heels, and Under Medical Devices         Does the Patient have a Wound?  No       Sukumar Prevention initiated by RN: Yes  Wound Care Orders initiated by RN: No    Pressure Injury (Stage 3,4, Unstageable, DTI, NWPT, and Complex wounds) if present, place Wound referral order by RN under : No    New Ostomies, if present place, Ostomy referral order under : No     Nurse 1 eSignature: Electronically signed by Willian Alan RN on 10/29/23 at 6:24 AM EDT    **SHARE this note so that the co-signing nurse can place an eSignature**    Nurse 2 eSignature: {Esignature:[id number]}

## 2023-10-29 NOTE — PROGRESS NOTES
10/28/23 2324   Patient Observation   Observations 7.5 ett 25 at lip   Breath Sounds   Right Upper Lobe Diminished   Right Middle Lobe Diminished   Right Lower Lobe Diminished   Left Upper Lobe Diminished   Left Lower Lobe Diminished   Vent Information   Vent Mode AC/VC   Ventilator Settings   FiO2  45 %   Vt (Set, mL) 400 mL   Resp Rate (Set) 18 bpm   PEEP/CPAP (cmH2O) 5   Vent Patient Data (Readings)   Vt (Measured) 414 mL   Peak Inspiratory Pressure (cmH2O) 13 cmH2O   Rate Measured 25 br/min   Minute Volume (L/min) 10.2 Liters   Peak Inspiratory Flow (lpm) 55 L/sec   Mean Airway Pressure (cmH2O) 10 cmH20   I:E Ratio 1:2.7   Flow Sensitivity 3 L/min   Vent Alarm Settings   High Pressure (cmH2O) 40 cmH2O   Low Minute Volume (lpm) 4 L/min   Low Exhaled Vt (ml) 250 mL   RR High (bpm) 35 br/min   Apnea (secs) 20 secs   Additional Respiratoray Assessments   Humidification Source Heated wire   Humidification Temp 36.8   Circuit Condensation Drained   Ambu Bag With Mask At Bedside Yes   Airway Clearance   Suction ET Tube   Suction Device Inline suction catheter   Sputum Method Obtained Endotracheal   Sputum Amount Moderate   Sputum Color/Odor White;Yellow   Sputum Consistency Thick   ETT    Placement Date/Time: 10/24/23 2336   Present on Admission/Arrival: Yes  Placed By: Licensed provider  Placement Verified By: Auscultation;Capnometry; Chest X-ray;Colorimetric ETCO2 device;Esophageal detection device;Direct visualization  Preoxygenation. ..    Secured At 25 cm   Measured From Lips   ETT Placement Center   Secured By Commercial tube louis

## 2023-10-30 LAB
ANION GAP SERPL CALCULATED.3IONS-SCNC: 10 MMOL/L (ref 3–16)
BASOPHILS # BLD: 0.1 K/UL (ref 0–0.2)
BASOPHILS NFR BLD: 0.5 %
BUN SERPL-MCNC: 30 MG/DL (ref 7–20)
CALCIUM SERPL-MCNC: 9.3 MG/DL (ref 8.3–10.6)
CHLORIDE SERPL-SCNC: 100 MMOL/L (ref 99–110)
CO2 SERPL-SCNC: 29 MMOL/L (ref 21–32)
CREAT SERPL-MCNC: <0.5 MG/DL (ref 0.8–1.3)
DEPRECATED RDW RBC AUTO: 15.8 % (ref 12.4–15.4)
EOSINOPHIL # BLD: 0 K/UL (ref 0–0.6)
EOSINOPHIL NFR BLD: 0.4 %
GFR SERPLBLD CREATININE-BSD FMLA CKD-EPI: >60 ML/MIN/{1.73_M2}
GLUCOSE BLD-MCNC: 130 MG/DL (ref 70–99)
GLUCOSE BLD-MCNC: 164 MG/DL (ref 70–99)
GLUCOSE BLD-MCNC: 61 MG/DL (ref 70–99)
GLUCOSE BLD-MCNC: 76 MG/DL (ref 70–99)
GLUCOSE SERPL-MCNC: 122 MG/DL (ref 70–99)
HCT VFR BLD AUTO: 35.9 % (ref 40.5–52.5)
HGB BLD-MCNC: 11.6 G/DL (ref 13.5–17.5)
LYMPHOCYTES # BLD: 1.5 K/UL (ref 1–5.1)
LYMPHOCYTES NFR BLD: 12.5 %
MCH RBC QN AUTO: 28 PG (ref 26–34)
MCHC RBC AUTO-ENTMCNC: 32.4 G/DL (ref 31–36)
MCV RBC AUTO: 86.2 FL (ref 80–100)
MONOCYTES # BLD: 0.6 K/UL (ref 0–1.3)
MONOCYTES NFR BLD: 4.9 %
NEUTROPHILS # BLD: 9.7 K/UL (ref 1.7–7.7)
NEUTROPHILS NFR BLD: 81.7 %
PERFORMED ON: ABNORMAL
PERFORMED ON: NORMAL
PLATELET # BLD AUTO: 238 K/UL (ref 135–450)
PMV BLD AUTO: 8.9 FL (ref 5–10.5)
POTASSIUM SERPL-SCNC: 4.5 MMOL/L (ref 3.5–5.1)
RBC # BLD AUTO: 4.16 M/UL (ref 4.2–5.9)
SODIUM SERPL-SCNC: 139 MMOL/L (ref 136–145)
WBC # BLD AUTO: 11.9 K/UL (ref 4–11)

## 2023-10-30 PROCEDURE — 94660 CPAP INITIATION&MGMT: CPT

## 2023-10-30 PROCEDURE — 97535 SELF CARE MNGMENT TRAINING: CPT

## 2023-10-30 PROCEDURE — 6370000000 HC RX 637 (ALT 250 FOR IP): Performed by: INTERNAL MEDICINE

## 2023-10-30 PROCEDURE — 2500000003 HC RX 250 WO HCPCS: Performed by: INTERNAL MEDICINE

## 2023-10-30 PROCEDURE — 6360000002 HC RX W HCPCS: Performed by: INTERNAL MEDICINE

## 2023-10-30 PROCEDURE — 97530 THERAPEUTIC ACTIVITIES: CPT

## 2023-10-30 PROCEDURE — C9113 INJ PANTOPRAZOLE SODIUM, VIA: HCPCS | Performed by: INTERNAL MEDICINE

## 2023-10-30 PROCEDURE — 97165 OT EVAL LOW COMPLEX 30 MIN: CPT

## 2023-10-30 PROCEDURE — 2700000000 HC OXYGEN THERAPY PER DAY

## 2023-10-30 PROCEDURE — 36592 COLLECT BLOOD FROM PICC: CPT

## 2023-10-30 PROCEDURE — 94761 N-INVAS EAR/PLS OXIMETRY MLT: CPT

## 2023-10-30 PROCEDURE — 6370000000 HC RX 637 (ALT 250 FOR IP)

## 2023-10-30 PROCEDURE — 97162 PT EVAL MOD COMPLEX 30 MIN: CPT

## 2023-10-30 PROCEDURE — 99233 SBSQ HOSP IP/OBS HIGH 50: CPT | Performed by: INTERNAL MEDICINE

## 2023-10-30 PROCEDURE — 80048 BASIC METABOLIC PNL TOTAL CA: CPT

## 2023-10-30 PROCEDURE — 1200000000 HC SEMI PRIVATE

## 2023-10-30 PROCEDURE — 85025 COMPLETE CBC W/AUTO DIFF WBC: CPT

## 2023-10-30 PROCEDURE — 2580000003 HC RX 258: Performed by: INTERNAL MEDICINE

## 2023-10-30 PROCEDURE — 6360000002 HC RX W HCPCS

## 2023-10-30 PROCEDURE — 94640 AIRWAY INHALATION TREATMENT: CPT

## 2023-10-30 PROCEDURE — 2580000003 HC RX 258

## 2023-10-30 RX ORDER — DIAZEPAM 5 MG/1
2.5 TABLET ORAL EVERY 8 HOURS
Status: DISCONTINUED | OUTPATIENT
Start: 2023-10-30 | End: 2023-10-31 | Stop reason: HOSPADM

## 2023-10-30 RX ORDER — FLUOXETINE HYDROCHLORIDE 20 MG/1
40 CAPSULE ORAL DAILY
Status: DISCONTINUED | OUTPATIENT
Start: 2023-10-31 | End: 2023-10-30

## 2023-10-30 RX ORDER — SENNA AND DOCUSATE SODIUM 50; 8.6 MG/1; MG/1
2 TABLET, FILM COATED ORAL 2 TIMES DAILY
Status: DISCONTINUED | OUTPATIENT
Start: 2023-10-30 | End: 2023-10-31 | Stop reason: HOSPADM

## 2023-10-30 RX ORDER — HYDROCODONE BITARTRATE AND HOMATROPINE METHYLBROMIDE ORAL SOLUTION 5; 1.5 MG/5ML; MG/5ML
5 LIQUID ORAL EVERY 6 HOURS PRN
Status: DISCONTINUED | OUTPATIENT
Start: 2023-10-30 | End: 2023-10-31 | Stop reason: HOSPADM

## 2023-10-30 RX ORDER — PREDNISONE 20 MG/1
40 TABLET ORAL DAILY
Status: DISCONTINUED | OUTPATIENT
Start: 2023-10-31 | End: 2023-10-31 | Stop reason: HOSPADM

## 2023-10-30 RX ORDER — FLUOXETINE HYDROCHLORIDE 20 MG/1
20 CAPSULE ORAL DAILY
Status: DISCONTINUED | OUTPATIENT
Start: 2023-10-31 | End: 2023-10-31 | Stop reason: HOSPADM

## 2023-10-30 RX ORDER — QUETIAPINE FUMARATE 25 MG/1
50 TABLET, FILM COATED ORAL NIGHTLY
Status: DISCONTINUED | OUTPATIENT
Start: 2023-10-31 | End: 2023-10-31 | Stop reason: HOSPADM

## 2023-10-30 RX ADMIN — DIAZEPAM 2.5 MG: 5 TABLET ORAL at 17:51

## 2023-10-30 RX ADMIN — PANTOPRAZOLE SODIUM 40 MG: 40 INJECTION, POWDER, LYOPHILIZED, FOR SOLUTION INTRAVENOUS at 08:32

## 2023-10-30 RX ADMIN — Medication 10 ML: at 08:33

## 2023-10-30 RX ADMIN — Medication 4 ML: at 08:28

## 2023-10-30 RX ADMIN — SENNOSIDES 17.2 MG: 8.6 TABLET, FILM COATED ORAL at 08:32

## 2023-10-30 RX ADMIN — PREGABALIN 150 MG: 100 CAPSULE ORAL at 08:30

## 2023-10-30 RX ADMIN — PREGABALIN 150 MG: 100 CAPSULE ORAL at 22:05

## 2023-10-30 RX ADMIN — IPRATROPIUM BROMIDE AND ALBUTEROL SULFATE 1 DOSE: 2.5; .5 SOLUTION RESPIRATORY (INHALATION) at 08:28

## 2023-10-30 RX ADMIN — LEVOTHYROXINE SODIUM 75 MCG: 25 TABLET ORAL at 08:31

## 2023-10-30 RX ADMIN — Medication 40 MG: at 08:42

## 2023-10-30 RX ADMIN — SENNOSIDES AND DOCUSATE SODIUM 2 TABLET: 8.6; 5 TABLET ORAL at 22:06

## 2023-10-30 RX ADMIN — ATORVASTATIN CALCIUM 80 MG: 40 TABLET, FILM COATED ORAL at 22:05

## 2023-10-30 RX ADMIN — IPRATROPIUM BROMIDE AND ALBUTEROL SULFATE 1 DOSE: 2.5; .5 SOLUTION RESPIRATORY (INHALATION) at 12:10

## 2023-10-30 RX ADMIN — QUETIAPINE FUMARATE 50 MG: 25 TABLET ORAL at 08:30

## 2023-10-30 RX ADMIN — DOXYCYCLINE 100 MG: 100 INJECTION, POWDER, LYOPHILIZED, FOR SOLUTION INTRAVENOUS at 10:52

## 2023-10-30 RX ADMIN — WATER 40 MG: 1 INJECTION INTRAMUSCULAR; INTRAVENOUS; SUBCUTANEOUS at 08:29

## 2023-10-30 RX ADMIN — SODIUM CHLORIDE: 9 INJECTION, SOLUTION INTRAVENOUS at 22:35

## 2023-10-30 RX ADMIN — OXYCODONE AND ACETAMINOPHEN 1 TABLET: 5; 325 TABLET ORAL at 08:30

## 2023-10-30 RX ADMIN — Medication 4 ML: at 21:05

## 2023-10-30 RX ADMIN — DOCUSATE SODIUM 100 MG: 50 LIQUID ORAL at 08:28

## 2023-10-30 RX ADMIN — DIAZEPAM 5 MG: 5 TABLET ORAL at 08:30

## 2023-10-30 RX ADMIN — ENOXAPARIN SODIUM 40 MG: 100 INJECTION SUBCUTANEOUS at 22:46

## 2023-10-30 RX ADMIN — DOXYCYCLINE 100 MG: 100 INJECTION, POWDER, LYOPHILIZED, FOR SOLUTION INTRAVENOUS at 22:36

## 2023-10-30 RX ADMIN — IPRATROPIUM BROMIDE AND ALBUTEROL SULFATE 1 DOSE: 2.5; .5 SOLUTION RESPIRATORY (INHALATION) at 21:05

## 2023-10-30 ASSESSMENT — PAIN DESCRIPTION - ORIENTATION: ORIENTATION: OTHER (COMMENT)

## 2023-10-30 ASSESSMENT — PAIN DESCRIPTION - DESCRIPTORS
DESCRIPTORS: ACHING
DESCRIPTORS: ACHING

## 2023-10-30 ASSESSMENT — PAIN DESCRIPTION - LOCATION
LOCATION: GENERALIZED
LOCATION: BACK

## 2023-10-30 ASSESSMENT — PAIN SCALES - GENERAL
PAINLEVEL_OUTOF10: 9
PAINLEVEL_OUTOF10: 7

## 2023-10-30 ASSESSMENT — PAIN DESCRIPTION - PAIN TYPE: TYPE: CHRONIC PAIN

## 2023-10-30 ASSESSMENT — PAIN - FUNCTIONAL ASSESSMENT: PAIN_FUNCTIONAL_ASSESSMENT: ACTIVITIES ARE NOT PREVENTED

## 2023-10-30 ASSESSMENT — PULMONARY FUNCTION TESTS: PIF_VALUE: 19

## 2023-10-30 NOTE — PROGRESS NOTES
CM-SR, VSS, pt remains afebrile, pt is tolerating PO well. Pt is up in the bedside chair & tolerating well @ this time.

## 2023-10-30 NOTE — PROGRESS NOTES
Telemetry Assignment Communication Form    Patient has orders for continuous telemetry OR pulse oximeter only orders    To be filled out by Clinical    Patient has Admission or Transfer orders and is assigned to Room Number: 203      (Once this top section is completed:  Select \"Route\" and send note to Fax number: [phone] ))      ___________________________________________________________________________      [facility]    Patient assigned to tele box number: __________________               (to be written in by [address] when telemetry box is assigned to patient)    ___________________________________________________________________________      Bedside RN confirming that the box listed above is in fact the telemetry box number being placed on the patient listed above.         X________________________________________ RN signature        __________________________________________RN assigned to Patient (please print)    _______________ Date    ____________ Time

## 2023-10-30 NOTE — CARE COORDINATION
Met with patient and spouse at bedside. The plan remains to return home with spouse at [place]. The family denies need for home care or home OT/PT.

## 2023-10-30 NOTE — PROGRESS NOTES
AM assessment done. Pt is alert & oriented. Placed on 6 Liters High flow NC. Pt is able to take PO w/out difficulty. Pt started on po Diet.

## 2023-10-30 NOTE — PROGRESS NOTES
Patient care assumed, assessment completed as charted. Patient resting in bed, in no apparent distress. SpO2 97% on 6L. Right IJ CVC in place, with Precedex infusing at 0.6mcg/kg/hr. Bruno catheter patent. No further needs assessed at this time. Will monitor.

## 2023-10-30 NOTE — PROGRESS NOTES
10/30/23 0313   NIV Type   NIV Started/Stopped On   Equipment Type 980   Mode Bilevel   Mask Type Full face mask   Mask Size Medium   Assessment   Pulse 69   Respirations 21   SpO2 98 %   Settings/Measurements   IPAP 16 cmH20   CPAP/EPAP 8 cmH2O   Vt (Measured) 381 mL   Rate Ordered 16   Insp Rise Time (%) 1 %   FiO2  45 %   I Time/ I Time % 0 s   Minute Volume (L/min) 5.21 Liters   Mask Leak (lpm) 47 lpm   Patient's Home Machine No   Alarm Settings   High Pressure (cmH2O) 40 cmH2O   RR High (bpm) 40 br/min   Oxygen Therapy/Pulse Ox   O2 Therapy Oxygen humidified   O2 Device PAP (positive airway pressure)   Humidification Source Heated wire   Humidification Temp 37

## 2023-10-30 NOTE — PROGRESS NOTES
Inpatient Physical Therapy Evaluation & Treatment    Unit: ICU  Date:  10/30/2023  Patient Name:    Yo Nolasco  Admitting diagnosis:  Acute respiratory failure with hypoxia ([address]) [J96.01]  Pneumonia of both upper lobes due to infectious organism [J18.9]  Pneumonia, unspecified organism [J18.9]  Admit Date:  10/22/2023  Precautions/Restrictions/WB Status/ Lines/ Wounds/ Oxygen: Fall risk, Bed/chair alarm, Lines (IV and Supplemental O2 (3L)), Telemetry, and Continuous pulse oximetry      Pt seen for cotreatment this date due to patient safety, patient endurance, acute illness/injury, and limited functional status information    Treatment Time:  1430- 1504  Treatment Number:  1   Timed Code Treatment Minutes: 28 minutes  Total Treatment Minutes:  38  minutes    Patient Stated Goals for Therapy: \" to get home \"          Discharge Recommendations: ARU/IRF (inpatient rehab facility)   DME needs for discharge: Defer to facility       Therapy recommendation for EMS Transport: can transport by wheelchair    Therapy recommendations for staff:   Assist of 1 for transfers with use of rolling walker (RW) and gait belt to/from [facility]  to/from [facility]    History of Present Illness:   Per Ashlie Jauregui PA-C H&P:  The patient is a 66 y.o. male with PMHx as below who presented to [facility] ED with complaint of shortness of breath. Patient reports several days of worsening productive cough and shortness of breath. He has COPD and has utilized his inhalers more frequently. He was seen by PCP and a chest x-ray was ordered which showed pneumonia. He was started on oral antibiotics and steroids, however, reports worsening of symptoms and developed chills and subjective fever over the last day. He reports checking his pulse ox at home, which dropped as low as 60% when ambulating. HE denies any recent sick contacts. He does not wear baseline oxygen. He does use tobacco, but reports minimal smoking since the onset of his illness.       10/24- on ROM/Strength  Please see OT evaluation. Lower Extremity ROM / Strength   AROM WFL: Yes  ROM limitations:     Strength Assessment (measured on a 0-5 scale):  R LE   Quad   4-   Ant Tib  4   Hamstring 4-   Iliopsoas 4-  L LE  Quad   4-   Ant Tib  4   Hamstring 4-   Iliopsoas 4-    Lower Extremity Sensation    Impaired B LE     Coordination  WFL    Tone  WNL    Balance  Static Sitting:  Fair ; Min A  to CGA  Dynamic Sitting:  Fair ; Min A  to CGA  Comments: EOB, VC for upright posture, pt initially with posterior lean and then L lateral lean    Static Standing: Fair ; Min A   Dynamic Standing: Fair ; Min A   Comments: RW and gait belt     Posture  Seated: Lateral lean left and Posterior lean  Standing: Posterior lean    Bed Mobility   Supine to Sit:    Min A  HOB elevated and increase time   Sit to Supine:   Not Tested  Rolling:   Not Tested   Scooting in sitting: Min A    Scooting in supine:  Not Tested   Bridging:  Not Tested    Transfer Training     Sit to stand:   Min A    Stand to sit:   Min A    Bed to/from Chair:  Min A  with use of gait belt and rolling walker (RW)    Gait gait deferred due to difficulty with transfers; pt ambulated 0 ft.    Distance:       ft  Deviations (firm surface/linoleum):  N/A  Assistive Device Used:    N/A  Level of Assist:    Not Tested   Comment:     Stair Training deferred, pt unsafe/ not appropriate to complete stairs at this time  # of Steps:   N/A  Level of Assist:  Not Tested   UE Support:  NA  Assistive Device:  N/A  Pattern:   N/A  Comments:      Therapeutic Exercises Initiated  deferred secondary to treatment focus on functional mobility  Supine:  N/A    Seated:  N/A    Standing:  N/A    Activity Tolerance   During therapy session noted pt with no adverse symptoms to activity    Pt Position BP (mmHg) HR (bpm) SpO2 (%) on 3 L  Comments   Supine at rest 131/80  107 95 %    Seated at EOB       Standing   88 %    End of session  114 95 %      Positioning Needs   Pt up in chair and reclined in chair, ICU monitoring, positioned in proper neutral alignment and pressure relief provided. Call light provided and all needs within reach  RN aware of pt position/status    Other Activities  See OT note for ADLs completed    Patient/Family Education   Pt educated on role of inpatient PT, POC, importance of continued activity, DC recommendations, safety awareness, transfer techniques, and calling for assist with mobility. CHF Education  N/A    Assessment  Pt seen today for physical therapy Evaluation & Treatment. Pt demonstrated decreased Activity tolerance, Balance, Safety, and Strength as well as decreased independence with Ambulation, Bed Mobility , and Transfers. Pt is a 67 y/o male who presents below his functional baseline. Pt with decreased ability to complete ambulation this date due to decreased strength and impulsivity. Continue to assess ambulation. Pt would continue to benefit from skilled PT services to promote increased strength, balance and functional activity tolerance. Recommend continued skilled PT services upon discharge. Recommending ARU/IRF/Inpatient Rehab Facility upon discharge as patient functioning well below baseline, demonstrates good rehab potential and unable to return home due to limited or no family support, inability to negotiate stairs to enter home/bedroom/bathroom, burden of care beyond caregiver ability, home environment not conducive to patient recovery, and limited safety awareness. Goals : To be met in 3 visits:  1). Independent with LE Ex x 10 reps  2). Sit to/from stand: CGA  3). Bed to chair: CGA      To be met in 6 visits:  1). Supine to/from sit: Independent  2). Sit to/from stand: Independent  3). Bed to chair: Independent  4). Gait: Ambulate 50 ft.  with CGA and use of LRAD (least restrictive assistive device)  5). Tolerate B LE exercises 3 sets of 10-15 reps  6).   Ascend/descend 3 steps with CGA with use of no handrail and LRAD

## 2023-10-30 NOTE — PROGRESS NOTES
Report given Nahum Bruce on 2 W. Pt transported via W/C on 3 liters per NC. Pt is alert & oriented. He is w/out evidence of distress upon transfer to 2W rm 203.

## 2023-10-30 NOTE — PROGRESS NOTES
Patient report given to WINNIE Sulliavn. Patient has rested comfortably on BiPAP overnight without acute changes in assessment noted. Care transferred.

## 2023-10-30 NOTE — PROGRESS NOTES
Inpatient Occupational Therapy Evaluation and Treatment    Unit: ICU  Date:  10/30/2023  Patient Name:    lAthea Steward  Admitting diagnosis:  Acute respiratory failure with hypoxia ([address]) [J96.01]  Pneumonia of both upper lobes due to infectious organism [J18.9]  Pneumonia, unspecified organism [J18.9]  Admit Date:  10/22/2023  Precautions/Restrictions/WB Status/ Lines/ Wounds/ Oxygen:  Fall risk, Bed/chair alarm, Lines (IV and Supplemental O2 (3L)), Telemetry, and Continuous pulse oximetry       Pt seen for cotreatment this date due to patient endurance and acute illness/injury    Treatment Time:  14:30-15:10  Treatment Number:  1  Timed Code Treatment Minutes: 30 minutes  Total Treatment Minutes:  40  minutes    Patient Goals for Therapy: \" to go home \"          Discharge Recommendations: Acute Rehab (ARU)/ Inpatient [address] (IRF)  DME needs for discharge: Defer to facility       Therapy recommendations for staff:   Assist of 1 for transfers with use of rolling walker (RW) and gait belt to/from chair    History of Present Illness: Patrick Goyal PA-C H&P:  The patient is a 66 y.o. male with PMHx as below who presented to [facility] ED with complaint of shortness of breath. Patient reports several days of worsening productive cough and shortness of breath. He has COPD and has utilized his inhalers more frequently. He was seen by PCP and a chest x-ray was ordered which showed pneumonia. He was started on oral antibiotics and steroids, however, reports worsening of symptoms and developed chills and subjective fever over the last day. He reports checking his pulse ox at home, which dropped as low as 60% when ambulating. HE denies any recent sick contacts. He does not wear baseline oxygen. He does use tobacco, but reports minimal smoking since the onset of his illness. 10/24- on Vapo therm. He uses Bipap last night. He is feeling better.  He dropped his oxygen saturation with coughing but doing better this am. Subsequently feeling worse and placed on Bipap and wants to be intubated. He was subsequently intubated     10/25- on the ventilator. On sedation. FiO2 60% and PEEP is 10.     10/26- having thick secretions with suctioning. Had de saturation with coughing on the vent. On sedation. No fever. 10/27- have secretions. On FiO2 of 40% and PEEP of 5.      10/28 - Remains intubated with thick secretions. Wife at the bedside. 10/29: Pt extubated to [address] [place] [address] S4 Level Recommendation:  NA    AM-PAC Score: AM-PAC Inpatient Daily Activity Raw Score: 15     Subjective:  Patient lying reclined in bed with no family present. Pt agreeable to this OT session. Cognition:    A&O x4   Able to follow 1 step commands    Pain:   No  Location:   Rating: NA /10  Pain Medicine Status: Denies need    Preadmission Environment:   Pt lives with                                         Spouse available 24 hr and able to assist physically  Home environment:                            one story home  Steps to enter first floor:                     3 steps to enter and no handrail  Steps to second floor/basement:        N/A  Laundry:                                              Family completes  Bathroom:                                           tub/shower unit and standard height toilet  Pt sleeps in a:                                     Flat bed  Equipment owned:                               and [facility]     Preadmission Status:  Pt able to drive: Yes  Pt fully independent with ADLs: Yes  Pt receives assistance from family for: Cleaning, Cooking, Laundry , and grocery shopping  Pt independent for functional transfers and utilized [facility] occasionally for mobility in home and SPC occasionally out in community  History of falls:            Yes 2, he tripped and broke his elbow  Home Health Services:None    Objective:  Does this pt have an acute or acute on chronic diagnosis of CHF?  No    Upper Extremity ROM:    B UEs grossly

## 2023-10-30 NOTE — PROGRESS NOTES
Report given @ bedside to [facility] and [address] RN, for transferral of care. Pt resting in bed. Plan recheck vitals once settled r/t high Mews score r/t transferred down in wheelchair and up to bed with walker. Call light in reach.

## 2023-10-30 NOTE — PROGRESS NOTES
10/29/23 9289   NIV Type   NIV Started/Stopped (S)  On   Equipment Type 980   Mode Bilevel   Mask Type Full face mask   Mask Size Medium   Assessment   Pulse 68   Respirations 21   SpO2 96 %   Settings/Measurements   PIP Observed 20 cm H20   IPAP 16 cmH20   CPAP/EPAP 8 cmH2O   Vt (Measured) 472 mL   Rate Ordered 16   FiO2  45 %   I Time/ I Time % 0.7 s   Minute Volume (L/min) 7.03 Liters   Mask Leak (lpm) 25 lpm   Patient's Home Machine No   Alarm Settings   Alarms On Y   Low Pressure (cmH2O) 10 cmH2O   High Pressure (cmH2O) 40 cmH2O   Delay Alarm 20 sec(s)   RR High (bpm) 40 br/min   Oxygen Therapy/Pulse Ox   O2 Therapy Oxygen humidified   O2 Device PAP (positive airway pressure)   Humidification Source Heated wire   Humidification Temp 31   Humidification Temp Measured 31

## 2023-10-30 NOTE — PROGRESS NOTES
4 Eyes Skin Assessment     NAME:  Romi Olson  YOB: 1960  MEDICAL RECORD NUMBER:  [id number]    The patient is being assessed for  Shift Handoff    I agree that at least one RN has performed a thorough Head to Toe Skin Assessment on the patient. ALL assessment sites listed below have been assessed. Areas assessed by both nurses:    Head, Face, Ears, Shoulders, Back, Chest, Arms, Elbows, Hands, Sacrum. Buttock, Coccyx, Ischium, Legs. Feet and Heels, and Under Medical Devices         Does the Patient have a Wound? No noted wound(s)       Sukumar Prevention initiated by RN: Yes  Wound Care Orders initiated by RN: No    Pressure Injury (Stage 3,4, Unstageable, DTI, NWPT, and Complex wounds) if present, place Wound referral order by RN under : No    New Ostomies, if present place, Ostomy referral order under : No     Nurse 1 eSignature: Electronically signed by Whit Camacho RN on 10/30/23 at 6:50 AM EDT    **SHARE this note so that the co-signing nurse can place an eSignature**    Nurse 2 eSignature: Electronically signed by Nicole Richmond RN on 10/30/23 at 6:51 AM EDT    Patient is not able to demonstrated the ability to move from a reclining position to an upright position within the recliner.  however patient is alert, oriented and able to provide informed consent

## 2023-10-30 NOTE — PROGRESS NOTES
4 Eyes Skin Assessment     NAME:  Guerline Witt  YOB: 1960  MEDICAL RECORD NUMBER:  [id number]    The patient is being assessed for  Transfer to New Unit    I agree that at least one RN has performed a thorough Head to Toe Skin Assessment on the patient. ALL assessment sites listed below have been assessed. Areas assessed by both nurses:    Head, Face, Ears, Shoulders, Back, Chest, Arms, Elbows, Hands, Sacrum. Buttock, Coccyx, Ischium, Legs. Feet and Heels, and Under Medical Devices         Does the Patient have a Wound?  No noted wound(s)       Sukumar Prevention initiated by RN: Yes  Wound Care Orders initiated by RN: No    Pressure Injury (Stage 3,4, Unstageable, DTI, NWPT, and Complex wounds) if present, place Wound referral order by RN under : No    New Ostomies, if present place, Ostomy referral order under : No     Nurse 1 eSignature: Electronically signed by Jessica Feldman RN on 10/30/23 at 6:18 PM EDT    **SHARE this note so that the co-signing nurse can place an eSignature**    Nurse 2 eSignature: Electronically signed by Samira Kelly RN on 10/30/23 at 7:38 PM EDT

## 2023-10-30 NOTE — PLAN OF CARE
Problem: Discharge Planning  Goal: Discharge to home or other facility with appropriate resources  Outcome: Progressing  Flowsheets (Taken 10/26/2023 2000 by Marino Jaffe RN)  Discharge to home or other facility with appropriate resources:   Identify barriers to discharge with patient and caregiver   Arrange for needed discharge resources and transportation as appropriate     Problem: Pain  Goal: Verbalizes/displays adequate comfort level or baseline comfort level  Outcome: Progressing  Flowsheets (Taken 10/26/2023 2000 by Marino Jaffe RN)  Verbalizes/displays adequate comfort level or baseline comfort level:   Encourage patient to monitor pain and request assistance   Assess pain using appropriate pain scale   Administer analgesics based on type and severity of pain and evaluate response   Implement non-pharmacological measures as appropriate and evaluate response     Problem: Safety - Adult  Goal: Free from fall injury  Outcome: Progressing  Flowsheets (Taken 10/27/2023 2044)  Free From Fall Injury:   Instruct family/caregiver on patient safety   Based on caregiver fall risk screen, instruct family/caregiver to ask for assistance with transferring infant if caregiver noted to have fall risk factors  Note: Fall precautions in place.       Problem: ABCDS Injury Assessment  Goal: Absence of physical injury  Outcome: Progressing  Flowsheets (Taken 10/27/2023 2044)  Absence of Physical Injury: Implement safety measures based on patient assessment     Problem: Respiratory - Adult  Goal: Achieves optimal ventilation and oxygenation  Outcome: Progressing  Flowsheets (Taken 10/26/2023 2000 by Marino Jaffe RN)  Achieves optimal ventilation and oxygenation:   Assess for changes in respiratory status   Assess for changes in mentation and behavior   Position to facilitate oxygenation and minimize respiratory effort   Oxygen supplementation based on oxygen saturation or arterial

## 2023-10-31 VITALS
OXYGEN SATURATION: 100 % | TEMPERATURE: 97.5 F | BODY MASS INDEX: 25.18 KG/M2 | DIASTOLIC BLOOD PRESSURE: 89 MMHG | HEIGHT: 69 IN | RESPIRATION RATE: 18 BRPM | HEART RATE: 100 BPM | WEIGHT: 170 LBS | SYSTOLIC BLOOD PRESSURE: 130 MMHG

## 2023-10-31 PROCEDURE — 94761 N-INVAS EAR/PLS OXIMETRY MLT: CPT

## 2023-10-31 PROCEDURE — 6370000000 HC RX 637 (ALT 250 FOR IP): Performed by: INTERNAL MEDICINE

## 2023-10-31 PROCEDURE — C9113 INJ PANTOPRAZOLE SODIUM, VIA: HCPCS | Performed by: INTERNAL MEDICINE

## 2023-10-31 PROCEDURE — 99239 HOSP IP/OBS DSCHRG MGMT >30: CPT | Performed by: INTERNAL MEDICINE

## 2023-10-31 PROCEDURE — 2700000000 HC OXYGEN THERAPY PER DAY

## 2023-10-31 PROCEDURE — 97116 GAIT TRAINING THERAPY: CPT

## 2023-10-31 PROCEDURE — 51798 US URINE CAPACITY MEASURE: CPT

## 2023-10-31 PROCEDURE — 99233 SBSQ HOSP IP/OBS HIGH 50: CPT | Performed by: INTERNAL MEDICINE

## 2023-10-31 PROCEDURE — 51702 INSERT TEMP BLADDER CATH: CPT

## 2023-10-31 PROCEDURE — 2580000003 HC RX 258: Performed by: INTERNAL MEDICINE

## 2023-10-31 PROCEDURE — 97530 THERAPEUTIC ACTIVITIES: CPT

## 2023-10-31 PROCEDURE — 97535 SELF CARE MNGMENT TRAINING: CPT

## 2023-10-31 PROCEDURE — 94660 CPAP INITIATION&MGMT: CPT

## 2023-10-31 PROCEDURE — 51701 INSERT BLADDER CATHETER: CPT

## 2023-10-31 PROCEDURE — 97166 OT EVAL MOD COMPLEX 45 MIN: CPT

## 2023-10-31 PROCEDURE — 94640 AIRWAY INHALATION TREATMENT: CPT

## 2023-10-31 PROCEDURE — 6360000002 HC RX W HCPCS: Performed by: INTERNAL MEDICINE

## 2023-10-31 RX ORDER — PANTOPRAZOLE SODIUM 40 MG/1
40 TABLET, DELAYED RELEASE ORAL
Status: DISCONTINUED | OUTPATIENT
Start: 2023-11-01 | End: 2023-10-31 | Stop reason: HOSPADM

## 2023-10-31 RX ORDER — DOXYCYCLINE HYCLATE 100 MG
100 TABLET ORAL 2 TIMES DAILY
Qty: 4 TABLET | Refills: 0 | Status: SHIPPED | OUTPATIENT
Start: 2023-10-31 | End: 2023-11-02

## 2023-10-31 RX ORDER — DOXYCYCLINE HYCLATE 100 MG
100 TABLET ORAL EVERY 12 HOURS SCHEDULED
Status: DISCONTINUED | OUTPATIENT
Start: 2023-10-31 | End: 2023-10-31 | Stop reason: HOSPADM

## 2023-10-31 RX ORDER — PREDNISONE 20 MG/1
TABLET ORAL
Qty: 13 TABLET | Refills: 0 | Status: SHIPPED | OUTPATIENT
Start: 2023-11-01

## 2023-10-31 RX ORDER — TAMSULOSIN HYDROCHLORIDE 0.4 MG/1
0.4 CAPSULE ORAL DAILY
Status: DISCONTINUED | OUTPATIENT
Start: 2023-10-31 | End: 2023-10-31 | Stop reason: HOSPADM

## 2023-10-31 RX ADMIN — PREDNISONE 40 MG: 20 TABLET ORAL at 08:15

## 2023-10-31 RX ADMIN — LEVOTHYROXINE SODIUM 75 MCG: 25 TABLET ORAL at 05:09

## 2023-10-31 RX ADMIN — FLUOXETINE 20 MG: 20 CAPSULE ORAL at 09:00

## 2023-10-31 RX ADMIN — DIAZEPAM 2.5 MG: 5 TABLET ORAL at 02:10

## 2023-10-31 RX ADMIN — TAMSULOSIN HYDROCHLORIDE 0.4 MG: 0.4 CAPSULE ORAL at 09:41

## 2023-10-31 RX ADMIN — DOXYCYCLINE HYCLATE 100 MG: 100 TABLET, COATED ORAL at 09:41

## 2023-10-31 RX ADMIN — IPRATROPIUM BROMIDE AND ALBUTEROL SULFATE 1 DOSE: 2.5; .5 SOLUTION RESPIRATORY (INHALATION) at 07:17

## 2023-10-31 RX ADMIN — Medication 4 ML: at 07:19

## 2023-10-31 RX ADMIN — SENNOSIDES AND DOCUSATE SODIUM 2 TABLET: 8.6; 5 TABLET ORAL at 08:15

## 2023-10-31 RX ADMIN — PREGABALIN 150 MG: 100 CAPSULE ORAL at 08:17

## 2023-10-31 RX ADMIN — PANTOPRAZOLE SODIUM 40 MG: 40 INJECTION, POWDER, LYOPHILIZED, FOR SOLUTION INTRAVENOUS at 09:00

## 2023-10-31 RX ADMIN — DIAZEPAM 2.5 MG: 5 TABLET ORAL at 08:17

## 2023-10-31 ASSESSMENT — COPD QUESTIONNAIRES
QUESTION5_HOMEACTIVITIES: 3
QUESTION1_COUGHFREQUENCY: 5
QUESTION8_ENERGYLEVEL: 4
QUESTION3_CHESTTIGHTNESS: 4
QUESTION4_WALKINCLINE: 5
QUESTION2_CHESTPHLEGM: 4
QUESTION7_SLEEPQUALITY: 4
QUESTION6_LEAVINGHOUSE: 3
CAT_TOTALSCORE: 32

## 2023-10-31 NOTE — PROGRESS NOTES
Inpatient Occupational Therapy Treatment    Unit: 2 [place]  Date:  10/31/2023  Patient Name:    Chitra Hylton  Admitting diagnosis:  Acute respiratory failure with hypoxia ([address]) [J96.01]  Pneumonia of both upper lobes due to infectious organism [J18.9]  Pneumonia, unspecified organism [J18.9]  Admit Date:  10/22/2023  Precautions/Restrictions/WB Status/ Lines/ Wounds/ Oxygen:  Fall risk, Bed/chair alarm, Lines (IV and Supplemental O2 (2L)), Telemetry, and Continuous pulse oximetry       Pt seen for partial cotreatment this date due to patient endurance and acute illness/injury    Treatment Time: [id number]  Treatment Number:  2  Timed Code Treatment Minutes: 40 minutes  Total Treatment Minutes:  40  minutes    Patient Goals for Therapy: \" to go home\"          Discharge Recommendations: Acute Rehab (ARU)/ Inpatient [address] (IRF)  DME needs for discharge: Defer to facility       Therapy recommendations for staff:   Assist of 1 for transfers with use of rolling walker (RW) and gait belt to/from chair    History of Present Illness: Per Chavez Hayden PA-C H&P:  The patient is a 66 y.o. male with PMHx as below who presented to [facility] ED with complaint of shortness of breath. Patient reports several days of worsening productive cough and shortness of breath. He has COPD and has utilized his inhalers more frequently. He was seen by PCP and a chest x-ray was ordered which showed pneumonia. He was started on oral antibiotics and steroids, however, reports worsening of symptoms and developed chills and subjective fever over the last day. He reports checking his pulse ox at home, which dropped as low as 60% when ambulating. HE denies any recent sick contacts. He does not wear baseline oxygen. He does use tobacco, but reports minimal smoking since the onset of his illness. 10/24- on Vapo therm. He uses Bipap last night. He is feeling better.  He dropped his oxygen saturation with coughing but doing better this am. Subsequently feeling worse and placed on Bipap and wants to be intubated. He was subsequently intubated     10/25- on the ventilator. On sedation. FiO2 60% and PEEP is 10.     10/26- having thick secretions with suctioning. Had de saturation with coughing on the vent. On sedation. No fever. 10/27- have secretions. On FiO2 of 40% and PEEP of 5.      10/28 - Remains intubated with thick secretions. Wife at the bedside. 10/29: Pt extubated to [address] [place] [address] S4 Level Recommendation:  NA    AM-PAC Score: AM-PAC Inpatient Daily Activity Raw Score: 17     Subjective:  Patient lying reclined in bed with spouse present. Pt agreeable to this OT session. Cognition:    A&O x4   Able to follow 1 step commands    Pain:   No  Location:   Rating: NA /10  Pain Medicine Status: Denies need    Preadmission Environment:   Pt lives with                                         Spouse available 24 hr and able to assist physically  Home environment:                            one story home  Steps to enter first floor:                     3 steps to enter and no handrail  Steps to second floor/basement:        N/A  Laundry:                                              Family completes  Bathroom:                                           tub/shower unit and standard height toilet  Pt sleeps in a:                                     Flat bed  Equipment owned:                               and [facility]     Preadmission Status:  Pt able to drive: Yes  Pt fully independent with ADLs: Yes  Pt receives assistance from family for: Cleaning, Cooking, Laundry , and grocery shopping  Pt independent for functional transfers and utilized [facility] occasionally for mobility in home and SPC occasionally out in community  History of falls:            Yes 2, he tripped and broke his elbow  Home Health Services:None    Objective:  Does this pt have an acute or acute on chronic diagnosis of CHF?  No    Upper Extremity ROM:    B UEs grossly mobility. CHF Education  N/A    Assessment:  Pt seen for Occupational therapy treatment in acute care setting. Pt continues to demonstrate decreased Activity tolerance, ADLs, Balance , Bathing, Bed mobility, Dressing, Safety Awareness, Transfers, and Cognition. Pt functioning below baseline and will likely benefit from skilled occupational therapy services to maximize safety and independence. Pt continues to demonstrate some impulsivity during session, requiring frequent cues to maintain safety, slow speed of completing tasks. Pt demonstrated improvement in standing balance and mobility as treatment progressed, but initially required min-mod A for sit to stand and to begin ambulation. Pt noted to have one LOB during ambulation to bathroom and, in spite of cuing to align self to toilet, pt still sat on edge of toilet and required assist to reorient safely. Pt's wife present throughout treatment session and states she is home 24/7 and has additional assist if she feels she needs it. She states that she will leave the decision to the pt, but she feels that she can provide the level of care he requires at home vs going to ARU. Recommending ARU/IRF/Inpatient Rehab Facility upon discharge as patient functioning well below baseline, demonstrates good rehab potential and unable to return home due to burden of care beyond caregiver ability, home environment not conducive to patient recovery, and limited safety awareness.     Goal(s) : 10/31/23: all goals ongoing  To be met in 3 Visits:  Bed to toilet/BSC:       CGA  Pt will complete 3/3 CHF goals     N/A    To be met in 5 Visits:  Supine to/from Sit in preparation for ADL task:   Supervision  Toileting        CGA  Grooming       Supervision  Upper Body Dressing:      Supervision  Lower Body Dressing:      CGA  Pt to demonstrate UE therapeutic exs x 15 reps with minimal cues    Rehabilitation Potential: Good  Strengths for achieving goals include: Pt motivated,

## 2023-10-31 NOTE — PROGRESS NOTES
Verified with Dr. Elias Ragland home with munguia, follow-up with his urologist. Pt and wife educated to home munguia care demonstrated and swapped into leg bag, given standard bag for bed, given supplies for care. Given written hand outs to review at home for munguia and O2 care. Pt and wife indicated understanding. Pt given written and verbal discharge instructions with prescriptions  information. . Pt indicated understanding and received prescription and home medication instructions. Pt packed own belongings. Pt taken down to family car via wheelchair on O2 concentrator for home.

## 2023-10-31 NOTE — PROGRESS NOTES
Pt did not wear bipap. Per RT caring for pt in ICU, bipap was done through 980 ventilator and pt was doing great without bipap. No bipap in room with pt. Pt doesn't wear bipap at home.

## 2023-10-31 NOTE — PROGRESS NOTES
O2 Sat at rest on room air is 87 % resting  O2 Sat at rest with oxygen @  3 lpm is 97%. O2 Sat with activity with oxygen @ 3 lpm is 91%.

## 2023-10-31 NOTE — CARE COORDINATION
DISCHARGE ORDER  Date/Time 10/31/2023 2:14 PM  Completed by: José Miguel Rutherford RN, Case Management    Patient Name: Jocelyn Dewitt      : 1960  Admitting Diagnosis: Acute respiratory failure with hypoxia ([address]) [J96.01]  Pneumonia of both upper lobes due to infectious organism [J18.9]  Pneumonia, unspecified organism [J18.9]      Admit order Date and Status:10/22/23 inpt  (verify MD's last order for status of admission)      Noted discharge order. If applicable PT/OT recommendation at Discharge: ARU  DME recommendation by PT/OT: Defer to facility  Confirmed discharge plan : Yes  with whom patient  If pt confirmed DC plan does family need to be contacted by CM Yes if yes who wife  Discharge Plan: Order for dc noted. Spoke with pt and  wife re: therapy rec's for ARU and pt and wife both decline. Explained benefits of inpt therapy but both still decline. Are agreeable to home therapy and chooses [facility]. Spoke with Denise Simmons at [facility] who will arrange for [address] needs. Also discussed need for home O2 and pt is agreeable and chooses [facility]. Spoke with Clifford Bowling who after review states can give POC to pt and inst to call when arrives home for delivery of home concentrator. Chart reviewed and no other dc needs identified. Noted pt was given POC prior to dc and inst in use. Date of Last IMM Given: 10/31/23 inpt    Reviewed chart. Role of discharge planner explained and patient verbalized understanding. Discharge order is noted. Has Home O2 in place on admit:  No  Informed of need to bring portable home O2 tank on day of discharge for nursing to connect prior to leaving:   Not Indicated  Verbalized agreement/Understanding:   Not Indicated  Pt is being d/c'd to home today. Pt's O2 sats are 100% on 3L NC. Discharge timeout done with nsg, CM and pt and wife. All discharge needs and concerns addressed.

## 2023-10-31 NOTE — PROGRESS NOTES
Bedside report given to Volodymyr Horne RN  pt in stable condition no needs at this time.  Call light within reach

## 2023-10-31 NOTE — PROGRESS NOTES
Patient provided a COPD Educational Folder that includes the following materials:     [x]  [address]: Managing your COPD  [x]  ALA: Getting the Most Out of Medication Delivery Devices  [x]  ALA: My COPD Action Plan  [x]  [employer]: [facility]   [x]  Smoking Cessation Classes  [x]  Outpatient Spiritual Care Services  [x]  Magnet: Signs of COPD    PATIENT/CAREGIVER TEACHING:   Level of patient/caregiver understanding able to:   [x] Verbalize understanding   [] Demonstrate understanding       [] Teach back        [] Needs reinforcement     []  Other:     COPD ASSESSMENT TOOL (CAT):   Cough Assessment: 5   Phlegm Assessment: 4   Chest tightness:  4   Walking on an incline: 5   Home Activities: 3   Confident Leaving the Home: 3   Sleeping Soundly: 4   Have Energy: 4   Assessment Score: 32    Reviewed resources available for patient. Offered [employer] outpatient, Smoking cessation classes, and outpatient pulmonary rehab. Patient interested in outpatient rehab. Called department for in person evaluation today prior to discharge. Referral placed. Patient plans to continue to quiet smoking since admission but also might still come to classes in January.      Electronically signed by Sandee Franco RN on 10/31/2023 at 12:23 PM

## 2023-10-31 NOTE — PROGRESS NOTES
Pt bladder scan completed due to limited output after munguia removal and pt stating he felt he needed to pee but could not. Bladder scan showed 950ml and straight cath had 900 ML output. Post void cath residual was 0ml. Pt stated he felt way better.

## 2023-10-31 NOTE — PROGRESS NOTES
Pt unable to void x2 second time after 6 hours time frame, bladder scan > 500 ml, Dr. Miguel Low aware. V/O replace munguia catheter. Nursing instructor and students to attempt.

## 2023-10-31 NOTE — PROGRESS NOTES
walker often - both posteriorly and laterally. Assistive Device Used:    rolling walker (RW)  Level of Assist:    Min A    Comment: Strong/steady cues needed. Stair Training deferred, pt unsafe/ not appropriate to complete stairs at this time    Therapeutic Exercises Initiated  deferred secondary to treatment focus on functional mobility    Activity Tolerance   During therapy session noted pt with no adverse symptoms to activity    Pt Position BP (mmHg) HR (bpm) SpO2 (%) on 2 L  Comments   Supine at rest   105 94 %     Seated at EOB           Standing           End of session   112 95 %      Positioning Needs   Pt up in chair, alarm set, positioned in proper neutral alignment and pressure relief provided. Call light provided and all needs within reach  RN aware of pt position/status    Other Activities  See OT note for ADLs completed    Patient/Family Education   Pt educated on role of inpatient PT, POC, importance of continued activity, DC recommendations, safety awareness, transfer techniques, and calling for assist with mobility. CHF Education  N/A    Assessment  Pt seen today for physical therapy followup Treatment after being downgraded from ICU to 2W. Pt recovering from 6 days on ventilator. He demonstrated improved mobility and activity tolerance today, although still fairly unsteady on his feet during transfers and ambulation. He is mildly impulsive and benefits from strong/steady cues. Recommending ARU/IRF/Inpatient Rehab Facility upon discharge as patient functioning well below baseline, demonstrates good rehab potential and unable to return home due to home environment not conducive to patient recovery and limited safety awareness. Pt/family are resistant. Much time spent educating on deficits noted, anticipated therapy POC at ARU level setting, etc.     Goals : To be met in 3 visits:  1). Independent with LE Ex x 10 reps  2). Sit to/from stand: CGA  3).  Bed to chair: CGA    To be met in 6 visits:  1). Supine to/from sit: Independent  2). Sit to/from stand: Independent  3). Bed to chair: Independent  4). Gait: Ambulate 50 ft.  with CGA and use of LRAD (least restrictive assistive device)  5). Tolerate B LE exercises 3 sets of 10-15 reps  6). Ascend/descend 3 steps with CGA with use of no handrail and LRAD (least restrictive assistive device)    Rehabilitation Potential: Good  Strengths for achieving goals include:   Pt motivated, PLOF, Family Support, and Pt cooperative   Barriers to achieving goals include:    Complexity of condition, Weakness, and Impaired cognition    Plan    To be seen 3-5 x / week  while in acute care setting for therapeutic exercises, bed mobility, transfers, progressive gait training, balance training, and family/patient education. Signature: Eduardo Geronimo PT     If patient discharges from this facility prior to next visit, this note will serve as the Discharge Summary.

## 2023-10-31 NOTE — DISCHARGE SUMMARY
infiltrates suspicious for pneumonia  - covid/flu negative   - resp status worsened needing vapotherm -Eventually intubated for refractory hypoxia  , weaned off vent now  - MRSA, legionella and strep pneumoniae negative  - sputum and blood cultures NGTD  -  Completed 4 days of cefepime, Zithromax and 2 days of Zyvox   Now on doxy  - IV solumedrol given for wheezing   - riaz duonebs and albuterol prn  - overall improved, wean steroids and wean o2  - start PT  Now on 3 L - requires home O2     #Hyponatremia resolved. -likely 2/2 reduced po intake  -IVF and repeat BMP with improvement  -back on Prozac  -improved     BPH s/p urolift   Unable to void - straight cath once this am  Start flomax      #CAD  -on Asa, statin     #Hypothyroidism  -on synthroid    - tsh 0.60. #GERD  -on PPI, carafate     #Chronic pain  -uses medical marijuana at home  -continue lyrica and zanaflex     #Gastric MALT Lymphoma  -f/w Dr. Ariel Mcburney  -not currently undergoing treatment for this     #Tobacco use  -counseled cessation  -prn nicotine replacement       XR CHEST PORTABLE   Final Result   No significant interval change. XR CHEST PORTABLE   Final Result   No significant interval change. XR CHEST PORTABLE   Final Result   Slight interval improvement of bilateral airspace disease. XR CHEST PORTABLE   Final Result   Worsening multifocal bilateral pneumonia. XR CHEST PORTABLE   Final Result   Improved aeration of the lungs bilaterally         XR CHEST PORTABLE   Final Result   1. No pneumothorax. 2.  Similar appearance of diffuse interstitial opacities likely representing   edema and small bilateral pleural effusions. XR CHEST PORTABLE   Final Result   1. Lines and tubes appear properly placed as described above.       2.  Stable interstitial opacities likely representing edema         XR CHEST PORTABLE   Final Result   Interval worsening of diffuse interstitial opacities likely representing interstitial edema         XR CHEST PORTABLE   Final Result   Mild cardiomegaly and mild central pulmonary congestion      Hazy interstitial and ground-glass opacities along the perihilar regions and   lung bases which is increased and could be due to pulmonary edema and/or   developing pneumonia vs progressive pulmonary fibrosis and scarring         XR CHEST PORTABLE   Final Result   New or more apparent patchy bilateral pulmonary infiltrates. Discharge Medications     Medication List        START taking these medications      doxycycline hyclate 100 MG tablet  Commonly known as: VIBRA-TABS  Take 1 tablet by mouth 2 times daily for 2 days     predniSONE 20 MG tablet  Commonly known as: DELTASONE  2 qd- 2 days, 1 1/2 qd- 3 days, 1 qd- 3 days,1/2 qd- 3 days  Start taking on: November 1, 2023            CHANGE how you take these medications      atorvastatin 80 MG tablet  Commonly known as: LIPITOR  Take 1 tablet by mouth daily  What changed: when to take this     levothyroxine 100 MCG tablet  Commonly known as: SYNTHROID  What changed: Another medication with the same name was removed. Continue taking this medication, and follow the directions you see here. tiZANidine 4 MG tablet  Commonly known as: Huseyin Read  What changed: Another medication with the same name was removed. Continue taking this medication, and follow the directions you see here.             CONTINUE taking these medications      * albuterol sulfate  (90 Base) MCG/ACT inhaler  Commonly known as: PROVENTIL;VENTOLIN;PROAIR  Inhale 2 puffs into the lungs every 6 hours as needed for Wheezing     * albuterol (5 MG/ML) 0.5% nebulizer solution  Commonly known as: PROVENTIL  Take 0.5 mLs by nebulization 4 times daily as needed for Wheezing     diazePAM 5 MG tablet  Commonly known as: VALIUM     FLUoxetine 20 MG capsule  Commonly known as: PROZAC     magnesium oxide 400 (240 Mg) MG tablet  Commonly known as: MAG-     medical

## 2023-10-31 NOTE — CARE COORDINATION
[facility]  Received referral regarding HC services from [facility]. [facility] is unable to service due to pt's insurance. CTN to assist CM in arranging [facility].. Telephone call to [facility]- unable to staff  [facility]- spoke with Merlyn Ayon (Jason Ramirez). [facility] able to service for PT/OT with a frontloaded PT visit. Sent referral to [facility]. Telephone call to pt. Informed [facility] servicing for therapy needs. Collaborated with  to arrange [facility]. services.       Electronically signed by Emilee Dacosta RN on 10/31/2023 at 3:21 PM

## 2023-11-02 LAB
FINAL REPORT: NORMAL
FUNGUS SPEC CULT: ABNORMAL
FUNGUS SPEC CULT: ABNORMAL
LOEFFLER MB STN SPEC: ABNORMAL
LOEFFLER MB STN SPEC: ABNORMAL
ORGANISM: ABNORMAL
ORGANISM: ABNORMAL
PRELIMINARY: NORMAL

## 2023-11-03 NOTE — CARE COORDINATION
Noted received call from Insurance CM stating pt called and said no HHC has seen him and that they called and stated did not service area. [facility]ed [facility] [employer] [facility] but was not in network with insurance so they referred to [facility] who accepted but then could not service are and made referral to [facility]. Spoke with [facility] who verifies they have accepted pt and will do SOC today.  States they received referral from [facility] on 11/2     Spoke with pt who verfied that [facility] will come out today

## 2023-11-06 LAB
FINAL REPORT: NORMAL
FUNGUS SPEC CULT: NORMAL
FUNGUS SPEC CULT: NORMAL
LOEFFLER MB STN SPEC: NORMAL
LOEFFLER MB STN SPEC: NORMAL
PRELIMINARY: NORMAL

## 2023-11-07 ENCOUNTER — HOSPITAL ENCOUNTER (OUTPATIENT)
Dept: GENERAL RADIOLOGY | Age: 63
Discharge: HOME | End: 2023-11-07
Payer: MEDICARE

## 2023-11-07 ENCOUNTER — HOSPITAL ENCOUNTER (OUTPATIENT)
Age: 63
Discharge: HOME | End: 2023-11-07
Payer: MEDICARE

## 2023-11-07 DIAGNOSIS — R06.02 SOB (SHORTNESS OF BREATH): ICD-10-CM

## 2023-11-07 LAB
ACID FAST STN SPEC QL: NORMAL
MYCOBACTERIUM SPEC CULT: NORMAL

## 2023-11-07 PROCEDURE — 71046 X-RAY EXAM CHEST 2 VIEWS: CPT

## 2023-11-09 ENCOUNTER — HOSPITAL ENCOUNTER (EMERGENCY)
Age: 63
Discharge: HOME | End: 2023-11-09
Payer: MEDICARE

## 2023-11-09 ENCOUNTER — TELEPHONE (OUTPATIENT)
Dept: PULMONOLOGY | Age: 63
End: 2023-11-09

## 2023-11-09 ENCOUNTER — APPOINTMENT (OUTPATIENT)
Dept: CT IMAGING | Age: 63
End: 2023-11-09
Payer: MEDICARE

## 2023-11-09 ENCOUNTER — APPOINTMENT (OUTPATIENT)
Dept: GENERAL RADIOLOGY | Age: 63
End: 2023-11-09
Payer: MEDICARE

## 2023-11-09 ENCOUNTER — TELEPHONE (OUTPATIENT)
Dept: OTHER | Facility: CLINIC | Age: 63
End: 2023-11-09

## 2023-11-09 VITALS
DIASTOLIC BLOOD PRESSURE: 70 MMHG | OXYGEN SATURATION: 99 % | SYSTOLIC BLOOD PRESSURE: 109 MMHG | HEART RATE: 82 BPM | TEMPERATURE: 97.8 F | RESPIRATION RATE: 19 BRPM

## 2023-11-09 DIAGNOSIS — J18.9 PNEUMONIA OF BOTH LOWER LOBES DUE TO INFECTIOUS ORGANISM: Primary | ICD-10-CM

## 2023-11-09 LAB
ALBUMIN SERPL-MCNC: 3.6 G/DL (ref 3.4–5)
ALBUMIN/GLOB SERPL: 1.6 {RATIO} (ref 1.1–2.2)
ALP SERPL-CCNC: 134 U/L (ref 40–129)
ALT SERPL-CCNC: 20 U/L (ref 10–40)
ANION GAP SERPL CALCULATED.3IONS-SCNC: 9 MMOL/L (ref 3–16)
AST SERPL-CCNC: 8 U/L (ref 15–37)
BASE EXCESS BLDV CALC-SCNC: 1.8 MMOL/L (ref -3–3)
BASOPHILS # BLD: 0.1 K/UL (ref 0–0.2)
BASOPHILS NFR BLD: 1.4 %
BILIRUB SERPL-MCNC: <0.2 MG/DL (ref 0–1)
BUN SERPL-MCNC: 8 MG/DL (ref 7–20)
CALCIUM SERPL-MCNC: 9.5 MG/DL (ref 8.3–10.6)
CHLORIDE SERPL-SCNC: 101 MMOL/L (ref 99–110)
CO2 BLDV-SCNC: 31 MMOL/L
CO2 SERPL-SCNC: 28 MMOL/L (ref 21–32)
COHGB MFR BLDV: 2.8 % (ref 0–1.5)
CREAT SERPL-MCNC: 0.8 MG/DL (ref 0.8–1.3)
DEPRECATED RDW RBC AUTO: 16.7 % (ref 12.4–15.4)
EKG ATRIAL RATE: 79 BPM
EKG DIAGNOSIS: NORMAL
EKG P AXIS: 51 DEGREES
EKG P-R INTERVAL: 140 MS
EKG Q-T INTERVAL: 372 MS
EKG QRS DURATION: 72 MS
EKG QTC CALCULATION (BAZETT): 426 MS
EKG R AXIS: 28 DEGREES
EKG T AXIS: 61 DEGREES
EKG VENTRICULAR RATE: 79 BPM
EOSINOPHIL # BLD: 0.2 K/UL (ref 0–0.6)
EOSINOPHIL NFR BLD: 1.8 %
GFR SERPLBLD CREATININE-BSD FMLA CKD-EPI: >60 ML/MIN/{1.73_M2}
GLUCOSE SERPL-MCNC: 119 MG/DL (ref 70–99)
HCO3 BLDV-SCNC: 29 MMOL/L (ref 23–29)
HCT VFR BLD AUTO: 35 % (ref 40.5–52.5)
HGB BLD-MCNC: 11.3 G/DL (ref 13.5–17.5)
LACTATE BLDV-SCNC: 1.4 MMOL/L (ref 0.4–1.9)
LYMPHOCYTES # BLD: 1.7 K/UL (ref 1–5.1)
LYMPHOCYTES NFR BLD: 19.4 %
MCH RBC QN AUTO: 28.1 PG (ref 26–34)
MCHC RBC AUTO-ENTMCNC: 32.3 G/DL (ref 31–36)
MCV RBC AUTO: 86.9 FL (ref 80–100)
METHGB MFR BLDV: 0.3 %
MONOCYTES # BLD: 0.4 K/UL (ref 0–1.3)
MONOCYTES NFR BLD: 4.7 %
NEUTROPHILS # BLD: 6.3 K/UL (ref 1.7–7.7)
NEUTROPHILS NFR BLD: 72.7 %
NT-PROBNP SERPL-MCNC: <36 PG/ML (ref 0–124)
O2 THERAPY: ABNORMAL
PCO2 BLDV: 57.9 MMHG (ref 40–50)
PH BLDV: 7.32 [PH] (ref 7.35–7.45)
PLATELET # BLD AUTO: 269 K/UL (ref 135–450)
PMV BLD AUTO: 8.8 FL (ref 5–10.5)
PO2 BLDV: 32 MMHG (ref 25–40)
POTASSIUM SERPL-SCNC: 3.8 MMOL/L (ref 3.5–5.1)
PROT SERPL-MCNC: 5.9 G/DL (ref 6.4–8.2)
RBC # BLD AUTO: 4.03 M/UL (ref 4.2–5.9)
SAO2 % BLDV: 55 %
SODIUM SERPL-SCNC: 138 MMOL/L (ref 136–145)
TROPONIN, HIGH SENSITIVITY: 16 NG/L (ref 0–22)
WBC # BLD AUTO: 8.6 K/UL (ref 4–11)

## 2023-11-09 PROCEDURE — 36415 COLL VENOUS BLD VENIPUNCTURE: CPT

## 2023-11-09 PROCEDURE — 6360000004 HC RX CONTRAST MEDICATION: Performed by: NURSE PRACTITIONER

## 2023-11-09 PROCEDURE — 99285 EMERGENCY DEPT VISIT HI MDM: CPT

## 2023-11-09 PROCEDURE — 2500000003 HC RX 250 WO HCPCS

## 2023-11-09 PROCEDURE — 93005 ELECTROCARDIOGRAM TRACING: CPT | Performed by: NURSE PRACTITIONER

## 2023-11-09 PROCEDURE — 93010 ELECTROCARDIOGRAM REPORT: CPT | Performed by: INTERNAL MEDICINE

## 2023-11-09 PROCEDURE — 83880 ASSAY OF NATRIURETIC PEPTIDE: CPT

## 2023-11-09 PROCEDURE — 83605 ASSAY OF LACTIC ACID: CPT

## 2023-11-09 PROCEDURE — 71260 CT THORAX DX C+: CPT

## 2023-11-09 PROCEDURE — 85025 COMPLETE CBC W/AUTO DIFF WBC: CPT

## 2023-11-09 PROCEDURE — 6370000000 HC RX 637 (ALT 250 FOR IP): Performed by: NURSE PRACTITIONER

## 2023-11-09 PROCEDURE — 71046 X-RAY EXAM CHEST 2 VIEWS: CPT

## 2023-11-09 PROCEDURE — 82803 BLOOD GASES ANY COMBINATION: CPT

## 2023-11-09 PROCEDURE — 80053 COMPREHEN METABOLIC PANEL: CPT

## 2023-11-09 PROCEDURE — 84484 ASSAY OF TROPONIN QUANT: CPT

## 2023-11-09 RX ORDER — LEVOFLOXACIN 500 MG/1
500 TABLET, FILM COATED ORAL ONCE
Status: COMPLETED | OUTPATIENT
Start: 2023-11-09 | End: 2023-11-09

## 2023-11-09 RX ORDER — PREDNISONE 10 MG/1
TABLET ORAL
Qty: 14 TABLET | Refills: 0 | Status: SHIPPED | OUTPATIENT
Start: 2023-11-09

## 2023-11-09 RX ORDER — PREDNISONE 20 MG/1
40 TABLET ORAL ONCE
Status: COMPLETED | OUTPATIENT
Start: 2023-11-09 | End: 2023-11-09

## 2023-11-09 RX ORDER — LEVOFLOXACIN 500 MG/1
500 TABLET, FILM COATED ORAL DAILY
Qty: 10 TABLET | Refills: 0 | Status: SHIPPED | OUTPATIENT
Start: 2023-11-09 | End: 2023-11-19

## 2023-11-09 RX ORDER — LEVOFLOXACIN 500 MG/1
500 TABLET, FILM COATED ORAL ONCE
Status: DISCONTINUED | OUTPATIENT
Start: 2023-11-09 | End: 2023-11-09

## 2023-11-09 RX ORDER — ASPIRIN 325 MG
325 TABLET ORAL ONCE
Status: COMPLETED | OUTPATIENT
Start: 2023-11-09 | End: 2023-11-09

## 2023-11-09 RX ADMIN — ASPIRIN 325 MG: 325 TABLET ORAL at 16:53

## 2023-11-09 RX ADMIN — PREDNISONE 40 MG: 20 TABLET ORAL at 20:51

## 2023-11-09 RX ADMIN — LEVOFLOXACIN 500 MG: 500 TABLET, FILM COATED ORAL at 19:40

## 2023-11-09 RX ADMIN — IOPAMIDOL 75 ML: 755 INJECTION, SOLUTION INTRAVENOUS at 17:48

## 2023-11-09 ASSESSMENT — ENCOUNTER SYMPTOMS
SORE THROAT: 1
VOMITING: 0
BACK PAIN: 0
EYE PAIN: 0
SHORTNESS OF BREATH: 1
COUGH: 1
NAUSEA: 0
ABDOMINAL PAIN: 0

## 2023-11-09 ASSESSMENT — PAIN SCALES - GENERAL: PAINLEVEL_OUTOF10: 8

## 2023-11-09 ASSESSMENT — PAIN DESCRIPTION - DESCRIPTORS: DESCRIPTORS: PRESSURE

## 2023-11-09 ASSESSMENT — PAIN DESCRIPTION - LOCATION: LOCATION: CHEST

## 2023-11-09 ASSESSMENT — PAIN - FUNCTIONAL ASSESSMENT: PAIN_FUNCTIONAL_ASSESSMENT: 0-10

## 2023-11-09 ASSESSMENT — PAIN DESCRIPTION - PAIN TYPE: TYPE: ACUTE PAIN

## 2023-11-09 NOTE — ED PROVIDER NOTES
Hyperlipidemia, Hypertension, Kidney stone, Lumbar herniated disc, Lymphoma of gastrointestinal tract [facility]) (May 2013), Pneumonia, Thyroid disease, and Vitamin B deficiency. CONSULTS: (Who and What was discussed)  IP CONSULT TO PULMONOLOGY      Records Reviewed (External and Source)    CC/HPI Summary, DDx, ED Course, and Reassessment: Patient was evaluated in the emergency department with symptoms of cough and chest congestion. Patient states that he has been dealing with some symptoms of pneumonia. Was actually admitted and intubated in the ICU at the end of October. His symptoms of mild cough and occasional shortness of breath have continued. He came to the hospital be checked out as he reported just slight amount of lower extremity swelling. He denies any fever or chills. No neck pain no back pain. Denies any chest pain. He has a white count of 8.6 with a hemoglobin 11.3. His lactic of 1.4 as well as a CMP which is grossly unremarkable with normal electrolytes and BUN and creatinine. His troponin is 16 and the BNP of 36. EKG was unremarkable. His chest x-ray displayed evidence of pneumonia both lower lobes as well as CT of his chest to rule out for PE was performed with bilateral lower lobe pneumonia. Patient was sent home on doxycycline. I reviewed the previous note in his discharge note from when he was admitted to the hospital last month. I had a detailed discussion with Dr. Fabian Vee with pulmonology and the plan is for them to be discharged and plan for follow-up as an outpatient. Encouraged discharge on Levaquin by Dr. Fabian Vee. The patient follows with Dr. Yajaira Dugan with pulmonology in the same group. Encouraged him follow-up. Discharged home in good condition. Shared decision making discussed discharge versus admission of the patient he feels comfortable going home. This time I believe that his appropriate decision. His SPO2 of 99% on his 3 L. He has not had any increase in oxygen demand. He is resting comfortably without any evidence of respiratory distress. He has been awake and alert and oriented. He denies any pain. Disposition Considerations (tests considered but not done, Admit vs D/C, Shared Decision Making, Pt Expectation of Test or Tx.):      The patient tolerated their visit well. The patient and / or the family were informed of the results of any tests, a time was given to answer questions, a plan was proposed and they agreed with plan. I am the Primary Clinician of Record. FINAL IMPRESSION      1.  Pneumonia of both lower lobes due to infectious organism          DISPOSITION/PLAN     DISPOSITION Decision To Discharge 11/09/2023 07:34:32 PM      PATIENT REFERRED TO:  Jarad Miramontes MD  [address] Dale Moe [address]  [phone]    Schedule an appointment as soon as possible for a visit       Afshin Aragon MD  [address]ill Sharpe [address]  [phone]    Schedule an appointment as soon as possible for a visit         DISCHARGE MEDICATIONS:  Discharge Medication List as of 11/9/2023  8:49 PM        START taking these medications    Details   levoFLOXacin (LEVAQUIN) 500 MG tablet Take 1 tablet by mouth daily for 10 days, Disp-10 tablet, R-0Normal             DISCONTINUED MEDICATIONS:  Discharge Medication List as of 11/9/2023  8:49 PM                 (Please note that portions of this note were completed with a voice recognition program.  Efforts were made to edit the dictations but occasionally words are mis-transcribed.)    SHERRY Pinto CNP (electronically signed)       SHERRY Pinto CNP  11/11/23 [id number]

## 2023-11-09 NOTE — TELEPHONE ENCOUNTER
Called [phone] (home)    Pt confirmed MD called and advised to go to ED. Pt states he is going.

## 2023-11-09 NOTE — TELEPHONE ENCOUNTER
Writer contacted SHERRY Carrizales CNP to inform of 30 day readmission risk. Writer's attempt to contact SHERRY Carrizales CNP was unsuccessful.      Call Back: If you need to call back to inform of disposition you can contact me at [phone]

## 2023-11-10 NOTE — ED NOTES
1916 call placed to pulmonology      Karla Frank  11/09/23 [phone] consult completed with call back from Dr. Mihaela Norris speaking with Amador Ramon, [facility] Madelaine Sr  11/09/23 [address] [place] [address]

## 2023-11-10 NOTE — ED NOTES
Reviewed discharge information. Patient verbalized understanding of paperwork, medication, and care instructions. Patient denied any questions.       Beba Eddy RN  11/09/23 [id number]

## 2023-11-13 LAB
FUNGUS SPEC CULT: NORMAL
FUNGUS SPEC CULT: NORMAL
LOEFFLER MB STN SPEC: NORMAL
LOEFFLER MB STN SPEC: NORMAL

## 2023-11-14 LAB
ACID FAST STN SPEC QL: NORMAL
MYCOBACTERIUM SPEC CULT: NORMAL

## 2023-11-17 ENCOUNTER — OFFICE VISIT (OUTPATIENT)
Dept: PULMONOLOGY | Age: 63
End: 2023-11-17
Payer: MEDICARE

## 2023-11-17 VITALS
HEIGHT: 69 IN | BODY MASS INDEX: 25.18 KG/M2 | DIASTOLIC BLOOD PRESSURE: 64 MMHG | HEART RATE: 66 BPM | WEIGHT: 170 LBS | RESPIRATION RATE: 19 BRPM | SYSTOLIC BLOOD PRESSURE: 112 MMHG | OXYGEN SATURATION: 98 %

## 2023-11-17 DIAGNOSIS — J44.9 CHRONIC OBSTRUCTIVE PULMONARY DISEASE, UNSPECIFIED COPD TYPE (HCC): Primary | ICD-10-CM

## 2023-11-17 PROCEDURE — 99214 OFFICE O/P EST MOD 30 MIN: CPT | Performed by: INTERNAL MEDICINE

## 2023-11-17 PROCEDURE — 3074F SYST BP LT 130 MM HG: CPT | Performed by: INTERNAL MEDICINE

## 2023-11-17 PROCEDURE — 3078F DIAST BP <80 MM HG: CPT | Performed by: INTERNAL MEDICINE

## 2023-11-17 RX ORDER — BUDESONIDE 0.5 MG/2ML
1 INHALANT ORAL 2 TIMES DAILY
Qty: 1 EACH | Refills: 1 | Status: SHIPPED | OUTPATIENT
Start: 2023-11-17 | End: 2023-12-17

## 2023-11-17 RX ORDER — LEVOFLOXACIN 500 MG/1
500 TABLET, FILM COATED ORAL DAILY
Qty: 4 TABLET | Refills: 0 | Status: SHIPPED | OUTPATIENT
Start: 2023-11-17 | End: 2023-11-24

## 2023-11-17 RX ORDER — PREDNISONE 10 MG/1
10 TABLET ORAL DAILY
Qty: 10 TABLET | Refills: 0 | Status: SHIPPED | OUTPATIENT
Start: 2023-11-17 | End: 2023-11-27

## 2023-11-17 NOTE — PROGRESS NOTES
[facility]                                               Pulmonary Clinic Consult     I had the pleasure of seeing  Armando Montes     Chief Complaint   Patient presents with    Follow-up     6 mo ct hospital f/u          HISTORY OF PRESENT ILLNESS:    Armando Montes is a 66y.o. year old  Who start smoking at age  17  And increase gradually   1.5 pack daily ,he still smoke    He was diagnosed 10 year,he received pills as he states ,he was done with therapy long time and was told he is in remission     The Patient comes in with SOB that has been going on  for the last few years Associated with .cough and he usually has clear sputum    He states that it get worse with exercise or walking long distance and he can walk less 300 feet- 1/2 And go 1-2 flight of stairs before get short winded        Patient underwent biopsy JERROD ,central nodule and was negative   Culture al was negative  Unfortunately he continue to smoke   His cough and SOB has improved  Could not tolerate Trelegy   PFT does 10 /22      Today Visit  Still with SOB post hospital follow as he still with SOB and ROCHE and some wheezing   Still with  SOB and ROCHE  Ambulation is any issues   Did not have biopsy as was told by IR he has to be admitted with chest tube . Some cough with white sputum   Wife with him       ALLERGIES:    Allergies   Allergen Reactions    Codeine Nausea Only     Pt tolerates Oxycodone    Sulfa Antibiotics      Other reaction(s):  Other (See Comments)  Yeast infection  Yeast infection    Yeast infection  Bactrim     Sulfamethoxazole     Sulfamethoxazole-Trimethoprim Other (See Comments)     States makes yeast infection on his penis  YEAST INFECTION    Trimethoprim     Wellbutrin [Bupropion] Hallucinations       PAST MEDICAL HISTORY:       Diagnosis Date    Arthritis 1/2011    SHOULDERS AND KNEES    CAD (coronary artery disease)     Chronic back pain     COPD (chronic obstructive pulmonary

## 2023-11-21 LAB
ACID FAST STN SPEC QL: NORMAL
MYCOBACTERIUM SPEC CULT: NORMAL

## 2023-11-22 ENCOUNTER — HOSPITAL ENCOUNTER (OUTPATIENT)
Dept: CARDIAC REHAB | Age: 63
Setting detail: THERAPIES SERIES
Discharge: HOME | End: 2023-11-22
Payer: MEDICARE

## 2023-11-22 VITALS — OXYGEN SATURATION: 98 % | RESPIRATION RATE: 18 BRPM

## 2023-11-22 PROBLEM — D48.5 NEOPLASM OF UNCERTAIN BEHAVIOR OF SKIN: Status: ACTIVE | Noted: 2022-03-16

## 2023-11-22 PROBLEM — J96.01 ACUTE RESPIRATORY FAILURE WITH HYPOXIA (HCC): Chronic | Status: ACTIVE | Noted: 2022-03-16

## 2023-11-22 PROBLEM — J44.9 CHRONIC OBSTRUCTIVE LUNG DISEASE (HCC): Status: ACTIVE | Noted: 2023-10-23

## 2023-11-22 PROBLEM — S09.90XA CLOSED HEAD INJURY: Status: ACTIVE | Noted: 2022-03-16

## 2023-11-22 PROBLEM — J18.9 COMMUNITY ACQUIRED PNEUMONIA: Status: ACTIVE | Noted: 2022-03-16

## 2023-11-22 PROBLEM — L03.113 CELLULITIS OF RIGHT HAND: Status: ACTIVE | Noted: 2022-03-16

## 2023-11-22 PROCEDURE — 94625 PHY/QHP OP PULM RHB W/O MNTR: CPT

## 2023-11-22 RX ORDER — ONDANSETRON HYDROCHLORIDE 8 MG/1
8 TABLET, FILM COATED ORAL EVERY 8 HOURS PRN
COMMUNITY

## 2023-11-22 RX ORDER — SUCRALFATE 1 G/1
1 TABLET ORAL 4 TIMES DAILY
COMMUNITY

## 2023-11-22 RX ORDER — HYDROCODONE BITARTRATE AND ACETAMINOPHEN 5; 325 MG/1; MG/1
1 TABLET ORAL EVERY 6 HOURS PRN
COMMUNITY

## 2023-11-22 SDOH — ECONOMIC STABILITY: TRANSPORTATION INSECURITY
IN THE PAST 12 MONTHS, HAS THE LACK OF TRANSPORTATION KEPT YOU FROM MEDICAL APPOINTMENTS OR FROM GETTING MEDICATIONS?: NO

## 2023-11-22 SDOH — HEALTH STABILITY: PHYSICAL HEALTH: ON AVERAGE, HOW MANY MINUTES DO YOU ENGAGE IN EXERCISE AT THIS LEVEL?: 20 MIN

## 2023-11-22 SDOH — ECONOMIC STABILITY: HOUSING INSECURITY: IN THE LAST 12 MONTHS, HOW MANY PLACES HAVE YOU LIVED?: 1

## 2023-11-22 SDOH — ECONOMIC STABILITY: INCOME INSECURITY: IN THE LAST 12 MONTHS, WAS THERE A TIME WHEN YOU WERE NOT ABLE TO PAY THE MORTGAGE OR RENT ON TIME?: NO

## 2023-11-22 SDOH — ECONOMIC STABILITY: FOOD INSECURITY: WITHIN THE PAST 12 MONTHS, YOU WORRIED THAT YOUR FOOD WOULD RUN OUT BEFORE YOU GOT MONEY TO BUY MORE.: NEVER TRUE

## 2023-11-22 SDOH — HEALTH STABILITY: PHYSICAL HEALTH: ON AVERAGE, HOW MANY DAYS PER WEEK DO YOU ENGAGE IN MODERATE TO STRENUOUS EXERCISE (LIKE A BRISK WALK)?: 4 DAYS

## 2023-11-22 SDOH — ECONOMIC STABILITY: TRANSPORTATION INSECURITY
IN THE PAST 12 MONTHS, HAS LACK OF TRANSPORTATION KEPT YOU FROM MEETINGS, WORK, OR FROM GETTING THINGS NEEDED FOR DAILY LIVING?: NO

## 2023-11-22 SDOH — ECONOMIC STABILITY: HOUSING INSECURITY
IN THE LAST 12 MONTHS, WAS THERE A TIME WHEN YOU DID NOT HAVE A STEADY PLACE TO SLEEP OR SLEPT IN A SHELTER (INCLUDING NOW)?: NO

## 2023-11-22 SDOH — ECONOMIC STABILITY: FOOD INSECURITY: WITHIN THE PAST 12 MONTHS, THE FOOD YOU BOUGHT JUST DIDN'T LAST AND YOU DIDN'T HAVE MONEY TO GET MORE.: NEVER TRUE

## 2023-11-22 ASSESSMENT — SOCIAL DETERMINANTS OF HEALTH (SDOH)
HOW HARD IS IT FOR YOU TO PAY FOR THE VERY BASICS LIKE FOOD, HOUSING, MEDICAL CARE, AND HEATING?: HARD
IN A TYPICAL WEEK, HOW MANY TIMES DO YOU TALK ON THE PHONE WITH FAMILY, FRIENDS, OR NEIGHBORS?: MORE THAN THREE TIMES A WEEK
WITHIN THE LAST YEAR, HAVE TO BEEN RAPED OR FORCED TO HAVE ANY KIND OF SEXUAL ACTIVITY BY YOUR PARTNER OR EX-PARTNER?: NO
HOW OFTEN DO YOU GET TOGETHER WITH FRIENDS OR RELATIVES?: TWICE A WEEK
DO YOU BELONG TO ANY CLUBS OR ORGANIZATIONS SUCH AS CHURCH GROUPS UNIONS, FRATERNAL OR ATHLETIC GROUPS, OR SCHOOL GROUPS?: NO
HOW OFTEN DO YOU ATTENT MEETINGS OF THE CLUB OR ORGANIZATION YOU BELONG TO?: NEVER
HOW OFTEN DO YOU ATTEND CHURCH OR RELIGIOUS SERVICES?: MORE THAN 4 TIMES PER YEAR
WITHIN THE LAST YEAR, HAVE YOU BEEN KICKED, HIT, SLAPPED, OR OTHERWISE PHYSICALLY HURT BY YOUR PARTNER OR EX-PARTNER?: NO
WITHIN THE LAST YEAR, HAVE YOU BEEN AFRAID OF YOUR PARTNER OR EX-PARTNER?: NO
WITHIN THE LAST YEAR, HAVE YOU BEEN HUMILIATED OR EMOTIONALLY ABUSED IN OTHER WAYS BY YOUR PARTNER OR EX-PARTNER?: NO

## 2023-11-22 ASSESSMENT — EXERCISE STRESS TEST
PEAK_BP: 144/84
PEAK_RPE: 11
PEAK_BP: 144/84
PEAK_HR: 98
PEAK_RPD: 12
PEAK_RPE: 3
PEAK_BP: 144/84

## 2023-11-22 ASSESSMENT — PATIENT HEALTH QUESTIONNAIRE - PHQ9
2. FEELING DOWN, DEPRESSED OR HOPELESS: 3
1. LITTLE INTEREST OR PLEASURE IN DOING THINGS: NEARLY EVERY DAY
4. FEELING TIRED OR HAVING LITTLE ENERGY: 1
SUM OF ALL RESPONSES TO PHQ QUESTIONS 1-9: 22
SUM OF ALL RESPONSES TO PHQ QUESTIONS 1-9: 22
7. TROUBLE CONCENTRATING ON THINGS, SUCH AS READING THE NEWSPAPER OR WATCHING TELEVISION: NEARLY EVERY DAY
2. FEELING DOWN, DEPRESSED OR HOPELESS: NEARLY EVERY DAY
4. FEELING TIRED OR HAVING LITTLE ENERGY: NEARLY EVERY DAY
3. TROUBLE FALLING OR STAYING ASLEEP: NEARLY EVERY DAY
7. TROUBLE CONCENTRATING ON THINGS, SUCH AS READING THE NEWSPAPER OR WATCHING TELEVISION: 3
SUM OF ALL RESPONSES TO PHQ9 QUESTIONS 1 & 2: 6
SUM OF ALL RESPONSES TO PHQ QUESTIONS 1-9: 22
SUM OF ALL RESPONSES TO PHQ QUESTIONS 1-9: 22
10. IF YOU CHECKED OFF ANY PROBLEMS, HOW DIFFICULT HAVE THESE PROBLEMS MADE IT FOR YOU TO DO YOUR WORK, TAKE CARE OF THINGS AT HOME, OR GET ALONG WITH OTHER PEOPLE: 3
9. THOUGHTS THAT YOU WOULD BE BETTER OFF DEAD, OR OF HURTING YOURSELF: 0
6. FEELING BAD ABOUT YOURSELF - OR THAT YOU ARE A FAILURE OR HAVE LET YOURSELF OR YOUR FAMILY DOWN: NEARLY EVERY DAY
3. TROUBLE FALLING OR STAYING ASLEEP: 3
5. POOR APPETITE OR OVEREATING: 3
1. LITTLE INTEREST OR PLEASURE IN DOING THINGS: 3
6. FEELING BAD ABOUT YOURSELF - OR THAT YOU ARE A FAILURE OR HAVE LET YOURSELF OR YOUR FAMILY DOWN: 3
9. THOUGHTS THAT YOU WOULD BE BETTER OFF DEAD, OR OF HURTING YOURSELF: NOT AT ALL
SUM OF ALL RESPONSES TO PHQ9 QUESTIONS 1 & 2: 6
SUM OF ALL RESPONSES TO PHQ QUESTIONS 1-9: 22
8. MOVING OR SPEAKING SO SLOWLY THAT OTHER PEOPLE COULD HAVE NOTICED. OR THE OPPOSITE, BEING SO FIGETY OR RESTLESS THAT YOU HAVE BEEN MOVING AROUND A LOT MORE THAN USUAL: 3
5. POOR APPETITE OR OVEREATING: SEVERAL DAYS
8. MOVING OR SPEAKING SO SLOWLY THAT OTHER PEOPLE COULD HAVE NOTICED. OR THE OPPOSITE, BEING SO FIGETY OR RESTLESS THAT YOU HAVE BEEN MOVING AROUND A LOT MORE THAN USUAL: NEARLY EVERY DAY

## 2023-11-22 ASSESSMENT — LIFESTYLE VARIABLES
HOW OFTEN DO YOU HAVE A DRINK CONTAINING ALCOHOL: NEVER
HOW MANY STANDARD DRINKS CONTAINING ALCOHOL DO YOU HAVE ON A TYPICAL DAY: PATIENT DOES NOT DRINK
SMOKELESS_TOBACCO: NO

## 2023-11-22 ASSESSMENT — PULMONARY FUNCTION TESTS
FEV1/FVC: 80
FEV1 (%PREDICTED): 82

## 2023-11-22 ASSESSMENT — EJECTION FRACTION: EF_VALUE: 57.5

## 2023-11-22 NOTE — PROGRESS NOTES
Pt PHQ9-depression screening is 22. Pt MD notified per policy for PHQ-9 score greater than 10. Pt currently being treated by PCP for depression and anxiety.

## 2023-11-22 NOTE — PLAN OF CARE
Cardiopulmonary Rehab Treatment Plan   Name: Chitra Hylton Assessment Date: 2023   : 1960 Primary Diagnosis:      Age: 66 y.o. Referring Physician: Eugene Carmichael   MRN: [id number] Primary Care Physician: Luis Cano MD   Treatment Diagnosis  Referral Date: 10/31/23  Significant Cardiovascular History: Previous PCI  Co-morbidities: Previous MI    Oxygen Saturation / Titration   Stages of Change : Preparation    Individual Treatment Plan  ITP Visit Type: Initial assessment  ITP Next Review Date:  (sent on 23)  Visit #/Total Visits:   EF%: 57.5 % (55-60%)  Risk Stratification: Moderate    Exercise   Gomez Total Score: 40    Data Measured Before Walk  Heart Rate: 81  Blood Pressure: 140/84  O2 Saturation: 98  O2 Device: Nasal cannula  O2 Flow Rate (l/min): 1 l/min    Data Measured during Walk  Symptoms: SOB  Do You Have Shortness of Breath: Yes  Peak RPE: 3  Peak RPD: 12    Data Measured Immediately After Walk  Distance Walked in : ft  Distance Walked (ft): 920 ft  Peak Heart Rate: 98  Peak Blood Pressure: 144/84    Data Measured at 5 Minutes After Walk  O2 Device: Nasal cannula  O2 Flow Rate (l/min): 1 l/min    Exercise Prescription  Mode: Treadmill; Track; Bike; Stepper; Ergometer; Elliptical; Rower  Frequency per week: 2-3 supervised sessions weekly  Duration Per Session: 20-36+ minutes of steady aerobic exercise  Intensity METS      : 3-6 (Increase workloads 0.5-1.0 METS/weekly) RPD 3-4 RPE 11-14  Progression: As RPE/DR stabilize, increase workloads gradually (start at low level) and progress to higher levels a few minutes at a time and then return to lower level until can maintain desired level for duration of an exercise (intermittent low-intensity interval training).   Symptoms with Exercise: Shortness of breath  Resistance Training: Yes    Exercise Blood Pressures  Resting BP: 140/84  Peak BP: 144/84  Is BP WDL: Yes    Exercise Activity at Home  Type: light weights,

## 2023-11-27 LAB
FUNGUS SPEC CULT: ABNORMAL
FUNGUS SPEC CULT: ABNORMAL
FUNGUS SPEC CULT: NORMAL
FUNGUS SPEC CULT: NORMAL
LOEFFLER MB STN SPEC: ABNORMAL
LOEFFLER MB STN SPEC: ABNORMAL
LOEFFLER MB STN SPEC: NORMAL
LOEFFLER MB STN SPEC: NORMAL
ORGANISM: ABNORMAL
ORGANISM: ABNORMAL

## 2023-11-28 ENCOUNTER — TELEPHONE (OUTPATIENT)
Dept: PULMONOLOGY | Age: 63
End: 2023-11-28

## 2023-11-28 ENCOUNTER — HOSPITAL ENCOUNTER (OUTPATIENT)
Dept: CARDIAC REHAB | Age: 63
Setting detail: THERAPIES SERIES
Discharge: HOME | End: 2023-11-28
Payer: MEDICARE

## 2023-11-28 LAB
ACID FAST STN SPEC QL: NORMAL
MYCOBACTERIUM SPEC CULT: NORMAL

## 2023-11-28 PROCEDURE — 94625 PHY/QHP OP PULM RHB W/O MNTR: CPT

## 2023-11-30 ENCOUNTER — HOSPITAL ENCOUNTER (OUTPATIENT)
Dept: CARDIAC REHAB | Age: 63
Setting detail: THERAPIES SERIES
Discharge: HOME | End: 2023-11-30
Payer: MEDICARE

## 2023-11-30 PROCEDURE — 94625 PHY/QHP OP PULM RHB W/O MNTR: CPT

## 2023-12-05 ENCOUNTER — TELEPHONE (OUTPATIENT)
Dept: PULMONOLOGY | Age: 63
End: 2023-12-05

## 2023-12-05 ENCOUNTER — HOSPITAL ENCOUNTER (OUTPATIENT)
Dept: CARDIAC REHAB | Age: 63
Setting detail: THERAPIES SERIES
Discharge: HOME | End: 2023-12-05
Payer: MEDICARE

## 2023-12-05 DIAGNOSIS — J44.9 CHRONIC OBSTRUCTIVE PULMONARY DISEASE, UNSPECIFIED COPD TYPE (HCC): Primary | ICD-10-CM

## 2023-12-05 LAB
ACID FAST STN SPEC QL: NORMAL
MYCOBACTERIUM SPEC CULT: NORMAL

## 2023-12-05 PROCEDURE — 93798 PHYS/QHP OP CAR RHAB W/ECG: CPT

## 2023-12-07 ENCOUNTER — HOSPITAL ENCOUNTER (OUTPATIENT)
Dept: CARDIAC REHAB | Age: 63
Setting detail: THERAPIES SERIES
Discharge: HOME | End: 2023-12-07
Payer: MEDICARE

## 2023-12-07 PROCEDURE — 94625 PHY/QHP OP PULM RHB W/O MNTR: CPT

## 2023-12-07 NOTE — TELEPHONE ENCOUNTER
Form signed and completed. Await pt  today. Printed notes from hospital 10-22 to 10-31-23. Orders for poc faxed to [facility] GILMA Manzano

## 2023-12-12 ENCOUNTER — HOSPITAL ENCOUNTER (OUTPATIENT)
Dept: CARDIAC REHAB | Age: 63
Setting detail: THERAPIES SERIES
Discharge: HOME | End: 2023-12-12
Payer: MEDICARE

## 2023-12-12 LAB
ACID FAST STN SPEC QL: NORMAL
MYCOBACTERIUM SPEC CULT: NORMAL

## 2023-12-12 PROCEDURE — 94625 PHY/QHP OP PULM RHB W/O MNTR: CPT

## 2023-12-14 ENCOUNTER — HOSPITAL ENCOUNTER (OUTPATIENT)
Dept: CARDIAC REHAB | Age: 63
Setting detail: THERAPIES SERIES
Discharge: HOME | End: 2023-12-14
Payer: MEDICARE

## 2023-12-14 PROCEDURE — 94625 PHY/QHP OP PULM RHB W/O MNTR: CPT

## 2023-12-26 ENCOUNTER — HOSPITAL ENCOUNTER (OUTPATIENT)
Dept: CARDIAC REHAB | Age: 63
Setting detail: THERAPIES SERIES
Discharge: HOME | End: 2023-12-26
Payer: MEDICARE

## 2023-12-26 PROCEDURE — 94625 PHY/QHP OP PULM RHB W/O MNTR: CPT

## 2023-12-27 NOTE — TELEPHONE ENCOUNTER
Patient called and said that Susan called him and said that the order should say \"portable oxygen concentrator\" on it. Please fix and refax.

## 2023-12-28 ENCOUNTER — HOSPITAL ENCOUNTER (OUTPATIENT)
Dept: CARDIAC REHAB | Age: 63
Setting detail: THERAPIES SERIES
Discharge: HOME | End: 2023-12-28
Payer: MEDICARE

## 2023-12-28 PROCEDURE — 94625 PHY/QHP OP PULM RHB W/O MNTR: CPT

## 2024-01-02 ENCOUNTER — HOSPITAL ENCOUNTER (OUTPATIENT)
Dept: CARDIAC REHAB | Age: 64
Setting detail: THERAPIES SERIES
Discharge: HOME | End: 2024-01-02
Payer: MEDICARE

## 2024-01-02 PROCEDURE — 94625 PHY/QHP OP PULM RHB W/O MNTR: CPT

## 2024-01-04 ENCOUNTER — HOSPITAL ENCOUNTER (OUTPATIENT)
Dept: CARDIAC REHAB | Age: 64
Setting detail: THERAPIES SERIES
Discharge: HOME | End: 2024-01-04
Payer: MEDICARE

## 2024-01-04 PROCEDURE — 94625 PHY/QHP OP PULM RHB W/O MNTR: CPT

## 2024-01-09 ENCOUNTER — HOSPITAL ENCOUNTER (OUTPATIENT)
Dept: CARDIAC REHAB | Age: 64
Setting detail: THERAPIES SERIES
Discharge: HOME | End: 2024-01-09
Payer: MEDICARE

## 2024-01-09 ENCOUNTER — HOSPITAL ENCOUNTER (OUTPATIENT)
Dept: CT IMAGING | Age: 64
Discharge: HOME | End: 2024-01-09
Attending: INTERNAL MEDICINE
Payer: MEDICARE

## 2024-01-09 DIAGNOSIS — R91.1 LUNG NODULE: ICD-10-CM

## 2024-01-09 PROCEDURE — 71250 CT THORAX DX C-: CPT

## 2024-01-09 PROCEDURE — 94625 PHY/QHP OP PULM RHB W/O MNTR: CPT

## 2024-01-09 ASSESSMENT — PATIENT HEALTH QUESTIONNAIRE - PHQ9
8. MOVING OR SPEAKING SO SLOWLY THAT OTHER PEOPLE COULD HAVE NOTICED. OR THE OPPOSITE, BEING SO FIGETY OR RESTLESS THAT YOU HAVE BEEN MOVING AROUND A LOT MORE THAN USUAL: 2
SUM OF ALL RESPONSES TO PHQ QUESTIONS 1-9: 21
SUM OF ALL RESPONSES TO PHQ QUESTIONS 1-9: 21
9. THOUGHTS THAT YOU WOULD BE BETTER OFF DEAD, OR OF HURTING YOURSELF: 0
SUM OF ALL RESPONSES TO PHQ9 QUESTIONS 1 & 2: 6
10. IF YOU CHECKED OFF ANY PROBLEMS, HOW DIFFICULT HAVE THESE PROBLEMS MADE IT FOR YOU TO DO YOUR WORK, TAKE CARE OF THINGS AT HOME, OR GET ALONG WITH OTHER PEOPLE: 2
SUM OF ALL RESPONSES TO PHQ QUESTIONS 1-9: 21
6. FEELING BAD ABOUT YOURSELF - OR THAT YOU ARE A FAILURE OR HAVE LET YOURSELF OR YOUR FAMILY DOWN: 3
3. TROUBLE FALLING OR STAYING ASLEEP: 3
1. LITTLE INTEREST OR PLEASURE IN DOING THINGS: 3
7. TROUBLE CONCENTRATING ON THINGS, SUCH AS READING THE NEWSPAPER OR WATCHING TELEVISION: 3
SUM OF ALL RESPONSES TO PHQ QUESTIONS 1-9: 21
4. FEELING TIRED OR HAVING LITTLE ENERGY: 3
2. FEELING DOWN, DEPRESSED OR HOPELESS: 3
5. POOR APPETITE OR OVEREATING: 1

## 2024-01-09 NOTE — CARDIO/PULMONARY
Pt PHQ9-depression screening is 22. Pt MD notified of PHQ-9 score greater than 10. Pt currently being treated by PCP for depression and anxiety.

## 2024-01-11 ENCOUNTER — HOSPITAL ENCOUNTER (OUTPATIENT)
Dept: CARDIAC REHAB | Age: 64
Setting detail: THERAPIES SERIES
Discharge: HOME | End: 2024-01-11
Payer: MEDICARE

## 2024-01-11 PROCEDURE — 94625 PHY/QHP OP PULM RHB W/O MNTR: CPT

## 2024-01-23 ENCOUNTER — HOSPITAL ENCOUNTER (OUTPATIENT)
Dept: CARDIAC REHAB | Age: 64
Setting detail: THERAPIES SERIES
Discharge: HOME | End: 2024-01-23
Payer: MEDICARE

## 2024-01-23 PROCEDURE — 94625 PHY/QHP OP PULM RHB W/O MNTR: CPT

## 2024-01-25 ENCOUNTER — APPOINTMENT (OUTPATIENT)
Dept: CARDIAC REHAB | Age: 64
End: 2024-01-25
Payer: MEDICARE

## 2024-01-25 NOTE — PROGRESS NOTES
Anesthesia Pre Eval Note    Anesthesia ROS/Med Hx        Anesthetic Complication History:    Patient does not have a history of anesthetic complications      Pulmonary Review:  Patient does not have a pulmonary history      Neuro/Psych Review:  Patient does not have a neuro/psych history         Cardiovascular Review:  Patient does not have a cardiovascular history       GI/HEPATIC/RENAL Review:  Patient does not have a GI/hepatic/renalhistory       End/Other Review:  Patient does not have an endo/other history        Relevant Problems   No relevant active problems       Physical Exam     Airway   Mallampati: II  TM Distance: >3 FB  Neck ROM: Full  Neck: Non-tender and Able to place in sniff position  TMJ Mobility: Good    Cardiovascular  Cardiovascular exam normal  Cardio Rhythm: Regular  Cardio Rate: Normal    Head Assessment  Head assessment: Normocephalic and Atraumatic    General Assessment  General Assessment: Alert and oriented and No acute distress    Dental Exam  Dental exam normal    Pulmonary Exam  Pulmonary exam normal  Breath sounds clear to auscultation:  Yes    Abdominal Exam  Abdominal exam normal      Anesthesia Plan:    ASA Status: 2  Anesthesia Type: General    Induction: Intravenous  Preferred Airway Type: ETT  Maintenance: Inhalational    Post-op Pain Management: Per Surgeon      Checklist  Reviewed: NPO Status, Allergies, Medications, Problem list and Past Med History  Consent/Risks Discussed Statement:  The proposed anesthetic plan, including its risks and benefits, have been discussed with the Patient along with the risks and benefits of alternatives. Questions were encouraged and answered and the patient and/or representative understands and agrees to proceed.        I discussed with the patient (and/or patient's legal representative) the risks and benefits of the proposed anesthesia plan, General, which may include services performed by other anesthesia providers.    Alternative anesthesia  Order for rapid sequence intubation obtained. Patient pre-oxygenated to with BiPAP to a saturation 100 %. 2 mg of Versed ordered and administered at 0950 hours  50 MCG of Fentanyl ordered and administered at 0950 hours  10 mg of Etomidate ordered and administered at 0953 hours  80 mg of Rocuronium ordered and administered at 0954 hours      Recent Labs     10/23/23  0135 10/23/23  0525 10/24/23  0458   * 136 136   K 3.6 3.3* 4.0   BUN 12 13 17   CREATININE 0.6* 0.8 0.6*   MG  --  1.70*  --          Dr. Lashanda Hernandez inserted a number  7.5 ET tube. The ET tube is secured at 23 cm measured at the patients lip line. Breath sounds are equal bilaterally. There is a positive color change noted in the colorimetric CO2 detector. RT connected the patient to a ventilator. See orders for ventilator orders. OG tube inserted to a depth of 65 cm. Ausculation of air bolus is positive. POST Intubation ORDERS    ABG ordered in 1 hours  Portable Chest x-ray ordered to confirm placement of the patients ET tube and gastric tube. Bilateral wrist restraints ordered and initiated. Pulses present distal to restraints. Propofol drip ordered for sedation. See EMAR and orders. See physician note to follow.  Electronically signed by Cynthia Patten RN on 10/24/2023 at 9:52 AM plans, if available, were reviewed with the patient (and/or patient's legal representative). Discussion has been held with the patient (and/or patient's legal representative) regarding risks of anesthesia, which include Nausea, Vomiting and Dental Injury and emergent situations that may require change in anesthesia plan.    The patient (and/or patient's legal representative) has indicated understanding, his/her questions have been answered, and he/she wishes to proceed with the planned anesthetic.    Blood Products: Not Anticipated    Comments  Plan Comments: Discussed risks, benefits, and alternatives of anesthesia in full detail with patient and answered all questions.  Risks of anesthesia including, not limited to, pain, aspiration, sore throat, changes in blood pressure/ heart rate, dental/oral/VC injury, eye injury, peripheral nerve injury/neuropathy, headache, medication related complication, allergic reaction, jaw pain, postoperative nausea and vomiting, and cardiac and pulmonary complications discussed.  No warranty or guarantee given regarding outcome of anesthestic.  Patient expressed understanding, agrees to these risks, and wishes to proceed.

## 2024-01-30 ENCOUNTER — HOSPITAL ENCOUNTER (OUTPATIENT)
Dept: CARDIAC REHAB | Age: 64
Setting detail: THERAPIES SERIES
Discharge: HOME | End: 2024-01-30
Payer: MEDICARE

## 2024-01-30 PROCEDURE — 94625 PHY/QHP OP PULM RHB W/O MNTR: CPT

## 2024-02-01 ENCOUNTER — APPOINTMENT (OUTPATIENT)
Dept: CARDIAC REHAB | Age: 64
End: 2024-02-01
Payer: MEDICARE

## 2024-02-06 ENCOUNTER — APPOINTMENT (OUTPATIENT)
Dept: GENERAL RADIOLOGY | Age: 64
End: 2024-02-06
Payer: MEDICARE

## 2024-02-06 ENCOUNTER — TELEPHONE (OUTPATIENT)
Dept: CARDIOLOGY CLINIC | Age: 64
End: 2024-02-06

## 2024-02-06 ENCOUNTER — HOSPITAL ENCOUNTER (EMERGENCY)
Age: 64
Discharge: HOME | End: 2024-02-06
Attending: EMERGENCY MEDICINE
Payer: MEDICARE

## 2024-02-06 ENCOUNTER — HOSPITAL ENCOUNTER (OUTPATIENT)
Dept: CARDIAC REHAB | Age: 64
Setting detail: THERAPIES SERIES
Discharge: HOME | End: 2024-02-06
Payer: MEDICARE

## 2024-02-06 ENCOUNTER — TELEPHONE (OUTPATIENT)
Dept: PULMONOLOGY | Age: 64
End: 2024-02-06

## 2024-02-06 VITALS
SYSTOLIC BLOOD PRESSURE: 131 MMHG | WEIGHT: 179 LBS | TEMPERATURE: 97.8 F | RESPIRATION RATE: 16 BRPM | BODY MASS INDEX: 26.51 KG/M2 | DIASTOLIC BLOOD PRESSURE: 91 MMHG | HEIGHT: 69 IN | OXYGEN SATURATION: 95 % | HEART RATE: 79 BPM

## 2024-02-06 VITALS — SYSTOLIC BLOOD PRESSURE: 118 MMHG | DIASTOLIC BLOOD PRESSURE: 60 MMHG

## 2024-02-06 DIAGNOSIS — R07.9 CHEST PAIN, UNSPECIFIED TYPE: Primary | ICD-10-CM

## 2024-02-06 LAB
ALBUMIN SERPL-MCNC: 4.3 G/DL (ref 3.4–5)
ALBUMIN/GLOB SERPL: 1.7 {RATIO} (ref 1.1–2.2)
ALP SERPL-CCNC: 135 U/L (ref 40–129)
ALT SERPL-CCNC: 11 U/L (ref 10–40)
ANION GAP SERPL CALCULATED.3IONS-SCNC: 9 MMOL/L (ref 3–16)
AST SERPL-CCNC: 10 U/L (ref 15–37)
BASOPHILS # BLD: 0 K/UL (ref 0–0.2)
BASOPHILS NFR BLD: 0.7 %
BILIRUB SERPL-MCNC: 0.4 MG/DL (ref 0–1)
BUN SERPL-MCNC: 7 MG/DL (ref 7–20)
CALCIUM SERPL-MCNC: 9.9 MG/DL (ref 8.3–10.6)
CHLORIDE SERPL-SCNC: 103 MMOL/L (ref 99–110)
CO2 SERPL-SCNC: 27 MMOL/L (ref 21–32)
CREAT SERPL-MCNC: 0.9 MG/DL (ref 0.8–1.3)
DEPRECATED RDW RBC AUTO: 16.2 % (ref 12.4–15.4)
EOSINOPHIL # BLD: 0.1 K/UL (ref 0–0.6)
EOSINOPHIL NFR BLD: 2.1 %
GFR SERPLBLD CREATININE-BSD FMLA CKD-EPI: >60 ML/MIN/{1.73_M2}
GLUCOSE SERPL-MCNC: 100 MG/DL (ref 70–99)
HCT VFR BLD AUTO: 36.4 % (ref 40.5–52.5)
HGB BLD-MCNC: 11.9 G/DL (ref 13.5–17.5)
INR PPP: 1.01 (ref 0.84–1.16)
LYMPHOCYTES # BLD: 2.1 K/UL (ref 1–5.1)
LYMPHOCYTES NFR BLD: 35 %
MCH RBC QN AUTO: 26.1 PG (ref 26–34)
MCHC RBC AUTO-ENTMCNC: 32.7 G/DL (ref 31–36)
MCV RBC AUTO: 79.8 FL (ref 80–100)
MONOCYTES # BLD: 0.5 K/UL (ref 0–1.3)
MONOCYTES NFR BLD: 8.8 %
NEUTROPHILS # BLD: 3.2 K/UL (ref 1.7–7.7)
NEUTROPHILS NFR BLD: 53.4 %
NT-PROBNP SERPL-MCNC: <36 PG/ML (ref 0–124)
PLATELET # BLD AUTO: 220 K/UL (ref 135–450)
PMV BLD AUTO: 8.3 FL (ref 5–10.5)
POTASSIUM SERPL-SCNC: 3.7 MMOL/L (ref 3.5–5.1)
PROT SERPL-MCNC: 6.8 G/DL (ref 6.4–8.2)
PROTHROMBIN TIME: 13.3 SEC (ref 11.5–14.8)
RBC # BLD AUTO: 4.56 M/UL (ref 4.2–5.9)
SODIUM SERPL-SCNC: 139 MMOL/L (ref 136–145)
TROPONIN, HIGH SENSITIVITY: 7 NG/L (ref 0–22)
WBC # BLD AUTO: 6 K/UL (ref 4–11)

## 2024-02-06 PROCEDURE — 83880 ASSAY OF NATRIURETIC PEPTIDE: CPT

## 2024-02-06 PROCEDURE — 71045 X-RAY EXAM CHEST 1 VIEW: CPT

## 2024-02-06 PROCEDURE — 93005 ELECTROCARDIOGRAM TRACING: CPT | Performed by: EMERGENCY MEDICINE

## 2024-02-06 PROCEDURE — 36415 COLL VENOUS BLD VENIPUNCTURE: CPT

## 2024-02-06 PROCEDURE — 80053 COMPREHEN METABOLIC PANEL: CPT

## 2024-02-06 PROCEDURE — 85610 PROTHROMBIN TIME: CPT

## 2024-02-06 PROCEDURE — 99285 EMERGENCY DEPT VISIT HI MDM: CPT

## 2024-02-06 PROCEDURE — 84484 ASSAY OF TROPONIN QUANT: CPT

## 2024-02-06 PROCEDURE — 85025 COMPLETE CBC W/AUTO DIFF WBC: CPT

## 2024-02-06 PROCEDURE — 6370000000 HC RX 637 (ALT 250 FOR IP): Performed by: INTERNAL MEDICINE

## 2024-02-06 PROCEDURE — 94625 PHY/QHP OP PULM RHB W/O MNTR: CPT

## 2024-02-06 RX ORDER — NITROGLYCERIN 0.4 MG/1
0.4 TABLET SUBLINGUAL EVERY 5 MIN PRN
Status: DISCONTINUED | OUTPATIENT
Start: 2024-02-06 | End: 2024-02-07 | Stop reason: HOSPADM

## 2024-02-06 RX ADMIN — NITROGLYCERIN 0.4 MG: 0.4 TABLET SUBLINGUAL at 11:29

## 2024-02-06 ASSESSMENT — PAIN - FUNCTIONAL ASSESSMENT: PAIN_FUNCTIONAL_ASSESSMENT: 0-10

## 2024-02-06 ASSESSMENT — ENCOUNTER SYMPTOMS
COUGH: 0
CHOKING: 0
CHEST TIGHTNESS: 0

## 2024-02-06 NOTE — TELEPHONE ENCOUNTER
Spoke to pt, relayed srj message. Pt v/u and states he will go to [facility] only

## 2024-02-06 NOTE — TELEPHONE ENCOUNTER
Pt stated he was kicked out of pulmonary therapy. Pt is having chest pains, pulse rate is going up & down, SOB- on oxy, had Nitro given to him about an hour again and is slightly dizzy. Pt was told to contact cardio and pulmonary. Please advise

## 2024-02-06 NOTE — TELEPHONE ENCOUNTER
Pt returned call. Pt states his hr now is 96 but earlier it was ranging from  while at pulmonary rehab doing exercise. Pt was given one nitro at rehab after he got sob and chest pain. Pt states he does not have bp reading but was dizzy when it was taken earlier. Pt states he is no longer having chest pain but his chest is slightly tight and feels like he cannot take a full breath. Pt states he feels much better. Pt states his pulmonologist told him to go to ED but pt wanted to speak with us first. Advised pt that with chest pain we recommend ED as well. Pt again states he feels better and isnt having chest pain. Please advise.

## 2024-02-06 NOTE — TELEPHONE ENCOUNTER
Pt came into office occupied by family stating he was seen in cardiopulmonary rehab, while there he experienced chest pain and increased SOB in which they gave Pt a nitro and instructed Pt to come up to office to possible be seen by Dr Cooper. Notified CHAZ Membreno whom brought it to 's attention. Dr Cooper advised Pt to go to the ED for evaluation. Relayed information to Pt and family in waiting room, offered to get a w/c and escort to the ED, they denied. Offered several more times, they declined.

## 2024-02-06 NOTE — TELEPHONE ENCOUNTER
LMOM for pt to contact the office. Would like to know what his pulse is? How many nitro did he take? The word again is in the message, so did he take it twice? Is he still having CP? Has he taken BP? If so what is it? If able to provide BP, was he dizzy when it was taken?

## 2024-02-06 NOTE — ED PROVIDER NOTES
with an underlying small effusion,   unchanged.   4. Mild interval increased atelectasis in the right lower lung.                 LABS:  Results for orders placed or performed during the hospital encounter of 02/06/24   CBC with Auto Differential   Result Value Ref Range    WBC 6.0 4.0 - 11.0 K/uL    RBC 4.56 4.20 - 5.90 M/uL    Hemoglobin 11.9 (L) 13.5 - 17.5 g/dL    Hematocrit 36.4 (L) 40.5 - 52.5 %    MCV 79.8 (L) 80.0 - 100.0 fL    MCH 26.1 26.0 - 34.0 pg    MCHC 32.7 31.0 - 36.0 g/dL    RDW 16.2 (H) 12.4 - 15.4 %    Platelets 220 135 - 450 K/uL    MPV 8.3 5.0 - 10.5 fL    Neutrophils % 53.4 %    Lymphocytes % 35.0 %    Monocytes % 8.8 %    Eosinophils % 2.1 %    Basophils % 0.7 %    Neutrophils Absolute 3.2 1.7 - 7.7 K/uL    Lymphocytes Absolute 2.1 1.0 - 5.1 K/uL    Monocytes Absolute 0.5 0.0 - 1.3 K/uL    Eosinophils Absolute 0.1 0.0 - 0.6 K/uL    Basophils Absolute 0.0 0.0 - 0.2 K/uL   Comprehensive Metabolic Panel   Result Value Ref Range    Sodium 139 136 - 145 mmol/L    Potassium 3.7 3.5 - 5.1 mmol/L    Chloride 103 99 - 110 mmol/L    CO2 27 21 - 32 mmol/L    Anion Gap 9 3 - 16    Glucose 100 (H) 70 - 99 mg/dL    BUN 7 7 - 20 mg/dL    Creatinine 0.9 0.8 - 1.3 mg/dL    Est, Glom Filt Rate >60 >60    Calcium 9.9 8.3 - 10.6 mg/dL    Total Protein 6.8 6.4 - 8.2 g/dL    Albumin 4.3 3.4 - 5.0 g/dL    Albumin/Globulin Ratio 1.7 1.1 - 2.2    Total Bilirubin 0.4 0.0 - 1.0 mg/dL    Alkaline Phosphatase 135 (H) 40 - 129 U/L    ALT 11 10 - 40 U/L    AST 10 (L) 15 - 37 U/L   Protime-INR   Result Value Ref Range    Protime 13.3 11.5 - 14.8 sec    INR 1.01 0.84 - 1.16   Troponin   Result Value Ref Range    Troponin, High Sensitivity 7 0 - 22 ng/L   Brain Natriuretic Peptide   Result Value Ref Range    Pro-BNP <36 0 - 124 pg/mL   EKG 12 Lead   Result Value Ref Range    Ventricular Rate 87 BPM    Atrial Rate 87 BPM    P-R Interval 146 ms    QRS Duration 88 ms    Q-T Interval 378 ms    QTc Calculation (Bazett) 454 ms    P

## 2024-02-06 NOTE — PROGRESS NOTES
Patient reported chest pain last night 8/10, sharp stabbing that relieved after resting.  Patient didn't call MD. Patient reported chest pain 4-5/10, tightness across chest. Patient placed on monitor SR. BP checked. Nitro SL given.BP rechecked. Patient reports pain 2/10. Patient states,\"where it normally is\". Patient states,\"he just can't seem to get deep breath.\" Patient refusing to go to ER. Patient instructed to go to MD or ER if pain continues or increases. Wife reports she is calling pulmonary and cardiologist.

## 2024-02-06 NOTE — DISCHARGE INSTRUCTIONS
If your chest pain returns please take a sublingual nitroglycerin and proceed to a major hospital  Follow-up with Dr. Valero in the next 1 to 2 weeks  Do not do your rehab for now until Dr. Valero sees you

## 2024-02-07 LAB
EKG ATRIAL RATE: 87 BPM
EKG DIAGNOSIS: NORMAL
EKG P AXIS: 67 DEGREES
EKG P-R INTERVAL: 146 MS
EKG Q-T INTERVAL: 378 MS
EKG QRS DURATION: 88 MS
EKG QTC CALCULATION (BAZETT): 454 MS
EKG R AXIS: 20 DEGREES
EKG T AXIS: 56 DEGREES
EKG VENTRICULAR RATE: 87 BPM

## 2024-02-07 PROCEDURE — 93010 ELECTROCARDIOGRAM REPORT: CPT | Performed by: INTERNAL MEDICINE

## 2024-02-07 NOTE — ED NOTES
Pt discharge instructions, follow up reviewed with pt. Pt verbalized understanding. No further needs. Pt discharged at this time.

## 2024-02-07 NOTE — TELEPHONE ENCOUNTER
Called Pt and he stated that he is feeling better he stated he went to ER in [place] said everything was ok and follow up with cardio

## 2024-02-08 ENCOUNTER — HOSPITAL ENCOUNTER (OUTPATIENT)
Dept: CARDIAC REHAB | Age: 64
Setting detail: THERAPIES SERIES
Discharge: HOME | End: 2024-02-08
Payer: MEDICARE

## 2024-02-08 PROCEDURE — 94625 PHY/QHP OP PULM RHB W/O MNTR: CPT

## 2024-02-13 ENCOUNTER — HOSPITAL ENCOUNTER (OUTPATIENT)
Dept: CARDIAC REHAB | Age: 64
Setting detail: THERAPIES SERIES
Discharge: HOME | End: 2024-02-13
Payer: MEDICARE

## 2024-02-13 NOTE — PLAN OF CARE
Unstable Housing in the Last Year: No   Interpersonal Safety: Not At Risk (11/22/2023)    Humiliation, Afraid, Rape, and Kick questionnaire     Fear of Current or Ex-Partner: No     Emotionally Abused: No     Physically Abused: No     Sexually Abused: No   Utilities: Not At Risk (11/22/2023)    [facility] Utilities     Threatened with loss of utilities: No      Psychosocial  Stages of Change: -- (Patient reported he has trouble exercising at home due to shortness of breath.)  Family Support: Yes  Comments: pt has stomach cancer    Psychosocial Intervention  Interventions: Currently under treatment for depression; PCP notified  Currently Taking Psychotropic Meds: Yes  Medication Changes: No    Psychosocial Target Goals  Target Goal(s): Engages in self-care behaviors; Assess presence or absence of depression using a valid screening tool; Demonstrates appropriate interaction with others  Uses Stress Mgmt Techniques: Yes  Patient Stated Psychosocial Goals: working in the yard    TOBACCO    Tobacco  Stages of Change: Preparation  Tobacco Use: No (pt recently quit smoking 10/21/23 after hospitalization.)  Smokeless Tobacco Use: No    Tobacco Cessation Intervention  Smoking Cessation Referral: No  Tobacco Adjunct: No    Tobacco Education  Resource Information Provided: Yes  Tobacco Triggers: stress    Target Goal  Target Goal: Complete cessation of tobacco use  Patient Stated Tobacco Goals: to never smoke again    NUTRITION    Nutrition  Stages of Change: Preparation  Diabetes: No    Lipids  Date Lipids Drawn: 04/05/21  Total: 107  HDL: 29  LDL: 76  Triglycerides: 68  Lipid Med(s): Lipitor  Lipid Med Change(s): No    Weight Management  Weight : 80.7 kg (178 lb) (2/8/24)  Height : 1.74 m (5' 8.5\")  BMI: 26.58  Weight Goal: maintain 174 lbs  Alcohol: None  Nutrition Assessment Tool: Rate Your Plate  Rate Your Plate Total Score: 43    Nutrition Intervention  Dietitian Consult: No  Nurse/Patient Discussion: Yes  Nutrition Goals: pt

## 2024-02-15 ENCOUNTER — HOSPITAL ENCOUNTER (OUTPATIENT)
Dept: CARDIAC REHAB | Age: 64
Setting detail: THERAPIES SERIES
Discharge: HOME | End: 2024-02-15
Payer: MEDICARE

## 2024-02-15 PROCEDURE — 94625 PHY/QHP OP PULM RHB W/O MNTR: CPT

## 2024-02-20 ENCOUNTER — HOSPITAL ENCOUNTER (OUTPATIENT)
Dept: CARDIAC REHAB | Age: 64
Setting detail: THERAPIES SERIES
Discharge: HOME | End: 2024-02-20
Payer: MEDICARE

## 2024-02-20 PROCEDURE — 94625 PHY/QHP OP PULM RHB W/O MNTR: CPT

## 2024-02-22 ENCOUNTER — HOSPITAL ENCOUNTER (OUTPATIENT)
Dept: CARDIAC REHAB | Age: 64
Setting detail: THERAPIES SERIES
Discharge: HOME | End: 2024-02-22
Payer: MEDICARE

## 2024-02-22 PROCEDURE — 94625 PHY/QHP OP PULM RHB W/O MNTR: CPT

## 2024-02-29 ENCOUNTER — HOSPITAL ENCOUNTER (OUTPATIENT)
Dept: CARDIAC REHAB | Age: 64
Setting detail: THERAPIES SERIES
End: 2024-02-29
Payer: MEDICARE

## 2024-03-05 ENCOUNTER — HOSPITAL ENCOUNTER (OUTPATIENT)
Dept: CARDIAC REHAB | Age: 64
Setting detail: THERAPIES SERIES
Discharge: HOME | End: 2024-03-05
Payer: MEDICARE

## 2024-03-05 VITALS — HEIGHT: 69 IN | WEIGHT: 177 LBS | BODY MASS INDEX: 26.22 KG/M2

## 2024-03-05 PROCEDURE — 94625 PHY/QHP OP PULM RHB W/O MNTR: CPT

## 2024-03-07 ENCOUNTER — HOSPITAL ENCOUNTER (OUTPATIENT)
Dept: CARDIAC REHAB | Age: 64
Setting detail: THERAPIES SERIES
Discharge: HOME | End: 2024-03-07
Payer: MEDICARE

## 2024-03-07 VITALS — HEIGHT: 69 IN | BODY MASS INDEX: 26.22 KG/M2 | WEIGHT: 177 LBS

## 2024-03-07 PROCEDURE — 94625 PHY/QHP OP PULM RHB W/O MNTR: CPT

## 2024-03-14 ENCOUNTER — HOSPITAL ENCOUNTER (OUTPATIENT)
Dept: CARDIAC REHAB | Age: 64
Setting detail: THERAPIES SERIES
Discharge: HOME | End: 2024-03-14
Payer: MEDICARE

## 2024-03-19 ENCOUNTER — HOSPITAL ENCOUNTER (OUTPATIENT)
Dept: CARDIAC REHAB | Age: 64
Setting detail: THERAPIES SERIES
Discharge: HOME | End: 2024-03-19
Payer: MEDICARE

## 2024-03-19 VITALS — BODY MASS INDEX: 26.07 KG/M2 | WEIGHT: 176 LBS | HEIGHT: 69 IN

## 2024-03-19 PROCEDURE — 94625 PHY/QHP OP PULM RHB W/O MNTR: CPT

## 2024-03-21 ENCOUNTER — HOSPITAL ENCOUNTER (OUTPATIENT)
Dept: CARDIAC REHAB | Age: 64
Setting detail: THERAPIES SERIES
Discharge: HOME | End: 2024-03-21
Payer: MEDICARE

## 2024-03-21 PROCEDURE — 94625 PHY/QHP OP PULM RHB W/O MNTR: CPT

## 2024-04-09 ENCOUNTER — HOSPITAL ENCOUNTER (OUTPATIENT)
Dept: CARDIAC REHAB | Age: 64
Setting detail: THERAPIES SERIES
Discharge: HOME | End: 2024-04-09
Payer: MEDICARE

## 2024-04-09 VITALS — WEIGHT: 173 LBS | BODY MASS INDEX: 25.92 KG/M2

## 2024-04-09 PROCEDURE — 94625 PHY/QHP OP PULM RHB W/O MNTR: CPT

## 2024-04-09 ASSESSMENT — LIFESTYLE VARIABLES: SMOKELESS_TOBACCO: NO

## 2024-04-09 NOTE — PLAN OF CARE
way 0   PHQ-2 Score 6   PHQ-9 Total Score 21   If you checked off any problems, how difficult have these problems made it for you to do your work, take care of things at home, or get along with other people? 2      Social Determinants of Health     Tobacco Use: Medium Risk (2/6/2024)    Patient History     Smoking Tobacco Use: Former     Smokeless Tobacco Use: Never     Passive Exposure: Current   Alcohol Use: Not At Risk (11/22/2023)    AUDIT-C     Frequency of Alcohol Consumption: Never     Average Number of Drinks: Patient does not drink     Frequency of Binge Drinking: Never   Financial Resource Strain: High Risk (11/22/2023)    Overall Financial Resource Strain (CARDIA)     Difficulty of Paying Living Expenses: Hard   Food Insecurity: Not on file (11/22/2023)   Transportation Needs: No Transportation Needs (11/22/2023)    PRAPARE - Transportation     Lack of Transportation (Medical): No     Lack of Transportation (Non-Medical): No   Physical Activity: Insufficiently Active (11/22/2023)    Exercise Vital Sign     Days of Exercise per Week: 4 days     Minutes of Exercise per Session: 20 min   Stress: Stress Concern Present (11/22/2023)    [employer] [place] [employer] - Occupational Stress Questionnaire     Feeling of Stress : Rather much   Social Connections: Moderately Integrated (11/22/2023)    Social Connection and Isolation Panel [NHANES]     Frequency of Communication with Friends and Family: More than three times a week     Frequency of Social Gatherings with Friends and Family: Twice a week     Attends [employer] Services: More than 4 times per year     Active Member of Clubs or Organizations: No     Attends Club or Organization Meetings: Never     Marital Status:    Intimate Partner Violence: Not At Risk (11/22/2023)    Humiliation, Afraid, Rape, and Kick questionnaire     Fear of Current or Ex-Partner: No     Emotionally Abused: No     Physically Abused: No     Sexually Abused: No

## 2024-04-11 ENCOUNTER — HOSPITAL ENCOUNTER (OUTPATIENT)
Dept: CARDIAC REHAB | Age: 64
Setting detail: THERAPIES SERIES
Discharge: HOME | End: 2024-04-11
Payer: MEDICARE

## 2024-04-15 ENCOUNTER — HOSPITAL ENCOUNTER (OUTPATIENT)
Age: 64
Discharge: HOME | End: 2024-04-15
Payer: MEDICARE

## 2024-04-15 ENCOUNTER — OFFICE VISIT (OUTPATIENT)
Dept: PULMONOLOGY | Age: 64
End: 2024-04-15
Payer: MEDICARE

## 2024-04-15 ENCOUNTER — HOSPITAL ENCOUNTER (OUTPATIENT)
Dept: GENERAL RADIOLOGY | Age: 64
Discharge: HOME | End: 2024-04-15
Payer: MEDICARE

## 2024-04-15 VITALS
BODY MASS INDEX: 25.62 KG/M2 | SYSTOLIC BLOOD PRESSURE: 130 MMHG | OXYGEN SATURATION: 94 % | HEART RATE: 83 BPM | RESPIRATION RATE: 18 BRPM | HEIGHT: 69 IN | DIASTOLIC BLOOD PRESSURE: 84 MMHG | WEIGHT: 173 LBS

## 2024-04-15 DIAGNOSIS — R06.02 SOB (SHORTNESS OF BREATH): ICD-10-CM

## 2024-04-15 DIAGNOSIS — R06.02 SOB (SHORTNESS OF BREATH): Primary | ICD-10-CM

## 2024-04-15 LAB
BASOPHILS # BLD: 0.1 K/UL (ref 0–0.2)
BASOPHILS NFR BLD: 0.9 %
DEPRECATED RDW RBC AUTO: 16.8 % (ref 12.4–15.4)
EOSINOPHIL # BLD: 0.1 K/UL (ref 0–0.6)
EOSINOPHIL NFR BLD: 1.4 %
HCT VFR BLD AUTO: 38.6 % (ref 40.5–52.5)
HGB BLD-MCNC: 12.8 G/DL (ref 13.5–17.5)
LYMPHOCYTES # BLD: 1.3 K/UL (ref 1–5.1)
LYMPHOCYTES NFR BLD: 21.4 %
MCH RBC QN AUTO: 26.3 PG (ref 26–34)
MCHC RBC AUTO-ENTMCNC: 33.1 G/DL (ref 31–36)
MCV RBC AUTO: 79.6 FL (ref 80–100)
MONOCYTES # BLD: 0.3 K/UL (ref 0–1.3)
MONOCYTES NFR BLD: 5.5 %
NEUTROPHILS # BLD: 4.4 K/UL (ref 1.7–7.7)
NEUTROPHILS NFR BLD: 70.8 %
NT-PROBNP SERPL-MCNC: <36 PG/ML (ref 0–124)
PLATELET # BLD AUTO: 259 K/UL (ref 135–450)
PMV BLD AUTO: 9.1 FL (ref 5–10.5)
PROCALCITONIN SERPL IA-MCNC: 0.05 NG/ML (ref 0–0.15)
RBC # BLD AUTO: 4.86 M/UL (ref 4.2–5.9)
WBC # BLD AUTO: 6.2 K/UL (ref 4–11)

## 2024-04-15 PROCEDURE — 71046 X-RAY EXAM CHEST 2 VIEWS: CPT

## 2024-04-15 PROCEDURE — 85025 COMPLETE CBC W/AUTO DIFF WBC: CPT

## 2024-04-15 PROCEDURE — 99214 OFFICE O/P EST MOD 30 MIN: CPT | Performed by: INTERNAL MEDICINE

## 2024-04-15 PROCEDURE — 3075F SYST BP GE 130 - 139MM HG: CPT | Performed by: INTERNAL MEDICINE

## 2024-04-15 PROCEDURE — 83880 ASSAY OF NATRIURETIC PEPTIDE: CPT

## 2024-04-15 PROCEDURE — 36415 COLL VENOUS BLD VENIPUNCTURE: CPT

## 2024-04-15 PROCEDURE — 3079F DIAST BP 80-89 MM HG: CPT | Performed by: INTERNAL MEDICINE

## 2024-04-15 PROCEDURE — 84145 PROCALCITONIN (PCT): CPT

## 2024-04-15 NOTE — PROGRESS NOTES
P  [facility]                                               Pulmonary Clinic Consult     I had the pleasure of seeing  Rubin Arteaga     Chief Complaint   Patient presents with    Follow-up     Fluid  Pulmicort, stiolto refills         HISTORY OF PRESENT ILLNESS:    Rubin Arteaga is a 64 y.o. year old  Who start smoking at age  14  And increase gradually   1.5 pack daily ,he still smoke    He was diagnosed 10 year,he received pills as he states ,he was done with therapy long time and was told he is in remission     The Patient comes in with SOB that has been going on  for the last few years Associated with .cough and he usually has clear sputum    He states that it get worse with exercise or walking long distance and he can walk less 300 feet- 1/2 And go 1-2 flight of stairs before get short winded        Patient underwent biopsy JERROD ,central nodule and was negative   Culture al was negative  Unfortunately he continue to smoke   His cough and SOB has improved  Could not tolerate Trelegy   PFT does 10 /22      Today Visit  Still with mild SOB   Sent by PCP as he sandy crackles heard by PCP  No swelling legs   Still with  SOB and ROCHE  Ambulation is any issues   Some cough with white sputum   Wife with him   Histo negative     ALLERGIES:    Allergies   Allergen Reactions    Wellbutrin [Bupropion] Hallucinations    Trimethoprim     Codeine Nausea Only     Pt tolerates Oxycodone    Sulfa Antibiotics      States makes yeast infection on his penis    Sulfamethoxazole-Trimethoprim Other (See Comments)     States makes yeast infection on his penis         PAST MEDICAL HISTORY:       Diagnosis Date    Arthritis 1/2011    SHOULDERS AND KNEES    CAD (coronary artery disease)     Chronic back pain     COPD (chronic obstructive pulmonary disease) ([facility])     GERD (gastroesophageal reflux disease)     ONCE WEEKLY TAKE TUMS    Hyperlipidemia     Hypertension     Kidney stone     Lumbar

## 2024-04-16 ENCOUNTER — TELEPHONE (OUTPATIENT)
Dept: CARDIAC REHAB | Age: 64
End: 2024-04-16

## 2024-04-16 NOTE — TELEPHONE ENCOUNTER
Patient did not show for pulmonary rehab today. Patient reports he has not been feeling well and went to see Dr. Cooper 4/15 and was told to take it easy. Patient hopes to return to rehab on 4/18.

## 2024-04-18 ENCOUNTER — HOSPITAL ENCOUNTER (OUTPATIENT)
Dept: CARDIAC REHAB | Age: 64
Setting detail: THERAPIES SERIES
Discharge: HOME | End: 2024-04-18
Payer: MEDICARE

## 2024-04-18 PROCEDURE — 94625 PHY/QHP OP PULM RHB W/O MNTR: CPT

## 2024-04-23 ENCOUNTER — HOSPITAL ENCOUNTER (OUTPATIENT)
Dept: CARDIAC REHAB | Age: 64
Setting detail: THERAPIES SERIES
Discharge: HOME | End: 2024-04-23
Payer: MEDICARE

## 2024-04-23 VITALS — BODY MASS INDEX: 25.77 KG/M2 | WEIGHT: 172 LBS

## 2024-04-23 PROCEDURE — 94625 PHY/QHP OP PULM RHB W/O MNTR: CPT

## 2024-04-25 ENCOUNTER — HOSPITAL ENCOUNTER (OUTPATIENT)
Dept: CARDIAC REHAB | Age: 64
Setting detail: THERAPIES SERIES
Discharge: HOME | End: 2024-04-25
Payer: MEDICARE

## 2024-04-25 VITALS — WEIGHT: 174 LBS | BODY MASS INDEX: 26.07 KG/M2

## 2024-04-25 PROCEDURE — 94625 PHY/QHP OP PULM RHB W/O MNTR: CPT

## 2024-04-30 ENCOUNTER — HOSPITAL ENCOUNTER (OUTPATIENT)
Dept: CARDIAC REHAB | Age: 64
Setting detail: THERAPIES SERIES
Discharge: HOME | End: 2024-04-30
Payer: MEDICARE

## 2024-04-30 VITALS — WEIGHT: 171 LBS | BODY MASS INDEX: 25.62 KG/M2

## 2024-04-30 PROCEDURE — 94625 PHY/QHP OP PULM RHB W/O MNTR: CPT

## 2024-05-02 ENCOUNTER — HOSPITAL ENCOUNTER (OUTPATIENT)
Dept: CARDIAC REHAB | Age: 64
Setting detail: THERAPIES SERIES
Discharge: HOME | End: 2024-05-02
Payer: MEDICARE

## 2024-05-02 PROCEDURE — 94625 PHY/QHP OP PULM RHB W/O MNTR: CPT

## 2024-05-07 ENCOUNTER — HOSPITAL ENCOUNTER (OUTPATIENT)
Dept: CARDIAC REHAB | Age: 64
Setting detail: THERAPIES SERIES
Discharge: HOME | End: 2024-05-07
Payer: MEDICARE

## 2024-05-07 VITALS — BODY MASS INDEX: 25.17 KG/M2 | WEIGHT: 168 LBS

## 2024-05-07 PROCEDURE — 94625 PHY/QHP OP PULM RHB W/O MNTR: CPT

## 2024-05-09 ENCOUNTER — HOSPITAL ENCOUNTER (OUTPATIENT)
Dept: CARDIAC REHAB | Age: 64
Setting detail: THERAPIES SERIES
Discharge: HOME | End: 2024-05-09
Payer: MEDICARE

## 2024-05-09 PROCEDURE — 94625 PHY/QHP OP PULM RHB W/O MNTR: CPT

## 2024-05-09 NOTE — PLAN OF CARE
Stability: Low Risk  (11/22/2023)    Housing Stability Vital Sign     Unable to Pay for Housing in the Last Year: No     Number of Places Lived in the Last Year: 1     Unstable Housing in the Last Year: No   Interpersonal Safety: Not At Risk (11/22/2023)    Humiliation, Afraid, Rape, and Kick questionnaire     Fear of Current or Ex-Partner: No     Emotionally Abused: No     Physically Abused: No     Sexually Abused: No   Utilities: Not At Risk (11/22/2023)    [facility] Utilities     Threatened with loss of utilities: No      Psychosocial  Stages of Change: Preparation  Family Support: Yes  Comments: pt has stomach cancer    Psychosocial Intervention  Interventions: Currently under treatment for depression; PCP notified  Currently Taking Psychotropic Meds: Yes  Medication Changes: No    Psychosocial Target Goals  Target Goal(s): Engages in self-care behaviors; Assess presence or absence of depression using a valid screening tool; Demonstrates appropriate interaction with others  Uses Stress Mgmt Techniques: Yes  Patient Stated Psychosocial Goals: Patient reports he went to a [employer] retreat this past week and he enjoyed it.    TOBACCO    Tobacco  Stages of Change: Action  Tobacco Use: No  Quit: Greater than 6 month  Date Started: [birth year]  Date Quit: 10/01/23  Smokeless Tobacco Use: No    Tobacco Cessation Intervention  Smoking Cessation Referral: No  Tobacco Adjunct: No    Tobacco Education  Resource Information Provided: Yes  Tobacco Triggers: stress    Target Goal  Target Goal: Complete cessation of tobacco use  Patient Stated Tobacco Goals: To never restart smoking    NUTRITION    Nutrition  Stages of Change: Preparation  Diabetes: No    Lipids  Date Lipids Drawn: 04/05/21  Total: 107  HDL: 29  LDL: 76  Triglycerides: 68  Lipid Med(s): Lipitor  Lipid Med Change(s): No    Weight Management  Weight : 75.3 kg (166 lb)  Height : 1.74 m (5' 8.5\")  BMI: 24.93  Weight Goal: patient has lost weight this week and reported he

## 2024-05-12 ENCOUNTER — APPOINTMENT (OUTPATIENT)
Dept: CT IMAGING | Age: 64
End: 2024-05-12
Attending: STUDENT IN AN ORGANIZED HEALTH CARE EDUCATION/TRAINING PROGRAM
Payer: MEDICARE

## 2024-05-12 ENCOUNTER — HOSPITAL ENCOUNTER (EMERGENCY)
Age: 64
Discharge: HOME | End: 2024-05-12
Attending: STUDENT IN AN ORGANIZED HEALTH CARE EDUCATION/TRAINING PROGRAM
Payer: MEDICARE

## 2024-05-12 VITALS
OXYGEN SATURATION: 96 % | HEIGHT: 68 IN | SYSTOLIC BLOOD PRESSURE: 127 MMHG | DIASTOLIC BLOOD PRESSURE: 76 MMHG | RESPIRATION RATE: 18 BRPM | TEMPERATURE: 98.2 F | WEIGHT: 170.3 LBS | HEART RATE: 86 BPM | BODY MASS INDEX: 25.81 KG/M2

## 2024-05-12 DIAGNOSIS — M54.41 ACUTE BILATERAL LOW BACK PAIN WITH BILATERAL SCIATICA: ICD-10-CM
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
## 2024-05-12 DIAGNOSIS — M54.42 ACUTE BILATERAL LOW BACK PAIN WITH BILATERAL SCIATICA: ICD-10-CM

## 2024-05-12 PROCEDURE — 72131 CT LUMBAR SPINE W/O DYE: CPT

## 2024-05-12 PROCEDURE — 96372 THER/PROPH/DIAG INJ SC/IM: CPT

## 2024-05-12 PROCEDURE — 99284 EMERGENCY DEPT VISIT MOD MDM: CPT

## 2024-05-12 PROCEDURE — 6370000000 HC RX 637 (ALT 250 FOR IP): Performed by: STUDENT IN AN ORGANIZED HEALTH CARE EDUCATION/TRAINING PROGRAM

## 2024-05-12 PROCEDURE — 6360000002 HC RX W HCPCS: Performed by: STUDENT IN AN ORGANIZED HEALTH CARE EDUCATION/TRAINING PROGRAM

## 2024-05-12 RX ORDER — FINASTERIDE 5 MG/1
5 TABLET, FILM COATED ORAL DAILY
COMMUNITY
Start: 2024-04-29

## 2024-05-12 RX ORDER — KETOROLAC TROMETHAMINE 15 MG/ML
15 INJECTION, SOLUTION INTRAMUSCULAR; INTRAVENOUS ONCE
Status: COMPLETED | OUTPATIENT
Start: 2024-05-12 | End: 2024-05-12

## 2024-05-12 RX ORDER — OXYCODONE HYDROCHLORIDE AND ACETAMINOPHEN 5; 325 MG/1; MG/1
1 TABLET ORAL ONCE
Status: COMPLETED | OUTPATIENT
Start: 2024-05-12 | End: 2024-05-12

## 2024-05-12 RX ORDER — PREDNISONE 10 MG/1
TABLET ORAL
Qty: 20 TABLET | Refills: 0 | Status: SHIPPED | OUTPATIENT
Start: 2024-05-12 | End: 2024-05-22

## 2024-05-12 RX ADMIN — OXYCODONE HYDROCHLORIDE AND ACETAMINOPHEN 1 TABLET: 5; 325 TABLET ORAL at 19:33

## 2024-05-12 RX ADMIN — KETOROLAC TROMETHAMINE 15 MG: 15 INJECTION, SOLUTION INTRAMUSCULAR; INTRAVENOUS at 19:33

## 2024-05-12 ASSESSMENT — PAIN DESCRIPTION - ORIENTATION: ORIENTATION: LOWER

## 2024-05-12 ASSESSMENT — PAIN DESCRIPTION - PAIN TYPE: TYPE: ACUTE PAIN;CHRONIC PAIN

## 2024-05-12 ASSESSMENT — PAIN SCALES - GENERAL
PAINLEVEL_OUTOF10: 8
PAINLEVEL_OUTOF10: 8

## 2024-05-12 ASSESSMENT — LIFESTYLE VARIABLES
HOW OFTEN DO YOU HAVE A DRINK CONTAINING ALCOHOL: NEVER
HOW MANY STANDARD DRINKS CONTAINING ALCOHOL DO YOU HAVE ON A TYPICAL DAY: PATIENT DOES NOT DRINK

## 2024-05-12 ASSESSMENT — PAIN DESCRIPTION - LOCATION: LOCATION: BACK

## 2024-05-12 ASSESSMENT — PAIN DESCRIPTION - DESCRIPTORS: DESCRIPTORS: SHARP;SHOOTING;THROBBING

## 2024-05-12 ASSESSMENT — PAIN - FUNCTIONAL ASSESSMENT: PAIN_FUNCTIONAL_ASSESSMENT: PREVENTS OR INTERFERES WITH ALL ACTIVE AND SOME PASSIVE ACTIVITIES

## 2024-05-12 ASSESSMENT — PAIN DESCRIPTION - FREQUENCY: FREQUENCY: CONTINUOUS

## 2024-05-12 ASSESSMENT — PAIN DESCRIPTION - ONSET: ONSET: PROGRESSIVE

## 2024-05-12 NOTE — ED PROVIDER NOTES
history (01/20/2011); Carpal tunnel release; Upper gastrointestinal endoscopy (07/26/2012); Endoscopy, colon, diagnostic; other surgical history (05/01/2013); Cystoscopy (06/05/2015); Lithotripsy; Lithotripsy (Left, 11/05/2015); Diagnostic Cardiac Cath Lab Procedure; Cardiac catheterization (02/20/2015); Cervical discectomy (02/08/2017); shoulder surgery (Left, 05/30/2017); Cystoscopy (N/A, 02/09/2018); Upper gastrointestinal endoscopy (N/A, 06/21/2019); bronchoscopy (N/A, 09/20/2022); bronchoscopy (09/20/2022); bronchoscopy (09/20/2022); bronchoscopy (09/20/2022); bronchoscopy (N/A, 10/27/2023); Skin cancer excision; and cyst removal.    Home medications:   Prior to Admission medications    Medication Sig Start Date End Date Taking? Authorizing Provider   finasteride (PROSCAR) 5 MG tablet Take 1 tablet by mouth daily 4/29/24  Yes Lois Black MD   predniSONE (DELTASONE) 10 MG tablet Take 4 tablets by mouth once daily for 5 days 5/12/24 5/22/24 Yes Laura Willett MD   HYDROcodone-acetaminophen (NORCO) 5-325 MG per tablet Take 1 tablet by mouth every 6 hours as needed for Pain. Max Daily Amount: 4 tablets    Lois Black MD   mupirocin (BACTROBAN) 2 % ointment Apply topically 3 times daily Apply topically 3 times daily.    Lois Black MD   sucralfate (CARAFATE) 1 GM tablet Take 1 tablet by mouth 4 times daily    Lois Black MD   ondansetron (ZOFRAN) 8 MG tablet Take 1 tablet by mouth every 8 hours as needed for Nausea or Vomiting    Lois Black MD   budesonide (PULMICORT) 0.5 MG/2ML nebulizer suspension Take 2 mLs by nebulization 2 times daily 11/17/23 12/17/23  Sury Cooper MD   tiZANidine (ZANAFLEX) 4 MG tablet Take 2 tablets by mouth every 8 hours as needed    Lois Black MD   magnesium oxide (MAG-OX) 400 (240 Mg) MG tablet Take 1 tablet by mouth daily    Lois Black, MD   levothyroxine (SYNTHROID) 100 MCG tablet Take 1 tablet by mouth
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
## 2024-05-14 ENCOUNTER — HOSPITAL ENCOUNTER (OUTPATIENT)
Dept: CARDIAC REHAB | Age: 64
Setting detail: THERAPIES SERIES
Discharge: HOME | End: 2024-05-14
Payer: MEDICARE

## 2024-05-14 PROCEDURE — 94625 PHY/QHP OP PULM RHB W/O MNTR: CPT

## 2024-05-16 ENCOUNTER — HOSPITAL ENCOUNTER (OUTPATIENT)
Dept: CARDIAC REHAB | Age: 64
Setting detail: THERAPIES SERIES
Discharge: HOME | End: 2024-05-16
Payer: MEDICARE

## 2024-05-16 PROCEDURE — 94625 PHY/QHP OP PULM RHB W/O MNTR: CPT

## 2024-05-16 ASSESSMENT — PATIENT HEALTH QUESTIONNAIRE - PHQ9
7. TROUBLE CONCENTRATING ON THINGS, SUCH AS READING THE NEWSPAPER OR WATCHING TELEVISION: NEARLY EVERY DAY
SUM OF ALL RESPONSES TO PHQ9 QUESTIONS 1 & 2: 6
1. LITTLE INTEREST OR PLEASURE IN DOING THINGS: NEARLY EVERY DAY
8. MOVING OR SPEAKING SO SLOWLY THAT OTHER PEOPLE COULD HAVE NOTICED. OR THE OPPOSITE, BEING SO FIGETY OR RESTLESS THAT YOU HAVE BEEN MOVING AROUND A LOT MORE THAN USUAL: NEARLY EVERY DAY
6. FEELING BAD ABOUT YOURSELF - OR THAT YOU ARE A FAILURE OR HAVE LET YOURSELF OR YOUR FAMILY DOWN: NEARLY EVERY DAY
SUM OF ALL RESPONSES TO PHQ QUESTIONS 1-9: 24
5. POOR APPETITE OR OVEREATING: NEARLY EVERY DAY
SUM OF ALL RESPONSES TO PHQ QUESTIONS 1-9: 24
4. FEELING TIRED OR HAVING LITTLE ENERGY: NEARLY EVERY DAY
2. FEELING DOWN, DEPRESSED OR HOPELESS: NEARLY EVERY DAY
10. IF YOU CHECKED OFF ANY PROBLEMS, HOW DIFFICULT HAVE THESE PROBLEMS MADE IT FOR YOU TO DO YOUR WORK, TAKE CARE OF THINGS AT HOME, OR GET ALONG WITH OTHER PEOPLE: EXTREMELY DIFFICULT
9. THOUGHTS THAT YOU WOULD BE BETTER OFF DEAD, OR OF HURTING YOURSELF: NOT AT ALL
SUM OF ALL RESPONSES TO PHQ QUESTIONS 1-9: 24
3. TROUBLE FALLING OR STAYING ASLEEP: NEARLY EVERY DAY
SUM OF ALL RESPONSES TO PHQ QUESTIONS 1-9: 24

## 2024-05-16 ASSESSMENT — EXERCISE STRESS TEST
PEAK_HR: 109
PEAK_BP: 148/72

## 2024-05-16 NOTE — PLAN OF CARE
Pulmonary Rehab Treatment Plan   Name: Rubin Arteaga Assessment Date: 2024   : 1960 Primary Diagnosis: COPD   Age: 64 y.o. Referring Physician: Sury Cooper   MRN: [id number] Primary Care Physician: Ian Lane MD       Treatment Diagnosis  Treatment Diagnosis 1: COPD  Referral Date: 10/31/23  Significant Cardiovascular History: Previous PCI  Co-morbidities: Previous MI    Oxygen Saturation / Titration   Stages of Change : Preparation    Oxygen Intervention  Oxygen Use: No  Oxygen Type : Nasal canula  Patients Liter Flow : 3  O2 Sat Greater Than 90%: Yes  Nurse/Patient Discussion : patient not using oxygen.  Oxygen Education : Oxygen Safety / [place]; Traveling with Oxygen; Proper care of Oxygen Equipment  Oxygen Target Goals : Discontinue oxygen useage; Keep SA02 greater than 90%  Patient Stated Goals : patient is no longer using oxygen.    Individual Treatment Plan  ITP Visit Type: Discharge, completed program  1st Date of Exercise: 23  ITP Next Review Date: 24  Visit #/Total Visits: 36/36  EF%: 57.5 % (55-60%)  FEV1 (%PRED): 82  FEV1/FVC: 80  DLCO (mL/mmHg sec): 12.28 ml/mmHg sec (41% of Predicted)  Risk Stratification: Moderate  Pulmonary ITP: Education    COPD Assessment Test (CAT)  Cough : 5  Phlegm (mucus): 3  Chest Tightness: 3  Breathless When Walking Up a Hill or Flight of Steps: 4  Activities at Home: 3  Confident Leaving Home Due to Lung Condition: 1  Sleep Soundly: 4  Energy Level: 3  CAT Total Score : 26     Dyspnea (Shortness of Breath) Evaluation  Resting, Lying Down: 0  Walking on a Level at Your Own Pace: 3  Walking on a Level with Others Your Age: 4  Walking Up a Hill: 5  Walking Up Stairs: 4  While Eatin  Standing Up From a Chair: 0  Brushing Teeth: 0  Shaving and/or Brushing Hair: 0  Showering/Bathin  Dressin  Picking Up and Straightening: 3  Doing Dishes: 0  Sweeping/Vacuumin  Making Bed: 2  Shoppin  Doing Laundry:

## 2024-05-16 NOTE — PROGRESS NOTES
Patient completed pulmonary rehab. Discharge depression screening completed and results discussed with patient. Patient score 24 indicating severe depression. Patient is taking Prozac and reports \"they don't want me on anything stronger\".        5/16/2024    11:00 AM   PHQ-9    Little interest or pleasure in doing things 3   Feeling down, depressed, or hopeless 3   Trouble falling or staying asleep, or sleeping too much 3   Feeling tired or having little energy 3   Poor appetite or overeating 3   Feeling bad about yourself - or that you are a failure or have let yourself or your family down 3   Trouble concentrating on things, such as reading the newspaper or watching television 3   Moving or speaking so slowly that other people could have noticed. Or the opposite - being so fidgety or restless that you have been moving around a lot more than usual 3   Thoughts that you would be better off dead, or of hurting yourself in some way 0   PHQ-2 Score 6   PHQ-9 Total Score 24   If you checked off any problems, how difficult have these problems made it for you to do your work, take care of things at home, or get along with other people? 3

## 2024-05-21 ENCOUNTER — APPOINTMENT (OUTPATIENT)
Dept: CARDIAC REHAB | Age: 64
End: 2024-05-21
Payer: MEDICARE

## 2024-05-23 ENCOUNTER — APPOINTMENT (OUTPATIENT)
Dept: CARDIAC REHAB | Age: 64
End: 2024-05-23
Payer: MEDICARE

## 2024-05-28 ENCOUNTER — APPOINTMENT (OUTPATIENT)
Dept: CARDIAC REHAB | Age: 64
End: 2024-05-28
Payer: MEDICARE

## 2024-05-30 ENCOUNTER — APPOINTMENT (OUTPATIENT)
Dept: CARDIAC REHAB | Age: 64
End: 2024-05-30
Payer: MEDICARE

## 2024-06-07 DIAGNOSIS — E78.2 MIXED HYPERLIPIDEMIA: Primary | ICD-10-CM

## 2024-06-10 RX ORDER — ATORVASTATIN CALCIUM 80 MG/1
80 TABLET, FILM COATED ORAL NIGHTLY
Qty: 30 TABLET | Refills: 0 | Status: SHIPPED | OUTPATIENT
Start: 2024-06-10

## 2024-06-10 NOTE — TELEPHONE ENCOUNTER
LOV 7/5/2023 [facility] [place] (clearance)  Next: 7/8/2024 [facility]     Labs: 4/2021    Labs ordered; Detailed VM left with instructions. 30 days refilled for appt.

## 2024-06-20 DIAGNOSIS — E78.2 MIXED HYPERLIPIDEMIA: ICD-10-CM

## 2024-06-24 RX ORDER — ATORVASTATIN CALCIUM 80 MG/1
80 TABLET, FILM COATED ORAL NIGHTLY
Qty: 30 TABLET | Refills: 0 | OUTPATIENT
Start: 2024-06-24

## 2024-06-24 NOTE — TELEPHONE ENCOUNTER
LOV 7/5/2023 [facility] [place]  Next: 7/8/2024 [facility] [place]   Labs: 4/2021  Refill sent for 30 day supply to get to appt. Refusing medication at this time until visit.

## 2024-07-08 ENCOUNTER — TELEPHONE (OUTPATIENT)
Dept: CARDIOLOGY CLINIC | Age: 64
End: 2024-07-08

## 2024-07-08 ENCOUNTER — HOSPITAL ENCOUNTER (EMERGENCY)
Age: 64
Discharge: ANOTHER HOSPITAL | End: 2024-07-09
Attending: INTERNAL MEDICINE
Payer: MEDICARE

## 2024-07-08 ENCOUNTER — APPOINTMENT (OUTPATIENT)
Dept: GENERAL RADIOLOGY | Age: 64
End: 2024-07-08
Payer: MEDICARE

## 2024-07-08 ENCOUNTER — APPOINTMENT (OUTPATIENT)
Dept: CT IMAGING | Age: 64
End: 2024-07-08
Payer: MEDICARE

## 2024-07-08 DIAGNOSIS — E78.2 MIXED HYPERLIPIDEMIA: ICD-10-CM

## 2024-07-08 DIAGNOSIS — N20.1 OBSTRUCTION OF LEFT URETEROPELVIC JUNCTION DUE TO STONE: Primary | ICD-10-CM

## 2024-07-08 DIAGNOSIS — T07.XXXA MULTIPLE CONTUSIONS: ICD-10-CM

## 2024-07-08 DIAGNOSIS — R10.9 LEFT FLANK PAIN: ICD-10-CM
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
## 2024-07-08 DIAGNOSIS — N20.0 RIGHT KIDNEY STONE: ICD-10-CM

## 2024-07-08 LAB
ALBUMIN SERPL-MCNC: 3.9 G/DL (ref 3.4–5)
ALBUMIN/GLOB SERPL: 1.6 {RATIO} (ref 1.1–2.2)
ALP SERPL-CCNC: 146 U/L (ref 40–129)
ALT SERPL-CCNC: 12 U/L (ref 10–40)
ANION GAP SERPL CALCULATED.3IONS-SCNC: 12 MMOL/L (ref 3–16)
AST SERPL-CCNC: 12 U/L (ref 15–37)
BACTERIA URNS QL MICRO: ABNORMAL /HPF
BASOPHILS # BLD: 0.1 K/UL (ref 0–0.2)
BASOPHILS NFR BLD: 0.6 %
BILIRUB SERPL-MCNC: 0.4 MG/DL (ref 0–1)
BILIRUB UR QL STRIP.AUTO: NEGATIVE
BUN SERPL-MCNC: 12 MG/DL (ref 7–20)
CALCIUM SERPL-MCNC: 9.2 MG/DL (ref 8.3–10.6)
CHLORIDE SERPL-SCNC: 104 MMOL/L (ref 99–110)
CLARITY UR: CLEAR
CO2 SERPL-SCNC: 26 MMOL/L (ref 21–32)
COLOR UR: YELLOW
CREAT SERPL-MCNC: 1.1 MG/DL (ref 0.8–1.3)
CRYSTALS URNS MICRO: ABNORMAL /HPF
DEPRECATED RDW RBC AUTO: 17.6 % (ref 12.4–15.4)
EOSINOPHIL # BLD: 0 K/UL (ref 0–0.6)
EOSINOPHIL NFR BLD: 0.6 %
EPI CELLS #/AREA URNS HPF: ABNORMAL /HPF (ref 0–5)
GFR SERPLBLD CREATININE-BSD FMLA CKD-EPI: 76 ML/MIN/{1.73_M2}
GLUCOSE SERPL-MCNC: 126 MG/DL (ref 70–99)
GLUCOSE UR STRIP.AUTO-MCNC: NEGATIVE MG/DL
HCT VFR BLD AUTO: 34.4 % (ref 40.5–52.5)
HGB BLD-MCNC: 11.3 G/DL (ref 13.5–17.5)
HGB UR QL STRIP.AUTO: ABNORMAL
KETONES UR STRIP.AUTO-MCNC: NEGATIVE MG/DL
LEUKOCYTE ESTERASE UR QL STRIP.AUTO: ABNORMAL
LYMPHOCYTES # BLD: 1.2 K/UL (ref 1–5.1)
LYMPHOCYTES NFR BLD: 13.8 %
MCH RBC QN AUTO: 25.7 PG (ref 26–34)
MCHC RBC AUTO-ENTMCNC: 32.8 G/DL (ref 31–36)
MCV RBC AUTO: 78.5 FL (ref 80–100)
MONOCYTES # BLD: 0.5 K/UL (ref 0–1.3)
MONOCYTES NFR BLD: 5.7 %
NEUTROPHILS # BLD: 7.1 K/UL (ref 1.7–7.7)
NEUTROPHILS NFR BLD: 79.3 %
NITRITE UR QL STRIP.AUTO: NEGATIVE
PH UR STRIP.AUTO: 6 [PH] (ref 5–8)
PLATELET # BLD AUTO: 231 K/UL (ref 135–450)
PMV BLD AUTO: 8.1 FL (ref 5–10.5)
POTASSIUM SERPL-SCNC: 3.8 MMOL/L (ref 3.5–5.1)
PROT SERPL-MCNC: 6.3 G/DL (ref 6.4–8.2)
PROT UR STRIP.AUTO-MCNC: NEGATIVE MG/DL
RBC # BLD AUTO: 4.38 M/UL (ref 4.2–5.9)
RBC #/AREA URNS HPF: ABNORMAL /HPF (ref 0–4)
SODIUM SERPL-SCNC: 142 MMOL/L (ref 136–145)
SP GR UR STRIP.AUTO: 1.02 (ref 1–1.03)
UA COMPLETE W REFLEX CULTURE PNL UR: YES
UA DIPSTICK W REFLEX MICRO PNL UR: YES
URN SPEC COLLECT METH UR: ABNORMAL
UROBILINOGEN UR STRIP-ACNC: 0.2 E.U./DL
WBC # BLD AUTO: 8.9 K/UL (ref 4–11)
WBC #/AREA URNS HPF: ABNORMAL /HPF (ref 0–5)
YEAST URNS QL MICRO: PRESENT /HPF

## 2024-07-08 PROCEDURE — 74177 CT ABD & PELVIS W/CONTRAST: CPT

## 2024-07-08 PROCEDURE — 70450 CT HEAD/BRAIN W/O DYE: CPT

## 2024-07-08 PROCEDURE — 2580000003 HC RX 258: Performed by: PHYSICIAN ASSISTANT

## 2024-07-08 PROCEDURE — 73060 X-RAY EXAM OF HUMERUS: CPT

## 2024-07-08 PROCEDURE — 6360000002 HC RX W HCPCS: Performed by: PHYSICIAN ASSISTANT

## 2024-07-08 PROCEDURE — 72125 CT NECK SPINE W/O DYE: CPT

## 2024-07-08 PROCEDURE — 81001 URINALYSIS AUTO W/SCOPE: CPT

## 2024-07-08 PROCEDURE — 36415 COLL VENOUS BLD VENIPUNCTURE: CPT

## 2024-07-08 PROCEDURE — 85025 COMPLETE CBC W/AUTO DIFF WBC: CPT

## 2024-07-08 PROCEDURE — 2580000003 HC RX 258: Performed by: INTERNAL MEDICINE

## 2024-07-08 PROCEDURE — 87086 URINE CULTURE/COLONY COUNT: CPT

## 2024-07-08 PROCEDURE — 80053 COMPREHEN METABOLIC PANEL: CPT

## 2024-07-08 PROCEDURE — 73030 X-RAY EXAM OF SHOULDER: CPT

## 2024-07-08 PROCEDURE — 96375 TX/PRO/DX INJ NEW DRUG ADDON: CPT

## 2024-07-08 PROCEDURE — 96376 TX/PRO/DX INJ SAME DRUG ADON: CPT

## 2024-07-08 PROCEDURE — 96365 THER/PROPH/DIAG IV INF INIT: CPT

## 2024-07-08 PROCEDURE — 6370000000 HC RX 637 (ALT 250 FOR IP): Performed by: PHYSICIAN ASSISTANT

## 2024-07-08 PROCEDURE — 76376 3D RENDER W/INTRP POSTPROCES: CPT

## 2024-07-08 PROCEDURE — 96361 HYDRATE IV INFUSION ADD-ON: CPT

## 2024-07-08 PROCEDURE — 99285 EMERGENCY DEPT VISIT HI MDM: CPT

## 2024-07-08 PROCEDURE — 6360000004 HC RX CONTRAST MEDICATION: Performed by: PHYSICIAN ASSISTANT

## 2024-07-08 RX ORDER — ONDANSETRON 2 MG/ML
4 INJECTION INTRAMUSCULAR; INTRAVENOUS ONCE
Status: COMPLETED | OUTPATIENT
Start: 2024-07-08 | End: 2024-07-08

## 2024-07-08 RX ORDER — SODIUM CHLORIDE 9 MG/ML
INJECTION, SOLUTION INTRAVENOUS CONTINUOUS
Status: DISCONTINUED | OUTPATIENT
Start: 2024-07-08 | End: 2024-07-09 | Stop reason: HOSPADM

## 2024-07-08 RX ORDER — 0.9 % SODIUM CHLORIDE 0.9 %
1000 INTRAVENOUS SOLUTION INTRAVENOUS ONCE
Status: COMPLETED | OUTPATIENT
Start: 2024-07-08 | End: 2024-07-08

## 2024-07-08 RX ORDER — MORPHINE SULFATE 4 MG/ML
4 INJECTION, SOLUTION INTRAMUSCULAR; INTRAVENOUS ONCE
Status: COMPLETED | OUTPATIENT
Start: 2024-07-08 | End: 2024-07-08

## 2024-07-08 RX ORDER — ATORVASTATIN CALCIUM 80 MG/1
80 TABLET, FILM COATED ORAL NIGHTLY
Qty: 15 TABLET | Refills: 0 | Status: SHIPPED | OUTPATIENT
Start: 2024-07-08

## 2024-07-08 RX ORDER — OXYCODONE HYDROCHLORIDE AND ACETAMINOPHEN 5; 325 MG/1; MG/1
1 TABLET ORAL ONCE
Status: COMPLETED | OUTPATIENT
Start: 2024-07-08 | End: 2024-07-08

## 2024-07-08 RX ORDER — LIDOCAINE 4 G/G
1 PATCH TOPICAL ONCE
Status: COMPLETED | OUTPATIENT
Start: 2024-07-08 | End: 2024-07-09

## 2024-07-08 RX ORDER — SODIUM CHLORIDE 450 MG/100ML
INJECTION, SOLUTION INTRAVENOUS CONTINUOUS
Status: DISCONTINUED | OUTPATIENT
Start: 2024-07-08 | End: 2024-07-08

## 2024-07-08 RX ORDER — KETOROLAC TROMETHAMINE 15 MG/ML
15 INJECTION, SOLUTION INTRAMUSCULAR; INTRAVENOUS ONCE
Status: COMPLETED | OUTPATIENT
Start: 2024-07-08 | End: 2024-07-08

## 2024-07-08 RX ADMIN — KETOROLAC TROMETHAMINE 15 MG: 15 INJECTION, SOLUTION INTRAMUSCULAR; INTRAVENOUS at 21:15

## 2024-07-08 RX ADMIN — ONDANSETRON 4 MG: 2 INJECTION INTRAMUSCULAR; INTRAVENOUS at 21:15

## 2024-07-08 RX ADMIN — MORPHINE SULFATE 4 MG: 4 INJECTION INTRAVENOUS at 20:39

## 2024-07-08 RX ADMIN — SODIUM CHLORIDE: 9 INJECTION, SOLUTION INTRAVENOUS at 23:13

## 2024-07-08 RX ADMIN — ONDANSETRON 4 MG: 2 INJECTION INTRAMUSCULAR; INTRAVENOUS at 19:55

## 2024-07-08 RX ADMIN — OXYCODONE HYDROCHLORIDE AND ACETAMINOPHEN 1 TABLET: 5; 325 TABLET ORAL at 18:44

## 2024-07-08 RX ADMIN — IOMEPROL INJECTION 75 ML: 714 INJECTION, SOLUTION INTRAVASCULAR at 19:20

## 2024-07-08 RX ADMIN — CEFTRIAXONE SODIUM 1000 MG: 1 INJECTION, POWDER, FOR SOLUTION INTRAMUSCULAR; INTRAVENOUS at 21:55

## 2024-07-08 RX ADMIN — SODIUM CHLORIDE 1000 ML: 9 INJECTION, SOLUTION INTRAVENOUS at 21:55

## 2024-07-08 ASSESSMENT — PAIN DESCRIPTION - ORIENTATION
ORIENTATION: LEFT

## 2024-07-08 ASSESSMENT — PAIN SCALES - GENERAL
PAINLEVEL_OUTOF10: 10
PAINLEVEL_OUTOF10: 9
PAINLEVEL_OUTOF10: 10
PAINLEVEL_OUTOF10: 6
PAINLEVEL_OUTOF10: 10

## 2024-07-08 ASSESSMENT — PAIN DESCRIPTION - LOCATION
LOCATION: ABDOMEN;BACK;FLANK
LOCATION: FLANK;ABDOMEN;BACK
LOCATION: ABDOMEN;BACK;FLANK
LOCATION: BACK;ABDOMEN

## 2024-07-08 ASSESSMENT — PAIN DESCRIPTION - PAIN TYPE: TYPE: ACUTE PAIN

## 2024-07-08 ASSESSMENT — PAIN DESCRIPTION - FREQUENCY: FREQUENCY: CONTINUOUS

## 2024-07-08 ASSESSMENT — PAIN - FUNCTIONAL ASSESSMENT: PAIN_FUNCTIONAL_ASSESSMENT: 0-10

## 2024-07-08 NOTE — TELEPHONE ENCOUNTER
Medication Refill    Medication needing refilled: atorvastatin (LIPITOR) 80 MG tablet [[id number]]        Dosage of the medication: 80    How are you taking this medication (QD, BID, TID, QID, PRN):   Sig: Take 1 tablet by mouth nightly              30 or 90 day supply called in: 30    When will you run out of your medication: 20 days     Which Pharmacy are we sending the medication to?:   [facility] [id number] - [place], [place] - [address] - P [phone] - F [phone]  [address] [address]  Phone: [phone]  Fax: [phone]

## 2024-07-08 NOTE — TELEPHONE ENCOUNTER
LOV 7/5/2023 [facility] [place]   Next: 8/12/2024  30 day supply requested. Will send in 15 day supply to get him to appt.       Labs: ordered, not done.   Called patient and advised him of labs; cx appt today due to not obtaining labs.

## 2024-07-09 ENCOUNTER — HOSPITAL ENCOUNTER (INPATIENT)
Age: 64
LOS: 1 days | Discharge: HOME | DRG: 690 | End: 2024-07-10
Attending: STUDENT IN AN ORGANIZED HEALTH CARE EDUCATION/TRAINING PROGRAM | Admitting: INTERNAL MEDICINE
Payer: MEDICARE

## 2024-07-09 VITALS
HEART RATE: 75 BPM | RESPIRATION RATE: 16 BRPM | DIASTOLIC BLOOD PRESSURE: 93 MMHG | WEIGHT: 170 LBS | BODY MASS INDEX: 25.76 KG/M2 | TEMPERATURE: 97.8 F | OXYGEN SATURATION: 98 % | HEIGHT: 68 IN | SYSTOLIC BLOOD PRESSURE: 159 MMHG

## 2024-07-09 PROCEDURE — 6360000002 HC RX W HCPCS: Performed by: INTERNAL MEDICINE

## 2024-07-09 PROCEDURE — 6360000002 HC RX W HCPCS

## 2024-07-09 PROCEDURE — 6370000000 HC RX 637 (ALT 250 FOR IP): Performed by: INTERNAL MEDICINE

## 2024-07-09 PROCEDURE — 2580000003 HC RX 258: Performed by: INTERNAL MEDICINE

## 2024-07-09 PROCEDURE — 2060000000 HC ICU INTERMEDIATE R&B

## 2024-07-09 RX ORDER — POTASSIUM CHLORIDE 7.45 MG/ML
10 INJECTION INTRAVENOUS PRN
Status: CANCELLED | OUTPATIENT
Start: 2024-07-09

## 2024-07-09 RX ORDER — ALBUTEROL SULFATE 90 UG/1
2 AEROSOL, METERED RESPIRATORY (INHALATION) EVERY 6 HOURS PRN
Status: CANCELLED | OUTPATIENT
Start: 2024-07-09

## 2024-07-09 RX ORDER — SUCRALFATE 1 G/1
1 TABLET ORAL 4 TIMES DAILY
Status: CANCELLED | OUTPATIENT
Start: 2024-07-09

## 2024-07-09 RX ORDER — ACETAMINOPHEN 325 MG/1
650 TABLET ORAL EVERY 6 HOURS PRN
Status: CANCELLED | OUTPATIENT
Start: 2024-07-09

## 2024-07-09 RX ORDER — FINASTERIDE 5 MG/1
5 TABLET, FILM COATED ORAL DAILY
Status: CANCELLED | OUTPATIENT
Start: 2024-07-09

## 2024-07-09 RX ORDER — SUCRALFATE 1 G/1
1 TABLET ORAL 4 TIMES DAILY
Status: DISCONTINUED | OUTPATIENT
Start: 2024-07-09 | End: 2024-07-09

## 2024-07-09 RX ORDER — PANTOPRAZOLE SODIUM 40 MG/1
40 TABLET, DELAYED RELEASE ORAL
Status: DISCONTINUED | OUTPATIENT
Start: 2024-07-10 | End: 2024-07-10 | Stop reason: HOSPADM

## 2024-07-09 RX ORDER — ROFLUMILAST 500 UG/1
500 TABLET ORAL NIGHTLY
Status: CANCELLED | OUTPATIENT
Start: 2024-07-09

## 2024-07-09 RX ORDER — ENOXAPARIN SODIUM 100 MG/ML
40 INJECTION SUBCUTANEOUS DAILY
Status: CANCELLED | OUTPATIENT
Start: 2024-07-09

## 2024-07-09 RX ORDER — SODIUM CHLORIDE 0.9 % (FLUSH) 0.9 %
5-40 SYRINGE (ML) INJECTION EVERY 12 HOURS SCHEDULED
Status: DISCONTINUED | OUTPATIENT
Start: 2024-07-09 | End: 2024-07-10 | Stop reason: HOSPADM

## 2024-07-09 RX ORDER — SODIUM CHLORIDE 9 MG/ML
INJECTION, SOLUTION INTRAVENOUS CONTINUOUS
Status: CANCELLED | OUTPATIENT
Start: 2024-07-09 | End: 2024-07-11

## 2024-07-09 RX ORDER — POLYETHYLENE GLYCOL 3350 17 G/17G
17 POWDER, FOR SOLUTION ORAL DAILY PRN
Status: DISCONTINUED | OUTPATIENT
Start: 2024-07-09 | End: 2024-07-10 | Stop reason: HOSPADM

## 2024-07-09 RX ORDER — MORPHINE SULFATE 2 MG/ML
2 INJECTION, SOLUTION INTRAMUSCULAR; INTRAVENOUS EVERY 6 HOURS PRN
Status: DISCONTINUED | OUTPATIENT
Start: 2024-07-09 | End: 2024-07-10 | Stop reason: HOSPADM

## 2024-07-09 RX ORDER — MAGNESIUM SULFATE IN WATER 40 MG/ML
2000 INJECTION, SOLUTION INTRAVENOUS PRN
Status: DISCONTINUED | OUTPATIENT
Start: 2024-07-09 | End: 2024-07-10 | Stop reason: HOSPADM

## 2024-07-09 RX ORDER — ONDANSETRON 2 MG/ML
4 INJECTION INTRAMUSCULAR; INTRAVENOUS EVERY 6 HOURS PRN
Status: CANCELLED | OUTPATIENT
Start: 2024-07-09

## 2024-07-09 RX ORDER — DIAZEPAM 5 MG/1
5 TABLET ORAL EVERY 8 HOURS PRN
Status: CANCELLED | OUTPATIENT
Start: 2024-07-09

## 2024-07-09 RX ORDER — LEVOTHYROXINE SODIUM 0.1 MG/1
100 TABLET ORAL DAILY
Status: CANCELLED | OUTPATIENT
Start: 2024-07-09

## 2024-07-09 RX ORDER — SODIUM CHLORIDE 0.9 % (FLUSH) 0.9 %
5-40 SYRINGE (ML) INJECTION PRN
Status: DISCONTINUED | OUTPATIENT
Start: 2024-07-09 | End: 2024-07-10 | Stop reason: HOSPADM

## 2024-07-09 RX ORDER — ONDANSETRON 2 MG/ML
4 INJECTION INTRAMUSCULAR; INTRAVENOUS EVERY 6 HOURS PRN
Status: DISCONTINUED | OUTPATIENT
Start: 2024-07-09 | End: 2024-07-10 | Stop reason: HOSPADM

## 2024-07-09 RX ORDER — PREGABALIN 25 MG/1
50 CAPSULE ORAL 2 TIMES DAILY
Status: DISCONTINUED | OUTPATIENT
Start: 2024-07-09 | End: 2024-07-10 | Stop reason: HOSPADM

## 2024-07-09 RX ORDER — POTASSIUM CHLORIDE 750 MG/1
40 TABLET, EXTENDED RELEASE ORAL PRN
Status: CANCELLED | OUTPATIENT
Start: 2024-07-09

## 2024-07-09 RX ORDER — FLUOXETINE HYDROCHLORIDE 20 MG/1
40 CAPSULE ORAL 2 TIMES DAILY
Status: DISCONTINUED | OUTPATIENT
Start: 2024-07-09 | End: 2024-07-10 | Stop reason: HOSPADM

## 2024-07-09 RX ORDER — KETOROLAC TROMETHAMINE 15 MG/ML
15 INJECTION, SOLUTION INTRAMUSCULAR; INTRAVENOUS ONCE
Status: DISCONTINUED | OUTPATIENT
Start: 2024-07-09 | End: 2024-07-09 | Stop reason: HOSPADM

## 2024-07-09 RX ORDER — PREGABALIN 75 MG/1
150 CAPSULE ORAL NIGHTLY
Status: DISCONTINUED | OUTPATIENT
Start: 2024-07-09 | End: 2024-07-09

## 2024-07-09 RX ORDER — ONDANSETRON 4 MG/1
4 TABLET, ORALLY DISINTEGRATING ORAL EVERY 8 HOURS PRN
Status: CANCELLED | OUTPATIENT
Start: 2024-07-09

## 2024-07-09 RX ORDER — ATORVASTATIN CALCIUM 80 MG/1
80 TABLET, FILM COATED ORAL NIGHTLY
Status: DISCONTINUED | OUTPATIENT
Start: 2024-07-09 | End: 2024-07-10 | Stop reason: HOSPADM

## 2024-07-09 RX ORDER — SODIUM CHLORIDE 9 MG/ML
INJECTION, SOLUTION INTRAVENOUS PRN
Status: CANCELLED | OUTPATIENT
Start: 2024-07-09

## 2024-07-09 RX ORDER — ACETAMINOPHEN 650 MG/1
650 SUPPOSITORY RECTAL EVERY 6 HOURS PRN
Status: DISCONTINUED | OUTPATIENT
Start: 2024-07-09 | End: 2024-07-10 | Stop reason: HOSPADM

## 2024-07-09 RX ORDER — ZOLPIDEM TARTRATE 5 MG/1
5 TABLET ORAL NIGHTLY PRN
Status: CANCELLED | OUTPATIENT
Start: 2024-07-09

## 2024-07-09 RX ORDER — ZOLPIDEM TARTRATE 5 MG/1
10 TABLET ORAL NIGHTLY PRN
Status: DISCONTINUED | OUTPATIENT
Start: 2024-07-09 | End: 2024-07-10 | Stop reason: HOSPADM

## 2024-07-09 RX ORDER — MORPHINE SULFATE 4 MG/ML
4 INJECTION, SOLUTION INTRAMUSCULAR; INTRAVENOUS ONCE
Status: COMPLETED | OUTPATIENT
Start: 2024-07-09 | End: 2024-07-09

## 2024-07-09 RX ORDER — PROCHLORPERAZINE EDISYLATE 5 MG/ML
10 INJECTION INTRAMUSCULAR; INTRAVENOUS EVERY 6 HOURS PRN
Status: DISCONTINUED | OUTPATIENT
Start: 2024-07-09 | End: 2024-07-10 | Stop reason: HOSPADM

## 2024-07-09 RX ORDER — POLYETHYLENE GLYCOL 3350 17 G/17G
17 POWDER, FOR SOLUTION ORAL DAILY PRN
Status: CANCELLED | OUTPATIENT
Start: 2024-07-09

## 2024-07-09 RX ORDER — SODIUM CHLORIDE 9 MG/ML
INJECTION, SOLUTION INTRAVENOUS PRN
Status: DISCONTINUED | OUTPATIENT
Start: 2024-07-09 | End: 2024-07-10 | Stop reason: HOSPADM

## 2024-07-09 RX ORDER — TIZANIDINE 4 MG/1
8 TABLET ORAL EVERY 8 HOURS PRN
Status: DISCONTINUED | OUTPATIENT
Start: 2024-07-09 | End: 2024-07-10 | Stop reason: HOSPADM

## 2024-07-09 RX ORDER — POTASSIUM CHLORIDE 20 MEQ/1
40 TABLET, EXTENDED RELEASE ORAL PRN
Status: DISCONTINUED | OUTPATIENT
Start: 2024-07-09 | End: 2024-07-10 | Stop reason: HOSPADM

## 2024-07-09 RX ORDER — ROFLUMILAST 500 UG/1
500 TABLET ORAL NIGHTLY
Status: DISCONTINUED | OUTPATIENT
Start: 2024-07-09 | End: 2024-07-10 | Stop reason: HOSPADM

## 2024-07-09 RX ORDER — KETOROLAC TROMETHAMINE 30 MG/ML
INJECTION, SOLUTION INTRAMUSCULAR; INTRAVENOUS
Status: COMPLETED
Start: 2024-07-09 | End: 2024-07-09

## 2024-07-09 RX ORDER — ACETAMINOPHEN 325 MG/1
650 TABLET ORAL EVERY 6 HOURS PRN
Status: DISCONTINUED | OUTPATIENT
Start: 2024-07-09 | End: 2024-07-10 | Stop reason: HOSPADM

## 2024-07-09 RX ORDER — ATORVASTATIN CALCIUM 40 MG/1
80 TABLET, FILM COATED ORAL NIGHTLY
Status: CANCELLED | OUTPATIENT
Start: 2024-07-09

## 2024-07-09 RX ORDER — ONDANSETRON 4 MG/1
4 TABLET, ORALLY DISINTEGRATING ORAL EVERY 8 HOURS PRN
Status: DISCONTINUED | OUTPATIENT
Start: 2024-07-09 | End: 2024-07-10 | Stop reason: HOSPADM

## 2024-07-09 RX ORDER — ALBUTEROL SULFATE 2.5 MG/3ML
2.5 SOLUTION RESPIRATORY (INHALATION) 4 TIMES DAILY PRN
Status: DISCONTINUED | OUTPATIENT
Start: 2024-07-09 | End: 2024-07-10 | Stop reason: HOSPADM

## 2024-07-09 RX ORDER — FLUOXETINE HYDROCHLORIDE 20 MG/1
40 CAPSULE ORAL 2 TIMES DAILY
Status: CANCELLED | OUTPATIENT
Start: 2024-07-09

## 2024-07-09 RX ORDER — FINASTERIDE 5 MG/1
5 TABLET, FILM COATED ORAL DAILY
Status: DISCONTINUED | OUTPATIENT
Start: 2024-07-09 | End: 2024-07-10 | Stop reason: HOSPADM

## 2024-07-09 RX ORDER — POTASSIUM CHLORIDE 7.45 MG/ML
10 INJECTION INTRAVENOUS PRN
Status: DISCONTINUED | OUTPATIENT
Start: 2024-07-09 | End: 2024-07-10 | Stop reason: HOSPADM

## 2024-07-09 RX ORDER — SUCRALFATE 1 G/1
1 TABLET ORAL 2 TIMES DAILY
Status: DISCONTINUED | OUTPATIENT
Start: 2024-07-10 | End: 2024-07-10 | Stop reason: HOSPADM

## 2024-07-09 RX ORDER — SODIUM CHLORIDE 0.9 % (FLUSH) 0.9 %
5-40 SYRINGE (ML) INJECTION EVERY 12 HOURS SCHEDULED
Status: CANCELLED | OUTPATIENT
Start: 2024-07-09

## 2024-07-09 RX ORDER — TIZANIDINE 4 MG/1
8 TABLET ORAL EVERY 8 HOURS PRN
Status: CANCELLED | OUTPATIENT
Start: 2024-07-09

## 2024-07-09 RX ORDER — ACETAMINOPHEN 650 MG/1
650 SUPPOSITORY RECTAL EVERY 6 HOURS PRN
Status: CANCELLED | OUTPATIENT
Start: 2024-07-09

## 2024-07-09 RX ORDER — SODIUM CHLORIDE 0.9 % (FLUSH) 0.9 %
5-40 SYRINGE (ML) INJECTION PRN
Status: CANCELLED | OUTPATIENT
Start: 2024-07-09

## 2024-07-09 RX ORDER — LEVOTHYROXINE SODIUM 0.1 MG/1
100 TABLET ORAL
Status: DISCONTINUED | OUTPATIENT
Start: 2024-07-09 | End: 2024-07-10 | Stop reason: HOSPADM

## 2024-07-09 RX ORDER — MAGNESIUM SULFATE IN WATER 40 MG/ML
2000 INJECTION, SOLUTION INTRAVENOUS PRN
Status: CANCELLED | OUTPATIENT
Start: 2024-07-09

## 2024-07-09 RX ADMIN — FLUOXETINE 40 MG: 20 CAPSULE ORAL at 21:12

## 2024-07-09 RX ADMIN — PREGABALIN 50 MG: 25 CAPSULE ORAL at 21:13

## 2024-07-09 RX ADMIN — FINASTERIDE 5 MG: 5 TABLET, FILM COATED ORAL at 13:22

## 2024-07-09 RX ADMIN — ROFLUMILAST 500 MCG: 500 TABLET ORAL at 21:13

## 2024-07-09 RX ADMIN — LEVOTHYROXINE SODIUM 100 MCG: 100 TABLET ORAL at 13:22

## 2024-07-09 RX ADMIN — MORPHINE SULFATE 4 MG: 4 INJECTION INTRAVENOUS at 06:33

## 2024-07-09 RX ADMIN — SODIUM CHLORIDE, PRESERVATIVE FREE 10 ML: 5 INJECTION INTRAVENOUS at 19:43

## 2024-07-09 RX ADMIN — ATORVASTATIN CALCIUM 80 MG: 80 TABLET, FILM COATED ORAL at 21:12

## 2024-07-09 RX ADMIN — FLUOXETINE 40 MG: 20 CAPSULE ORAL at 13:22

## 2024-07-09 RX ADMIN — ONDANSETRON 4 MG: 2 INJECTION INTRAMUSCULAR; INTRAVENOUS at 13:25

## 2024-07-09 RX ADMIN — MORPHINE SULFATE 2 MG: 2 INJECTION, SOLUTION INTRAMUSCULAR; INTRAVENOUS at 13:25

## 2024-07-09 RX ADMIN — ONDANSETRON 4 MG: 2 INJECTION INTRAMUSCULAR; INTRAVENOUS at 19:42

## 2024-07-09 RX ADMIN — KETOROLAC TROMETHAMINE 30 MG: 30 INJECTION, SOLUTION INTRAMUSCULAR at 00:50

## 2024-07-09 RX ADMIN — WATER 1000 MG: 1 INJECTION INTRAMUSCULAR; INTRAVENOUS; SUBCUTANEOUS at 21:13

## 2024-07-09 RX ADMIN — ZOLPIDEM TARTRATE 10 MG: 5 TABLET ORAL at 21:40

## 2024-07-09 ASSESSMENT — PAIN DESCRIPTION - ONSET
ONSET: ON-GOING

## 2024-07-09 ASSESSMENT — PAIN DESCRIPTION - ORIENTATION
ORIENTATION: LEFT

## 2024-07-09 ASSESSMENT — PAIN DESCRIPTION - DESCRIPTORS
DESCRIPTORS: ACHING
DESCRIPTORS: SHARP
DESCRIPTORS: ACHING;SORE

## 2024-07-09 ASSESSMENT — PAIN DESCRIPTION - FREQUENCY
FREQUENCY: CONTINUOUS

## 2024-07-09 ASSESSMENT — PAIN DESCRIPTION - PAIN TYPE
TYPE: ACUTE PAIN

## 2024-07-09 ASSESSMENT — PAIN DESCRIPTION - LOCATION
LOCATION: FLANK

## 2024-07-09 ASSESSMENT — PAIN - FUNCTIONAL ASSESSMENT
PAIN_FUNCTIONAL_ASSESSMENT: ACTIVITIES ARE NOT PREVENTED

## 2024-07-09 ASSESSMENT — PAIN SCALES - GENERAL
PAINLEVEL_OUTOF10: 7
PAINLEVEL_OUTOF10: 7
PAINLEVEL_OUTOF10: 5
PAINLEVEL_OUTOF10: 7
PAINLEVEL_OUTOF10: 5
PAINLEVEL_OUTOF10: 6

## 2024-07-09 ASSESSMENT — LIFESTYLE VARIABLES
HOW MANY STANDARD DRINKS CONTAINING ALCOHOL DO YOU HAVE ON A TYPICAL DAY: PATIENT DOES NOT DRINK
HOW OFTEN DO YOU HAVE A DRINK CONTAINING ALCOHOL: NEVER

## 2024-07-09 NOTE — ED PROVIDER NOTES
In addition to the advanced practice provider, I personally saw Rubin TITUS  Jenni and performed a substantive portion of the visit including all aspects of the medical decision making.    Medical Decision Making  64-year-old male comes to the ER after a fall.  The fall occurred yesterday and when he tried to sit down on a chair on the porch he missed and fell about 2 feet.  He has pain in his left arm, right shoulder and his lower back and he does have significant pain on the left flank.  Patient is also complaining of a headache but does not not remember hitting his head.  Heart has a regular rhythm and rate.  Lungs are clear bilaterally.  He does have tenderness along the left flank and abdomen.  Abdomen is soft and nondistended.  Patient does not have any evidence of an acute abdomen on exam.  Patient also has tenderness with palpation of the right shoulder without step-off or crepitus.  He has tenderness with palpation over the left arm without step-off or crepitus.  He also has tenderness over the lumbar region without step-off or crepitus.  He does not have any focal neurologic deficits.  His lower extremities are neurovascularly intact.  He has not had any loss of bowel or bladder function.  He also has tenderness over the left hip without step-off or crepitus.  Labs do show evidence of UTI on the urine.  Hemoglobin is 11.3.  CT of the lumbar region did not show any acute fracture or dislocation.  X-ray of the right shoulder also did not show any fracture or dislocation.  X-ray of the left humerus did not show any fracture or dislocation.  CT of the cervical spine did not show any acute changes or fracture or dislocation.  CT of the head did not show any acute findings.  CT of the abdomen pelvis did show an obstructing 1.1 cm calculus on the left UPJ with associated mild to moderate hydronephrosis and perinephric stranding there is also a nonobstructing calculus in the proximal right ureter measuring 5 mm.  
Eosinophils Absolute 0.0 0.0 - 0.6 K/uL    Basophils Absolute 0.1 0.0 - 0.2 K/uL   Comprehensive Metabolic Panel w/ Reflex to MG   Result Value Ref Range    Sodium 142 136 - 145 mmol/L    Potassium reflex Magnesium 3.8 3.5 - 5.1 mmol/L    Chloride 104 99 - 110 mmol/L    CO2 26 21 - 32 mmol/L    Anion Gap 12 3 - 16    Glucose 126 (H) 70 - 99 mg/dL    BUN 12 7 - 20 mg/dL    Creatinine 1.1 0.8 - 1.3 mg/dL    Est, Glom Filt Rate 76 >60    Calcium 9.2 8.3 - 10.6 mg/dL    Total Protein 6.3 (L) 6.4 - 8.2 g/dL    Albumin 3.9 3.4 - 5.0 g/dL    Albumin/Globulin Ratio 1.6 1.1 - 2.2    Total Bilirubin 0.4 0.0 - 1.0 mg/dL    Alkaline Phosphatase 146 (H) 40 - 129 U/L    ALT 12 10 - 40 U/L    AST 12 (L) 15 - 37 U/L   Urinalysis with Reflex to Culture    Specimen: Urine, clean catch   Result Value Ref Range    Color, UA Yellow Straw/Yellow    Clarity, UA Clear Clear    Glucose, Ur Negative Negative mg/dL    Bilirubin, Urine Negative Negative    Ketones, Urine Negative Negative mg/dL    Specific Gravity, UA 1.020 1.005 - 1.030    Blood, Urine LARGE (A) Negative    pH, Urine 6.0 5.0 - 8.0    Protein, UA Negative Negative mg/dL    Urobilinogen, Urine 0.2 <2.0 E.U./dL    Nitrite, Urine Negative Negative    Leukocyte Esterase, Urine SMALL (A) Negative    Microscopic Examination YES     Urine Type NotGiven     Urine Reflex to Culture Yes    Microscopic Urinalysis   Result Value Ref Range    WBC, UA  (A) 0 - 5 /HPF    RBC, UA 11-20 (A) 0 - 4 /HPF    Epithelial Cells, UA 2-5 0 - 5 /HPF    Bacteria, UA 2+ (A) None Seen /HPF    Yeast, UA Present (A) None Seen /HPF    Crystals, UA Few Ca. Oxalate (A) None Seen /HPF       When ordered only abnormal lab results are displayed. All other labs were within normal range or not returned as of this dictation.    EKG: When ordered, EKG's are interpreted by the Emergency Department Physician in the absence of a cardiologist.  Please see their note for interpretation of EKG.    RADIOLOGY:

## 2024-07-09 NOTE — PROGRESS NOTES
07/09/24 1424   Encounter Summary   Encounter Overview/Reason Attempted Encounter   Service Provided For Patient not available   Referral/Consult From Rounding   Last Encounter  07/09/24  (Patient was sleeping/CK)

## 2024-07-09 NOTE — H&P
History and Physical      Name:  Rubin Arteaga /Age/Sex: 1960  (64 y.o. male)   MRN & CSN:  [id number] & [id number] Admission Date/Time: 2024 10:47 AM   Location:  3208/3208-01 PCP: Ian Lane MD       Hospital Day: 1    Assessment and Plan:   Rubin Arteaga is a 64 y.o.  male with past medical history of CAD, hypothyroidism, chronic pain syndrome, gastric MALT lymphoma,, nicotine dependence came to the hospital from [facility] for evaluation of left UPJ obstruction due to stone, left flank pain.  Patient initially presented to the outside ED after he suffered a fall when he tried to sit down on a chair on the porch.      Imaging studies done did not reveal any acute findings. CT of the lumbar region did not show any acute fracture or dislocation. X-ray of the right shoulder also did not show any fracture or dislocation. X-ray of the left humerus did not show any fracture or dislocation. CT of the cervical spine did not show any acute changes or fracture or dislocation. CT of the head did not show any acute findings.   Outside ED showed WBC 8.9, hemoglobin/hematocrit 11.3/34.4, platelets 231.  Sodium 142, BUN/creatinine 12/1.1    CT abdomen/pelvis  Obstructing 1.1 cm calculus at the left UPJ with associated mild to moderate hydronephrosis and perinephric fat stranding.   Nonobstructing calculus in the proximal right ureter measuring 5 mm.   No CT evidence of acute injury.    Assessment    Left flank pain secondary to obstructing left UPJ stone with mild to moderate hydronephrosis  Acute pyelonephritis  S/p mechanical fall  History of CAD  Hypothyroidism  Chronic pain syndrome  Gastric MALT lymphoma  Nicotine dependence    Plan    N.p.o. for now  Gentle IV hydration  Urology consult  Start IV ceftriaxone  Morphine IV as needed for pain  Follow-up urine culture/blood culture  Continue Lipitor  Continue Synthroid  Continue Lyrica/Zanaflex  Continue Prozac  Continue finasteride      Diet

## 2024-07-09 NOTE — CARE COORDINATION
Case Management Assessment  Initial Evaluation    Date/Time of Evaluation: 7/9/2024 3:32 PM  Assessment Completed by: OZZIE Dean    If patient is discharged prior to next notation, then this note serves as note for discharge by case management.    Patient Name: Rubin Arteaga                   YOB: 1960  Diagnosis: Obstruction of left ureteropelvic junction (UPJ) due to stone [N20.1]                   Date / Time: 7/9/2024 10:47 AM    Patient Admission Status: Inpatient   Readmission Risk (Low < 19, Mod (19-27), High > 27): Readmission Risk Score: 14.8    Current PCP: Ian Lane MD  PCP verified by CM? Yes    Chart Reviewed: Yes      History Provided by: Patient  Patient Orientation: Alert and Oriented    Patient Cognition: Alert    Hospitalization in the last 30 days (Readmission):  No    If yes, Readmission Assessment in CM Navigator will be completed.    Advance Directives:      Code Status: Full Code   Patient's Primary Decision Maker is: Legal Next of Kin    Primary Decision Maker: Yuliya Arteaga - Spouse - [phone]    Discharge Planning:    Patient lives with: Spouse/Significant Other Type of Home: House  Primary Care Giver: Self  Patient Support Systems include: Spouse/Significant Other   Current Financial resources: Medicare  Current community resources: None  Current services prior to admission: None            Current DME:              Type of Home Care services:  None    ADLS  Prior functional level: Independent in ADLs/IADLs  Current functional level: Independent in ADLs/IADLs    PT AM-PAC:   /24  OT AM-PAC:   /24    Family can provide assistance at DC: Yes  Would you like Case Management to discuss the discharge plan with any other family members/significant others, and if so, who? No  Plans to Return to Present Housing: Yes  Other Identified Issues/Barriers to RETURNING to current housing: Yes  Potential Assistance needed at discharge: N/A            Potential DME:

## 2024-07-09 NOTE — PLAN OF CARE
Problem: Discharge Planning  Goal: Discharge to home or other facility with appropriate resources  Outcome: Progressing  Flowsheets (Taken 7/9/2024 1421)  Discharge to home or other facility with appropriate resources:   Identify barriers to discharge with patient and caregiver   Identify discharge learning needs (meds, wound care, etc)   Arrange for needed discharge resources and transportation as appropriate     Problem: Pain  Goal: Verbalizes/displays adequate comfort level or baseline comfort level  Outcome: Progressing  Flowsheets (Taken 7/9/2024 1421)  Verbalizes/displays adequate comfort level or baseline comfort level:   Encourage patient to monitor pain and request assistance   Administer analgesics based on type and severity of pain and evaluate response   Assess pain using appropriate pain scale   Implement non-pharmacological measures as appropriate and evaluate response     Problem: Safety - Adult  Goal: Free from fall injury  Outcome: Progressing  Flowsheets (Taken 7/9/2024 1421)  Free From Fall Injury: Instruct family/caregiver on patient safety

## 2024-07-09 NOTE — PLAN OF CARE
Patient accepted to [facility] for obstructing UPJ stone.   Currently on IVF, IVF abx and urology will be seeing the patient while [facility]     Souleymane Bentley MD

## 2024-07-09 NOTE — ED NOTES
[facility] Hospitalist and [facility] ED provider both agreeable to let pt go by POV. Pt called wife to take him, however pt's wife unable to take pt to [facility]. Pt agreeable to wait for transport at 0845. ED provider made aware.

## 2024-07-09 NOTE — PROGRESS NOTES
Patient admitted to room 3208 from [facility] ED. Patient oriented to room, call light, bed rails, phone, lights and bathroom. Patient instructed about the schedule of the day including: vital sign frequency, lab draws, possible tests, frequency of MD and staff rounds, daily weights, I &O's and prescribed diet. Telemetry box in place, patient aware of placement and reason. Bed locked, in lowest position, side rails up 2/4, call light within reach.        Recliner Assessment  Patient is able to demonstrate the ability to move from a reclining position to an upright position within the recliner.       4 Eyes Skin Assessment     NAME:  Rubin Arteaga  YOB: 1960  MEDICAL RECORD NUMBER:  [id number]    The patient is being assessed for  Admission    I agree that at least one RN has performed a thorough Head to Toe Skin Assessment on the patient. ALL assessment sites listed below have been assessed.      Areas assessed by both nurses:    Head, Face, Ears, Shoulders, Back, Chest, Arms, Elbows, Hands, Sacrum. Buttock, Coccyx, Ischium, Legs. Feet and Heels, and Under Medical Devices         Does the Patient have a Wound? Yes wound(s) were present on assessment. LDA wound assessment was Initiated and completed by RN         Sukumar Prevention initiated by RN: No  Wound Care Orders initiated by RN: No    Pressure Injury (Stage 3,4, Unstageable, DTI, NWPT, and Complex wounds) if present, place Wound referral order by RN under : No    New Ostomies, if present place, Ostomy referral order under : No     Nurse 1 eSignature: Electronically signed by BEBE ACHARYA RN on 7/9/24 at 2:20 PM EDT    **SHARE this note so that the co-signing nurse can place an eSignature**    Nurse 2 eSignature: {Esignature:[id number]}

## 2024-07-10 ENCOUNTER — APPOINTMENT (OUTPATIENT)
Age: 64
DRG: 690 | End: 2024-07-10
Attending: STUDENT IN AN ORGANIZED HEALTH CARE EDUCATION/TRAINING PROGRAM
Payer: MEDICARE

## 2024-07-10 VITALS
SYSTOLIC BLOOD PRESSURE: 172 MMHG | DIASTOLIC BLOOD PRESSURE: 87 MMHG | BODY MASS INDEX: 25.86 KG/M2 | OXYGEN SATURATION: 95 % | HEIGHT: 68 IN | HEART RATE: 81 BPM | TEMPERATURE: 98.1 F | RESPIRATION RATE: 18 BRPM | WEIGHT: 170.64 LBS

## 2024-07-10 LAB
ALBUMIN SERPL-MCNC: 3.7 G/DL (ref 3.4–5)
ALBUMIN/GLOB SERPL: 1.5 {RATIO}
ALP SERPL-CCNC: 118 U/L (ref 40–129)
ALT SERPL-CCNC: 9 U/L (ref 10–40)
ANION GAP SERPL CALCULATED.3IONS-SCNC: 10 MMOL/L (ref 3–16)
AST SERPL-CCNC: 15 U/L (ref 15–37)
BACTERIA UR CULT: NORMAL
BASOPHILS # BLD: 0.01 K/UL (ref 0–0.2)
BASOPHILS NFR BLD: 0 %
BILIRUB SERPL-MCNC: 0.8 MG/DL (ref 0–1)
BUN SERPL-MCNC: 11 MG/DL (ref 7–20)
CALCIUM SERPL-MCNC: 9.1 MG/DL (ref 8.3–10.6)
CHLORIDE SERPL-SCNC: 104 MMOL/L (ref 99–110)
CO2 SERPL-SCNC: 24 MMOL/L (ref 21–32)
CREAT SERPL-MCNC: 0.8 MG/DL (ref 0.8–1.3)
EOSINOPHIL # BLD: 0 K/UL (ref 0–0.6)
EOSINOPHILS RELATIVE PERCENT: 0 %
ERYTHROCYTE [DISTWIDTH] IN BLOOD BY AUTOMATED COUNT: 16.1 % (ref 12.4–15.4)
GFR, ESTIMATED: >90 ML/MIN/1.73M2
GLUCOSE SERPL-MCNC: 106 MG/DL (ref 70–99)
HCT VFR BLD AUTO: 33.7 % (ref 40.5–52.5)
HGB BLD-MCNC: 10.8 G/DL (ref 13.5–17.5)
IMM GRANULOCYTES # BLD AUTO: 0.02 K/UL (ref 0–0.5)
IMM GRANULOCYTES NFR BLD: 0 %
LYMPHOCYTES NFR BLD: 0.91 K/UL (ref 1–5.1)
LYMPHOCYTES RELATIVE PERCENT: 10 %
MAGNESIUM SERPL-MCNC: 1.6 MG/DL (ref 1.8–2.4)
MCH RBC QN AUTO: 26.1 PG (ref 26–34)
MCHC RBC AUTO-ENTMCNC: 32 G/DL (ref 31–36)
MCV RBC AUTO: 81.4 FL (ref 80–100)
MONOCYTES NFR BLD: 0.65 K/UL (ref 0–1.3)
MONOCYTES NFR BLD: 7 %
NEUTROPHILS NFR BLD: 83 %
NEUTS SEG NFR BLD: 7.81 K/UL (ref 1.7–7.7)
PLATELET # BLD AUTO: 211 K/UL (ref 135–450)
PMV BLD AUTO: 10.5 FL (ref 9.4–12.4)
POTASSIUM SERPL-SCNC: 3.5 MMOL/L (ref 3.5–5.1)
PROT SERPL-MCNC: 6.1 G/DL (ref 6.4–8.2)
RBC # BLD AUTO: 4.14 M/UL (ref 4.2–5.9)
SODIUM SERPL-SCNC: 138 MMOL/L (ref 136–145)
WBC OTHER # BLD: 9.4 K/UL (ref 4–11)

## 2024-07-10 PROCEDURE — 2580000003 HC RX 258: Performed by: INTERNAL MEDICINE

## 2024-07-10 PROCEDURE — 6370000000 HC RX 637 (ALT 250 FOR IP): Performed by: INTERNAL MEDICINE

## 2024-07-10 PROCEDURE — 83735 ASSAY OF MAGNESIUM: CPT

## 2024-07-10 PROCEDURE — 6360000002 HC RX W HCPCS: Performed by: INTERNAL MEDICINE

## 2024-07-10 PROCEDURE — 85025 COMPLETE CBC W/AUTO DIFF WBC: CPT

## 2024-07-10 PROCEDURE — 74018 RADEX ABDOMEN 1 VIEW: CPT

## 2024-07-10 PROCEDURE — 80053 COMPREHEN METABOLIC PANEL: CPT

## 2024-07-10 PROCEDURE — 36415 COLL VENOUS BLD VENIPUNCTURE: CPT

## 2024-07-10 RX ORDER — CEFDINIR 300 MG/1
300 CAPSULE ORAL 2 TIMES DAILY
Qty: 10 CAPSULE | Refills: 0 | Status: SHIPPED | OUTPATIENT
Start: 2024-07-10 | End: 2024-07-15

## 2024-07-10 RX ADMIN — FLUOXETINE 40 MG: 20 CAPSULE ORAL at 08:31

## 2024-07-10 RX ADMIN — ONDANSETRON 4 MG: 4 TABLET, ORALLY DISINTEGRATING ORAL at 08:31

## 2024-07-10 RX ADMIN — PROCHLORPERAZINE EDISYLATE 10 MG: 5 INJECTION INTRAMUSCULAR; INTRAVENOUS at 00:59

## 2024-07-10 RX ADMIN — FINASTERIDE 5 MG: 5 TABLET, FILM COATED ORAL at 08:31

## 2024-07-10 RX ADMIN — SODIUM CHLORIDE, PRESERVATIVE FREE 10 ML: 5 INJECTION INTRAVENOUS at 08:31

## 2024-07-10 RX ADMIN — PREGABALIN 50 MG: 25 CAPSULE ORAL at 08:31

## 2024-07-10 RX ADMIN — PROCHLORPERAZINE EDISYLATE 10 MG: 5 INJECTION INTRAMUSCULAR; INTRAVENOUS at 06:46

## 2024-07-10 ASSESSMENT — PAIN SCALES - GENERAL: PAINLEVEL_OUTOF10: 0

## 2024-07-10 NOTE — DISCHARGE SUMMARY
Discharge Summary           Name:  Rubin Arteaga /Age/Sex: 1960  (64 y.o. male)   MRN & CSN:  [id number] & [id number] Admission Date/Time: 2024  5:05 PM   Attending:  No att. providers found Discharging Physician: Lavell Rosales MD     Hospital Course:   Rubin Arteaga is a 64 y.o.  male with past medical history of CAD, hypothyroidism, chronic pain syndrome, gastric MALT lymphoma,, nicotine dependence came to the hospital from [facility] for evaluation of left UPJ obstruction due to stone, left flank pain.  Patient initially presented to the outside ED after he suffered a fall when he tried to sit down on a chair on the porch.    Imaging studies done did not reveal any acute findings. CT of the lumbar region did not show any acute fracture or dislocation. X-ray of the right shoulder also did not show any fracture or dislocation. X-ray of the left humerus did not show any fracture or dislocation. CT of the cervical spine did not show any acute changes or fracture or dislocation. CT of the head did not show any acute findings.  CT abdomen/pelvis noted as below showed obstructing 1.1 cm calculus at the left UPJ.  Patient was seen by urology who recommended outpatient ESWL.  Initial urinalysis abnormal suggestive of UTI.  Patient was placed on IV antibiotic.  Culture is pending.  However patient wants to leave today.  Stated that he will follow urine culture as outpatient.  I called patient's PCPs Dr. Lane's office and spoke to CHAZ Verma who stated that their office will follow-up the urine culture and adjust antibiotic if needed. Will switch PO antibiotics. Pt is currently being discharged in stable condition on 07/10/2024.  The patient expressed appropriate understanding of and agreement with the discharge recommendations, medications, and plan.     CT abdomen/pelvis  Obstructing 1.1 cm calculus at the left UPJ with associated mild to moderate hydronephrosis and perinephric fat stranding.

## 2024-07-10 NOTE — PROGRESS NOTES
Patient  alert oriented x 4 Patient sitting up in bed vomiting.  IV compazine given per order patient tolerated well will continue  to monitor call light in reach

## 2024-07-10 NOTE — DISCHARGE SUMMARY
sucralfate 1 GM tablet  Commonly known as: CARAFATE     tiZANidine 4 MG tablet  Commonly known as: ZANAFLEX     zolpidem 10 MG tablet  Commonly known as: AMBIEN           * This list has 2 medication(s) that are the same as other medications prescribed for you. Read the directions carefully, and ask your doctor or other care provider to review them with you.                   Where to Get Your Medications        These medications were sent to [facility] [id number] - [place] [facility], [place] - [address] - P [phone] - F [phone]  [address] [place] [address]      Phone: [phone]   cefdinir 300 MG capsule          Objective Findings at Discharge:   BP (!) 172/87   Pulse 81   Temp 98.1 °F (36.7 °C) (Oral)   Resp 18   Ht 1.727 m (5' 7.99\")   Wt 77.4 kg (170 lb 10.2 oz)   SpO2 95%   BMI 25.95 kg/m²            PHYSICAL EXAM   General-no acute distress  CVS-S1-S2, RRR  Respiratory bilateral entry fair  Abdomen-soft, nontender, nondistended  CNS-AOx3, no focal deficit.  .    BMP/CBC  Recent Labs     07/08/24  1817 07/10/24  0443    138   K 3.8 3.5    104   CO2 26 24   BUN 12 11   CREATININE 1.1 0.8   WBC 8.9 9.4   HCT 34.4* 33.7*    211       Discharge Time of 35 minutes    Electronically signed by Lavell Rosales MD on 7/10/2024 at 2:26 PM

## 2024-07-10 NOTE — PLAN OF CARE
Problem: Discharge Planning  Goal: Discharge to home or other facility with appropriate resources  7/9/2024 2308 by Elba Presley, RN  Outcome: Progressing  Discharge to home or other facility with appropriate resources:   Identify barriers to discharge with patient and caregiver   Identify discharge learning needs (meds, wound care, etc)   Arrange for needed discharge resources and transportation as appropriate     Problem: Pain  Goal: Verbalizes/displays adequate comfort level or baseline comfort level  7/9/2024 2308 by Elba Presley, RN  Outcome: Progressing  Verbalizes/displays adequate comfort level or baseline comfort level:   Encourage patient to monitor pain and request assistance   Administer analgesics based on type and severity of pain and evaluate response   Assess pain using appropriate pain scale   Implement non-pharmacological measures as appropriate and evaluate response     Problem: Safety - Adult  Goal: Free from fall injury  7/9/2024 2308 by Elba Presley, RN  Outcome: Progressing  Flowsheets (Taken 7/9/2024 1921)  Free From Fall Injury: Based on caregiver fall risk screen, instruct family/caregiver to ask for assistance with transferring infant if caregiver noted to have fall risk factors

## 2024-07-10 NOTE — PROGRESS NOTES
Patient alert oriented x4 able to make needs known. Patient voiced complaints of nausea and vomiting compazine given IV  per order patient tolerated well. Patient has bandage noted to upper left arm area cleaned with NS no redness/edema noted to site new bandage placed per patient request will continue to monitor call light in reach

## 2024-07-10 NOTE — PROGRESS NOTES
Patient resting quietly @ check no further episodes of n/v noted will continue to monitor call light in reach

## 2024-07-10 NOTE — PROGRESS NOTES
Patient resting quietly @ check sitting up in bed watching TV no further complaints voiced will continue to monitor call light in reach

## 2024-07-10 NOTE — PROGRESS NOTES
Perfect Serve Message to Mayi Downs NP per patient request    Patient requesting to be discharged. Urology said he can be discharged from thier standpoint and follow up as outpatient. KUB: Left nephrolithiasis. No definitive ureterolithiasis.    Message read. Awaiting response.

## 2024-07-10 NOTE — PLAN OF CARE
Problem: Discharge Planning  Goal: Discharge to home or other facility with appropriate resources  7/10/2024 0956 by Marsha Gupta, RN  Outcome: Adequate for Discharge     Problem: Pain  Goal: Verbalizes/displays adequate comfort level or baseline comfort level  7/10/2024 0956 by Marsha Gupta, RN  Outcome: Adequate for Discharge     Problem: Safety - Adult  Goal: Free from fall injury  7/10/2024 0956 by Marsha Gupta, RN  Outcome: Adequate for Discharge

## 2024-07-10 NOTE — CONSULTS
PTT:    Lab Results   Component Value Date/Time    APTT 29.8 05/11/2016 12:45 PM   [APTT      Impression/Plan: 64-year-old male with 1.1 cm left UPJ stone and 5 mm nonobstructing right proximal ureteral stone.    Plan:  -He is having no pain at this time.  Denies any infection-like symptoms.  -UA showed possible UTI but WBC is normal and he has been afebrile.  UA likely just reactive to the stone.  Continue antibiotic per primary.  Follow-up on cultures.  -Creatinine 0.8  -Discussed options with patient  -Since he has no pain at this time, he would prefer to be discharged to undergo outpatient ESWL.  -Will get stat KUB.  If stone is seen, he can be discharged from our standpoint and we will arrange left ESWL at the urology center as soon as possible.  -Right ureteral stone is nonobstructing and smaller.  This should be able to pass on its own.    Laith Perez PA-C

## 2024-07-10 NOTE — FLOWSHEET NOTE
07/10/24 0754   Vital Signs   Temp 98.1 °F (36.7 °C)   Temp Source Oral   Pulse 81   Heart Rate Source Monitor   Respirations 18   BP (!) 172/87   MAP (Calculated) 115   MAP (mmHg) 110   BP Location Right upper arm   BP Method Automatic   Patient Position Semi fowlers   Pain Assessment   Pain Assessment 0-10   Pain Level 0   Oxygen Therapy   SpO2 95 %   O2 Device None (Room air)   Rhythm Interpretation   Cardiac Rhythm Sinus rhythm       Lab Results   Component Value Date/Time    WBC 9.4 07/10/2024 04:43 AM    HGB 10.8 (L) 07/10/2024 04:43 AM    HCT 33.7 (L) 07/10/2024 04:43 AM     07/10/2024 04:43 AM     07/10/2024 04:43 AM    K 3.5 07/10/2024 04:43 AM    K 3.8 07/08/2024 06:17 PM    BUN 11 07/10/2024 04:43 AM    CREATININE 0.8 07/10/2024 04:43 AM    MG 1.6 (L) 07/10/2024 04:43 AM        AM Assessment completed.  Patient in bed.  Awake, Alert and oriented. Respirations easy unlabored.    Plan of care, education and safety measures reviewed and mutually agreed upon with the patient.   Calls appropriately. Needed items including call light in reach.    KUB completed at bedside.

## 2024-07-10 NOTE — PROGRESS NOTES
Nursing Discharge Note    Verbal and Written discharge instructions was given to the patient including diet, activity  and medications.      these medications from [facility] [id number] - [place] [facility], [place] - [address] [facility] - P [phone] - F [phone]  [email or web address]     At the time of discharge, patient reported feeling stable. The patient expressed appropriate understanding of, and agreement with, the discharge recommendations, medications, and follow up appointments and understands to seek medical attention if they experience recurrence or worsening of symptoms.       D/C'd IV patient tolerated well, no signs of bleeding.The written instructions was given to the patient to take home. Pt discharged home with all belongings. Ambulated out with family.

## 2024-07-11 ENCOUNTER — TELEPHONE (OUTPATIENT)
Dept: CARDIOLOGY CLINIC | Age: 64
End: 2024-07-11

## 2024-07-11 NOTE — TELEPHONE ENCOUNTER
CARDIAC CLEARANCE REQUEST    What type of procedure are you having:  Bilateral kidney stone removal   Are you taking any blood thinners:  N/a  Type on anesthesia:  general  When is your procedure scheduled for:  7/19/24  What physician is performing your procedure:  Dr. Alexei Johns  Phone Number:  [phone]  Fax number to send the letter:  [phone]    Last ov 07/05/2023 [facility] [place] [place]  Next ov 08/12/2024 [facility] [place] [place]

## 2024-07-12 NOTE — TELEPHONE ENCOUNTER
Not seen for 1 year  Although I do not see cardiac contraindication to procedure, he needs seen by cards or PCP prior to procedure. PCP can clear for procedure  No add-on. May need to delay procedure if requires cardiology

## 2024-07-16 ENCOUNTER — OFFICE VISIT (OUTPATIENT)
Dept: PULMONOLOGY | Age: 64
End: 2024-07-16
Payer: MEDICARE

## 2024-07-16 VITALS
HEIGHT: 68 IN | BODY MASS INDEX: 25.43 KG/M2 | SYSTOLIC BLOOD PRESSURE: 114 MMHG | OXYGEN SATURATION: 93 % | RESPIRATION RATE: 12 BRPM | HEART RATE: 80 BPM | WEIGHT: 167.8 LBS | DIASTOLIC BLOOD PRESSURE: 78 MMHG

## 2024-07-16 DIAGNOSIS — J44.9 CHRONIC OBSTRUCTIVE PULMONARY DISEASE, UNSPECIFIED COPD TYPE (HCC): Primary | ICD-10-CM

## 2024-07-16 PROCEDURE — 3017F COLORECTAL CA SCREEN DOC REV: CPT | Performed by: INTERNAL MEDICINE

## 2024-07-16 PROCEDURE — 3078F DIAST BP <80 MM HG: CPT | Performed by: INTERNAL MEDICINE

## 2024-07-16 PROCEDURE — 99214 OFFICE O/P EST MOD 30 MIN: CPT | Performed by: INTERNAL MEDICINE

## 2024-07-16 PROCEDURE — 1036F TOBACCO NON-USER: CPT | Performed by: INTERNAL MEDICINE

## 2024-07-16 PROCEDURE — G8419 CALC BMI OUT NRM PARAM NOF/U: HCPCS | Performed by: INTERNAL MEDICINE

## 2024-07-16 PROCEDURE — G8427 DOCREV CUR MEDS BY ELIG CLIN: HCPCS | Performed by: INTERNAL MEDICINE

## 2024-07-16 PROCEDURE — 1111F DSCHRG MED/CURRENT MED MERGE: CPT | Performed by: INTERNAL MEDICINE

## 2024-07-16 PROCEDURE — 3074F SYST BP LT 130 MM HG: CPT | Performed by: INTERNAL MEDICINE

## 2024-07-16 PROCEDURE — 3023F SPIROM DOC REV: CPT | Performed by: INTERNAL MEDICINE

## 2024-07-16 RX ORDER — OXYCODONE HYDROCHLORIDE AND ACETAMINOPHEN 5; 325 MG/1; MG/1
TABLET ORAL
COMMUNITY
Start: 2024-04-15

## 2024-07-16 RX ORDER — FLUOXETINE HYDROCHLORIDE 40 MG/1
CAPSULE ORAL
COMMUNITY
Start: 2024-04-30

## 2024-07-16 RX ORDER — TRAMADOL HYDROCHLORIDE 50 MG/1
50-100 TABLET ORAL EVERY 6 HOURS PRN
COMMUNITY
Start: 2024-07-11 | End: 2024-07-16

## 2024-07-16 NOTE — PROGRESS NOTES
P  [facility]                                               Pulmonary Clinic Consult     I had the pleasure of seeing  Rubin Arteaga     Chief Complaint   Patient presents with    Follow-up         HISTORY OF PRESENT ILLNESS:    Rubin Arteaga is a 64 y.o. year old  Who start smoking at age  14  And increase gradually   1.5 pack daily ,he still smoke    He was diagnosed 10 year,he received pills as he states ,he was done with therapy long time and was told he is in remission     The Patient comes in with SOB that has been going on  for the last few years Associated with .cough and he usually has clear sputum    He states that it get worse with exercise or walking long distance and he can walk less 300 feet- 1/2 And go 1-2 flight of stairs before get short winded        Patient underwent biopsy JERROD ,central nodule and was negative   Culture al was negative  Unfortunately he continue to smoke   His cough and SOB has improved  Could not tolerate Trelegy   PFT does 10 /22      Today Visit  Still with mild SOB and cough   Not on any O2   No swelling legs   Still with  SOB and ROCHE  Ambulation is any issues   Some cough with white sputum   Wife with him   Histo negative     ALLERGIES:    Allergies   Allergen Reactions    Wellbutrin [Bupropion] Hallucinations    Trimethoprim     Codeine Nausea Only     Pt tolerates Oxycodone    Sulfa Antibiotics      States makes yeast infection on his penis    Sulfamethoxazole-Trimethoprim Other (See Comments)     States makes yeast infection on his penis         PAST MEDICAL HISTORY:       Diagnosis Date    Arthritis 1/2011    SHOULDERS AND KNEES    CAD (coronary artery disease)     Chronic back pain     COPD (chronic obstructive pulmonary disease) (HCC)     GERD (gastroesophageal reflux disease)     ONCE WEEKLY TAKE TUMS    Hyperlipidemia     Hypertension     Kidney stone     Lumbar herniated disc     Chronic Pain    Lymphoma of

## 2024-08-08 NOTE — PATIENT INSTRUCTIONS
Plan:  ~ Take Aspirin 81 mg daily- Hold this until procedure and then restart after procedure  ~ Fasting Lipids  ~ You are cleared for surgery  ~ Smoking cessation completed  ~1 Year follow up none

## 2024-08-09 NOTE — PROGRESS NOTES
[facility]   Cardiac Followup    Referring Provider:  Ian Lane MD     Follow-up, HLD, Cardiac clearance      History of Present Illness:   Rubin Arteaga is a 64 y.o. male who presents to office today for follow up HLD and cardiac clearance for upcoming cysto surgery with urology at [facility]. Patient follows Dr. Valero, cardiololgy. Patient has past medical history of CAD, HTN, HLD, COPD and thyroid disease. His cardiac cath on 02/23/15 showed 40% mid LAD. He underwent ACDF C5/C6 and C6-7 with allograft and plate and screws on 2/8/17. He had a syncopal episode after coughing and was admitted 12/26-12/28/18. He presented ED in April 2018 due to a syncopal episode. He was treated for dehydration.                OV 4/19/2022 with Dr. Valero, he reported doing okay. He reports that he is going to have knee surgery on his right knee on Thursday. He can walk 1-2 blocks w/o difficulty other than knee pain. Denies Shortness of breath, chest pain,dizziness, syncope and edema.                 Today he states he has been sick for the last 2 days. He has dizziness, nausea/vomiting and confusion along with being unsteady. He also has left hand numbness, no visual changes. He states he has back pain and burning when he urinates. Low grade fever yesterday with chills. He has a history of stomach cancer. He states he occasionally has chest pains that are unchanged. SOB has decreased and he is no longer using oxygen. Patient currently denies any weight gain, edema, palpitations,  and syncope.      Past Medical History:   has a past medical history of Arthritis, CAD (coronary artery disease), Chronic back pain, COPD (chronic obstructive pulmonary disease) (HCC), GERD (gastroesophageal reflux disease), Hyperlipidemia, Hypertension, Kidney stone, Lumbar herniated disc, Lymphoma of gastrointestinal tract (HCC), Pneumonia, Thyroid disease, and Vitamin B deficiency.    Surgical History:   has a past surgical

## 2024-08-12 ENCOUNTER — OFFICE VISIT (OUTPATIENT)
Dept: CARDIOLOGY CLINIC | Age: 64
End: 2024-08-12
Payer: MEDICARE

## 2024-08-12 ENCOUNTER — APPOINTMENT (OUTPATIENT)
Dept: CT IMAGING | Age: 64
End: 2024-08-12
Payer: MEDICARE

## 2024-08-12 ENCOUNTER — HOSPITAL ENCOUNTER (EMERGENCY)
Age: 64
Discharge: ANOTHER HOSPITAL | End: 2024-08-12
Attending: EMERGENCY MEDICINE
Payer: MEDICARE

## 2024-08-12 ENCOUNTER — APPOINTMENT (OUTPATIENT)
Dept: GENERAL RADIOLOGY | Age: 64
End: 2024-08-12
Payer: MEDICARE

## 2024-08-12 VITALS
TEMPERATURE: 98.8 F | BODY MASS INDEX: 25.03 KG/M2 | WEIGHT: 169 LBS | OXYGEN SATURATION: 95 % | HEART RATE: 90 BPM | DIASTOLIC BLOOD PRESSURE: 76 MMHG | SYSTOLIC BLOOD PRESSURE: 140 MMHG | RESPIRATION RATE: 24 BRPM | HEIGHT: 69 IN

## 2024-08-12 VITALS
WEIGHT: 169 LBS | DIASTOLIC BLOOD PRESSURE: 84 MMHG | OXYGEN SATURATION: 96 % | BODY MASS INDEX: 25.03 KG/M2 | SYSTOLIC BLOOD PRESSURE: 148 MMHG | HEART RATE: 86 BPM | HEIGHT: 69 IN

## 2024-08-12 DIAGNOSIS — R29.898 LEFT ARM WEAKNESS: ICD-10-CM

## 2024-08-12 DIAGNOSIS — N17.9 ACUTE KIDNEY INJURY (HCC): ICD-10-CM

## 2024-08-12 DIAGNOSIS — N10 ACUTE PYELONEPHRITIS: ICD-10-CM

## 2024-08-12 DIAGNOSIS — R03.0 ELEVATED BLOOD PRESSURE READING: ICD-10-CM

## 2024-08-12 DIAGNOSIS — N20.1 BILATERAL URETERAL CALCULI: Primary | ICD-10-CM

## 2024-08-12 DIAGNOSIS — I25.110: Primary | ICD-10-CM

## 2024-08-12 DIAGNOSIS — E78.2 MIXED HYPERLIPIDEMIA: ICD-10-CM

## 2024-08-12 LAB
ALBUMIN SERPL-MCNC: 3.7 G/DL (ref 3.4–5)
ALBUMIN/GLOB SERPL: 1.2 {RATIO} (ref 1.1–2.2)
ALP SERPL-CCNC: 128 U/L (ref 40–129)
ALT SERPL-CCNC: 11 U/L (ref 10–40)
ANION GAP SERPL CALCULATED.3IONS-SCNC: 13 MMOL/L (ref 3–16)
AST SERPL-CCNC: 11 U/L (ref 15–37)
BACTERIA URNS QL MICRO: ABNORMAL /HPF
BASE EXCESS BLDV CALC-SCNC: -0.9 MMOL/L (ref -3–3)
BASOPHILS # BLD: 0 K/UL (ref 0–0.2)
BASOPHILS NFR BLD: 0.3 %
BILIRUB SERPL-MCNC: 1.1 MG/DL (ref 0–1)
BILIRUB UR QL STRIP.AUTO: NEGATIVE
BUN SERPL-MCNC: 15 MG/DL (ref 7–20)
CALCIUM SERPL-MCNC: 9 MG/DL (ref 8.3–10.6)
CHLORIDE SERPL-SCNC: 97 MMOL/L (ref 99–110)
CLARITY UR: CLEAR
CO2 BLDV-SCNC: 26 MMOL/L
CO2 SERPL-SCNC: 24 MMOL/L (ref 21–32)
COHGB MFR BLDV: 2.7 % (ref 0–1.5)
COLOR UR: YELLOW
CREAT SERPL-MCNC: 2.6 MG/DL (ref 0.8–1.3)
DEPRECATED RDW RBC AUTO: 17.1 % (ref 12.4–15.4)
EKG ATRIAL RATE: 87 BPM
EKG DIAGNOSIS: NORMAL
EKG P AXIS: 43 DEGREES
EKG P-R INTERVAL: 142 MS
EKG Q-T INTERVAL: 392 MS
EKG QRS DURATION: 76 MS
EKG QTC CALCULATION (BAZETT): 471 MS
EKG R AXIS: 5 DEGREES
EKG T AXIS: 40 DEGREES
EKG VENTRICULAR RATE: 87 BPM
EOSINOPHIL # BLD: 0 K/UL (ref 0–0.6)
EOSINOPHIL NFR BLD: 0 %
EPI CELLS #/AREA URNS HPF: ABNORMAL /HPF (ref 0–5)
ETHANOLAMINE SERPL-MCNC: NORMAL MG/DL (ref 0–0.08)
GFR SERPLBLD CREATININE-BSD FMLA CKD-EPI: 27 ML/MIN/{1.73_M2}
GLUCOSE BLD-MCNC: 145 MG/DL (ref 70–99)
GLUCOSE SERPL-MCNC: 137 MG/DL (ref 70–99)
GLUCOSE UR STRIP.AUTO-MCNC: NEGATIVE MG/DL
HCO3 BLDV-SCNC: 24.5 MMOL/L (ref 23–29)
HCT VFR BLD AUTO: 33.4 % (ref 40.5–52.5)
HGB BLD-MCNC: 11 G/DL (ref 13.5–17.5)
HGB UR QL STRIP.AUTO: ABNORMAL
HYALINE CASTS #/AREA URNS LPF: ABNORMAL /LPF (ref 0–2)
KETONES UR STRIP.AUTO-MCNC: NEGATIVE MG/DL
LACTATE BLDV-SCNC: 1.4 MMOL/L (ref 0.4–2)
LEUKOCYTE ESTERASE UR QL STRIP.AUTO: ABNORMAL
LIPASE SERPL-CCNC: 12 U/L (ref 13–60)
LYMPHOCYTES # BLD: 1.1 K/UL (ref 1–5.1)
LYMPHOCYTES NFR BLD: 6.6 %
MCH RBC QN AUTO: 26.1 PG (ref 26–34)
MCHC RBC AUTO-ENTMCNC: 32.8 G/DL (ref 31–36)
MCV RBC AUTO: 79.5 FL (ref 80–100)
METHGB MFR BLDV: 0.3 %
MONOCYTES # BLD: 1.2 K/UL (ref 0–1.3)
MONOCYTES NFR BLD: 7.4 %
MUCOUS THREADS #/AREA URNS LPF: ABNORMAL /LPF
NEUTROPHILS # BLD: 13.7 K/UL (ref 1.7–7.7)
NEUTROPHILS NFR BLD: 85.7 %
NITRITE UR QL STRIP.AUTO: NEGATIVE
O2 CT VFR BLDV CALC: 9 VOL %
O2 THERAPY: ABNORMAL
PCO2 BLDV: 43.5 MMHG (ref 40–50)
PERFORMED ON: ABNORMAL
PH BLDV: 7.37 [PH] (ref 7.35–7.45)
PH UR STRIP.AUTO: 6 [PH] (ref 5–8)
PLATELET # BLD AUTO: 249 K/UL (ref 135–450)
PMV BLD AUTO: 8.5 FL (ref 5–10.5)
PO2 BLDV: 30.6 MMHG (ref 25–40)
POTASSIUM SERPL-SCNC: 3.9 MMOL/L (ref 3.5–5.1)
PROT SERPL-MCNC: 6.8 G/DL (ref 6.4–8.2)
PROT UR STRIP.AUTO-MCNC: NEGATIVE MG/DL
RBC # BLD AUTO: 4.2 M/UL (ref 4.2–5.9)
RBC #/AREA URNS HPF: ABNORMAL /HPF (ref 0–4)
SAO2 % BLDV: 56 %
SODIUM SERPL-SCNC: 134 MMOL/L (ref 136–145)
SP GR UR STRIP.AUTO: <=1.005 (ref 1–1.03)
TROPONIN, HIGH SENSITIVITY: 14 NG/L (ref 0–22)
TSH SERPL DL<=0.005 MIU/L-ACNC: 0.86 UIU/ML (ref 0.27–4.2)
UA COMPLETE W REFLEX CULTURE PNL UR: YES
UA DIPSTICK W REFLEX MICRO PNL UR: YES
URN SPEC COLLECT METH UR: ABNORMAL
UROBILINOGEN UR STRIP-ACNC: 0.2 E.U./DL
WBC # BLD AUTO: 16 K/UL (ref 4–11)
WBC #/AREA URNS HPF: ABNORMAL /HPF (ref 0–5)
YEAST URNS QL MICRO: PRESENT /HPF

## 2024-08-12 PROCEDURE — 85025 COMPLETE CBC W/AUTO DIFF WBC: CPT

## 2024-08-12 PROCEDURE — 81001 URINALYSIS AUTO W/SCOPE: CPT

## 2024-08-12 PROCEDURE — 82077 ASSAY SPEC XCP UR&BREATH IA: CPT

## 2024-08-12 PROCEDURE — 36415 COLL VENOUS BLD VENIPUNCTURE: CPT

## 2024-08-12 PROCEDURE — 96375 TX/PRO/DX INJ NEW DRUG ADDON: CPT

## 2024-08-12 PROCEDURE — 84484 ASSAY OF TROPONIN QUANT: CPT

## 2024-08-12 PROCEDURE — 71046 X-RAY EXAM CHEST 2 VIEWS: CPT

## 2024-08-12 PROCEDURE — 3079F DIAST BP 80-89 MM HG: CPT | Performed by: INTERNAL MEDICINE

## 2024-08-12 PROCEDURE — 70450 CT HEAD/BRAIN W/O DYE: CPT

## 2024-08-12 PROCEDURE — 74176 CT ABD & PELVIS W/O CONTRAST: CPT

## 2024-08-12 PROCEDURE — 96365 THER/PROPH/DIAG IV INF INIT: CPT

## 2024-08-12 PROCEDURE — 99214 OFFICE O/P EST MOD 30 MIN: CPT | Performed by: INTERNAL MEDICINE

## 2024-08-12 PROCEDURE — 83690 ASSAY OF LIPASE: CPT

## 2024-08-12 PROCEDURE — 2580000003 HC RX 258: Performed by: EMERGENCY MEDICINE

## 2024-08-12 PROCEDURE — 87086 URINE CULTURE/COLONY COUNT: CPT

## 2024-08-12 PROCEDURE — 6370000000 HC RX 637 (ALT 250 FOR IP): Performed by: EMERGENCY MEDICINE

## 2024-08-12 PROCEDURE — 93010 ELECTROCARDIOGRAM REPORT: CPT | Performed by: INTERNAL MEDICINE

## 2024-08-12 PROCEDURE — 80053 COMPREHEN METABOLIC PANEL: CPT

## 2024-08-12 PROCEDURE — 3017F COLORECTAL CA SCREEN DOC REV: CPT | Performed by: INTERNAL MEDICINE

## 2024-08-12 PROCEDURE — G8419 CALC BMI OUT NRM PARAM NOF/U: HCPCS | Performed by: INTERNAL MEDICINE

## 2024-08-12 PROCEDURE — 99285 EMERGENCY DEPT VISIT HI MDM: CPT

## 2024-08-12 PROCEDURE — 6360000002 HC RX W HCPCS: Performed by: EMERGENCY MEDICINE

## 2024-08-12 PROCEDURE — 82803 BLOOD GASES ANY COMBINATION: CPT

## 2024-08-12 PROCEDURE — 3077F SYST BP >= 140 MM HG: CPT | Performed by: INTERNAL MEDICINE

## 2024-08-12 PROCEDURE — 84443 ASSAY THYROID STIM HORMONE: CPT

## 2024-08-12 PROCEDURE — 93005 ELECTROCARDIOGRAM TRACING: CPT | Performed by: EMERGENCY MEDICINE

## 2024-08-12 PROCEDURE — 1036F TOBACCO NON-USER: CPT | Performed by: INTERNAL MEDICINE

## 2024-08-12 PROCEDURE — 83605 ASSAY OF LACTIC ACID: CPT

## 2024-08-12 PROCEDURE — G8427 DOCREV CUR MEDS BY ELIG CLIN: HCPCS | Performed by: INTERNAL MEDICINE

## 2024-08-12 RX ORDER — HYDROCODONE BITARTRATE AND ACETAMINOPHEN 5; 325 MG/1; MG/1
1 TABLET ORAL ONCE
Status: COMPLETED | OUTPATIENT
Start: 2024-08-12 | End: 2024-08-12

## 2024-08-12 RX ORDER — ATORVASTATIN CALCIUM 80 MG/1
80 TABLET, FILM COATED ORAL NIGHTLY
Qty: 90 TABLET | Refills: 3 | Status: SHIPPED | OUTPATIENT
Start: 2024-08-12

## 2024-08-12 RX ORDER — 0.9 % SODIUM CHLORIDE 0.9 %
500 INTRAVENOUS SOLUTION INTRAVENOUS ONCE
Status: DISCONTINUED | OUTPATIENT
Start: 2024-08-12 | End: 2024-08-12

## 2024-08-12 RX ORDER — ONDANSETRON 2 MG/ML
4 INJECTION INTRAMUSCULAR; INTRAVENOUS ONCE
Status: COMPLETED | OUTPATIENT
Start: 2024-08-12 | End: 2024-08-12

## 2024-08-12 RX ORDER — 0.9 % SODIUM CHLORIDE 0.9 %
1000 INTRAVENOUS SOLUTION INTRAVENOUS ONCE
Status: COMPLETED | OUTPATIENT
Start: 2024-08-12 | End: 2024-08-12

## 2024-08-12 RX ADMIN — HYDROCODONE BITARTRATE AND ACETAMINOPHEN 1 TABLET: 5; 325 TABLET ORAL at 12:49

## 2024-08-12 RX ADMIN — CEFTRIAXONE SODIUM 1000 MG: 1 INJECTION, POWDER, FOR SOLUTION INTRAMUSCULAR; INTRAVENOUS at 13:40

## 2024-08-12 RX ADMIN — ONDANSETRON 4 MG: 2 INJECTION INTRAMUSCULAR; INTRAVENOUS at 12:47

## 2024-08-12 RX ADMIN — HYDROCODONE BITARTRATE AND ACETAMINOPHEN 1 TABLET: 5; 325 TABLET ORAL at 15:50

## 2024-08-12 RX ADMIN — SODIUM CHLORIDE 1000 ML: 9 INJECTION, SOLUTION INTRAVENOUS at 12:46

## 2024-08-12 ASSESSMENT — PAIN - FUNCTIONAL ASSESSMENT: PAIN_FUNCTIONAL_ASSESSMENT: 0-10

## 2024-08-12 ASSESSMENT — PAIN DESCRIPTION - LOCATION: LOCATION: GENERALIZED

## 2024-08-12 ASSESSMENT — PAIN SCALES - GENERAL
PAINLEVEL_OUTOF10: 3
PAINLEVEL_OUTOF10: 8

## 2024-08-12 NOTE — ED NOTES
Gave quality care patients chardt and results from the visit. I called radiology to have images pushed to [facility].

## 2024-08-12 NOTE — ED NOTES
Took over for Etienne and this is what I know. Patient is accepted to [facility] and we are currently waiting on a bed.

## 2024-08-12 NOTE — ED NOTES
1337 Placed call to [facility] to initiate Pt transfer per continuity of care to [facility].      1401 [facility] returned call back with [facility] on the phone who is speaking to  at this time.     1413 [facility] called with Dr. Nava who is speaking to Dr. May at this time.

## 2024-08-12 NOTE — PATIENT INSTRUCTIONS
Plan:  ~Recommend ER referral   Cardiac medications reviewed including indications and pertinent side effects. Medication list updated at this visit.   Patient verbalizes understanding of the need for treatment and education has been provided at today's visit. Additional education material will be provided in after visit summary.    Check blood pressure and heart rate at home a few times per week- keep a log with dates and times and bring to office visit   Regular exercise and following a healthy diet encouraged   Follow up with me in 2-3 months

## 2024-08-12 NOTE — ED PROVIDER NOTES
of 87  Axis is   Normal  QTc is   borderline prolonged QT      ST Segments: no acute change and nonspecific changes  No significant change from prior EKG dated - 2/6/24  No STEMI, RSR' present today similar to old EKG  No signs of Brugada syndrome currently         RADIOLOGY:   Non-plain film images such as CT, Ultrasound and MRI are read by the radiologist. Plainradiographic images are visualized and preliminarily interpreted by the  ED Provider with the belowfindings:        Interpretation per the Radiologist below, if available at the time of this note:    CT ABDOMEN PELVIS WO CONTRAST Additional Contrast? None   Final Result   The left kidney is enlarged and heterogeneous consistent with pyelonephritis,   potentially renal hemorrhage or combination of both.  Multiple stones in the   renal pelvis.  Recommend close clinical and laboratory correlation and   follow-up study with IV contrast or ultrasound.      Right hydronephrosis and hydroureter secondary to a distal ureteral 3 mm   obstructing stone..      Prostatic gland enlargement.      Cholelithiasis.         CT HEAD WO CONTRAST   Preliminary Result   No evidence of acute intracranial abnormality.         XR CHEST (2 VW)   Final Result   No acute finding in the chest. Chronic changes of COPD.               PROCEDURES   Unless otherwise noted below, none     Procedures    CRITICAL CARE TIME   Critical Care Time:    I personally saw the patient and independently provided 45 minutes of non-concurrent critical care out of the total shared critical care time provided.  Includes repeat examinations, speaking with consultants, lab  interpretation, charting, treating for acute kidney injury with IV fluids    Excludes separate billable procedures.  Patient at risk for serious decompensation if not treated for this life-threatening condition.            CONSULTS: Spoke with representative for [facility] urology and she reported it was okay to transfer the patient as long as

## 2024-08-12 NOTE — ED TRIAGE NOTES
Patient presents to the ED from cardiac doctor for c/o 'infection somewhere'. Patient is s/p lithotripsy on 8/2 and has not felt well since. Patient also states he passed 2 kidney stones yesterday. Patient c/o chills, body aches and vomiting. Also complaining of worsening confusion and loss of coordination the last couple of months.  in triage. Patient ambulatory to triage room with steady gait. Alert and oriented x 4.

## 2024-08-13 LAB — BACTERIA UR CULT: NORMAL

## 2024-08-13 ASSESSMENT — ENCOUNTER SYMPTOMS
NAUSEA: 1
SHORTNESS OF BREATH: 0
ABDOMINAL PAIN: 1
BACK PAIN: 1

## 2024-08-22 ENCOUNTER — TELEPHONE (OUTPATIENT)
Dept: PULMONOLOGY | Age: 64
End: 2024-08-22

## 2024-08-22 RX ORDER — DOXYCYCLINE HYCLATE 100 MG
100 TABLET ORAL 2 TIMES DAILY
Qty: 14 TABLET | Refills: 0 | Status: SHIPPED | OUTPATIENT
Start: 2024-08-22 | End: 2024-08-29

## 2024-08-22 RX ORDER — PREDNISONE 20 MG/1
20 TABLET ORAL DAILY
Qty: 5 TABLET | Refills: 0 | Status: SHIPPED | OUTPATIENT
Start: 2024-08-22 | End: 2024-08-27

## 2024-08-22 NOTE — TELEPHONE ENCOUNTER
Patient is aware.    He was advised to stop levaquin, start doxy, and keep prednisone taper that [facility] gave him as he is currently on 60mg. He was told to call the office back or head to the ER if he is not feeling better by tomorrow.

## 2024-08-22 NOTE — TELEPHONE ENCOUNTER
Patient recently in [place] ED and transferred to [facility] for kidney stones. States he developed pneumonia after surgery. Got IV ABX now on oral  Do you have the following symptoms?  Shortness of Breath  yes  Wheezing  yes  Cough  yes  Cough Characteristics:  Productive    yes a little  Sputum Color    clear  Hemoptysis   no  Consistency of sputum   thick     Fever:   not since the hospital    Temp: 99 a few days ago; most recent 97  Chills/Sweats:  had them before not right now  What other symptoms are you having?:  can't take a deep breath; Using oxygen again on 2-4 liters. Resting 88%-92%; needs more o2 on ambulation to keep sats up.    How long have you had these symptoms?   1 week; after renal surgery. Felling worse since d/c    Pharmacy: [facility]          Review medications and allergies:        Allergies?    Trimethoprim  Not Specified  7/5/2023      Sulfa Antibiotics  Low Side Effect 4/11/2022   States makes yeast infection on his penis   Sulfamethoxazole-trimethoprim Other (See Comments) Low Side Effect 8/13/2016   States makes yeast infection on his penis    Adverse Reactions/Drug Intolerances      Wellbutrin [Bupropion] Hallucinations Medium Intolerance 10/22/2023      Codeine Nausea Only Low Intolerance 1/13/2011   Pt tolerates Oxycodone           Currently on Antibiotics?  (Drug/Dose/Frequency and how long on?) Levaquin 750mg x5 days started yesterday        Systemic Steroids?  (Drug/Dose/Frequency and how long on?) Prednisone Taper started yesterday morning     There is chest imaging from patient's hospital stay in [facility]  Last OV 7/16 with Dr. Cooper   IMPRESSION:    1-Lung mass left side X 2   2-h/o Lymphoma  3-COPD  4-KELLIE                PLAN:      He is on stilto as Trelegy cause thrush   Stopped smoking 10/2023  Keep Stilto   Lung nodule JERROD ,one pleural seems stable ,the other perpronchial looks small will keep  monitor ,  Eccentric calcification will get CT   CT Franko looks better

## 2024-08-28 ENCOUNTER — HOSPITAL ENCOUNTER (OUTPATIENT)
Age: 64
Discharge: HOME | End: 2024-08-28
Payer: MEDICARE

## 2024-08-28 ENCOUNTER — HOSPITAL ENCOUNTER (OUTPATIENT)
Dept: GENERAL RADIOLOGY | Age: 64
Discharge: HOME | End: 2024-08-28
Payer: MEDICARE

## 2024-08-28 ENCOUNTER — OFFICE VISIT (OUTPATIENT)
Dept: PULMONOLOGY | Age: 64
End: 2024-08-28

## 2024-08-28 VITALS
WEIGHT: 138.6 LBS | OXYGEN SATURATION: 95 % | DIASTOLIC BLOOD PRESSURE: 60 MMHG | RESPIRATION RATE: 18 BRPM | HEIGHT: 68 IN | HEART RATE: 88 BPM | BODY MASS INDEX: 21.01 KG/M2 | SYSTOLIC BLOOD PRESSURE: 118 MMHG

## 2024-08-28 DIAGNOSIS — J18.9 PNEUMONIA DUE TO INFECTIOUS ORGANISM, UNSPECIFIED LATERALITY, UNSPECIFIED PART OF LUNG: ICD-10-CM

## 2024-08-28 DIAGNOSIS — J18.9 PNEUMONIA DUE TO INFECTIOUS ORGANISM, UNSPECIFIED LATERALITY, UNSPECIFIED PART OF LUNG: Primary | ICD-10-CM

## 2024-08-28 PROCEDURE — 71046 X-RAY EXAM CHEST 2 VIEWS: CPT

## 2024-08-28 RX ORDER — FLUCONAZOLE 100 MG/1
100 TABLET ORAL DAILY
Qty: 7 TABLET | Refills: 0 | Status: SHIPPED | OUTPATIENT
Start: 2024-08-28 | End: 2024-09-04

## 2024-08-28 NOTE — PROGRESS NOTES
LITHOTRIPSY Left 11/05/2015    OTHER SURGICAL HISTORY  01/20/2011    video arthroscopy of right shoulder with subcromial d ecompression and arthroscopic sarbjit    OTHER SURGICAL HISTORY  05/01/2013    Excision Lesion Right Angle of Mouth    SHOULDER SURGERY      Left Shoulder X 3; right Shoulder X 5    SHOULDER SURGERY Left 05/30/2017    SKIN CANCER EXCISION      L back    UPPER GASTROINTESTINAL ENDOSCOPY  07/26/2012    UPPER GASTROINTESTINAL ENDOSCOPY N/A 06/21/2019    EGD BIOPSY performed by Riley Diaz DO at [facility] ENDOSCOPY       FAMILY HISTORY:   Lung cancer:wife has Lung cancer   DVT or PE no     REVIEW OF SYSTEMS:  Constitutional: General health is good . There has been no weight changes. No fevers, fatigue or weakness.   Head: Patient denies any history of trauma, convulsive disorder or syncope.    Skin:  Patient denies history of changes in pigmentation, eruptions or pruritus.  No easy bruising or bleeding.  EENT: no nasal congestion   Cardiovascular ,No chest pain ,No edema ,  Respiration:SOB:  ++,ROCHE :++  Gastrointestinal:No GI bleed ,no melena  ,no hematemesis    Musculoskeletal: no joint pain ,no swelling  Neurological:no , syncope.  Denies twitching, convulsions, loss of consciousness or memory.     Endocrine:  . No history of goiter, exophthalmos or dryness of skin.  The patient has no history of diabetes.    Hematopoietic:  No history of bleeding disorders or easy bruising.  Rheumatic:  No connective tissue disease history or polyarthritis/inflammatory joint disease.      PHYSICAL EXAMINATION:  Vitals:    08/28/24 1439   BP: 118/60   Pulse: 88   Resp: 18   SpO2: 95%     Constitutional: This is a well developed, well nourished 64 y.o. year old male who is alert, oriented, cooperative and in no apparent distress.  Head was normocephalic and atraumatic.    EENT: Mallampati class :II  Extraocular muscles intact.   External canals are patent and no discharge was appreciated.  Septum was  midline,   mucosa was without erythema, exudates or cobblestoning.  No thrush was noted.      Neck: Supple without thyromegaly. No elevated JVP. Trachea was midline. No carotid bruits were auscultated.    Respiratory: Rhonchi scattered /some wheezing     Cardiovascular: Regular without murmur, clicks, gallops or rubs.  There is no left or right ventricular heave.    Pulses:  Carotid, radial and femoral pulses were equally bilaterally.    Abdomen: Slightly rounded and soft without organomegaly. No rebound, rigidity or guarding was appreciated.    Lymphatic: No lymphadenopathy.  Musculoskeletal: no joint deformity .    Extremities: no edema   Skin:  Warm and dry.  Good color, turgor and pigmentation. No lesions or scars.  Neurological/Psychiatric: The patient's general behavior, level of consciousness, thought content and emotional status is normal.  Cranial nerves II-XII are intact.      DATA:   PFT                             IMPRESSION:    1-Lung mass left side X 2   2-h/o Lymphoma  3-COPD  4-KELLIE               PLAN:      He is on stilto as Trelegy stopped now as he had thrush   Stopped smoking 10/2023  Keep Stilto   On nystantin  Will add diflucan to help with  throat thrush .most likely related recent abx  as has for UTI-Sepsis   Lung nodule JERROD ,one pleural seems stable ,the other perpronchial looks small will keep  monitor ,  Eccentric calcification will get CT   CT Franko looks better   -PFT no obstruction  -PET scan reviewed with tanya ,some activity left hilar ,attempted biopsy and still stable and actually smaller and almost r  -check histo /blasto neg   sleep study down the road when agree   S/p kidney stone  Discussed in details with him and his wife ,all questions and concerns were answered  If get worse will come ED  To call me with update on frdiay if mprove  Flu and Pneumovax as per primary   Done with Pulm rehab   Thank you for allowing me to participate in [facility]. I will keep following

## 2024-09-10 ENCOUNTER — TELEPHONE (OUTPATIENT)
Dept: PULMONOLOGY | Age: 64
End: 2024-09-10

## 2024-09-10 ENCOUNTER — HOSPITAL ENCOUNTER (OUTPATIENT)
Dept: CT IMAGING | Age: 64
Discharge: HOME | End: 2024-09-10
Payer: MEDICARE

## 2024-09-10 DIAGNOSIS — R91.1 LUNG NODULE: ICD-10-CM

## 2024-09-10 DIAGNOSIS — R91.1 LUNG NODULE: Primary | ICD-10-CM

## 2024-09-10 PROCEDURE — 71250 CT THORAX DX C-: CPT

## 2024-09-26 ENCOUNTER — OFFICE VISIT (OUTPATIENT)
Dept: PULMONOLOGY | Age: 64
End: 2024-09-26
Payer: MEDICARE

## 2024-09-26 VITALS
OXYGEN SATURATION: 96 % | SYSTOLIC BLOOD PRESSURE: 138 MMHG | HEART RATE: 89 BPM | BODY MASS INDEX: 24.35 KG/M2 | DIASTOLIC BLOOD PRESSURE: 82 MMHG | WEIGHT: 164.4 LBS | HEIGHT: 69 IN

## 2024-09-26 DIAGNOSIS — J84.9 ILD (INTERSTITIAL LUNG DISEASE) (HCC): Primary | ICD-10-CM

## 2024-09-26 PROCEDURE — 99214 OFFICE O/P EST MOD 30 MIN: CPT | Performed by: INTERNAL MEDICINE

## 2024-09-26 PROCEDURE — G8427 DOCREV CUR MEDS BY ELIG CLIN: HCPCS | Performed by: INTERNAL MEDICINE

## 2024-09-26 PROCEDURE — 1036F TOBACCO NON-USER: CPT | Performed by: INTERNAL MEDICINE

## 2024-09-26 PROCEDURE — 3017F COLORECTAL CA SCREEN DOC REV: CPT | Performed by: INTERNAL MEDICINE

## 2024-09-26 PROCEDURE — G8420 CALC BMI NORM PARAMETERS: HCPCS | Performed by: INTERNAL MEDICINE

## 2024-09-26 PROCEDURE — 3075F SYST BP GE 130 - 139MM HG: CPT | Performed by: INTERNAL MEDICINE

## 2024-09-26 PROCEDURE — 3079F DIAST BP 80-89 MM HG: CPT | Performed by: INTERNAL MEDICINE

## 2024-09-26 RX ORDER — DOXYCYCLINE HYCLATE 100 MG
100 TABLET ORAL 2 TIMES DAILY
Qty: 10 TABLET | Refills: 0 | Status: SHIPPED | OUTPATIENT
Start: 2024-09-26 | End: 2024-10-01

## 2024-09-26 RX ORDER — PREDNISONE 20 MG/1
20 TABLET ORAL DAILY
Qty: 5 TABLET | Refills: 0 | Status: SHIPPED | OUTPATIENT
Start: 2024-09-26 | End: 2024-10-01

## 2024-09-26 RX ORDER — BUDESONIDE 0.5 MG/2ML
1 INHALANT ORAL 2 TIMES DAILY
Qty: 1 EACH | Refills: 1 | Status: SHIPPED | OUTPATIENT
Start: 2024-09-26 | End: 2024-10-26

## 2024-09-30 RX ORDER — POTASSIUM CHLORIDE 750 MG/1
TABLET, EXTENDED RELEASE ORAL
Qty: 180 TABLET | Refills: 3 | Status: SHIPPED | OUTPATIENT
Start: 2024-09-30

## 2024-12-11 ENCOUNTER — HOSPITAL ENCOUNTER (OUTPATIENT)
Dept: PULMONOLOGY | Age: 64
Discharge: HOME | End: 2024-12-11
Payer: MEDICARE

## 2024-12-11 DIAGNOSIS — J84.9 ILD (INTERSTITIAL LUNG DISEASE) (HCC): ICD-10-CM

## 2024-12-11 LAB
DLCO %PRED: 35 %
DLCO PRED: NORMAL
DLCO/VA %PRED: NORMAL
DLCO/VA PRED: NORMAL
DLCO/VA: NORMAL
DLCO: NORMAL
EXPIRATORY TIME-POST: NORMAL
EXPIRATORY TIME: NORMAL
FEF 25-75 %CHNG: NORMAL
FEF 25-75 POST %PRED: NORMAL
FEF 25-75% %PRED-PRE: NORMAL
FEF 25-75% PRED: NORMAL
FEF 25-75-POST: NORMAL
FEF 25-75-PRE: NORMAL
FEV1 %PRED-POST: 77 %
FEV1 %PRED-PRE: 75 %
FEV1 PRED: NORMAL
FEV1-POST: NORMAL
FEV1-PRE: NORMAL
FEV1/FVC %PRED-POST: NORMAL
FEV1/FVC %PRED-PRE: NORMAL
FEV1/FVC PRED: NORMAL
FEV1/FVC-POST: 0.75 %
FEV1/FVC-PRE: 0.75 %
FVC %PRED-POST: NORMAL
FVC %PRED-PRE: NORMAL
FVC PRED: NORMAL
FVC-POST: NORMAL
FVC-PRE: NORMAL
GAW %PRED: NORMAL
GAW PRED: NORMAL
GAW: NORMAL
IC PRE %PRED: NORMAL
IC PRED: NORMAL
IC: NORMAL
MEP: NORMAL
MIP: NORMAL
MVV %PRED-PRE: NORMAL
MVV PRED: NORMAL
MVV-PRE: NORMAL
PEF %PRED-POST: NORMAL
PEF %PRED-PRE: NORMAL
PEF PRED: NORMAL
PEF%CHNG: NORMAL
PEF-POST: NORMAL
PEF-PRE: NORMAL
RAW %PRED: NORMAL
RAW PRED: NORMAL
RAW: NORMAL
RV PRE %PRED: NORMAL
RV PRED: NORMAL
RV: NORMAL
SVC %PRED: NORMAL
SVC PRED: NORMAL
SVC: NORMAL
TLC PRE %PRED: 76 %
TLC PRED: NORMAL
TLC: NORMAL
VA %PRED: NORMAL
VA PRED: NORMAL
VA: NORMAL
VTG %PRED: NORMAL
VTG PRED: NORMAL
VTG: NORMAL

## 2024-12-11 PROCEDURE — 94726 PLETHYSMOGRAPHY LUNG VOLUMES: CPT

## 2024-12-11 PROCEDURE — 6370000000 HC RX 637 (ALT 250 FOR IP): Performed by: INTERNAL MEDICINE

## 2024-12-11 PROCEDURE — 94729 DIFFUSING CAPACITY: CPT

## 2024-12-11 PROCEDURE — 94618 PULMONARY STRESS TESTING: CPT

## 2024-12-11 PROCEDURE — 94060 EVALUATION OF WHEEZING: CPT

## 2024-12-11 PROCEDURE — 94640 AIRWAY INHALATION TREATMENT: CPT

## 2024-12-11 RX ORDER — ALBUTEROL SULFATE 90 UG/1
4 INHALANT RESPIRATORY (INHALATION) ONCE
Status: COMPLETED | OUTPATIENT
Start: 2024-12-11 | End: 2024-12-11

## 2024-12-11 RX ADMIN — ALBUTEROL SULFATE 4 PUFF: 90 AEROSOL, METERED RESPIRATORY (INHALATION) at 15:04

## 2024-12-11 ASSESSMENT — PULMONARY FUNCTION TESTS
FEV1_PERCENT_PREDICTED_PRE: 75
FEV1/FVC_PRE: 0.75
FEV1_PERCENT_PREDICTED_POST: 77
FEV1/FVC_POST: 0.75

## 2024-12-14 NOTE — PROCEDURES
[address] [address]                           PULMONARY FUNCTION      PATIENT NAME: NGOZI RILEY SR              : 1960  MED REC NO: [id number]                      ROOM:   ACCOUNT NO: [id number]                       ADMIT DATE: 2024  PROVIDER: Paddy Adamson MD      DATE OF PROCEDURE: 2024    SURGEON:  Paddy Adamson MD    REFERRING PHYSICIAN:  NIKOLAY WEST    INDICATION:  ILD.    INTERPRETATION:    1. Spirometry:  The FVC is 75% of predicted.  The FEV1 is 75% of predicted.  The FEV1/FVC ratio is 0.75.  There is no response to bronchodilators.    2. Plethysmography:  The total lung capacity is 76% of predicted.    3. The diffusion capacity for carbon monoxide is 35% of predicted.    4. The flow volume loop is normal.    IMPRESSION:  This patient has mild restrictive lung disease and severely decreased DLCO.  This is consistent with the given diagnosis of interstitial lung disease.    6-MINUTE WALK TEST:  This was conducted per [facility] protocol.  The patient walked 1190 feet on room air.  The resting SpO2 is 97% and the final SpO2 was 90% on room air.  However, at maximum oxygen was not needed.  Heart rate at rest is 93 and heart rate maximum was 122.          PADDY ADAMSON MD      D:  2024 15:44:30     T:  2024 03:05:57     GIAN/AN  Job #:  [id number]     Doc#:  [id number]

## 2025-01-16 ENCOUNTER — OFFICE VISIT (OUTPATIENT)
Dept: PULMONOLOGY | Age: 65
End: 2025-01-16
Payer: MEDICARE

## 2025-01-16 VITALS
HEART RATE: 81 BPM | DIASTOLIC BLOOD PRESSURE: 82 MMHG | BODY MASS INDEX: 26.34 KG/M2 | SYSTOLIC BLOOD PRESSURE: 128 MMHG | WEIGHT: 173.8 LBS | OXYGEN SATURATION: 97 % | HEIGHT: 68 IN | RESPIRATION RATE: 16 BRPM

## 2025-01-16 DIAGNOSIS — J84.9 ILD (INTERSTITIAL LUNG DISEASE) (HCC): Primary | ICD-10-CM

## 2025-01-16 PROCEDURE — 99214 OFFICE O/P EST MOD 30 MIN: CPT | Performed by: INTERNAL MEDICINE

## 2025-01-16 PROCEDURE — 3079F DIAST BP 80-89 MM HG: CPT | Performed by: INTERNAL MEDICINE

## 2025-01-16 PROCEDURE — G8419 CALC BMI OUT NRM PARAM NOF/U: HCPCS | Performed by: INTERNAL MEDICINE

## 2025-01-16 PROCEDURE — 3074F SYST BP LT 130 MM HG: CPT | Performed by: INTERNAL MEDICINE

## 2025-01-16 PROCEDURE — 3017F COLORECTAL CA SCREEN DOC REV: CPT | Performed by: INTERNAL MEDICINE

## 2025-01-16 PROCEDURE — G8427 DOCREV CUR MEDS BY ELIG CLIN: HCPCS | Performed by: INTERNAL MEDICINE

## 2025-01-16 PROCEDURE — 1036F TOBACCO NON-USER: CPT | Performed by: INTERNAL MEDICINE

## 2025-01-16 RX ORDER — DESVENLAFAXINE 50 MG/1
TABLET, FILM COATED, EXTENDED RELEASE ORAL
COMMUNITY
Start: 2025-01-13

## 2025-01-16 NOTE — PROGRESS NOTES
patient walked 1190 feet on room air.  The resting SpO2 is 97% and the final SpO2 was 90% on room air.  However, at maximum oxygen was not needed.  Heart rate at rest is 93 and heart rate maximum was 122.           IMPRESSION:    1-Lung mass left side X 2   2-h/o Lymphoma  3-COPD  4-KELLIE             5- Possible ILD-UIP bs autoimmune     PLAN:      He is on stilto as Trelegy stopped now as he had thrush   CT scan with worsening UIP ,fibrotic changes   Will checked auto immune work   Offer open lung ,declined  Offer anti fibrotic ,did not want take risk of side effects  Will do PFT and hrct in 4 months if further progression ,then will address options   Stopped smoking 10/2023  Keep Stilto   Auto immune work up today   CT scan now showing possible  of UIP and possible worsening honey combing vs some emphysema   On nystantin    Lung nodule JERROD ,one pleural seems stable ,the other perpronchial looks small will keep  monitor ,  Eccentric calcification will get CT stable   CT Franko looks better   -PFT no obstruction but restriction will get get follow up PFT   -PET scan reviewed with tanya ,some activity left hilar ,attempted biopsy and still stable and actually smaller and almost r  -check histo /blasto neg   sleep study down the road when agree   S/p kidney stone  If get worse will come ED  PFT  Flu and Pneumovax as per primary   Done with Pulm rehab   Thank you for allowing me to participate in [facility]. I will keep following with you ,should you have any concerns ,please contact us at [place] pulmonary office     Sincerely,        Sury Cooper MD  Pulmonary & Critical Care Medicine     NOTE: This report was transcribed using voice recognition software. Every effort was made to ensure accuracy; however, inadvertent computerized transcription errors may be present.

## 2025-04-16 ENCOUNTER — ANESTHESIA EVENT (OUTPATIENT)
Dept: ENDOSCOPY | Age: 65
End: 2025-04-16
Payer: MEDICARE

## 2025-04-21 ENCOUNTER — HOSPITAL ENCOUNTER (OUTPATIENT)
Dept: CT IMAGING | Age: 65
Discharge: HOME | End: 2025-04-21
Payer: MEDICARE

## 2025-04-21 ENCOUNTER — HOSPITAL ENCOUNTER (OUTPATIENT)
Dept: PULMONOLOGY | Age: 65
Discharge: HOME | End: 2025-04-21
Payer: MEDICARE

## 2025-04-21 DIAGNOSIS — J84.9 ILD (INTERSTITIAL LUNG DISEASE) (HCC): ICD-10-CM

## 2025-04-21 PROCEDURE — 94010 BREATHING CAPACITY TEST: CPT

## 2025-04-21 PROCEDURE — 94729 DIFFUSING CAPACITY: CPT

## 2025-04-21 PROCEDURE — 94726 PLETHYSMOGRAPHY LUNG VOLUMES: CPT

## 2025-04-21 PROCEDURE — 94618 PULMONARY STRESS TESTING: CPT

## 2025-04-21 PROCEDURE — 71250 CT THORAX DX C-: CPT

## 2025-04-24 LAB
DLCO %PRED: 37 %
DLCO PRED: NORMAL
DLCO/VA %PRED: NORMAL
DLCO/VA PRED: NORMAL
DLCO/VA: NORMAL
DLCO: NORMAL
EXPIRATORY TIME-POST: NORMAL
EXPIRATORY TIME: NORMAL
FEF 25-75 %CHNG: NORMAL
FEF 25-75 POST %PRED: NORMAL
FEF 25-75% %PRED-PRE: NORMAL
FEF 25-75% PRED: NORMAL
FEF 25-75-POST: NORMAL
FEF 25-75-PRE: NORMAL
FEV1 %PRED-POST: NORMAL
FEV1 %PRED-PRE: 74 %
FEV1 PRED: NORMAL
FEV1-POST: NORMAL
FEV1-PRE: NORMAL
FEV1/FVC %PRED-POST: NORMAL
FEV1/FVC %PRED-PRE: NORMAL
FEV1/FVC PRED: NORMAL
FEV1/FVC-POST: NORMAL
FEV1/FVC-PRE: 75 %
FVC %PRED-POST: NORMAL
FVC %PRED-PRE: NORMAL
FVC PRED: NORMAL
FVC-POST: NORMAL
FVC-PRE: NORMAL
GAW %PRED: NORMAL
GAW PRED: NORMAL
GAW: NORMAL
IC PRE %PRED: NORMAL
IC PRED: NORMAL
IC: NORMAL
MEP: NORMAL
MIP: NORMAL
MVV %PRED-PRE: NORMAL
MVV PRED: NORMAL
MVV-PRE: NORMAL
PEF %PRED-POST: NORMAL
PEF %PRED-PRE: NORMAL
PEF PRED: NORMAL
PEF%CHNG: NORMAL
PEF-POST: NORMAL
PEF-PRE: NORMAL
RAW %PRED: NORMAL
RAW PRED: NORMAL
RAW: NORMAL
RV PRE %PRED: NORMAL
RV PRED: NORMAL
RV: NORMAL
SVC %PRED: NORMAL
SVC PRED: NORMAL
SVC: NORMAL
TLC PRE %PRED: 74 %
TLC PRED: NORMAL
TLC: NORMAL
VA %PRED: NORMAL
VA PRED: NORMAL
VA: NORMAL
VTG %PRED: NORMAL
VTG PRED: NORMAL
VTG: NORMAL

## 2025-04-24 ASSESSMENT — PULMONARY FUNCTION TESTS
FEV1/FVC_PRE: 75
FEV1_PERCENT_PREDICTED_PRE: 74

## 2025-04-24 NOTE — PROCEDURES
[address] [address]                           PULMONARY FUNCTION      PATIENT NAME: NGOZI RILEY SR              : 1960  MED REC NO: [id number]                      ROOM:   ACCOUNT NO: [id number]                       ADMIT DATE: 2025  PROVIDER: Sury Cooper MD      DATE OF PROCEDURE: 2025    SURGEON:  Sury Cooper MD    REFERRING PHYSICIAN:  SURY COOPER    Pack per year smoking 45.    SPIROMETRY:  FVC 3.23 which is 74 of predicted.  FEV1 is 2.42, which is 74 of predicted.  FEV1/FVC 75.    LUNG VOLUME:  TLC 4.90 which is 74.  RV/TLC 37.    DIFFUSION CAPACITY:  DLCO 11.88, which is 37, corrected for alveolar volume 52.    IMPRESSION:  This study is showing clear evidence of mild restrictive lung disease with severe reduction in diffusion capacity.  Interstitial lung disease in the top of the differential diagnosis, however, other parenchymal lung disease cannot be ruled out.  Clinical and radiological correlation indicated with the patient's history and imaging.    6-MINUTE WALK TEST:  The patient ambulated for 980 feet.  The patient's saturation remained above 92.  The patient did not require oxygen during the study with good and appropriate response in heart rate and respiratory rate.  Clinical and radiological correlation indicated.          SURY COOPER MD      D:  2025 16:11:52     T:  2025 21:10:45     MA/AN  Job #:  [id number]     Doc#:  [id number]

## 2025-04-25 NOTE — PROGRESS NOTES
Notified pt to be here at 1200 for procedure on 4/28/25. Also instructed pt to follow bowel prep per Dr Olson's instructions and to start clear liquids the day before the procedure.

## 2025-04-28 ENCOUNTER — HOSPITAL ENCOUNTER (OUTPATIENT)
Age: 65
Setting detail: OUTPATIENT SURGERY
Discharge: HOME | End: 2025-04-28
Attending: INTERNAL MEDICINE | Admitting: INTERNAL MEDICINE
Payer: MEDICARE

## 2025-04-28 ENCOUNTER — ANESTHESIA (OUTPATIENT)
Dept: ENDOSCOPY | Age: 65
End: 2025-04-28
Payer: MEDICARE

## 2025-04-28 VITALS
BODY MASS INDEX: 28.49 KG/M2 | TEMPERATURE: 97.8 F | OXYGEN SATURATION: 96 % | WEIGHT: 188 LBS | HEIGHT: 68 IN | DIASTOLIC BLOOD PRESSURE: 89 MMHG | HEART RATE: 80 BPM | SYSTOLIC BLOOD PRESSURE: 130 MMHG | RESPIRATION RATE: 16 BRPM

## 2025-04-28 DIAGNOSIS — D64.9 ANEMIA, UNSPECIFIED TYPE: ICD-10-CM

## 2025-04-28 LAB
ANION GAP SERPL CALCULATED.3IONS-SCNC: 12 MMOL/L (ref 3–16)
BUN SERPL-MCNC: 5 MG/DL (ref 7–20)
CALCIUM SERPL-MCNC: 9.3 MG/DL (ref 8.3–10.6)
CHLORIDE SERPL-SCNC: 103 MMOL/L (ref 99–110)
CO2 SERPL-SCNC: 25 MMOL/L (ref 21–32)
CREAT SERPL-MCNC: 1 MG/DL (ref 0.8–1.3)
DEPRECATED RDW RBC AUTO: 16.9 % (ref 12.4–15.4)
EKG ATRIAL RATE: 69 BPM
EKG DIAGNOSIS: NORMAL
EKG P AXIS: 68 DEGREES
EKG P-R INTERVAL: 174 MS
EKG Q-T INTERVAL: 390 MS
EKG QRS DURATION: 78 MS
EKG QTC CALCULATION (BAZETT): 417 MS
EKG R AXIS: 28 DEGREES
EKG T AXIS: 47 DEGREES
EKG VENTRICULAR RATE: 69 BPM
GFR SERPLBLD CREATININE-BSD FMLA CKD-EPI: 84 ML/MIN/{1.73_M2}
GLUCOSE SERPL-MCNC: 102 MG/DL (ref 70–99)
HCT VFR BLD AUTO: 44 % (ref 40.5–52.5)
HGB BLD-MCNC: 14.8 G/DL (ref 13.5–17.5)
MCH RBC QN AUTO: 28.3 PG (ref 26–34)
MCHC RBC AUTO-ENTMCNC: 33.6 G/DL (ref 31–36)
MCV RBC AUTO: 84.4 FL (ref 80–100)
PLATELET # BLD AUTO: 155 K/UL (ref 135–450)
PMV BLD AUTO: 9.1 FL (ref 5–10.5)
POTASSIUM SERPL-SCNC: 3.9 MMOL/L (ref 3.5–5.1)
RBC # BLD AUTO: 5.21 M/UL (ref 4.2–5.9)
SODIUM SERPL-SCNC: 140 MMOL/L (ref 136–145)
WBC # BLD AUTO: 4.9 K/UL (ref 4–11)

## 2025-04-28 PROCEDURE — 85027 COMPLETE CBC AUTOMATED: CPT

## 2025-04-28 PROCEDURE — 88305 TISSUE EXAM BY PATHOLOGIST: CPT

## 2025-04-28 PROCEDURE — 3609012400 HC EGD TRANSORAL BIOPSY SINGLE/MULTIPLE: Performed by: INTERNAL MEDICINE

## 2025-04-28 PROCEDURE — 7100000011 HC PHASE II RECOVERY - ADDTL 15 MIN: Performed by: INTERNAL MEDICINE

## 2025-04-28 PROCEDURE — 2709999900 HC NON-CHARGEABLE SUPPLY: Performed by: INTERNAL MEDICINE

## 2025-04-28 PROCEDURE — 80048 BASIC METABOLIC PNL TOTAL CA: CPT

## 2025-04-28 PROCEDURE — 6360000002 HC RX W HCPCS: Performed by: NURSE ANESTHETIST, CERTIFIED REGISTERED

## 2025-04-28 PROCEDURE — 7100000010 HC PHASE II RECOVERY - FIRST 15 MIN: Performed by: INTERNAL MEDICINE

## 2025-04-28 PROCEDURE — 3700000000 HC ANESTHESIA ATTENDED CARE: Performed by: INTERNAL MEDICINE

## 2025-04-28 PROCEDURE — 3609010600 HC COLONOSCOPY POLYPECTOMY SNARE/COLD BIOPSY: Performed by: INTERNAL MEDICINE

## 2025-04-28 PROCEDURE — 93010 ELECTROCARDIOGRAM REPORT: CPT | Performed by: INTERNAL MEDICINE

## 2025-04-28 PROCEDURE — 36415 COLL VENOUS BLD VENIPUNCTURE: CPT

## 2025-04-28 PROCEDURE — 2580000003 HC RX 258: Performed by: ANESTHESIOLOGY

## 2025-04-28 PROCEDURE — 3700000001 HC ADD 15 MINUTES (ANESTHESIA): Performed by: INTERNAL MEDICINE

## 2025-04-28 PROCEDURE — 93005 ELECTROCARDIOGRAM TRACING: CPT | Performed by: ANESTHESIOLOGY

## 2025-04-28 RX ORDER — LIDOCAINE HYDROCHLORIDE 20 MG/ML
INJECTION, SOLUTION INFILTRATION; PERINEURAL
Status: DISCONTINUED | OUTPATIENT
Start: 2025-04-28 | End: 2025-04-28 | Stop reason: SDUPTHER

## 2025-04-28 RX ORDER — ONDANSETRON 2 MG/ML
4 INJECTION INTRAMUSCULAR; INTRAVENOUS
Status: CANCELLED | OUTPATIENT
Start: 2025-04-28

## 2025-04-28 RX ORDER — OXYCODONE HYDROCHLORIDE 5 MG/1
5 TABLET ORAL PRN
Refills: 0 | Status: CANCELLED | OUTPATIENT
Start: 2025-04-28 | End: 2025-04-28

## 2025-04-28 RX ORDER — SODIUM CHLORIDE 0.9 % (FLUSH) 0.9 %
5-40 SYRINGE (ML) INJECTION PRN
Status: DISCONTINUED | OUTPATIENT
Start: 2025-04-28 | End: 2025-04-28 | Stop reason: HOSPADM

## 2025-04-28 RX ORDER — PROPOFOL 10 MG/ML
INJECTION, EMULSION INTRAVENOUS
Status: DISCONTINUED | OUTPATIENT
Start: 2025-04-28 | End: 2025-04-28 | Stop reason: SDUPTHER

## 2025-04-28 RX ORDER — SODIUM CHLORIDE 0.9 % (FLUSH) 0.9 %
5-40 SYRINGE (ML) INJECTION EVERY 12 HOURS SCHEDULED
Status: CANCELLED | OUTPATIENT
Start: 2025-04-28

## 2025-04-28 RX ORDER — SODIUM CHLORIDE 0.9 % (FLUSH) 0.9 %
5-40 SYRINGE (ML) INJECTION EVERY 12 HOURS SCHEDULED
Status: DISCONTINUED | OUTPATIENT
Start: 2025-04-28 | End: 2025-04-28 | Stop reason: HOSPADM

## 2025-04-28 RX ORDER — SODIUM CHLORIDE 0.9 % (FLUSH) 0.9 %
5-40 SYRINGE (ML) INJECTION PRN
Status: CANCELLED | OUTPATIENT
Start: 2025-04-28

## 2025-04-28 RX ORDER — SODIUM CHLORIDE, SODIUM LACTATE, POTASSIUM CHLORIDE, CALCIUM CHLORIDE 600; 310; 30; 20 MG/100ML; MG/100ML; MG/100ML; MG/100ML
INJECTION, SOLUTION INTRAVENOUS CONTINUOUS
Status: DISCONTINUED | OUTPATIENT
Start: 2025-04-28 | End: 2025-04-28 | Stop reason: HOSPADM

## 2025-04-28 RX ORDER — NALOXONE HYDROCHLORIDE 0.4 MG/ML
INJECTION, SOLUTION INTRAMUSCULAR; INTRAVENOUS; SUBCUTANEOUS PRN
Status: CANCELLED | OUTPATIENT
Start: 2025-04-28

## 2025-04-28 RX ORDER — SODIUM CHLORIDE 9 MG/ML
INJECTION, SOLUTION INTRAVENOUS PRN
Status: DISCONTINUED | OUTPATIENT
Start: 2025-04-28 | End: 2025-04-28 | Stop reason: HOSPADM

## 2025-04-28 RX ORDER — LABETALOL HYDROCHLORIDE 5 MG/ML
5 INJECTION, SOLUTION INTRAVENOUS EVERY 10 MIN PRN
Status: CANCELLED | OUTPATIENT
Start: 2025-04-28

## 2025-04-28 RX ORDER — SODIUM CHLORIDE 9 MG/ML
INJECTION, SOLUTION INTRAVENOUS PRN
Status: CANCELLED | OUTPATIENT
Start: 2025-04-28

## 2025-04-28 RX ORDER — MEPERIDINE HYDROCHLORIDE 25 MG/ML
12.5 INJECTION INTRAMUSCULAR; INTRAVENOUS; SUBCUTANEOUS EVERY 5 MIN PRN
Refills: 0 | Status: CANCELLED | OUTPATIENT
Start: 2025-04-28

## 2025-04-28 RX ORDER — OXYCODONE HYDROCHLORIDE 5 MG/1
10 TABLET ORAL PRN
Refills: 0 | Status: CANCELLED | OUTPATIENT
Start: 2025-04-28 | End: 2025-04-28

## 2025-04-28 RX ORDER — DIPHENHYDRAMINE HYDROCHLORIDE 50 MG/ML
12.5 INJECTION, SOLUTION INTRAMUSCULAR; INTRAVENOUS
Status: CANCELLED | OUTPATIENT
Start: 2025-04-28

## 2025-04-28 RX ADMIN — PROPOFOL 200 MG: 10 INJECTION, EMULSION INTRAVENOUS at 13:48

## 2025-04-28 RX ADMIN — PROPOFOL 150 MG: 10 INJECTION, EMULSION INTRAVENOUS at 14:12

## 2025-04-28 RX ADMIN — SODIUM CHLORIDE, POTASSIUM CHLORIDE, SODIUM LACTATE AND CALCIUM CHLORIDE: 600; 310; 30; 20 INJECTION, SOLUTION INTRAVENOUS at 13:48

## 2025-04-28 RX ADMIN — PROPOFOL 200 MG: 10 INJECTION, EMULSION INTRAVENOUS at 13:53

## 2025-04-28 RX ADMIN — LIDOCAINE HYDROCHLORIDE 60 MG: 20 INJECTION, SOLUTION INFILTRATION; PERINEURAL at 13:48

## 2025-04-28 ASSESSMENT — PAIN - FUNCTIONAL ASSESSMENT
PAIN_FUNCTIONAL_ASSESSMENT: ACTIVITIES ARE NOT PREVENTED
PAIN_FUNCTIONAL_ASSESSMENT: NONE - DENIES PAIN
PAIN_FUNCTIONAL_ASSESSMENT: 0-10
PAIN_FUNCTIONAL_ASSESSMENT: NONE - DENIES PAIN

## 2025-04-28 ASSESSMENT — ENCOUNTER SYMPTOMS: SHORTNESS OF BREATH: 1

## 2025-04-28 ASSESSMENT — PAIN DESCRIPTION - DESCRIPTORS: DESCRIPTORS: DISCOMFORT

## 2025-04-28 NOTE — H&P
Hard   Food Insecurity: No Food Insecurity (7/9/2024)    Hunger Vital Sign     Worried About Running Out of Food in the Last Year: Never true     Ran Out of Food in the Last Year: Never true   Transportation Needs: No Transportation Needs (7/9/2024)    PRAPARE - Transportation     Lack of Transportation (Medical): No     Lack of Transportation (Non-Medical): No   Physical Activity: Insufficiently Active (11/22/2023)    Exercise Vital Sign     Days of Exercise per Week: 4 days     Minutes of Exercise per Session: 20 min   Stress: Stress Concern Present (11/22/2023)    [employer] [place] [employer] - Occupational Stress Questionnaire     Feeling of Stress : Rather much   Social Connections: Moderately Integrated (11/22/2023)    Social Connection and Isolation Panel [NHANES]     Frequency of Communication with Friends and Family: More than three times a week     Frequency of Social Gatherings with Friends and Family: Twice a week     Attends Taoist Services: More than 4 times per year     Active Member of Clubs or Organizations: No     Attends Club or Organization Meetings: Never     Marital Status:    Intimate Partner Violence: Not At Risk (11/22/2023)    Humiliation, Afraid, Rape, and Kick questionnaire     Fear of Current or Ex-Partner: No     Emotionally Abused: No     Physically Abused: No     Sexually Abused: No   Housing Stability: Low Risk  (7/9/2024)    Housing Stability Vital Sign     Unable to Pay for Housing in the Last Year: No     Number of Places Lived in the Last Year: 1     Unstable Housing in the Last Year: No      Family History:       Problem Relation Age of Onset    Heart Disease Father     Cancer Mother     Cancer Sister         PHYSICAL EXAM:      /80   Pulse 74   Temp 97.5 °F (36.4 °C) (Infrared)   Resp 18   Ht 1.727 m (5' 8\")   Wt 85.3 kg (188 lb)   SpO2 100%   BMI 28.59 kg/m²  I        Heart:  Normal apical impulse, regular rate and rhythm, normal S1 and S2,  no S3 or S4, and no murmur noted    Lungs:  No increased work of breathing, good air exchange, clear to auscultation bilaterally, no crackles or wheezing    Abdomen:  No scars, normal bowel sounds, soft, non-distended, non-tender, no masses palpated, no hepatosplenomegally      ASA Class  ASA 3 - Patient with moderate systemic disease with functional limitations    Mallampati Class: 3      ASSESSMENT AND PLAN:    1.  Patient is a suitable candidate for endoscopic procedure and attendant anesthesia  2.  Risks, benefits, alternatives of procedure discussed in detail with patient including risks of bleeding, infection, perforation, risks of sedation, risks of missed lesions. The patient wishes to proceed.

## 2025-04-28 NOTE — ANESTHESIA POSTPROCEDURE EVALUATION
Department of Anesthesiology  Postprocedure Note    Patient: Rubin Arteaga  MRN: [id number]  YOB: 1960  Date of evaluation: 4/28/2025    Procedure Summary       Date: 04/28/25 Room / Location: [address]    Anesthesia Start: 1348 Anesthesia Stop: 1434    Procedures:       EGD W/ANES.      COLONOSCOPY POLYPECTOMY SNARE/BIOPSY Diagnosis:       Anemia, unspecified type      (Anemia, unspecified type [D64.9])    Surgeons: Louie Olson MD Responsible Provider: Obdulia Hernandez MD    Anesthesia Type: TIVA ASA Status: 4            Anesthesia Type: No value filed.    Jeanette Phase I: Jeanette Score: 10    Jeanette Phase II: Jeanette Score: 8    Anesthesia Post Evaluation    Comments: Postoperative Anesthesia Note    Name:    Rubin Arteaga  MRN:      [id number]    Patient Vitals in the past 12 hrs:  04/28/25 1434, BP:105/77, Temp:97.7 °F (36.5 °C), Temp src:Temporal, Pulse:84, Resp:16, SpO2:94 %  04/28/25 1220, BP:133/80, Temp:97.5 °F (36.4 °C), Temp src:Infrared, Pulse:74, Resp:18, SpO2:100 %     LABS:    CBC  Lab Results       Component                Value               Date/Time                  WBC                      4.9                 04/28/2025 12:25 PM        HGB                      14.8                04/28/2025 12:25 PM        HCT                      44.0                04/28/2025 12:25 PM        PLT                      155                 04/28/2025 12:25 PM   RENAL  Lab Results       Component                Value               Date/Time                  NA                       140                 04/28/2025 12:25 PM        K                        3.9                 04/28/2025 12:25 PM        CL                       103                 04/28/2025 12:25 PM        CO2                      25                  04/28/2025 12:25 PM        BUN                      5 (L)               04/28/2025 12:25 PM        CREATININE               1.0

## 2025-04-28 NOTE — DISCHARGE INSTRUCTIONS
PATIENT INSTRUCTIONS  POST-SEDATION    Pathology will show up on the [facility] portal with Dr. Olson's attestation attached. This is different than your MyChart.      IMMEDIATELY FOLLOWING PROCEDURE:    Do not drive or operate machinery for the first twenty four hours after surgery.     Do not make any important decisions for twenty four hours after surgery or while taking narcotic pain medications or sedatives.     You should NOT BE LEFT UNATTENDED OR ALONE. A responsible adult should be with you for the rest of the day of your procedure and also during the night for your protection and safety.    You may experience some light headedness. Rest at home with activity as tolerated. You may not need to go to bed, but it is important to rest for the next 24 hours. You should not engage in athletic sports such as basketball, volleyball, jogging, skating, or activities requiring refined motor skills for 24 hours.   If you develop intractable nausea and vomiting or a severe headache please notify your doctor immediately.   You are not expected to have any fever, but if you feel warm, take your temperature. If you have a fever 101 degrees or higher, call your doctor.     If you have had an Endoscopy:   *Eat lightly for your first meal and gradually resume your normal / prescribed diet. DO NOT eat or drink until your gag reflex returns.   *If you have a sore throat you may use lozenges, or salt water gargles.     If you have had a colonoscopy:   *Do not expect a normal bowel movement for approximately three days due to the cleansing of the large intestine prior to colonoscopy.    ONCE YOU ARE HOME, IF YOU SHOULD HAVE:  Difficulty in breathing, persistent nausea or vomiting, bleeding you feel is excessive, or pain that is unusual, increased abdominal bloating, or any swelling, fever / chills, call your physician. If you cannot contact your physician, but feel that your signs and symptoms need a physician's attention,

## 2025-04-28 NOTE — ANESTHESIA PRE PROCEDURE
GASTROINTESTINAL ENDOSCOPY N/A 2019    EGD BIOPSY performed by Riley Diaz DO at [facility]       Social History:    Social History     Tobacco Use   • Smoking status: Former     Current packs/day: 0.00     Average packs/day: 1 pack/day for 45.0 years (45.0 ttl pk-yrs)     Types: Cigarettes     Start date: 10/21/1978     Quit date: 10/21/2023     Years since quittin.5     Passive exposure: Current   • Smokeless tobacco: Never   Substance Use Topics   • Alcohol use: No                                Counseling given: Not Answered      Vital Signs (Current):   Vitals:    25 1348 25 1220   BP:  133/80   Pulse:  74   Resp:  18   Temp:  97.5 °F (36.4 °C)   TempSrc:  Infrared   SpO2:  100%   Weight: 85.3 kg (188 lb)    Height: 1.727 m (5' 8\")                                               BP Readings from Last 3 Encounters:   25 133/80   25 128/82   24 138/82       NPO Status: Time of last liquid consumption: 800                        Time of last solid consumption:                         Date of last liquid consumption: 25                        Date of last solid food consumption: 25    BMI:   Wt Readings from Last 3 Encounters:   25 85.3 kg (188 lb)   25 78.8 kg (173 lb 12.8 oz)   24 74.6 kg (164 lb 6.4 oz)     Body mass index is 28.59 kg/m².    CBC:   Lab Results   Component Value Date/Time    WBC 4.9 2025 12:25 PM    RBC 5.21 2025 12:25 PM    RBC 3.92 2017 01:10 PM    HGB 14.8 2025 12:25 PM    HCT 44.0 2025 12:25 PM    MCV 84.4 2025 12:25 PM    RDW 16.9 2025 12:25 PM     2025 12:25 PM       CMP:   Lab Results   Component Value Date/Time     2025 12:25 PM    K 3.9 2025 12:25 PM     2025 12:25 PM    CO2 25 2025 12:25 PM    BUN 5 2025 12:25 PM    CREATININE 1.0 2025 12:25 PM    GFRAA >60 2022 07:47 PM    GFRAA >60 
08/28/2012 06:00 AM    AGRATIO 1.2 08/12/2024 11:15 AM    LABGLOM 84 04/28/2025 12:25 PM    LABGLOM >60 02/06/2024 04:59 PM    GLUCOSE 102 04/28/2025 12:25 PM    GLUCOSE 119 06/09/2017 01:10 PM    CALCIUM 9.3 04/28/2025 12:25 PM    BILITOT 1.1 08/12/2024 11:15 AM    ALKPHOS 128 08/12/2024 11:15 AM    AST 11 08/12/2024 11:15 AM    ALT 11 08/12/2024 11:15 AM       POC Tests: No results for input(s): \"POCGLU\", \"POCNA\", \"POCK\", \"POCCL\", \"POCBUN\", \"POCHEMO\", \"POCHCT\" in the last 72 hours.    Coags:   Lab Results   Component Value Date/Time    PROTIME 13.3 02/06/2024 04:59 PM    INR 1.01 02/06/2024 04:59 PM    APTT 29.8 05/11/2016 12:45 PM       HCG (If Applicable): No results found for: \"PREGTESTUR\", \"PREGSERUM\", \"HCG\", \"HCGQUANT\"     ABGs:   Lab Results   Component Value Date/Time    PHART 7.452 10/29/2023 10:31 AM    PO2ART 80.2 10/29/2023 10:31 AM    MJR8BQD 40.5 10/29/2023 10:31 AM    IGF0YHI 27.6 10/29/2023 10:31 AM    BEART 3.4 10/29/2023 10:31 AM    C4BSQHAW 96.3 10/29/2023 10:31 AM        Type & Screen (If Applicable):  No results found for: \"ABORH\", \"LABANTI\"    Drug/Infectious Status (If Applicable):  No results found for: \"HIV\", \"HEPCAB\"    COVID-19 Screening (If Applicable):   Lab Results   Component Value Date/Time    COVID19 NOT DETECTED 10/22/2023 09:07 AM           Anesthesia Evaluation     history of anesthetic complications (post-op ventilation):   Airway: Mallampati: III  TM distance: >3 FB   Neck ROM: full  Mouth opening: > = 3 FB   Dental:    (+) edentulous      Pulmonary:normal exam  breath sounds clear to auscultation  (+)  COPD (as used home O2 in the past):  shortness of breath:                                    Cardiovascular:    (+) hypertension:, CAD: obstructive, CABG/stent: no interval change        Rhythm: regular  Rate: normal                    Neuro/Psych:   (+) neuromuscular disease:            GI/Hepatic/Renal:   (+) GERD:          Endo/Other:                     Abdominal:

## 2025-04-29 ENCOUNTER — RESULTS FOLLOW-UP (OUTPATIENT)
Dept: PULMONOLOGY | Age: 65
End: 2025-04-29

## 2025-05-05 ENCOUNTER — OFFICE VISIT (OUTPATIENT)
Dept: PULMONOLOGY | Age: 65
End: 2025-05-05
Payer: MEDICARE

## 2025-05-05 VITALS
RESPIRATION RATE: 17 BRPM | HEART RATE: 78 BPM | DIASTOLIC BLOOD PRESSURE: 70 MMHG | BODY MASS INDEX: 28.37 KG/M2 | HEIGHT: 68 IN | SYSTOLIC BLOOD PRESSURE: 122 MMHG | OXYGEN SATURATION: 99 % | WEIGHT: 187.2 LBS

## 2025-05-05 DIAGNOSIS — J84.9 ILD (INTERSTITIAL LUNG DISEASE) (HCC): Primary | ICD-10-CM

## 2025-05-05 PROCEDURE — 3078F DIAST BP <80 MM HG: CPT | Performed by: INTERNAL MEDICINE

## 2025-05-05 PROCEDURE — 99214 OFFICE O/P EST MOD 30 MIN: CPT | Performed by: INTERNAL MEDICINE

## 2025-05-05 PROCEDURE — 3074F SYST BP LT 130 MM HG: CPT | Performed by: INTERNAL MEDICINE

## 2025-05-05 PROCEDURE — G8427 DOCREV CUR MEDS BY ELIG CLIN: HCPCS | Performed by: INTERNAL MEDICINE

## 2025-05-05 PROCEDURE — G8419 CALC BMI OUT NRM PARAM NOF/U: HCPCS | Performed by: INTERNAL MEDICINE

## 2025-05-05 PROCEDURE — 3017F COLORECTAL CA SCREEN DOC REV: CPT | Performed by: INTERNAL MEDICINE

## 2025-05-05 PROCEDURE — 1036F TOBACCO NON-USER: CPT | Performed by: INTERNAL MEDICINE

## 2025-05-05 NOTE — PROGRESS NOTES
P  [facility]                                               Pulmonary Clinic Consult     I had the pleasure of seeing  Rubin Arteaga     Chief Complaint   Patient presents with    Follow-up     PFT/CT 4-21         HISTORY OF PRESENT ILLNESS:    Rubin Arteaga is a 65 y.o. year old  Who start smoking at age  14  And increase gradually   1.5 pack daily ,he still smoke    He was diagnosed 10 year,he received pills as he states ,he was done with therapy long time and was told he is in remission     The Patient comes in with SOB that has been going on  for the last few years Associated with .cough and he usually has clear sputum    He states that it get worse with exercise or walking long distance and he can walk less 300 feet- 1/2 And go 1-2 flight of stairs before get short winded        Patient underwent biopsy JERROD ,central nodule and was negative   Culture al was negative  Unfortunately he continue to smoke   His cough and SOB has improved  Could not tolerate Trelegy   PFT does 10 /22      Today Visit  Still doing great and activity has even get better  NO ROCHE   On RA    Mild swelling legs   Ambulation is any issues   Histo negative   Lost weight   He is on stiloto     ALLERGIES:    Allergies   Allergen Reactions    Wellbutrin [Bupropion] Hallucinations    Trimethoprim     Codeine Nausea Only     Pt tolerates Oxycodone    Sulfa Antibiotics      States makes yeast infection on his penis    Sulfamethoxazole-Trimethoprim Other (See Comments)     States makes yeast infection on his penis         PAST MEDICAL HISTORY:       Diagnosis Date    Arthritis 1/2011    SHOULDERS AND KNEES    CAD (coronary artery disease)     Chronic back pain     COPD (chronic obstructive pulmonary disease) ([facility])     GERD (gastroesophageal reflux disease)     ONCE WEEKLY TAKE TUMS    Hyperlipidemia     Hypertension     Kidney stone     Lumbar herniated disc     Chronic Pain    Lymphoma of

## 2025-05-16 NOTE — TELEPHONE ENCOUNTER
----- Message from Dr. Sury Cooper MD sent at 4/29/2025 10:01 PM EDT -----  Stable CT and stable nodule and stable fibrosis  ----- Message -----  From: Mami Clements Incoming Radiology Results From [facility]  Sent: 4/22/2025  12:17 PM EDT  To: Sury Cooper MD

## 2025-07-21 ENCOUNTER — TRANSCRIBE ORDERS (OUTPATIENT)
Dept: ADMINISTRATIVE | Age: 65
End: 2025-07-21

## 2025-07-21 DIAGNOSIS — Z85.72 HISTORY OF LYMPHOMA: Primary | ICD-10-CM

## 2025-07-31 RX ORDER — TIOTROPIUM BROMIDE AND OLODATEROL 3.124; 2.736 UG/1; UG/1
SPRAY, METERED RESPIRATORY (INHALATION)
Qty: 4 G | Refills: 2 | Status: SHIPPED | OUTPATIENT
Start: 2025-07-31

## 2025-08-09 DIAGNOSIS — E78.2 MIXED HYPERLIPIDEMIA: ICD-10-CM

## 2025-08-12 RX ORDER — ATORVASTATIN CALCIUM 80 MG/1
80 TABLET, FILM COATED ORAL NIGHTLY
Qty: 90 TABLET | Refills: 3 | Status: SHIPPED | OUTPATIENT
Start: 2025-08-12

## (undated) DEVICE — BRUSH CYTO L150CM CHN L2MM BRIST DIA1.9MM PTFE S STL BULL

## (undated) DEVICE — FORCEPS BX PED L160CM JAW DIA1.8MM WRK CHN 2MM W/ NDL DISP

## (undated) DEVICE — BRONCHOSCOPES MONARCH

## (undated) DEVICE — ELECTRODE PT RET AD L9FT HI MOIST COND ADH HYDRGEL CORDED

## (undated) DEVICE — SINGLE USE SUCTION VALVE MAJ-209: Brand: SINGLE USE SUCTION VALVE (STERILE)

## (undated) DEVICE — FORCEP BX STD CAP 240CM RAD JAW 4

## (undated) DEVICE — ELECTRODE ECG MONITR FOAM TEAR DROP ADLT RED

## (undated) DEVICE — NEEDLE ASPIR 21GA L700MM US GUID TREAT DST END FOR EFFICIENT

## (undated) DEVICE — CONMED SCOPE SAVER BITE BLOCK, 20X27 MM: Brand: SCOPE SAVER

## (undated) DEVICE — ENDO CARRY-ON PROCEDURE KIT INCLUDES SUCTION TUBING, LUBRICANT, GAUZE, BIOHAZARD STICKER, TRANSPORT PAD AND INTERCEPT BEDSIDE KIT.: Brand: ENDO CARRY-ON PROCEDURE KIT

## (undated) DEVICE — SYRINGE MED 10ML SLIP TIP BLNT FILL AND LUERLOCK DISP

## (undated) DEVICE — Device: Brand: SINGLE USE ELECTROSURGICAL SNARE SD-400

## (undated) DEVICE — SYRINGE MED 50ML LUERSLIP TIP

## (undated) DEVICE — SINGLE USE BIOPSY VALVE MAJ-210: Brand: SINGLE USE BIOPSY VALVE (STERILE)

## (undated) DEVICE — YANKAUER,BULB TIP,W/O VENT,RIGID,STERILE: Brand: MEDLINE

## (undated) DEVICE — SHEET,DRAPE,40X58,STERILE: Brand: MEDLINE

## (undated) DEVICE — CANNULA,OXY,ADULT,SUPERSOFT,W/7'TUB,SC: Brand: MEDLINE INDUSTRIES, INC.

## (undated) DEVICE — ENDOSCOPIC KIT 2 12 FT OP4 DE2 GWN SYR

## (undated) DEVICE — ELECTRODE,RADIOTRANSLUCENT,FOAM,3PK: Brand: MEDLINE

## (undated) DEVICE — TRAP POLYP ETRAP